# Patient Record
Sex: FEMALE | Employment: OTHER | URBAN - METROPOLITAN AREA
[De-identification: names, ages, dates, MRNs, and addresses within clinical notes are randomized per-mention and may not be internally consistent; named-entity substitution may affect disease eponyms.]

---

## 2017-05-24 ENCOUNTER — TELEPHONE (OUTPATIENT)
Dept: OBGYN | Facility: CLINIC | Age: 47
End: 2017-05-24

## 2017-05-24 NOTE — TELEPHONE ENCOUNTER
[facility] Call Center    Phone Message    Name of Caller: Sheryle    Phone Number: Cell number on file:    Telephone Information:   Mobile [phone]       Best time to return call: any    May a detailed message be left on voicemail: yes    Relation to patient: Self    Reason for Call: Other: Patient called being very insistent on being scheduled with Dr. Bosch for back pain. Informed patient that isnt not something Dr. Bosch sees for. Patient again very insistent on seeing Dr. Bosch in June for back pain. Patient is currently scheduled in [place] to see Dr. Bosch on 06/27/2017. Please advise,      Action Taken: Message routed to:  [facility] p [phone]

## 2017-05-25 NOTE — TELEPHONE ENCOUNTER
Called and left a message to call the clinic back. Per Dr. Bosch this PT needs to be seen in FP appointment needs to be canceled.  Juanita Palumbo, CMA

## 2017-05-25 NOTE — TELEPHONE ENCOUNTER
Patient returned call.  Informed patient that she would have to see family practice for her back pain.  Patient states she also has personal issues she would like to discuss with Dr. Bosch.  Patient states they are gyn related.  Informed patient that Dr. Bosch will see her for her personal issues but not back pain.  Patient would like to do research on the family practice providers and will call and schedule.  Keturah Tompkins CMA  May 25, 2017 11:58 AM

## 2017-06-12 ENCOUNTER — OFFICE VISIT (OUTPATIENT)
Dept: PEDIATRICS | Facility: CLINIC | Age: 47
End: 2017-06-12
Payer: COMMERCIAL

## 2017-06-12 VITALS
BODY MASS INDEX: 22.55 KG/M2 | HEIGHT: 64 IN | HEART RATE: 120 BPM | SYSTOLIC BLOOD PRESSURE: 111 MMHG | TEMPERATURE: 97.6 F | DIASTOLIC BLOOD PRESSURE: 70 MMHG | WEIGHT: 132.1 LBS | OXYGEN SATURATION: 95 %

## 2017-06-12 DIAGNOSIS — R10.9 RIGHT FLANK PAIN: Primary | ICD-10-CM

## 2017-06-12 DIAGNOSIS — E55.9 VITAMIN D INSUFFICIENCY: ICD-10-CM

## 2017-06-12 DIAGNOSIS — Z13.0 SCREENING, DEFICIENCY ANEMIA, IRON: ICD-10-CM

## 2017-06-12 DIAGNOSIS — Z13.6 CARDIOVASCULAR SCREENING; LDL GOAL LESS THAN 160: ICD-10-CM

## 2017-06-12 DIAGNOSIS — Z13.29 SCREENING FOR THYROID DISORDER: ICD-10-CM

## 2017-06-12 DIAGNOSIS — Z13.1 SCREENING FOR DIABETES MELLITUS: ICD-10-CM

## 2017-06-12 PROCEDURE — 99214 OFFICE O/P EST MOD 30 MIN: CPT | Performed by: NURSE PRACTITIONER

## 2017-06-12 NOTE — PATIENT INSTRUCTIONS
PLAN:   1.  Orders Placed This Encounter   Procedures     CT Abdomen Pelvis w/o Contrast     Lipid panel reflex to direct LDL     Comprehensive metabolic panel     Vitamin D Deficiency     TSH     T4 free     CBC with platelets     Vitamin B12     Ferritin       2. Patient needs to follow up in if no improvement,or sooner if worsening of symptoms or other symptoms develop.  FURTHER TESTING:       - CT abdomen and pelvis  FUTURE LABS:       - Schedule a fasting blood draw   Will follow up and/or notify patient of  results via My Chart to determine further need for followup      It was a pleasure seeing you today at the [facility]. Thank you for allowing us to care for you today. We truly hope we provided you with the excellent service you deserve. Please let us know if there is anything else we can do for you so we can be sure you are leaving completley satisfied with your care experience.       General information about your clinic   Clinic Hours Lab Hours (Appointments are required)   Mon-Thurs: 7:30 AM - 7 PM Mon-Thurs: 7:30 AM - 7 PM   Fri: 7:30 AM - 5 PM Fri: 7:30 AM - 5 PM        After Hours Nurse Advise & Appts:  [facility] Nurse Advisors: [phone]  [facility] On Call: to make appointments anytime: [phone] On Call Physician: call [phone] and answering service will page the on call physician.        For urgent appointments, please call [phone] and ask for the triage nurse or your care team clinic nurse.  How to contact my care team:  MyChart: [email or web address]   Phone: [phone]   Fax: [phone]       [facility]:   Phone: [phone]  Hours: 8:00 AM - 6:00 PM  Medication Refills:  Call your pharmacy and they will forward the refill to us. Please allow 3 business days for your refills to be completed.       Normal or non-critical lab and imaging results will be communicated to you by MyChart, letter or phone within 7 days.  If you do not hear  from us within 10 days, please call the clinic. If you have a critical or abnormal lab result, we will notify you by phone as soon as possible.       We now have PWIC (Pediatric Walk in Care)  Monday-Friday from 7:30-4. Simply walk in and be seen for your urgent needs like cough, fever, rash, diarrhea or vomiting, pink eye, UTI. No appointments needed. Ask one of the team for more information      -Your Care Team:    Dr. Stephanie Theodore - Internal Medicine/Pediatrics   Dr. Shirley Wesley - Family Medicine  Dr. Loli Nichols - Pediatrics  Katie Billy CNP - Family Practice Nurse Practitioner  Dr. Vale Santillan - Pediatrics  Dr. Aron Freedman - Internal Medicine

## 2017-06-12 NOTE — MR AVS SNAPSHOT
After Visit Summary   6/12/2017    Sheryle Cruse    MRN: [id number]           Patient Information     Date Of Birth          1970        Visit Information        Provider Department      6/12/2017 9:00 AM Katie Billy APRN CNP [facility]        Today's Diagnoses     Right flank pain    -  1    CARDIOVASCULAR SCREENING; LDL GOAL LESS THAN 160        Screening for thyroid disorder        Screening for diabetes mellitus        Vitamin D insufficiency        Screening, deficiency anemia, iron          Care Instructions    PLAN:   1.  Orders Placed This Encounter   Procedures     CT Abdomen Pelvis w/o Contrast     Lipid panel reflex to direct LDL     Comprehensive metabolic panel     Vitamin D Deficiency     TSH     T4 free     CBC with platelets     Vitamin B12     Ferritin       2. Patient needs to follow up in if no improvement,or sooner if worsening of symptoms or other symptoms develop.  FURTHER TESTING:       - CT abdomen and pelvis  FUTURE LABS:       - Schedule a fasting blood draw   Will follow up and/or notify patient of  results via My Chart to determine further need for followup      It was a pleasure seeing you today at the [facility]. Thank you for allowing us to care for you today. We truly hope we provided you with the excellent service you deserve. Please let us know if there is anything else we can do for you so we can be sure you are leaving completley satisfied with your care experience.       General information about your clinic   Clinic Hours Lab Hours (Appointments are required)   Mon-Thurs: 7:30 AM - 7 PM Mon-Thurs: 7:30 AM - 7 PM   Fri: 7:30 AM - 5 PM Fri: 7:30 AM - 5 PM        After Hours Nurse Advise & Appts:  [facility] Nurse Advisors: [phone]  [facility] On Call: to make appointments anytime: [phone] On Call Physician: call [phone] and answering service will page the on call physician.        For  urgent appointments, please call [phone] and ask for the triage nurse or your care team clinic nurse.  How to contact my care team:  Michael: [email or web address]   Phone: [phone]   Fax: [phone]       [facility]:   Phone: [phone]  Hours: 8:00 AM - 6:00 PM  Medication Refills:  Call your pharmacy and they will forward the refill to us. Please allow 3 business days for your refills to be completed.       Normal or non-critical lab and imaging results will be communicated to you by MyChart, letter or phone within 7 days.  If you do not hear from us within 10 days, please call the clinic. If you have a critical or abnormal lab result, we will notify you by phone as soon as possible.       We now have PWIC (Pediatric Walk in Care)  Monday-Friday from 7:30-4. Simply walk in and be seen for your urgent needs like cough, fever, rash, diarrhea or vomiting, pink eye, UTI. No appointments needed. Ask one of the team for more information      -Your Care Team:    Dr. Stephanie Theodore - Internal Medicine/Pediatrics   Dr. Shirley Wesley - Family Medicine  Dr. Loli Nichols - Pediatrics  Katie Billy CNP - Family Practice Nurse Practitioner  Dr. Vale Santillan - Pediatrics  Dr. Aron Freedman - Internal Medicine                      Follow-ups after your visit        Your next 10 appointments already scheduled     Jun 27, 2017  9:15 AM CDT   Office Visit with Griselda Bosch DO   [facility] ([facility])    [address] [place] [id number] [phone]           Bring a current list of meds and any records pertaining to this visit.  For Physicals, please bring immunization records and any forms needing to be filled out.  Please arrive 10 minutes early to complete paperwork.              Future tests that were ordered for you today     Open Future Orders        Priority Expected Expires Ordered    UA with Microscopic reflex to Culture Routine  6/12/2018  2017    CT Abdomen Pelvis w/o Contrast Routine 2017    Lipid panel reflex to direct LDL Routine  2017    Comprehensive metabolic panel Routine  2017    Vitamin D Deficiency Routine  2017    TSH Routine  2017    T4 free Routine  2017    CBC with platelets Routine  2017    Vitamin B12 Routine  2017    Ferritin Routine  2017            Who to contact     If you have questions or need follow up information about today's clinic visit or your schedule please contact [facility] directly at [phone].  Normal or non-critical lab and imaging results will be communicated to you by qunbhart, letter or phone within 4 business days after the clinic has received the results. If you do not hear from us within 7 days, please contact the clinic through qunbhart or phone. If you have a critical or abnormal lab result, we will notify you by phone as soon as possible.  Submit refill requests through [employer] or call your pharmacy and they will forward the refill request to us. Please allow 3 business days for your refill to be completed.          Additional Information About Your Visit        [employer] Information     [employer] is an electronic gateway that provides easy, online access to your medical records. With [employer], you can request a clinic appointment, read your test results, renew a prescription or communicate with your care team.     To sign up for [employer] visit the website at [email or web address]   You will be asked to enter the access code listed below, as well as some personal information. Please follow the directions to create your username and password.     Your access code is: [email or web address]  Expires: 9/10/2017  9:58 AM     Your access code will  in 90 days. If you need help or a new code, please contact your [facility]  [facility] or call [phone] for assistance.        Care EveryWhere ID     This is your Care EveryWhere ID. This could be used by other organizations to access your [facility] medical records  [id number]        Your Vitals Were     Pulse Temperature Height Pulse Oximetry Breastfeeding? BMI (Body Mass Index)    120 97.6  F (36.4  C) (Temporal) 5' 4\" (1.626 m) 95% No 22.67 kg/m2       Blood Pressure from Last 3 Encounters:   06/12/17 111/70   08/19/16 117/79   11/21/14 128/73    Weight from Last 3 Encounters:   06/12/17 132 lb 1.6 oz (59.9 kg)   09/27/16 130 lb (59 kg)   08/19/16 125 lb 8 oz (56.9 kg)               Primary Care Provider    [facility]       No address on file        Thank you!     Thank you for choosing [facility]  for your care. Our goal is always to provide you with excellent care. Hearing back from our patients is one way we can continue to improve our services. Please take a few minutes to complete the written survey that you may receive in the mail after your visit with us. Thank you!             Your Updated Medication List - Protect others around you: Learn how to safely use, store and throw away your medicines at www.disposemymeds.org.          This list is accurate as of: 6/12/17  9:59 AM.  Always use your most recent med list.                   Brand Name Dispense Instructions for use    CALCIUM MAGNESIUM PO      With zinc       CHROMIUM PICOLINATE PO          FOLIC ACID PO      Take 1 mg by mouth daily       MILK THISTLE PO      With dandelion root       ONE-A-DAY ESSENTIAL Tabs      Take 1 tablet by mouth daily.       UNABLE TO FIND      Take 1 tablet by mouth daily. hepacaps supplement       Vitamin B 12 100 MCG Lozg          VITAMIN D PO            "

## 2017-06-12 NOTE — PROGRESS NOTES
SUBJECTIVE:                                                    Sheryle Cruse is a 46 year old female who presents to clinic today for the following health issues:    Back Pain      Duration: worst in the 3 months        Specific cause: none    Description:   Location of pain: low back right, middle of back right and upper back right  Character of pain: tenderness   Pain radiation:radiates into the right buttocks into the pelvic regions-feels it more when eating  New numbness or weakness in legs, not attributed to pain:  no     Intensity: Currently 2-3/10    History:   Pain interferes with job: YES, makes it more difficult   History of back problems: recurrent self limited episodes of low back pain in the past  Any previous MRI or X-rays: Yes- at [facility].  Date 10/26/16  Sees a specialist for back pain:  No  Therapies tried without relief: Physical Therapy, stretching     Alleviating factors:   Improved by: feels better in the morning       Precipitating factors:  Worsened by: after eating , sitting     Functional and Psychosocial Screen (Noemi STarT Back):      Not performed today       Accompanying Signs & Symptoms:  Risk of Fracture:  None  Risk of Cauda Equina:  None  Risk of Infection:  None  Risk of Cancer:  None  Risk of Ankylosing Spondylitis:  Onset at age <35, male, AND morning back stiffness. no                  Has a history of periodical issues with her back before  Has done physical therapy in the past  Pain in high in upper back and will radiate down her right side almost down to her hip   Pain in the right flank region and then up the side   States has issues with her mid section since 2008   States has had tests in the past to rule out PCOS   Thought was cyst on ovary but that was in 2008   Now having lessoning of her menses.  Have had issues with her menses when younger   Last pap was done about 5 years ago and was done a clinic not in existence any longer     Problem list and histories reviewed  & adjusted, as indicated.  Additional history: as documented    Patient Active Problem List   Diagnosis     Vitamin D deficiency     Vegetarianism     Hyperlipidemia LDL goal <160     Advance care planning     Past Surgical History:   Procedure Laterality Date     Mammogram  2003, 2009       Social History   Substance Use Topics     Smoking status: Never Smoker     Smokeless tobacco: Never Used     Alcohol use No     Family History   Problem Relation Age of Onset     DIABETES Mother 76     Obese     HEART DISEASE Mother      Hypertension Mother      CEREBROVASCULAR DISEASE Mother      Cardiovascular Mother      CVA     CANCER Father      bladder     CEREBROVASCULAR DISEASE Father 70     Breast Cancer Maternal Grandmother      CEREBROVASCULAR DISEASE Maternal Grandmother      Lipids Maternal Grandmother      HEART DISEASE Maternal Grandfather      heart disease     DIABETES Paternal Grandmother      CEREBROVASCULAR DISEASE Paternal Grandfather      HEART DISEASE Paternal Grandfather          Current Outpatient Prescriptions   Medication Sig Dispense Refill     FOLIC ACID PO Take 1 mg by mouth daily       MILK THISTLE PO With dandelion root       CHROMIUM PICOLINATE PO        Calcium-Magnesium-Vitamin D (CALCIUM MAGNESIUM PO) With zinc       Cyanocobalamin (VITAMIN B 12) 100 MCG LOZG        Cholecalciferol (VITAMIN D PO)        Multiple Vitamin (ONE-A-DAY ESSENTIAL) TABS Take 1 tablet by mouth daily.       UNABLE TO FIND Take 1 tablet by mouth daily. hepacaps supplement       Allergies   Allergen Reactions     Pcn [Bicillin C-R,] Rash     swelling       Reviewed and updated as needed this visit by clinical staff  Tobacco  Allergies  Meds  Med Hx  Surg Hx  Fam Hx  Soc Hx      Reviewed and updated as needed this visit by Provider         ROS:  CONSTITUTIONAL:POSITIVE  for fatigue and NEGATIVE  for anorexia  INTEGUMENTARY/SKIN: NEGATIVE for rash   ENT/MOUTH: NEGATIVE for ear, mouth and throat  "problems  RESP:NEGATIVE for significant cough or SOB  CV: NEGATIVE for chest pain, palpitations or peripheral edema  GI: POSITIVE for right flank pain  and NEGATIVE for nausea, poor appetite, vomiting and weight loss  MUSCULOSKELETAL: NEGATIVE for significant arthralgias or myalgia  NEURO: NEGATIVE for weakness, dizziness or paresthesias  ENDOCRINE: POSITIVE  for hair loss and history of PCOS  and NEGATIVE for polydipsia, polyphagia, weight gain and weight loss  PSYCHIATRIC: POSITIVE foranxiety and Hx anxiety and NEGATIVE forthoughts of hurting someone else and thoughts of self harm      OBJECTIVE:                                                    /70 (BP Location: Right arm, Patient Position: Sitting, Cuff Size: Adult Regular)  Pulse 120  Temp 97.6  F (36.4  C) (Temporal)  Ht 5' 4\" (1.626 m)  Wt 132 lb 1.6 oz (59.9 kg)  SpO2 95%  Breastfeeding? No  BMI 22.67 kg/m2  Body mass index is 22.67 kg/(m^2).  GENERAL APPEARANCE: alert, active and no distress  RESP: lungs clear to auscultation - no rales, rhonchi or wheezes  CV: regular rates and rhythm and no murmur, click or rub  MS: extremities normal- no gross deformities noted and peripheral pulses normal  SKIN: no suspicious lesions or rashes  NEURO: Normal strength and tone, mentation intact and speech normal  PSYCH: alertness: alert, affect: anxious, thought content exhibits flight of ideas,mentation appears normal., patient appears normal, good hygiene, oriented X 3  MENTAL STATUS EXAM:  Appearance/Behavior: No apparent distress, Casually groomed and Dressed appropriately for weather  Speech: Rambling  Mood/Affect: anxiety  Insight: Adequate and Fair    Diagnostic Test Results:  Recent Results (from the past 504 hour(s))   UA with Microscopic reflex to Culture    Collection Time: 06/16/17  7:49 AM   Result Value Ref Range    Color Urine Straw     Appearance Urine Clear     Glucose Urine Negative NEG mg/dL    Bilirubin Urine Negative NEG    Ketones " Urine Negative NEG mg/dL    Specific Gravity Urine 1.000 (L) 1.003 - 1.035    Blood Urine Negative NEG    pH Urine 7.0 5.0 - 7.0 pH    Protein Albumin Urine Negative NEG mg/dL    Urobilinogen mg/dL Normal 0.0 - 2.0 mg/dL    Nitrite Urine Negative NEG    Leukocyte Esterase Urine Trace (A) NEG    Source Midstream Urine     WBC Urine 2-5 (A) 0 - 2 /HPF    RBC Urine O - 2 0 - 2 /HPF    Bacteria Urine Few (A) NEG /HPF    Squamous Epithelial /LPF Urine Few FEW /LPF   Lipid panel reflex to direct LDL    Collection Time: 06/16/17  7:50 AM   Result Value Ref Range    Cholesterol 178 <200 mg/dL    Triglycerides 119 <150 mg/dL    HDL Cholesterol 57 >49 mg/dL    LDL Cholesterol Calculated 97 <100 mg/dL    Non HDL Cholesterol 121 <130 mg/dL   Comprehensive metabolic panel    Collection Time: 06/16/17  7:50 AM   Result Value Ref Range    Sodium 135 133 - 144 mmol/L    Potassium 4.3 3.4 - 5.3 mmol/L    Chloride 100 94 - 109 mmol/L    Carbon Dioxide 27 20 - 32 mmol/L    Anion Gap 8 3 - 14 mmol/L    Glucose 95 70 - 99 mg/dL    Urea Nitrogen 4 (L) 7 - 30 mg/dL    Creatinine 0.56 0.52 - 1.04 mg/dL    GFR Estimate >90  Non  GFR Calc   >60 mL/min/1.7m2    GFR Estimate If Black >90   GFR Calc   >60 mL/min/1.7m2    Calcium 9.0 8.5 - 10.1 mg/dL    Bilirubin Total 0.8 0.2 - 1.3 mg/dL    Albumin 4.1 3.4 - 5.0 g/dL    Protein Total 7.9 6.8 - 8.8 g/dL    Alkaline Phosphatase 54 40 - 150 U/L    ALT 24 0 - 50 U/L    AST 21 0 - 45 U/L   Vitamin D Deficiency    Collection Time: 06/16/17  7:50 AM   Result Value Ref Range    Vitamin D Deficiency screening 31 20 - 75 ug/L   TSH    Collection Time: 06/16/17  7:50 AM   Result Value Ref Range    TSH 1.40 0.40 - 4.00 mU/L   T4 free    Collection Time: 06/16/17  7:50 AM   Result Value Ref Range    T4 Free 1.16 0.76 - 1.46 ng/dL   CBC with platelets    Collection Time: 06/16/17  7:50 AM   Result Value Ref Range    WBC 6.2 4.0 - 11.0 10e9/L    RBC Count 4.06 3.8 - 5.2 10e12/L     Hemoglobin 12.7 11.7 - 15.7 g/dL    Hematocrit 36.3 35.0 - 47.0 %    MCV 89 78 - 100 fl    MCH 31.3 26.5 - 33.0 pg    MCHC 35.0 31.5 - 36.5 g/dL    RDW 12.7 10.0 - 15.0 %    Platelet Count 364 150 - 450 10e9/L   Vitamin B12    Collection Time: 06/16/17  7:50 AM   Result Value Ref Range    Vitamin B12 947 193 - 986 pg/mL   Ferritin    Collection Time: 06/16/17  7:50 AM   Result Value Ref Range    Ferritin 161 8 - 252 ng/mL          Recent Results (from the past 744 hour(s))   CT Abdomen Pelvis w/o Contrast    Narrative    CT abdomen/pelvis without contrast, Renal Stone Protocol, 6/16/2017.    Comparison: Not available.    History: Unspecified abdominal pain. Right flank pain    Technique: Routine images from the level of the diaphragm through the  pelvis were obtained without contrast.    Total DLP: 463 mGy*cm.    Findings:  Kidneys and collecting systems:  Normal-appearing kidneys without evidence of calculi in both kidneys  and ureters. No hydronephrosis. Urinary bladder is distended and  unremarkable.    Remainder of abdomen and pelvis. Normal nonenhanced appearance of the  liver. The gallbladder is unremarkable. Pancreas, spleen, and adrenals  are within normal limits. Moderate colonic stool burden. No bowel  obstruction. Normal appendix. The uterus and the adnexa appear grossly  unremarkable. A small free fluid within the pelvis likely physiologic.      Lower chest:  Visualized lung bases are clear.    Bones:  Lumbar spine spondylosis more pronounced at level L4-5. Scattered  sclerotic foci likely represent bone islands. Sacral Tarlov cysts. No  acute or aggressive appearing bone lesion.      Impression    Impression:  1. No calculi in either kidney or collecting system.  2. Small volume free fluid in the pelvis, most likely physiologic.  3. Otherwise no CT findings to explain the patient's symptoms.    I have personally reviewed the examination and initial interpretation  and I agree with the  findings.    KANDI GILLIAM MD       ASSESSMENT/PLAN:                                                        Sheryle was seen today for back pain.    Diagnoses and all orders for this visit:    Right flank pain  -     Comprehensive metabolic panel; Future  -     CBC with platelets; Future  -     CT Abdomen Pelvis w/o Contrast; Future  -     UA with Microscopic reflex to Culture; Future    CARDIOVASCULAR SCREENING; LDL GOAL LESS THAN 160  -     Lipid panel reflex to direct LDL; Future    Screening for thyroid disorder  -     TSH; Future  -     T4 free; Future    Screening for diabetes mellitus  -     Comprehensive metabolic panel; Future    Vitamin D insufficiency  -     Vitamin D Deficiency; Future    Screening, deficiency anemia, iron  -     CBC with platelets; Future  -     Vitamin B12; Future  -     Ferritin; Future    PLAN:   Patient needs to follow up in if no improvement,or sooner if worsening of symptoms or other symptoms develop.  FURTHER TESTING:       - CT abdomen and pelvis  FUTURE LABS:       - Schedule a fasting blood draw   Will follow up and/or notify patient of  results via My Chart to determine further need for followup    See Patient Instructions    GILDARDO Michaels [facility]

## 2017-06-12 NOTE — NURSING NOTE
"Chief Complaint   Patient presents with     Back Pain     ongoing right sided back pain, worst since x 3 months       Initial /70 (BP Location: Right arm, Patient Position: Sitting, Cuff Size: Adult Regular)  Pulse 120  Temp 97.6  F (36.4  C) (Temporal)  Ht 5' 4\" (1.626 m)  Wt 132 lb 1.6 oz (59.9 kg)  SpO2 95%  Breastfeeding? No  BMI 22.67 kg/m2 Estimated body mass index is 22.67 kg/(m^2) as calculated from the following:    Height as of this encounter: 5' 4\" (1.626 m).    Weight as of this encounter: 132 lb 1.6 oz (59.9 kg).  Medication Reconciliation: complete      EDIE Ley      "

## 2017-06-16 ENCOUNTER — RADIANT APPOINTMENT (OUTPATIENT)
Dept: CT IMAGING | Facility: CLINIC | Age: 47
End: 2017-06-16
Attending: NURSE PRACTITIONER
Payer: COMMERCIAL

## 2017-06-16 DIAGNOSIS — E55.9 VITAMIN D INSUFFICIENCY: ICD-10-CM

## 2017-06-16 DIAGNOSIS — Z13.1 SCREENING FOR DIABETES MELLITUS: ICD-10-CM

## 2017-06-16 DIAGNOSIS — R10.9 RIGHT FLANK PAIN: ICD-10-CM

## 2017-06-16 DIAGNOSIS — Z13.6 CARDIOVASCULAR SCREENING; LDL GOAL LESS THAN 160: ICD-10-CM

## 2017-06-16 DIAGNOSIS — Z13.0 SCREENING, DEFICIENCY ANEMIA, IRON: ICD-10-CM

## 2017-06-16 DIAGNOSIS — Z13.29 SCREENING FOR THYROID DISORDER: ICD-10-CM

## 2017-06-16 LAB
ALBUMIN SERPL-MCNC: 4.1 G/DL (ref 3.4–5)
ALBUMIN UR-MCNC: NEGATIVE MG/DL
ALP SERPL-CCNC: 54 U/L (ref 40–150)
ALT SERPL W P-5'-P-CCNC: 24 U/L (ref 0–50)
ANION GAP SERPL CALCULATED.3IONS-SCNC: 8 MMOL/L (ref 3–14)
APPEARANCE UR: CLEAR
AST SERPL W P-5'-P-CCNC: 21 U/L (ref 0–45)
BACTERIA #/AREA URNS HPF: ABNORMAL /HPF
BILIRUB SERPL-MCNC: 0.8 MG/DL (ref 0.2–1.3)
BILIRUB UR QL STRIP: NEGATIVE
BUN SERPL-MCNC: 4 MG/DL (ref 7–30)
CALCIUM SERPL-MCNC: 9 MG/DL (ref 8.5–10.1)
CHLORIDE SERPL-SCNC: 100 MMOL/L (ref 94–109)
CHOLEST SERPL-MCNC: 178 MG/DL
CO2 SERPL-SCNC: 27 MMOL/L (ref 20–32)
COLOR UR AUTO: ABNORMAL
CREAT SERPL-MCNC: 0.56 MG/DL (ref 0.52–1.04)
DEPRECATED CALCIDIOL+CALCIFEROL SERPL-MC: 31 UG/L (ref 20–75)
ERYTHROCYTE [DISTWIDTH] IN BLOOD BY AUTOMATED COUNT: 12.7 % (ref 10–15)
FERRITIN SERPL-MCNC: 161 NG/ML (ref 8–252)
GFR SERPL CREATININE-BSD FRML MDRD: ABNORMAL ML/MIN/1.7M2
GLUCOSE SERPL-MCNC: 95 MG/DL (ref 70–99)
GLUCOSE UR STRIP-MCNC: NEGATIVE MG/DL
HCT VFR BLD AUTO: 36.3 % (ref 35–47)
HDLC SERPL-MCNC: 57 MG/DL
HGB BLD-MCNC: 12.7 G/DL (ref 11.7–15.7)
HGB UR QL STRIP: NEGATIVE
KETONES UR STRIP-MCNC: NEGATIVE MG/DL
LDLC SERPL CALC-MCNC: 97 MG/DL
LEUKOCYTE ESTERASE UR QL STRIP: ABNORMAL
MCH RBC QN AUTO: 31.3 PG (ref 26.5–33)
MCHC RBC AUTO-ENTMCNC: 35 G/DL (ref 31.5–36.5)
MCV RBC AUTO: 89 FL (ref 78–100)
NITRATE UR QL: NEGATIVE
NON-SQ EPI CELLS #/AREA URNS LPF: ABNORMAL /LPF
NONHDLC SERPL-MCNC: 121 MG/DL
PH UR STRIP: 7 PH (ref 5–7)
PLATELET # BLD AUTO: 364 10E9/L (ref 150–450)
POTASSIUM SERPL-SCNC: 4.3 MMOL/L (ref 3.4–5.3)
PROT SERPL-MCNC: 7.9 G/DL (ref 6.8–8.8)
RBC # BLD AUTO: 4.06 10E12/L (ref 3.8–5.2)
RBC #/AREA URNS AUTO: ABNORMAL /HPF (ref 0–2)
SODIUM SERPL-SCNC: 135 MMOL/L (ref 133–144)
SP GR UR STRIP: 1 (ref 1–1.03)
T4 FREE SERPL-MCNC: 1.16 NG/DL (ref 0.76–1.46)
TRIGL SERPL-MCNC: 119 MG/DL
TSH SERPL DL<=0.05 MIU/L-ACNC: 1.4 MU/L (ref 0.4–4)
URN SPEC COLLECT METH UR: ABNORMAL
UROBILINOGEN UR STRIP-MCNC: NORMAL MG/DL (ref 0–2)
VIT B12 SERPL-MCNC: 947 PG/ML (ref 193–986)
WBC # BLD AUTO: 6.2 10E9/L (ref 4–11)
WBC #/AREA URNS AUTO: ABNORMAL /HPF (ref 0–2)

## 2017-06-16 PROCEDURE — 74176 CT ABD & PELVIS W/O CONTRAST: CPT | Performed by: RADIOLOGY

## 2017-06-16 PROCEDURE — 80053 COMPREHEN METABOLIC PANEL: CPT | Performed by: NURSE PRACTITIONER

## 2017-06-16 PROCEDURE — 81001 URINALYSIS AUTO W/SCOPE: CPT | Performed by: NURSE PRACTITIONER

## 2017-06-16 PROCEDURE — 82306 VITAMIN D 25 HYDROXY: CPT | Performed by: NURSE PRACTITIONER

## 2017-06-16 PROCEDURE — 82607 VITAMIN B-12: CPT | Performed by: NURSE PRACTITIONER

## 2017-06-16 PROCEDURE — 85027 COMPLETE CBC AUTOMATED: CPT | Performed by: NURSE PRACTITIONER

## 2017-06-16 PROCEDURE — 84443 ASSAY THYROID STIM HORMONE: CPT | Performed by: NURSE PRACTITIONER

## 2017-06-16 PROCEDURE — 36415 COLL VENOUS BLD VENIPUNCTURE: CPT | Performed by: NURSE PRACTITIONER

## 2017-06-16 PROCEDURE — 80061 LIPID PANEL: CPT | Performed by: NURSE PRACTITIONER

## 2017-06-16 PROCEDURE — 82728 ASSAY OF FERRITIN: CPT | Performed by: NURSE PRACTITIONER

## 2017-06-16 PROCEDURE — 84439 ASSAY OF FREE THYROXINE: CPT | Performed by: NURSE PRACTITIONER

## 2017-06-19 ENCOUNTER — TELEPHONE (OUTPATIENT)
Dept: PEDIATRICS | Facility: CLINIC | Age: 47
End: 2017-06-19

## 2017-06-19 DIAGNOSIS — R30.0 DYSURIA: Primary | ICD-10-CM

## 2017-06-19 NOTE — TELEPHONE ENCOUNTER
Patient given lab results from 6/16/17.  Patient had questioned what the UA results are?  She states seh is still having discomfort and a crowded feeling.  She has increased her consumption of water.  She does have an appt the OB/GYN next Tuesday.   Will send to Cecilia Billy to advise.          LAB RESULTS FROM 6/16/17:    -Liver and gallbladder tests are normal. (ALT,AST, Alk phos, bilirubin), kidney function is normal (Cr, GFR), Sodium is normal, Potassium is normal, Calcium is normal, Glucose is normal (diabetes screening test).   -Cholesterol levels (LDL,HDL, Triglycerides) are normal.  ADVISE: rechecking in 1 year.  -TSH (thyroid stimulating hormone) level is normal which indicates normal thyroid function.  -Normal red blood cell (hgb) levels, normal white blood cell count and normal platelet levels.  -Ferritin (iron) level is normal.  -Vitamin D level is normal, 1000 IU daily in diet or supplements is recommended.

## 2017-06-19 NOTE — TELEPHONE ENCOUNTER
Patient notified.  She states she will keep in touch if she would like a repeat UA/UC.  She wants to get this next appt with gyn completed first.  Informed patient that if she is still having symptoms next week the GYn provider can order a UA if deemed appropriate.      Shira Pinedo RN,   [facility], [facility]

## 2017-06-19 NOTE — TELEPHONE ENCOUNTER
I sent a letter with her results   Can we read off the results for her noted on the letter.   Thanks

## 2017-06-19 NOTE — TELEPHONE ENCOUNTER
Urine did not look like a urine infection. Urine was obviously dilute as looks like she has been drinking plenty of fluids  If still having symptoms I do not mind repeating this and adding a culture just to make sure no infection

## 2017-06-19 NOTE — LETTER
July 3, 2017      Sheryle Cruse  [address]   [place] [facility] [address]              Dear Sheryle,    In order to ensure that we are providing the best quality care, we would like to remind you that you are due for a lab appointment for the following UA that was recently ordered by Cecilia Billy. Please let us know if you have any questions and we would be happy to help.     Thank you for trusting us with your care.    Sincerely,     [facility]  [phone]

## 2017-06-19 NOTE — TELEPHONE ENCOUNTER
Notes Recorded by Katie Billy APRN CNP on 6/18/2017 at 11:44 PM  Please let patient know CT scan negative for abnormalities that would explain her symptoms

## 2017-06-19 NOTE — TELEPHONE ENCOUNTER
Patient advised of information below per Cecilia Billy regarding CT results.    Patient wondering what her lab results are?  Advised patient I will ask Cecilia Billy and call pt back later today.    Whitney Wakefield CMA

## 2017-06-27 ENCOUNTER — OFFICE VISIT (OUTPATIENT)
Dept: OBGYN | Facility: CLINIC | Age: 47
End: 2017-06-27
Payer: COMMERCIAL

## 2017-06-27 VITALS
HEART RATE: 85 BPM | BODY MASS INDEX: 23 KG/M2 | DIASTOLIC BLOOD PRESSURE: 78 MMHG | SYSTOLIC BLOOD PRESSURE: 129 MMHG | WEIGHT: 134 LBS

## 2017-06-27 DIAGNOSIS — M54.5 LOW BACK PAIN, UNSPECIFIED BACK PAIN LATERALITY, UNSPECIFIED CHRONICITY, WITH SCIATICA PRESENCE UNSPECIFIED: ICD-10-CM

## 2017-06-27 DIAGNOSIS — Z12.31 VISIT FOR SCREENING MAMMOGRAM: ICD-10-CM

## 2017-06-27 DIAGNOSIS — R10.2 PELVIC PAIN IN FEMALE: Primary | ICD-10-CM

## 2017-06-27 DIAGNOSIS — N63.0 LUMP IN FEMALE BREAST: ICD-10-CM

## 2017-06-27 PROCEDURE — 99203 OFFICE O/P NEW LOW 30 MIN: CPT | Performed by: OBSTETRICS & GYNECOLOGY

## 2017-06-27 ASSESSMENT — PAIN SCALES - GENERAL: PAINLEVEL: NO PAIN (0)

## 2017-06-27 NOTE — MR AVS SNAPSHOT
After Visit Summary   6/27/2017    Sheryle Cruse    MRN: [id number]           Patient Information     Date Of Birth          1970        Visit Information        Provider Department      6/27/2017 9:15 AM Griselda Bosch DO [facility]        Today's Diagnoses     Pelvic pain in female    -  1    Visit for screening mammogram        Low back pain, unspecified back pain laterality, unspecified chronicity, with sciatica presence unspecified          Care Instructions    Please call if you any questions.    [facility]  [address] [place]   [address]  [phone]        Griselda Bosch          Follow-ups after your visit        Follow-up notes from your care team     Return in about 4 weeks (around 7/25/2017).      Future tests that were ordered for you today     Open Future Orders        Priority Expected Expires Ordered    *MA Screening Digital Bilateral Routine  9/29/2017 6/27/2017            Who to contact     If you have questions or need follow up information about today's clinic visit or your schedule please contact [facility] directly at [phone].  Normal or non-critical lab and imaging results will be communicated to you by MyChart, letter or phone within 4 business days after the clinic has received the results. If you do not hear from us within 7 days, please contact the clinic through [facility] or phone. If you have a critical or abnormal lab result, we will notify you by phone as soon as possible.  Submit refill requests through [facility] or call your pharmacy and they will forward the refill request to us. Please allow 3 business days for your refill to be completed.          Additional Information About Your Visit        MyChart Information     [facility] lets you send messages to your doctor, view your test results, renew your prescriptions, schedule appointments and more. To sign up, go to [email or web address]  ". Click on \"Log in\" on the left side of the screen, which will take you to the Welcome page. Then click on \"Sign up Now\" on the right side of the page.     You will be asked to enter the access code listed below, as well as some personal information. Please follow the directions to create your username and password.     Your access code is: [email or web address]  Expires: 9/10/2017  9:58 AM     Your access code will  in 90 days. If you need help or a new code, please call your [place] clinic or [phone].        Care EveryWhere ID     This is your Care EveryWhere ID. This could be used by other organizations to access your [place] medical records  [id number]        Your Vitals Were     Pulse Last Period BMI (Body Mass Index)             85 2017 23 kg/m2          Blood Pressure from Last 3 Encounters:   17 129/78   17 111/70   16 117/79    Weight from Last 3 Encounters:   17 60.8 kg (134 lb)   17 59.9 kg (132 lb 1.6 oz)   16 59 kg (130 lb)               Primary Care Provider    [facility]       No address on file        Equal Access to Services     JORGE HERNÁNDEZ AH: Hadii cecy guerreroo Soaquilesali, waaxda luqadaha, qaybta kaalmada adeegyada, malinda murry. So [facility] [phone].    ATENCIÓN: Si habla español, tiene a spear disposición servicios gratuitos de asistencia lingüística. Llame al [phone].    We comply with applicable federal civil rights laws and [place] laws. We do not discriminate on the basis of race, color, national origin, age, disability sex, sexual orientation or gender identity.            Thank you!     Thank you for choosing [facility]  for your care. Our goal is always to provide you with excellent care. Hearing back from our patients is one way we can continue to improve our services. Please take a few minutes to complete the written survey that you may receive in the mail after your " visit with us. Thank you!             Your Updated Medication List - Protect others around you: Learn how to safely use, store and throw away your medicines at www.disposemymeds.org.          This list is accurate as of: 6/27/17  9:43 AM.  Always use your most recent med list.                   Brand Name Dispense Instructions for use Diagnosis    CALCIUM MAGNESIUM PO      With zinc    RLQ abdominal pain       CHROMIUM PICOLINATE PO       RLQ abdominal pain       FOLIC ACID PO      Take 1 mg by mouth daily        MILK THISTLE PO      With dandelion root    RLQ abdominal pain       ONE-A-DAY ESSENTIAL Tabs      Take 1 tablet by mouth daily.        UNABLE TO FIND      Take 1 tablet by mouth daily. hepacaps supplement        Vitamin B 12 100 MCG Lozg       RLQ abdominal pain       VITAMIN D PO       RLQ abdominal pain

## 2017-06-27 NOTE — PROGRESS NOTES
"Subjective  46 year old non-pregnant female presents today with many complaints.  In 2008 patient had a workup in [place] due to pelvic discomfort.  Patient was told she had ovarian cysts.  Patient has always had irregular cycles.  Normally her cycles are around 40-60 days.  Her last menstrual period is now.  The last few cycles have been 28 days.  Patient has noticed a \"dent\" in her abdominal region and is concerned.  She has hair loss as well.  On 6/15 patient noticed a lump on her right breast.  She has fibrocystic breast disease and states she always gets called back after a mammogram for more imaging.  Patient is not wanting to be on any form of birth control.  No children-no desire.  Patient has lower back pain as well.  She will see FP for this.  Patient is due for a pap smear today but is refusing a pelvic exam.  She states she doesn't have time for this.  We discussed the importance of this and she will make a follow up appointment.  We reviewed her CT.          ROS: 10 point ROS neg other than the symptoms noted above in the HPI.  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  U/s abd/pelvist     Vitamin D deficiency      Past Surgical History:   Procedure Laterality Date     Mammogram  2003, 2009     Family History   Problem Relation Age of Onset     DIABETES Mother 76     Obese     HEART DISEASE Mother      Hypertension Mother      CEREBROVASCULAR DISEASE Mother      Cardiovascular Mother      CVA     CANCER Father      bladder     CEREBROVASCULAR DISEASE Father 70     Breast Cancer Maternal Grandmother      CEREBROVASCULAR DISEASE Maternal Grandmother      Lipids Maternal Grandmother      HEART DISEASE Maternal Grandfather      heart disease     DIABETES Paternal Grandmother      CEREBROVASCULAR DISEASE Paternal Grandfather  "     HEART DISEASE Paternal Grandfather      Social History   Substance Use Topics     Smoking status: Never Smoker     Smokeless tobacco: Never Used     Alcohol use No         Objective  Vitals: /78  Pulse 85  Wt 60.8 kg (134 lb)  LMP 06/24/2017  BMI 23 kg/m2  BMI= Body mass index is 23 kg/(m^2).    General appearance=mood is stable, no deformities noted  Breast:  Benign exam, no masses palpated.  Dense tissue noted.  No skin changes, no axillary lymphadenopathy, no nipple discharge.  Axilla feel completely normal, no lymph node enlargement and non-tender.  Neck=overall appearance is normal  Abd=soft, Nontender/nondistended, +bowel sounds x4, no masses, no signs of hernias, no evidence of hepatosplenomegaly    CT abdomen=6/16/17:  Findings:  Kidneys and collecting systems:  Normal-appearing kidneys without evidence of calculi in both kidneys  and ureters. No hydronephrosis. Urinary bladder is distended and  unremarkable.     Remainder of abdomen and pelvis. Normal nonenhanced appearance of the  liver. The gallbladder is unremarkable. Pancreas, spleen, and adrenals  are within normal limits. Moderate colonic stool burden. No bowel  obstruction. Normal appendix. The uterus and the adnexa appear grossly  unremarkable. A small free fluid within the pelvis likely physiologic.        Lower chest:  Visualized lung bases are clear.     Bones:  Lumbar spine spondylosis more pronounced at level L4-5. Scattered  sclerotic foci likely represent bone islands. Sacral Tarlov cysts. No  acute or aggressive appearing bone lesion.         Impression:  1. No calculi in either kidney or collecting system.  2. Small volume free fluid in the pelvis, most likely physiologic.  3. Otherwise no CT findings to explain the patient's symptoms.      Assessment  1.)  Abdominal pain  2.)  Fibrocystic breasts  3.)  Hair loss  4.)  Lower back pain  5.)  Due for pap smear      Plan  1.)  Schedule appointment for pap smear  2.)  Mammogram  ordered      35 minutes was spent face to face with the patient today discussing her history, diagnosis, and follow-up plan as noted above.  Over 50% of the visit was spent in counseling and coordination of care.    Total Visit Time: 40 minutes including counseling and physical exam not including time spent on the procedure.    Nursing notes read and reviewed    Griselda Bosch

## 2017-06-27 NOTE — NURSING NOTE
"Chief Complaint   Patient presents with     Consult     Consult for personal issues   (  Pap  due ?   Last Pap 9/1/13 in [facility] [place] )       Initial /78  Pulse 85  Wt 134 lb (60.8 kg)  LMP 06/24/2017  BMI 23 kg/m2 Estimated body mass index is 23 kg/(m^2) as calculated from the following:    Height as of 6/12/17: 5' 4\" (1.626 m).    Weight as of this encounter: 134 lb (60.8 kg).  Medication Reconciliation: complete       Discuss PCOS/Cystic breasts  "

## 2017-06-27 NOTE — PATIENT INSTRUCTIONS
Please call if you any questions.    [facility]  [address] [place]   [address]  [phone]        Griselda Bosch

## 2017-06-30 ENCOUNTER — RADIANT APPOINTMENT (OUTPATIENT)
Dept: MAMMOGRAPHY | Facility: CLINIC | Age: 47
End: 2017-06-30
Attending: OBSTETRICS & GYNECOLOGY
Payer: COMMERCIAL

## 2017-06-30 ENCOUNTER — RADIANT APPOINTMENT (OUTPATIENT)
Dept: ULTRASOUND IMAGING | Facility: CLINIC | Age: 47
End: 2017-06-30
Attending: OBSTETRICS & GYNECOLOGY
Payer: COMMERCIAL

## 2017-06-30 DIAGNOSIS — N63.0 LUMP IN FEMALE BREAST: ICD-10-CM

## 2017-06-30 PROCEDURE — G0204 DX MAMMO INCL CAD BI: HCPCS | Performed by: STUDENT IN AN ORGANIZED HEALTH CARE EDUCATION/TRAINING PROGRAM

## 2017-06-30 PROCEDURE — 76642 ULTRASOUND BREAST LIMITED: CPT | Mod: RT | Performed by: STUDENT IN AN ORGANIZED HEALTH CARE EDUCATION/TRAINING PROGRAM

## 2017-07-07 ENCOUNTER — RADIANT APPOINTMENT (OUTPATIENT)
Dept: ULTRASOUND IMAGING | Facility: CLINIC | Age: 47
End: 2017-07-07
Attending: OBSTETRICS & GYNECOLOGY
Payer: COMMERCIAL

## 2017-07-07 DIAGNOSIS — N63.0 LUMP IN FEMALE BREAST: ICD-10-CM

## 2017-07-07 PROCEDURE — 88342 IMHCHEM/IMCYTCHM 1ST ANTB: CPT | Mod: 59 | Performed by: FAMILY MEDICINE

## 2017-07-07 PROCEDURE — 88377 M/PHMTRC ALYS ISHQUANT/SEMIQ: CPT | Performed by: FAMILY MEDICINE

## 2017-07-07 PROCEDURE — 88360 TUMOR IMMUNOHISTOCHEM/MANUAL: CPT | Performed by: FAMILY MEDICINE

## 2017-07-07 PROCEDURE — 00000158 ZZHCL STATISTIC H-FISH PROCESS B/S: Performed by: FAMILY MEDICINE

## 2017-07-07 PROCEDURE — 00000159 ZZHCL STATISTIC H-SEND OUTS PREP: Performed by: FAMILY MEDICINE

## 2017-07-07 PROCEDURE — 19083 BX BREAST 1ST LESION US IMAG: CPT | Mod: RT

## 2017-07-07 PROCEDURE — 88305 TISSUE EXAM BY PATHOLOGIST: CPT | Performed by: FAMILY MEDICINE

## 2017-07-11 ENCOUNTER — PRE VISIT (OUTPATIENT)
Dept: SURGERY | Facility: CLINIC | Age: 47
End: 2017-07-11

## 2017-07-11 ENCOUNTER — TELEPHONE (OUTPATIENT)
Dept: GENERAL RADIOLOGY | Facility: CLINIC | Age: 47
End: 2017-07-11

## 2017-07-11 LAB — COPATH REPORT: NORMAL

## 2017-07-11 NOTE — TELEPHONE ENCOUNTER
PREVISIT INFORMATION                                                    Sheryle Cruse is scheduled for future visit with Dr. Gonzalez on 7/13/17 at 3:40pm the [facility] specialty clinics.    Reason for visit: new breast cancer  Referring provider: Radiology referral  Have images been done? Yes   Location: [facility] available in Epic   Date: 7/7/17  Previous pathology reports? No  Have previous office records been requested? No  Has the patient seen Dr. Gonzalez in the past? (for old records): No    REVIEW                                                      New patient packet mailed to patient: No, to be given at check in  Medication reconciliation complete: No    PLAN/FOLLOW-UP NEEDED                                                      Previsit review complete.  Patient will see provider at future scheduled appointment.     Patient Reminders Given:    Informed patient to bring an updated list of allergies, medications, pharmacy details and insurance information. Directed patient to come to the 2nd floor, check-in #4 for their appointment. Informed patient to call back if appointment needs to be cancelled or rescheduled at [phone].    Reminded patient to bring any outside records regarding this appointment or have them faxed to clinic at [phone].      Esther Valerio LPN

## 2017-07-11 NOTE — TELEPHONE ENCOUNTER
Spoke with patient regarding right breast biopsy results, which indicate invasive breast cancer. Notified pt that the radiologist recommendation is surgical consultation. This has been scheduled for 7/13/17 with Dr. Birdie Gonzalez. Pt verbalized understanding of these results and all questions answered to her satisfaction.

## 2017-07-13 ENCOUNTER — OFFICE VISIT (OUTPATIENT)
Dept: SURGERY | Facility: CLINIC | Age: 47
End: 2017-07-13
Payer: COMMERCIAL

## 2017-07-13 VITALS
SYSTOLIC BLOOD PRESSURE: 117 MMHG | BODY MASS INDEX: 22.18 KG/M2 | HEIGHT: 64 IN | DIASTOLIC BLOOD PRESSURE: 83 MMHG | HEART RATE: 100 BPM | WEIGHT: 129.9 LBS

## 2017-07-13 DIAGNOSIS — Z17.0 MALIGNANT NEOPLASM OF UPPER-INNER QUADRANT OF RIGHT BREAST IN FEMALE, ESTROGEN RECEPTOR POSITIVE (H): Primary | ICD-10-CM

## 2017-07-13 DIAGNOSIS — C50.211 MALIGNANT NEOPLASM OF UPPER-INNER QUADRANT OF RIGHT BREAST IN FEMALE, ESTROGEN RECEPTOR POSITIVE (H): Primary | ICD-10-CM

## 2017-07-13 PROCEDURE — 99205 OFFICE O/P NEW HI 60 MIN: CPT | Performed by: SURGERY

## 2017-07-13 NOTE — NURSING NOTE
"Sheryle Cruse's goals for this visit include: new breast cancer  She requests these members of her care team be copied on today's visit information: no    PCP: [facility]    Referring Provider:  No referring provider defined for this encounter.    Chief Complaint   Patient presents with     Consult     new breast cancer       Initial LMP 06/24/2017 Estimated body mass index is 23 kg/(m^2) as calculated from the following:    Height as of 6/12/17: 1.626 m (5' 4\").    Weight as of 6/27/17: 60.8 kg (134 lb).  Medication Reconciliation: complete    Do you need any medication refills at today's visit? No    Esther Valerio LPN      "

## 2017-07-13 NOTE — LETTER
2017    RE:  Sheryle Cruse-:  [birth year]    HISTORY OF PRESENT ILLNESS:  Sheryle Cruse is a 46 year old female who is seen in consultation at the request of  Dr. Griselda Bosch for evaluation of newly diagnosed right breast cancer.  Esther first noted a lump in her right breast on 6/15/17.  She had felt lumps in her right breast in the past for which she had mammograms in , , and .  She also had ultrasounds to further evaluate the lumps, but no imaging abnormality was found any of those times.  When Sheryle felt this lump, she thought that it felt different than the others.  She saw her gyn for her annual exam and she was advised to have screening mammograms.  Because Sheryle was feeling a lump, diagnostic imaging was performed.  On the mammogram, an asymmetry was seen in the right breast at 3 o'clock.  An ultrasound was performed and showed a 1.9 cm mass that had a suspicious appearance.  An ultrasound biopsy revealed the mass to have high grade invasive ductal carcinoma that is strongly ER/WA positive, Her 2 is pending.  Sheryle has noted no other breast changes.  She had had no prior breast biopsy or cyst aspiration.       Sheryle reached menarche at age 13.  She is .  She continues to have regular periods (recently regular, previously always irregular) and is on no BCP or hormones.  FH is significant for her maternal GM having been diagnosed with breast cancer in her 60's and a paternal aunt being diagnosed in her 50's.  There is no FH for ovarian, colon, uterine, prostate or pancreatic CA.     REVIEW OF SYSTEMS:  Constitutional:  Negative for chills, fatigue, fever and weight change.  Eyes:  Negative for blurred vision, eye pain and photophobia.  ENT:  Negative for hearing problems, ENT pain, congestion, rhinorrhea, epistaxis, hoarseness and dental problems.  Cardiovascular:  Negative for chest pain, palpitations, tachycardia, orthopnea and edema.  Respiratory:  Negative for cough,  "dyspnea and hemoptysis.  Gastrointestinal:  Negative for abdominal pain, heartburn, constipation, diarrhea and stool changes.  Musculoskeletal:  Negative for arthralgias, back pain and myalgias.  Integumentary/Breast:  See HPI.     Vitals: /83 (BP Location: Left arm)  Pulse 100  Ht 1.626 m (5' 4\")  Wt 58.9 kg (129 lb 14.4 oz)  LMP 06/24/2017  BMI 22.3 kg/m2  BMI= Body mass index is 22.3 kg/(m^2).     EXAM:  GENERAL: healthy, alert and no distress   BREAST:  The breasts are fairly large and ptotic and appear symmetric with no overlying skin changes.  The nipples are normal bilaterally.  There is no dimpling or thickening of the skin.  A mass is palpated at 3 o'clock in the right breast near the edge of the areola.  No other mass is appreciated in either breast.  The breast tissue is generally fairly soft, but is more nodular on the right, especially in the 10 and 12 o'clock areas.  There is no axillary or supraclavicular lymphadenopathy.  CARDIOVASCULAR:  No edema or JVD.  RESPIRATORY: negative findings: no chest deformities noted, no chest wall tenderness, breathing is unlabored.  NECK: Neck supple. No adenopathy.   SKIN: No suspicious lesions or rashes  LYMPH: Normal cervical lymph nodes     ASSESSMENT:  Sheryle Cruse has newly diagnosed right breast cancer.  I reviewed the imaging and pathology reports with her and her  and explained the findings.  We talked about the fact that this is invasive ductal carcinoma that is high grade and was not that clearly seen on mammogram. We also talked about the fact that the tumor has favorable features (ER/DC strongly positive) and should be quite treatable.  With the density of the right breast on mammogram and the lumpiness on exam, I recommended that she have an MRI for further evaluation prior to planning surgery.     We next discussed the surgical options for treatment.  I described the procedures for lumpectomy and mastectomy including the details of " the procedures, the risks, anesthesia and expected recovery.  I explained the need for clear margins with lumpectomy and the possibility of needing to have another surgery if a positive margin was identified.  We talked about post-lumpectomy radiation, the course and usual side effects.  We also talked about post-mastectomy reconstruction and the stages involved.  I reviewed the data regarding lumpectomy and radiation vs mastectomy that shows that the local recurrence risk is slightly higher for lumpectomy and radiation vs mastectomy (5-10% vs. 1-2%), but the survival at 20 years is the same.  I advised Sheryle that lymph node biopsy is recommended whenever we are dealing with invasive breast cancer and described the procedure for sentinel lymph node biopsy.  We talked about the risk for lymphedema which is small with removal of only a few nodes, but certainly not zero.  I mentioned the possibility of have reduction mammoplasty as part of the treatment.     Lastly, we discussed genetic testing.  Sheryle is only 46 at diagnosis which makes her a candidate for genetic testing.  She, however, has no children or siblings and isn't sure that she wants or needs to have testing.  I suggested that she at least have a consultation with genetics and then decide.     PLAN:  At the conclusion of our conversation, Sheryle said that she was leaning toward lumpectomy and sentinel lymph node biopsy.  I, therefore, recommended that she have a breast MRI before scheduling surgery to rule out additional breast cancer.  Sheryle agreed and will be scheduled as soon as possible.  She also said that if she needs to have mastectomy, she is not interested in reconstruction.  Sheryle seemed a little interested in lumpectomy and reduction mammoplasty, but when offered a plastic surgery consultation, she declined.  She did agree to have a genetics consultation which will also be scheduled as soon as possible.     Sheryle was given a copy of  her pathology report and a folder of patient education materials.  She was invited to call with further questions.  I will call her with the results of the Her 2 and MRI when they are available.       Birdie Gonzalez MD

## 2017-07-13 NOTE — PROGRESS NOTES
CHIEF COMPLAINT:  Chief Complaint   Patient presents with     Consult     new breast cancer       HISTORY OF PRESENT ILLNESS:  Sheryle Cruse is a 46 year old female who is seen in consultation at the request of  Dr. Griselda Bosch for evaluation of newly diagnosed right breast cancer.  Esther first noted a lump in her right breast on 6/15/17.  She had felt lumps in her right breast in the past for which she had mammograms in , , and .  She also had ultrasounds to further evaluate the lumps, but no imaging abnormality was found any of those times.  When Sheryle felt this lump, she thought that it felt different than the others.  She saw her gyn for her annual exam and she was advised to have screening mammograms.  Because Sheryle was feeling a lump, diagnostic imaging was performed.  On the mammogram, an asymmetry was seen in the right breast at 3 o'clock.  An ultrasound was performed and showed a 1.9 cm mass that had a suspicious appearance.  An ultrasound biopsy revealed the mass to have high grade invasive ductal carcinoma that is strongly ER/OR positive, Her 2 is pending.  Sheryle has noted no other breast changes.  She had had no prior breast biopsy or cyst aspiration.      Sheryle reached menarche at age 13.  She is .  She continues to have regular periods (recently regular, previously always irregular) and is on no BCP or hormones.  FH is significant for her maternal GM having been diagnosed with breast cancer in her 60's and a paternal aunt being diagnosed in her 50's.  There is no FH for ovarian, colon, uterine, prostate or pancreatic CA.    REVIEW OF SYSTEMS:  Constitutional:  Negative for chills, fatigue, fever and weight change.  Eyes:  Negative for blurred vision, eye pain and photophobia.  ENT:  Negative for hearing problems, ENT pain, congestion, rhinorrhea, epistaxis, hoarseness and dental problems.  Cardiovascular:  Negative for chest pain, palpitations, tachycardia, orthopnea and  edema.  Respiratory:  Negative for cough, dyspnea and hemoptysis.  Gastrointestinal:  Negative for abdominal pain, heartburn, constipation, diarrhea and stool changes.  Musculoskeletal:  Negative for arthralgias, back pain and myalgias.  Integumentary/Breast:  See HPI.    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  U/s abd/pelvist     Vitamin D deficiency        Past Surgical History:   Procedure Laterality Date     Mammogram  2003, 2009       Family History   Problem Relation Age of Onset     DIABETES Mother 76     Obese     HEART DISEASE Mother      Hypertension Mother      CEREBROVASCULAR DISEASE Mother      Cardiovascular Mother      CVA     CANCER Father      bladder     CEREBROVASCULAR DISEASE Father 70     Breast Cancer Maternal Grandmother      CEREBROVASCULAR DISEASE Maternal Grandmother      Lipids Maternal Grandmother      HEART DISEASE Maternal Grandfather      heart disease     DIABETES Paternal Grandmother      CEREBROVASCULAR DISEASE Paternal Grandfather      HEART DISEASE Paternal Grandfather        Social History   Substance Use Topics     Smoking status: Never Smoker     Smokeless tobacco: Never Used     Alcohol use No       Patient Active Problem List   Diagnosis     Vitamin D deficiency     Vegetarianism     Hyperlipidemia LDL goal <160     Advance care planning     Allergies   Allergen Reactions     Contrast Dye      Pt does not recall which type of contrast     Pcn [Bicillin C-R,] Rash     swelling     Current Outpatient Prescriptions   Medication Sig Dispense Refill     Calcium-Magnesium-Vitamin D (CALCIUM MAGNESIUM PO) With zinc       Multiple Vitamin (ONE-A-DAY ESSENTIAL) TABS Take 1 tablet by mouth daily.       FOLIC ACID PO Take 1 mg by mouth daily       MILK THISTLE PO With dandelion root       CHROMIUM  "PICOLINATE PO        Cyanocobalamin (VITAMIN B 12) 100 MCG LOZG        Cholecalciferol (VITAMIN D PO)        Vitals: /83 (BP Location: Left arm)  Pulse 100  Ht 1.626 m (5' 4\")  Wt 58.9 kg (129 lb 14.4 oz)  LMP 06/24/2017  BMI 22.3 kg/m2  BMI= Body mass index is 22.3 kg/(m^2).    EXAM:  GENERAL: healthy, alert and no distress   BREAST:  The breasts are fairly large and ptotic and appear symmetric with no overlying skin changes.  The nipples are normal bilaterally.  There is no dimpling or thickening of the skin.  A mass is palpated at 3 o'clock in the right breast near the edge of the areola.  No other mass is appreciated in either breast.  The breast tissue is generally fairly soft, but is more nodular on the right, especially in the 10 and 12 o'clock areas.  There is no axillary or supraclavicular lymphadenopathy.  CARDIOVASCULAR:  No edema or JVD.  RESPIRATORY: negative findings: no chest deformities noted, no chest wall tenderness, breathing is unlabored.  NECK: Neck supple. No adenopathy.   SKIN: No suspicious lesions or rashes  LYMPH: Normal cervical lymph nodes    ASSESSMENT:  Sheryle Cruse has newly diagnosed right breast cancer.  I reviewed the imaging and pathology reports with her and her  and explained the findings.  We talked about the fact that this is invasive ductal carcinoma that is high grade and was not that clearly seen on mammogram. We also talked about the fact that the tumor has favorable features (ER/[place] strongly positive) and should be quite treatable.  With the density of the right breast on mammogram and the lumpiness on exam, I recommended that she have an MRI for further evaluation prior to planning surgery.    We next discussed the surgical options for treatment.  I described the procedures for lumpectomy and mastectomy including the details of the procedures, the risks, anesthesia and expected recovery.  I explained the need for clear margins with lumpectomy and the " possibility of needing to have another surgery if a positive margin was identified.  We talked about post-lumpectomy radiation, the course and usual side effects.  We also talked about post-mastectomy reconstruction and the stages involved.  I reviewed the data regarding lumpectomy and radiation vs mastectomy that shows that the local recurrence risk is slightly higher for lumpectomy and radiation vs mastectomy (5-10% vs. 1-2%), but the survival at 20 years is the same.  I advised Sheryle that lymph node biopsy is recommended whenever we are dealing with invasive breast cancer and described the procedure for sentinel lymph node biopsy.  We talked about the risk for lymphedema which is small with removal of only a few nodes, but certainly not zero.  I mentioned the possibility of have reduction mammoplasty as part of the treatment.    Lastly, we discussed genetic testing.  Sheryle is only 46 at diagnosis which makes her a candidate for genetic testing.  She, however, has no children or siblings and isn't sure that she wants or needs to have testing.  I suggested that she at least have a consultation with genetics and then decide.    PLAN:  At the conclusion of our conversation, Sheryle said that she was leaning toward lumpectomy and sentinel lymph node biopsy.  I, therefore, recommended that she have a breast MRI before scheduling surgery to rule out additional breast cancer.  Sheryle agreed and will be scheduled as soon as possible.  She also said that if she needs to have mastectomy, she is not interested in reconstruction.  Sheryle seemed a little interested in lumpectomy and reduction mammoplasty, but when offered a plastic surgery consultation, she declined.  She did agree to have a genetics consultation which will also be scheduled as soon as possible.    Sheryle was given a copy of her pathology report and a folder of patient education materials.  She was invited to call with further questions.  I will call  her with the results of the Her 2 and MRI when they are available.    Total time with patient visit: 60 minutes including discussions about the plan of care and care coordination with the patient.    Birdie Gonzalez MD    Please route or send letter to:  Dr. Griselda Bosch

## 2017-07-14 DIAGNOSIS — C50.911 MALIGNANT NEOPLASM OF RIGHT FEMALE BREAST, UNSPECIFIED ESTROGEN RECEPTOR STATUS, UNSPECIFIED SITE OF BREAST (H): Primary | ICD-10-CM

## 2017-07-14 LAB — COPATH REPORT: NORMAL

## 2017-07-17 ENCOUNTER — TELEPHONE (OUTPATIENT)
Dept: FAMILY MEDICINE | Facility: CLINIC | Age: 47
End: 2017-07-17

## 2017-07-17 ENCOUNTER — TELEPHONE (OUTPATIENT)
Dept: PEDIATRICS | Facility: CLINIC | Age: 47
End: 2017-07-17

## 2017-07-17 DIAGNOSIS — Z53.9 ERRONEOUS ENCOUNTER--DISREGARD: Primary | ICD-10-CM

## 2017-07-17 DIAGNOSIS — Z91.041 CONTRAST MEDIA ALLERGY: Primary | ICD-10-CM

## 2017-07-17 RX ORDER — METHYLPREDNISOLONE 32 MG/1
TABLET ORAL
Qty: 2 TABLET | Refills: 0 | Status: SHIPPED | OUTPATIENT
Start: 2017-07-17 | End: 2017-10-17

## 2017-07-17 NOTE — LETTER
[facility]       [address]  [place] [address]      July 17, 2017      Sheryle Cruse  [address]   [place] [facility] [facility] [place] [facility] [address]          Dear Sheryle Cruse, [id number]    At [facility] we care about your health and are committed to providing quality patient care, which includes staying current on preventative cancer screenings.  You can increase your chances of finding and treating cancers through regular screenings.      Our records show that you are due for the following screening(s):Physical with pap.    Pap Smear for cervical cancer  Recommended every three years for women 21 and older  A Pap test is used to detect cervical cancer.  The test should be taken at least once every three years but women who are at a greater risk for cervical cancer may need to have the test more often.      You are at a greater risk for cervical cancer if:   - You have had a sexually transmitted disease   - You have had more than one sex partner   - You have had an abnormal pap test in the past    You may contact the [facility] at [phone] to schedule the screening test(s) at your earliest convenience.    If you have a My-Chart Account, you also can schedule this appointment through there.    If you have already had one or all of the above screening tests at another facility, please call us so that we may update your chart.      Your partners in health,      Quality Committee   [facility]

## 2017-07-17 NOTE — TELEPHONE ENCOUNTER
Panel Management Review      BP Readings from Last 1 Encounters:   07/13/17 117/83    , No results found for: A1C, Visit date not found    Fail List measure: Physical with pap.      Patient is due/failing the following:   PAP and PHYSICAL    Action needed:   Patient needs office visit for Physical with pap..    Type of outreach:    Phone, left message for patient to call back.  and Sent letter.    Questions for provider review:    None                                                                                                                                    Anusha Suarez CMA

## 2017-07-17 NOTE — TELEPHONE ENCOUNTER
----- Message from Sol Vazquez RN sent at 7/17/2017 12:26 PM CDT -----  Hi Cecilia,  I did send it to Dr. Gonzalez, but she is on vacation this week and I don't believe anyone is covering her in-box.     ----- Message -----     From: Katie Billy APRN CNP     Sent: 7/17/2017  10:35 AM       To: Sol Vazquez RN    Hello   I did not order this test   Can you send this to the doctor that ordered it?  Thanks  Katie Billy NP, GILDARDO CNP    ----- Message -----     From: Sol Vazquez RN     Sent: 7/17/2017  10:04 AM       To: GILDARDO Michaels CNP, #    Good morning,    Sheryle is scheduled for a breast MRI on 7/24. She has a contrast allergy to an unknown contrast, and although it is unlikely to be a gadolinium allergy, the radiologist has recommended that she be premedicated for the scan.   Would you please order 32mg PO methylprednisolone to be taken 12 hours prior to the scan, and an additional 32mg PO methylprednisolone to be taken 2 hours prior to the scan?   Thank you! Let me know if you have any questions or concerns.    Sol

## 2017-07-25 ENCOUNTER — TELEPHONE (OUTPATIENT)
Dept: SURGERY | Facility: CLINIC | Age: 47
End: 2017-07-25

## 2017-07-25 NOTE — TELEPHONE ENCOUNTER
Sheryle left a message for me to call her back as she had further questions.  She wanted to be sure that I knew that she was having her bilateral breast MRI and genetics consultation on August 4.  Sheryle wanted to know if this would be too much for one day and had questions about the procedure for the MRI and what would happen in the genetics consultation.  She also asked whether taking the premeds for the MRI (she has a contrast allergy) would affect the genetic testing if she had her blood drawn the same day.  I told Sheryle that the steroid premed should not interfere in the genetic testing at all.  I described the MRI procedure and the time and reassured her that I thought that doing both in one day would be ok.  We also talked about the genetics consultation and what that generally involves.  She thinks that she will call the genetics counselor ahead of time and see if she can work on getting the information that will be needed to Claudia ahead of time to decrease her stress that day.  I told her that that was a good idea and she should go ahead and do that.  Sheryle also had been reading the patient education materials I had given her and had further questions about lymphedema risk and the number of lymph nodes removed for sentinel lymph node biopsy.  We reviewed that information and I stressed that there is always risk for lymphedema, but that overall, it is very low (about 6%) for sentinel lymph node biopsy removing 1-4 lymph nodes.  I also told Sheryle that were she to develop lymphedema post-op, she would be referred for lymphedema treatment early.  Sheryle seemed to be satisfied that her questions were answered.  She was encouraged to call if she has further questions.  I will call her as soon as I receive the MRI result.    Birdie Gonzalez MD

## 2017-07-31 ENCOUNTER — TELEPHONE (OUTPATIENT)
Dept: ONCOLOGY | Facility: CLINIC | Age: 47
End: 2017-07-31

## 2017-07-31 NOTE — TELEPHONE ENCOUNTER
Social Work Contact - Initial - 7-31-17    SW received referral from Sol Vazquez, Breast Navigator at [facility] to follow up with pt regarding cancer support/resources.  SW contacted pt via phone today.  Pt is 46 year old  female that has been diagnosed with right breast cancer.  Pt is anticipating lumpectomy surgery and radiation therapy treatments though unsure yet of when these things will happen.  Pt communicates that her 2 priority needs currently are transportation (pt doesn't drive and spouse works) to/from clinic for appts, especially radiation therapy appts.  SW discussed/provided pt with information regarding the [employer] [facility] volunteer transportation program.  Pt aware that it is volunteer program so all of pt's transport needs will not be able to be provided by this program.  SW also provided pt with [employer], [phone], transport resource.  Pt's other current primary need is for financial assistance.  Currently pt and spouse have [place] [employer] insurance policies for which they pay a smaller premium as premium is subsidized.  Pt's spouse has been out of work for three months and just started back to work in a contract job where he has no benefits.  MONA discussed with pt the option of applying for the [employer] myra once she begins her cancer treatment.  MONA also discussed with pt the [employer], which sometimes is able to assist patients in paying a bill.    MONA also gave pt the resource of [employer] to see if she might be eligible for financial assistance through them for transportation.  MONA provided active listening and support to pt during contact.  Pt will contact SW when she has her surgery date or date of first radiation therapy appt scheduled.  MONA will continue to follow pt for cancer support/resources.         Skip Traore, LAZARA     Social Work  [facility]  Office:  [phone]  E-Mail:   [email or web address]  7/31/2017 2:47 PM

## 2017-08-04 ENCOUNTER — OFFICE VISIT (OUTPATIENT)
Dept: ONCOLOGY | Facility: CLINIC | Age: 47
End: 2017-08-04
Attending: GENETIC COUNSELOR, MS
Payer: COMMERCIAL

## 2017-08-04 DIAGNOSIS — C50.911 MALIGNANT NEOPLASM OF RIGHT BREAST IN FEMALE, ESTROGEN RECEPTOR POSITIVE, UNSPECIFIED SITE OF BREAST (H): Primary | ICD-10-CM

## 2017-08-04 DIAGNOSIS — Z80.3 FAMILY HISTORY OF MALIGNANT NEOPLASM OF BREAST: ICD-10-CM

## 2017-08-04 DIAGNOSIS — Z17.0 MALIGNANT NEOPLASM OF RIGHT BREAST IN FEMALE, ESTROGEN RECEPTOR POSITIVE, UNSPECIFIED SITE OF BREAST (H): Primary | ICD-10-CM

## 2017-08-04 DIAGNOSIS — Z80.41 FAMILY HISTORY OF MALIGNANT NEOPLASM OF OVARY: ICD-10-CM

## 2017-08-04 PROCEDURE — 96040 ZZH GENETIC COUNSELING, EACH 30 MINUTES: CPT | Mod: ZF | Performed by: GENETIC COUNSELOR, MS

## 2017-08-04 NOTE — PROGRESS NOTES
8/4/2017    Referring Provider: Birdie Gonzalez MD    Presenting Information:   I met with Sheryle Cruse today for genetic counseling at the Cancer Risk Management Program at the [facility] to discuss her personal history of breast cancer and family history of breast and ovarian cancer. She is here today to review this history, cancer screening recommendations, and available genetic testing options.    Personal History:  Sheryle is a 46 year old female. She was diagnosed with invasive mammary carcinoma with DCIS of the right breast at age 46; the tumor is ER/MA+ and Her2-. Sheryle reports that she is currently planning to pursue a lumpectomy, though her genetic testing results may help inform surgery decisions (lumpectomy versus bilateral mastectomy).      She had her first menstrual period at age 13, does not have biological children, and is premenopausal. Sheryle has her ovaries, fallopian tubes and uterus in place, and she has had no ovarian cancer screening to date. She reports that she has never used hormone replacement therapy.      Her most recent mammogram in June 2017 after self-palpating a mass identified this cancer. She does not regularly do any other cancer screening at this time. Sheryle reported no tobacco use and no alcohol use.    Family History: (Please see scanned pedigree for detailed family history information)    One maternal aunt was diagnosed with leukemia in her 50's and passed away at age 59.    One maternal aunt was diagnosed with lung cancer and passed away at age 74; she had a history of smoking.    Sheryle's maternal grandmother was diagnosed with breast cancer in her 70's and passed away at age 86; treatment included surgery.    Sheryle's father was diagnosed with and passed away from bladder cancer at age 80; he did not have a history of smoking or alcohol use.    One paternal aunt was diagnosed with leukemia and passed away at age 26.    One paternal aunt passed away  from lung cancer at an unknown age; she had a history of smoking.    One paternal aunt was diagnosed with possibly breast and ovarian cancer in her 50's and passed away at an unknown age.    Her maternal ethnicity is , Occitan, English, and Congolese. Her paternal ethnicity is Luxembourger, Polish, French, and possibly Ashkenazi Holiness.    Discussion:    Sheryle's personal history of breast cancer and family history of breast and ovarian cancer is suggestive of a hereditary cancer syndrome.    We reviewed the features of sporadic, familial, and hereditary cancers.  In looking at Sheryle's family history, it is possible that a cancer susceptibility gene is present as she was diagnosed with breast cancer under age 51 and she has two relatives diagnosed with breast and possibly related cancers (i.e. ovarian). That being said, it is not clear if her paternal aunt was diagnosed with ovarian cancer and she has multiple relatives that have never been diagnosed with a related cancer.    We discussed the natural history and genetics of several hereditary cancer syndromes, including Hereditary Breast and Ovarian Cancer (HBOC) syndrome and Cool syndrome. A detailed handout regarding these syndromes and the information we discussed was provided to Sheryle at the end of our appointment today and can be found in the after visit summary. Topics included: inheritance pattern, cancer risks, cancer screening recommendations, and also risks, benefits and limitations of testing.    We reviewed that the most common cause of hereditary breast cancer is HBOC syndrome, which is caused by mutations in the BRCA1 and BRCA2 genes. Individuals with HBOC syndrome are at increased risk for several different cancers, including breast, ovarian, male breast, prostate, melanoma, and pancreatic cancer.    Based on her personal and family history, Sheryle meets current National Comprehensive Cancer Network (NCCN) criteria for genetic testing  of BRCA1 and BRCA2.      We discussed that there are also other additional genes that could cause increased risk of breast and/or ovarian cancer. For example, Cool syndrome (caused by mutations in the MLH1, MSH2, MSH6, PMS2, and EPCAM genes) is associated with increased risk for ovarian, uterine, and gastrointestinal cancers. As many of these genes present with overlapping features in a family, it would be reasonable for Sheryle to consider panel genetic testing to analyze multiple genes at once.    We reviewed genetic testing options for hereditary breast and gynecologic cancers: actionable high/moderate risk breast and gynecologic cancer risk custom panel (CustomNext-Cancer, 16 genes) and expanded high and moderate risk panel testing (OvaNext, 25 genes).     Sheryle explained that she will likely pursue the OvaNext gene panel, but stated that she would prefer to take information home to review over the weekend. She then explained she will contact me on Monday, 8/7 with her final decision so we may coordinate the necessary paperwork and blood draw. She denied having any other questions at this time.    Plan:  1) Today Sheryle elected to take the weekend to review her genetic testing options before having her blood drawn.  2) She was provided information regarding the 16 gene CustomNext-Cancer Panel and OvaNext Panel, as well as my contact information.  3) Sheryle will contact me on Monday, 8/7 with her final decision regarding testing in order to coordinate the necessary paperwork and blood draw.    Face to face time: 45 minutes    Claudia Wynn MS, [facility]  Certified Genetic Counselor  Office: [phone]  Pager: [phone]    Addendum: Sheryle contacted me on Monday, 8/7 and stated that she wishes to proceed with testing using the OvaNext gene panel offered by [facility]. Sheryle also stated that she would prefer having her blood drawn at the [facility] on either Thursday or Friday in the  late afternoon. We reviewed that before having her blood drawn for testing, Sheryle will need to meet with either myself or my colleague at [place] to complete the final paperwork. She explained that given her transportation and financial concerns, she would not be able to meet with either myself or my colleague this week and likely not next week. As information gathered from the OvaNext test will be used to inform surgery decisions, I offered to provide Sheryle with the necessary paperwork to review and that I can call her to obtain consent for testing. She can then present to the [place] laboratory for the blood draw. Sheryle was appreciative of the offer and requested that the forms be emailed to her, using the email address on file. I will then contact Sheryle on Tuesday, 8/8 at 4:30pm to review the paperwork and obtain consent for testing. She explained that she will then email me the signed forms and present to the laboratory on Thursday, 8/10 at 5:00pm to have her blood drawn for testing. Sheryle denied having any other questions at this time.

## 2017-08-04 NOTE — MR AVS SNAPSHOT
After Visit Summary   8/4/2017    Sheryle Cruse    MRN: [id number]           Patient Information     Date Of Birth          1970        Visit Information        Provider Department      8/4/2017 2:00 PM Claudia Wynn GC;  [address] CONSULT [facility]        Care Instructions      Assessing Cancer Risk  Only about 5-10% of cancers are thought to be due to an inherited cancer susceptibility gene.    These families often have:    Several people with the same or related types of cancer    Cancers diagnosed at a young age (before age 50)    Individuals with more than one primary cancer    Multiple generations of the family affected with cancer    Some people may be candidates for genetic testing of more than one gene.  For these families, genetic testing using a multi-gene cancer panel may be offered.  These panels may test genes known to increase the risk for breast (and other) cancers: OLLIE, BRCA1, BRCA2, CDH1, CHEK2, PALB2, PTEN, and TP53.  The purpose of this handout is to serve as a brief summary of the high/moderate-risk breast cancer genes which have published clinical management guidelines for individuals who are found to carry a mutation.    Hereditary Breast and Ovarian Cancer Syndrome (HBOC)  A single mutation in one of the copies of BRCA1 or BRCA2 increases the risk for breast and ovarian cancer, among others.  The risk for pancreatic cancer and melanoma may also be slightly increased in some families.  The tables below list the chance that someone with a BRCA mutation would develop cancer in his or her lifetime1,2,3,4.          Women   Men     General Population BRCA+    General Population BRCA+   Breast  12% 40-80%  Breast <1% ~7%   Ovarian  1-2% 10-40%  Prostate 16% 20%     A person s ethnic background is also important to consider, as individuals of Ashkenazi Shinto ancestry have a higher chance of having a BRCA gene mutation.  There are three BRCA mutations that  occur more frequently in this population.    Li-Fraumeni Syndrome (LFS)  LFS is a cancer predisposition syndrome. Individuals with LFS are at an increased risk for developing cancer at a young age. The general lifetime risk for development of cancer is 50% by age 30 and 90% by age 60.  LFS is caused by a mutation in the TP53 gene.  A single mutation in one of the copies of TP53 increases the risk for multiple cancers.     Core Cancers: Sarcomas, Breast, Brain, Lung, Leukemias/Lymphomas, Adrenocortical carcinomas  Other Cancers: Gastrointestinal, Thyroid, Skin, Genitourinary        Cowden Syndrome  Cowden syndrome is a hereditary condition that increases the risk for breast, thyroid, endometrial, and kidney cancer.  Cowden syndrome is caused by a mutation in the PTEN gene.  A single mutation in one of the copies of PTEN causes Cowden syndrome and increases cancer risk.  The table below shows the chance that someone with a PTEN mutation would develop cancer in their lifetime5,6.  Other benign features seen in some individuals with Cowden syndrome include benign skin lesions (facial papules, keratoses, lipomas), learning disability, autism, thyroid nodules, colon polyps, and larger head size.      Lifetime Cancer Risk   Cancer Type General Population Cowden Syndrome   Breast  12% 25-50%*   Thyroid  1% 35%   Renal 1-2% 35%   Endometrial  2-3% 28%   Colon 5% 9%   Melanoma 2-3% >5%     *One recent study found breast cancer risk to be increased to 85%    Hereditary Diffuse Gastric Cancer (HDGC)  Currently, one gene is known to cause hereditary diffuse gastric cancer: CDH1.  Individuals with HDGC are at increased risk for diffuse gastric cancer and lobular breast cancer. Of people diagnosed with HDGC, 30-50% have a mutation in the CDH1 gene.  This suggests there are likely other genes that may cause HDGC that have not been identified yet.      Lifetime Cancer Risks    General Pop HDGC    Diffuse Gastric  <1% ~80%   Breast  12% 39-52%     Additional Genes Associated with Increased Breast Cancer Risk  PALB2  Mutations in PALB2 have been shown to increase the risk of breast cancer up to 33-58% in some families; where individuals fall within this risk range is dependent upon family history.9 PALB2 mutations have also been associated with increased risk for pancreatic cancer, although this risk has not been quantified yet.  Individuals who inherit two PALB2 mutations--one from their mother and one from their father--have a condition called Fanconi Anemia.  This rare autosomal recessive condition is associated with short stature, developmental delay, bone marrow failure, and increased risk for childhood cancers.    OLLIE  OLLIE is a moderate-risk breast cancer gene. Women who have a mutation in OLLIE can have between a 2-4 fold increased risk for breast cancer compared to the general population.10  OLLIE mutations have also been associated with increased risk for pancreatic cancer, however an estimate of this cancer risk is not well understood.11  Individuals who inherit two OLLIE mutations have a condition called ataxia-telangiectasia (AT).  This rare autosomal recessive condition affects the nervous system and immune system, and is associated with progressive cerebellar ataxia beginning in childhood.  Individuals with ataxia-telangiectasia often have a weakened immune system and have an increased risk for childhood cancers.           CHEK2   CHEK2 is a moderate-risk breast cancer gene.  Women who have a mutation in CHEK2 have around a 2-fold increased risk for breast cancer compared to the general population, and this risk may be higher depending upon family history.12,13,14  Mutations in CHEK2 have also been shown to increase the risk of a number of other cancers, including colon and prostate, however these cancer risks are currently not well understood.         Inheritance   All of the genes reviewed above are inherited in an autosomal dominant  pattern.  This means that if a parent has a mutation, each of his or her children will have a 50% chance of inheriting that same mutation.  Therefore, each child--male or female--would have a 50% chance of being at increased risk for developing cancer.      Image obtained from Genetics Home Reference, 2013     Mutations in some genes can occur de ector, which means that a person s mutation occurred for the first time in them and was not inherited from a parent.  Now that they have the mutation, however, it can be passed on to future generations.    Genetic Testing  Genetic testing involves a blood test and will look for any harmful mutations within genes that are associated with increased cancer risk.  If possible, it is recommended that the person(s) who has had cancer be tested before other family members.  That person will give us the most useful information about whether or not a specific gene is associated with the cancer in the family.    Results  There are three possible results of genetic testing:    Positive--a harmful mutation was identified in one or more of the genes    Negative--no mutation was identified in any of the genes on this panel    Variant of unknown significance--a variation in one of the genes was identified, but it is unclear how this impacts cancer risk in the family    Advantages and Disadvantages  There are advantages and disadvantages to genetic testing.  Advantages    May clarify your cancer risk    Can help you make medical decisions    May explain the cancers in your family    May give useful information to your family members (if you share your results)    Disadvantages    Possible negative emotional impact of learning about inherited cancer risk    Uncertainty in interpreting a negative test result in some situations    Possible genetic discrimination concerns (see below)    Genetic Information Nondiscrimination Act (EMIL)  EMIL is a federal law that protects individuals from health  insurance or employment discrimination based on a genetic test result alone.  Although rare, there are currently no legal discrimination protections in terms of life insurance, long term care, or disability insurances.  Visit the National Human Genome Research [place] genome.gov/32458034 to learn more.    Reducing Cancer Risk  Each of the genes listed within this handout have nationally recognized cancer screening guidelines that would be recommended for individuals who test positive.  In addition to increased cancer screening, surgeries may be offered or recommended to reduce cancer risk.  Recommendations are based upon an individual s genetic test result as well as their personal and family history of cancer.    Questions to Think About Regarding Genetic Testing    What effect will the test result have on me and my relationship with my family members if I have an inherited gene mutation?  If I don t have a gene mutation?    Should I share my test results, and how will my family react to this news, which may also affect them?    Are my children ready to learn new information that may one day affect their own health?      Resources  FORCE: Facing Our Risk of Cancer Empowered facingourrisk.org   Bright Pink bebrightpink.org   Li-Fraumeni Syndrome Association lfsassociation.org   PTEN World PTENworld.Canlife   No stomach for cancer, Inc. nostomachforcancer.org   Stomach cancer relief network scrnet.org   Collaborative Group of the Americas on Inherited Colorectal Cancer (CGA) cgaicc.com    Cancer Care cancercare.org   American Cancer Society (ACS) cancer.org   National Cancer [place] (NCI) cancer.gov     Cancer Risk Management Program [phone] ([phone])  ? Liseth Chavez, MS, [facility]  [phone]  ? Blanca Moralez, MS, [facility]  [phone]  ? Claudia Wynn, MS, [facility]  [phone]  ? Jenna Rivero, MS, [facility]  [phone]   ? Jalen Yung, MS, [facility]  [phone]    References  1. Darlene Do PDP, Micheal S, Deepti VELAZQUEZ,  Yulissa JE, Loren JL, Fahad N, Waylon H, Jose O, Chanda A, Maria G B, Sarthak P, Nam S, Jericho DM, Perez N, Hyacinth E, Michelle H, Zach E, Robert J, Ambika J, Dai B, Tulinius H, Thorlacius S, Eerola H, Nevanlinna H, Gordon K, Aaron OP. Average risks of breast and ovarian cancer associated with BRCA1 or BRCA2 mutations detected in case series unselected for family history: a combined analysis of 222 studies. Am J Hum Monica. 2003;72:1117-30.  2. Anurag N, Reny M, Ramos G.  BRCA1 and BRCA2 Hereditary Breast and Ovarian Cancer. Gene Reviews online. 2013.  3. Dennis YC, Serge S, Valentin G, Anthony S. Breast cancer risk among male BRCA1 and BRCA2 mutation carriers. J Natl Cancer Inst. 2007;99:1811-4.  4. Antonio KELLY, Sea I, Alon J, Kimmy E, Scarlett ER, Margie F. Risk of breast cancer in male BRCA2 carriers. J Med Monica. 2010;47:710-1.  5. Balderrama MH, Meg J, Frank J, Itzel LA, Yamile MS, Bobo C. Lifetime cancer risks in individuals with germline PTEN mutations. Clin Cancer Res. 2012;18:400-7.  6. Pilarski R. Cowden Syndrome: A Critical Review of the Clinical Literature. J Monica . 2009:18:13-27.  7. National Comprehensive Cancer Network. Clinical practice guidelines in oncology, colorectal cancer screening. Available online (registration required). 2013.  8. National Cancer Independence. SEER Cancer Stat Fact Sheets.  December 2013.  9. Dennis LENZ, et al. Breast-Cancer Risk in Families with Mutations in PALB2. NEJM. 2014; 371(6):497-506.  10. Camille ELDER, Ayaz VARGAS, Cachorro S, Brandie P, Aylin T, Malini M, Jeremi B, Rosaura H, Enmanuel R, Hector K, Aster L, Antonio KELLY, Jericho VARGAS, Lamont DF, Dony MR, The Breast Cancer Susceptibility Collaboration ([place]) & Cindy DELATORRE. OLLIE mutations that cause ataxia-telangiectasia are breast cancer susceptibility alleles. Nature Genetics. 2006;38:873-875  11. Mark N , Saira Y, Corinna J, Tejas L, Cassy AYALA , Fany ML, Kirby S, Domo VILLASENOR,  Vianney S, Krystina ML, Etta J , Crystal R, Kylah SZ, Missy JR, Lisa VE, Isela M, Vogelstein B, Toy N, Sandie RH, Renuka KW, and Bradley AP. OLLIE mutations in patients with hereditary pancreatic cancer. Cancer Discover. 2012;2:41-46  12. CHEK2 Breast Cancer Case-Control Consortium. CHEK2*1100delC and susceptibility to breast cancer: A collaborative analysis involving 10,860 breast cancer cases and 9,065 controls from 10 studies. Am J Hum Monica, 74 (2004), pp. 3186-5656  13. Jose T, Arnaldo S, Calvin K, et al. Spectrum of Mutations in BRCA1, BRCA2, CHEK2, and TP53 in Families at High Risk of Breast Cancer. VICENTE. 2006;295(12):0636-0201.   14. Yfn C, Brian D, Imtiaz ELDER, et al. Risk of breast cancer in women with a CHEK2 mutation with and without a family history of breast cancer. J Clin Oncol. 2011;29:5066-1799\        Assessing Cancer Risk  Only about 5-10% of cancers are thought to be due to an inherited cancer susceptibility gene.    These families often have:    Several people with the same or related types of cancer    Cancers diagnosed at a young age (before age 50)    Individuals with more than one primary cancer    Multiple generations of the family affected with cancer    Some people may be candidates for genetic testing of more than one gene.  For these families, genetic testing using a cancer panel may be offered.  These panels can test many genes at once known to increase the risk for gynecologic (and other) cancers:  BRCA1, BRCA2, BRIP1 MLH1, MSH2, MSH6, PMS2, EPCAM, PTEN, PALB2, RAD51C, RAD51D, and TP53. The purpose of this handout is to serve as a brief summary of the gynecologic cancer risk genes that have published clinical management guidelines for individuals who are found to carry a mutation.    ______________________________________________________________________________  Hereditary Breast and Ovarian Cancer Syndrome   (BRCA1 and BRCA2)  A single mutation in one of  the copies of BRCA1 or BRCA2 increases the risk for breast and ovarian cancer, among others.  The risk for pancreatic cancer and melanoma may also be slightly increased in some families.  The chart below shows the chance that someone with a BRCA mutation would develop cancer in his or her lifetime1,2,3,4.        A person s ethnic background is also important to consider, as individuals of Ashkenazi Sikhism ancestry have a higher chance of having a BRCA gene mutation.  There are three BRCA mutations that occur more frequently in this population.    Cool Syndrome   (MLH1, MSH2, MSH6, PMS2, and EPCAM)  Currently five genes are known to cause Cool Syndrome: MLH1, MSH2, MSH6, PMS2, and EPCAM.  A single mutation in one of the Cool Syndrome genes increases the risk for colon, endometrial, ovarian, and stomach cancers.  Other cancers that occur less commonly in Cool Syndrome include urinary tract, skin, and brain cancers.  The chart below shows the chance that a person with Cool syndrome would develop cancer in his or her lifetime7.      *Cancer risk varies depending on Cool syndrome gene found    Cowden Syndrome   (PTEN)  Cowden syndrome is a hereditary condition that increases the risk for breast, thyroid, endometrial, and kidney cancer.  Cowden syndrome is caused by a mutation in the PTEN gene.  A single mutation in one of the copies of PTEN causes Cowden syndrome and increases cancer risk.  The chart below shows the chance that someone with a PTEN mutation would develop cancer in their lifetime5,6.  Other benign features seen in some individuals with Cowden syndrome include benign skin lesions (facial papules, keratoses, lipomas), learning disability, autism, thyroid nodules, colon polyps, and larger head size.      *One recent study found breast cancer risk to be increased to 85%  Li-Fraumeni Syndrome   (TP53)  Li-Fraumeni Syndrome (LFS) is a cancer predisposition syndrome caused by a mutation in the TP53 gene. A  single mutation in one of the copies of TP53 increases the risk for multiple cancers. Individuals with LFS are at an increased risk for developing cancer at a young age. The general lifetime risk for development of cancer is 50% by age 30 and 90% by age 60.     Core Cancers: Sarcomas, Breast, Brain, Lung, Leukemias/Lymphomas, Adrenocortical carcinomas  Other Cancers: Gastrointestinal, Thyroid, Skin, Genitourinary    Additional Genes  PALB2  Mutations in PALB2 have been shown to increase the risk of breast cancer up to 33-58% in some families; where individuals fall within this risk range is dependent upon family history. PALB2 mutations have also been associated with increased risk for pancreatic cancer, although this risk has not been quantified yet.  Individuals who inherit two PALB2 mutations--one from their mother and one from their father--have a condition called Fanconi Anemia.  This rare autosomal recessive condition is associated with short stature, developmental delay, bone marrow failure, and increased risk for childhood cancers.    BRIP1, RAD51C and RAD51D  Mutations in BRIP1, RAD51C, and RAD51D have been shown to increase the risk of ovarian cancer as well as female breast cancer.    ______________________________________________________________________________    Inheritance  All of the cancer syndromes reviewed above are inherited in an autosomal dominant pattern.  This means that if a parent has a mutation, each of his or her children will have a 50% chance of inheriting that same mutation.  Therefore, each child--male or female--would have a 50% chance of being at increased risk for developing cancer.      Image obtained from Genetics Home Reference, 2013     Mutations in some genes can occur de ector, which means that a person s mutation occurred for the first time in them and was not inherited from a parent.  Now that they have the mutation, however, it can be passed on to future  generations.    Genetic Testing  Genetic testing involves a blood test and will look for any harmful mutations that are associated with increased cancer risk.  If possible, it is recommended that the person(s) who has had cancer be tested before other family members.  That person will give us the most useful information about whether or not a specific gene is associated with the cancer in the family.    Results  There are three possible results of genetic testing:    Positive--a harmful mutation was identified in one or more of the genes    Negative--no mutation was identified in any of the 9 genes on this panel    Variant of unknown significance--a variation in one of the genes was identified, but it is unclear how this impacts cancer risk in the family    Advantages and Disadvantages   There are advantages and disadvantages to genetic testing.  Advantages    May clarify your cancer risk    Can help you make medical decisions    May explain the cancers in your family    May give useful information to your family members (if you share your results)    Disadvantages    Possible negative emotional impact of learning about inherited cancer risk    Uncertainty in interpreting a negative test result in some situations    Possible genetic discrimination concerns (see below)    Genetic Information Nondiscrimination Act (EMIL)  EMIL is a federal law that protects individuals from health insurance or employment discrimination based on a genetic test result alone.  Although rare, there are currently no legal discrimination protections in terms of life insurance, long term care, or disability insurances.  Visit the National Human Genome Research [place] website to learn more.    Reducing Cancer Risk  Each of the genes listed within this handout have nationally recognized cancer screening guidelines that would be recommended for individuals who test positive.  In addition to increased cancer screening, surgeries may be  offered or recommended to reduce cancer risk.  Recommendations are based upon an individual s genetic test result as well as their personal and family history of cancer.    Questions to Think About Regarding Genetic Testing:    What effect will the test result have on me and my relationship with my family members if I have an inherited gene mutation?  If I don t have a gene mutation?    Should I share my test results, and how will my family react to this news, which may also affect them?    Are my children ready to learn new information that may one day affect their own health?        Hereditary Cancer Resources    FORCE: Facing Our Risk of Cancer Empowered facingourrisk.org   Bright Pink bebrightpink.org   Li-Fraumeni Syndrome Association lfsassociation.org   PTEN World PTENworld.com   Collaborative Group of the Americas on Inherited Colorectal Cancer (CGA) cgaicc.com http://www.facingourrisk.org/   Cancer Care cancercare.org   American Cancer Society (ACS) cancer.org   National Cancer [place] (NCI) cancer.gov       Cancer Risk Management Program [phone] ([phone])  ? Liseth Scott, MS, [facility]  [phone]  ? Blanca Kitr, MS, [facility]  [phone]  ? Claudia Wynn, MS, [facility]  [phone]  ? Jenna Rivero, MS, [facility]  [phone]   ? Jordynon Dilshad, MS, [facility]  [phone]    References  15. Dennis ELDER, Darlene PDP, Micheal S, Deepti VELAZQUEZ, Yulissa JE, Loren JL, Fahad N, Waylon H, Jose O, Chanda A, Maria G B, Sarthak P, Manajay S, Jreicho DM, Chris N, Hyacinth E, Michelle H, Serrano E, Lubinski J, Gronwald J, Dai B, Cindy H, Hebertlaci S, Emekarola H, Shakira H, Gordon K, Aaron OP. Average risks of breast and ovarian cancer associated with BRCA1 or BRCA2 mutations detected in case series unselected for family history: a combined analysis of 222 studies. Am J Hum Monica. 2003;72:1117-30.  16. Anurag DELATORRE, Reny GARCIA, Ramos GANNON.  BRCA1 and BRCA2 Hereditary Breast and Ovarian Cancer. Gene Reviews online.  2013.  17. Dennis YC, Serge S, Valentin G, Gabrielle S. Breast cancer risk among male BRCA1 and BRCA2 mutation carriers. J Natl Cancer Inst. 2007;99:1811-4.  18. Antonio DG, Sea I, Alon J, Kimmy E, Scarlett ER, Margie F. Risk of breast cancer in male BRCA2 carriers. J Med Monica. 2010;47:710-1.  19. Balderrama MH, Meg J, Frank J, Itzel LA, Yamile MS, Eng C. Lifetime cancer risks in individuals with germline PTEN mutations. Clin Cancer Res. 2012;18:400-7.  20. Pilcorinki R. Cowden Syndrome: A Critical Review of the Clinical Literature. J Monica . 2009:18:13-27.  21. National Comprehensive Cancer Network. Clinical practice guidelines in oncology, colorectal cancer screening. Available online (registration required). 2015.  22. Dennis LENZ et al. Breast-Cancer Risk in Families with Mutations in PALB2. NEM. 2014; 371(6):497-506.          Follow-ups after your visit        Your next 10 appointments already scheduled     Aug 04, 2017  3:30 PM CDT   [facility] with  [facility]   [facility] ([facility])    [address] [place] [id number] [phone]            Aug 04, 2017  4:00 PM CDT   (Arrive by 3:45 PM)   MR BREAST BILATERAL W/O & W CONTRAST with [address] MRI ([facility])    [address] [place] [address] [id number] [phone]           Take your medicines as usual, unless your doctor tells you not to. Bring a list of your current medicines to your exam (including vitamins, minerals and over-the-counter drugs).  The timing of your exam may depend on the start of your last period. If you re in menopause, you may have the exam anytime.  Please bring any previous mammograms or breast MRIs from other facilities to the MRI dept. Do not mail these items to us.  You will have contrast for this exam. To prepare:   The day before your exam, drink extra fluids at least  six 8-ounce glasses (unless your doctor tells you to restrict your fluids).   Have a blood test (creatinine test) within 30 days of your exam. Go to your clinic or Diagnostic Imaging Department for this test.  The MRI machine uses a strong magnet. Please wear clothes without metal (snaps, zippers). A sweatsuit works well, or we may give you a hospital gown.  Please remove any body piercings and hair extensions before you arrive. You will also remove watches, jewelry, hairpins, wallets, dentures, partial dental plates and hearing aids. You may wear contact lenses, and you may be able to wear your rings. We have a safe place to keep your personal items, but it is safer to leave them at home.   **IMPORTANT** THE INSTRUCTIONS BELOW ARE ONLY FOR THOSE PATIENTS WHO HAVE BEEN TOLD THEY WILL RECEIVE SEDATION OR GENERAL ANESTHESIA DURING THEIR MRI PROCEDURE:  IF YOU WILL RECEIVE SEDATION (take medicine to help you relax during your exam)   You must get the medicine from your doctor before you arrive. Bring the medicine to the exam. Do not take it at home.   Arrive one hour early. Bring someone who can take you home after the test. Your medicine will make you sleepy. After the exam, you may not drive, take a bus or take a taxi by yourself.   No eating 8 hours before your exam. You may have clear liquids up until 4 hours before your exam. (Clear liquids include water, clear tea, black coffee and fruit juice without pulp.)  IF YOU WILL RECEIVE ANESTHESIA (be asleep for your exam)   Arrive 1 1/2 hours early. Bring someone who can take you home after the test. You may not drive, take a bus or take a taxi by yourself.   No eating 8 hours before your exam. You may have clear liquids up until 4 hours before your exam. (Clear liquids include water, clear tea, black coffee and fruit juice without pulp.)  If you have any questions, please contact your Imaging Department exam site.              Who to contact     If you have questions  "or need follow up information about today's clinic visit or your schedule please contact [facility] directly at [phone].  Normal or non-critical lab and imaging results will be communicated to you by MyChart, letter or phone within 4 business days after the clinic has received the results. If you do not hear from us within 7 days, please contact the clinic through [employer] or phone. If you have a critical or abnormal lab result, we will notify you by phone as soon as possible.  Submit refill requests through [employer] or call your pharmacy and they will forward the refill request to us. Please allow 3 business days for your refill to be completed.          Additional Information About Your Visit        [employer] Information     [employer] lets you send messages to your doctor, view your test results, renew your prescriptions, schedule appointments and more. To sign up, go to [email or web address] . Click on \"Log in\" on the left side of the screen, which will take you to the Welcome page. Then click on \"Sign up Now\" on the right side of the page.     You will be asked to enter the access code listed below, as well as some personal information. Please follow the directions to create your username and password.     Your access code is: [email or web address]  Expires: 9/10/2017  9:58 AM     Your access code will  in 90 days. If you need help or a new code, please call your [facility] clinic or [phone].        Care EveryWhere ID     This is your Care EveryWhere ID. This could be used by other organizations to access your [facility] medical records  [id number]         Blood Pressure from Last 3 Encounters:   17 117/83   17 129/78   17 111/70    Weight from Last 3 Encounters:   17 58.9 kg (129 lb 14.4 oz)   17 60.8 kg (134 lb)   17 59.9 kg (132 lb 1.6 oz)              Today, you had the following     No orders found for display       Primary Care Provider    [place]       No address on file        Equal Access to Services     JORGE HERNÁNDEZ : Hadii cecy fowler yolandayolanda Maevebee, walacyda leenabethanyha, diorlorena dayroxanabree haley, malinda mcdermottyisselbrodie murry. So [facility] [phone].    ATENCIÓN: Si habla español, tiene a spear disposición servicios gratuitos de asistencia lingüística. [facility] [phone].    We comply with applicable federal civil rights laws and [place] laws. We do not discriminate on the basis of race, color, national origin, age, disability sex, sexual orientation or gender identity.            Thank you!     Thank you for choosing [facility]  for your care. Our goal is always to provide you with excellent care. Hearing back from our patients is one way we can continue to improve our services. Please take a few minutes to complete the written survey that you may receive in the mail after your visit with us. Thank you!             Your Updated Medication List - Protect others around you: Learn how to safely use, store and throw away your medicines at www.disposemymeds.org.          This list is accurate as of: 8/4/17  3:22 PM.  Always use your most recent med list.                   Brand Name Dispense Instructions for use Diagnosis    CALCIUM MAGNESIUM PO      With zinc    RLQ abdominal pain       CHROMIUM PICOLINATE PO       RLQ abdominal pain       FOLIC ACID PO      Take 1 mg by mouth daily        methylPREDNISolone 32 MG tablet    MEDROL    2 tablet    one tablet to be taken 12 hours prior to the scan, and an additional 32mg PO methylprednisolone to be taken 2 hours prior to the scan    Contrast media allergy       MILK THISTLE PO      With dandelion root    RLQ abdominal pain       ONE-A-DAY ESSENTIAL Tabs      Take 1 tablet by mouth daily.        Vitamin B 12 100 MCG Lozg       RLQ abdominal pain       VITAMIN D PO       RLQ abdominal pain

## 2017-08-04 NOTE — LETTER
8/4/2017       RE: Sheryle Cruse  [address]  [place] [address]     Dear Colleague,    Thank you for referring your patient, Sheryle Cruse, to the [facility]. Please see a copy of my visit note below.    8/4/2017    Referring Provider: Birdie Gonzalez MD    Presenting Information:   I met with Sheryle Cruse today for genetic counseling at the Cancer Risk Management Program at the [facility] to discuss her personal history of breast cancer and family history of breast and ovarian cancer. She is here today to review this history, cancer screening recommendations, and available genetic testing options.    Personal History:  Sheryle is a 46 year old female. 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 Sheryle reports that she is currently planning to pursue a lumpectomy, though her genetic testing results may help inform surgery decisions (lumpectomy versus bilateral mastectomy).      She had her first menstrual period at age 13, does not have biological children, and is premenopausal. Sheryle has her ovaries, fallopian tubes and uterus in place, and she has had no ovarian cancer screening to date. She reports that she has never used hormone replacement therapy.      Her most recent mammogram in June 2017 after self-palpating a mass identified this cancer. She does not regularly do any other cancer screening at this time. Sheryle reported no tobacco use and no alcohol use.    Family History: (Please see scanned pedigree for detailed family history information)    One maternal aunt was diagnosed with leukemia in her 50's and passed away at age 59.    One maternal aunt was diagnosed with lung cancer and passed away at age 74; she had a history of smoking.    Sheryle's maternal grandmother was diagnosed with breast cancer in her 70's and passed away at age 86; treatment included  surgery.    Sheryle's father was diagnosed with and passed away from bladder cancer at age 80; he did not have a history of smoking or alcohol use.    One paternal aunt was diagnosed with leukemia and passed away at age 26.    One paternal aunt passed away from lung cancer at an unknown age; she had a history of smoking.    One paternal aunt was diagnosed with possibly breast and ovarian cancer in her 50's and passed away at an unknown age.    Her maternal ethnicity is , Turkmen, English, and Luxembourger. Her paternal ethnicity is Spanish, Polish, Yi, and possibly Ashkenazi Confucianist.    Discussion:    Sheryle's personal history of breast cancer and family history of breast and ovarian cancer is suggestive of a hereditary cancer syndrome.    We reviewed the features of sporadic, familial, and hereditary cancers.  In looking at Sheryle's family history, it is possible that a cancer susceptibility gene is present as she was diagnosed with breast cancer under age 51 and she has two relatives diagnosed with breast and possibly related cancers (i.e. ovarian). That being said, it is not clear if her paternal aunt was diagnosed with ovarian cancer and she has multiple relatives that have never been diagnosed with a related cancer.    We discussed the natural history and genetics of several hereditary cancer syndromes, including Hereditary Breast and Ovarian Cancer (HBOC) syndrome and Cool syndrome. A detailed handout regarding these syndromes and the information we discussed was provided to Sheryle at the end of our appointment today and can be found in the after visit summary. Topics included: inheritance pattern, cancer risks, cancer screening recommendations, and also risks, benefits and limitations of testing.    We reviewed that the most common cause of hereditary breast cancer is HBOC syndrome, which is caused by mutations in the BRCA1 and BRCA2 genes. Individuals with HBOC syndrome are at increased  risk for several different cancers, including breast, ovarian, male breast, prostate, melanoma, and pancreatic cancer.    Based on her personal and family history, Sheryle meets current National Comprehensive Cancer Network (NCCN) criteria for genetic testing of BRCA1 and BRCA2.      We discussed that there are also other additional genes that could cause increased risk of breast and/or ovarian cancer. For example, Cool syndrome (caused by mutations in the MLH1, MSH2, MSH6, PMS2, and EPCAM genes) is associated with increased risk for ovarian, uterine, and gastrointestinal cancers. As many of these genes present with overlapping features in a family, it would be reasonable for Sheryle to consider panel genetic testing to analyze multiple genes at once.    We reviewed genetic testing options for hereditary breast and gynecologic cancers: actionable high/moderate risk breast and gynecologic cancer risk custom panel (CustomNext-Cancer, 16 genes) and expanded high and moderate risk panel testing (OvaNext, 25 genes).     Sheryle explained that she will likely pursue the OvaNext gene panel, but stated that she would prefer to take information home to review over the weekend. She then explained she will contact me on Monday, 8/7 with her final decision so we may coordinate the necessary paperwork and blood draw. She denied having any other questions at this time.    Plan:  1) Today Sheryle elected to take the weekend to review her genetic testing options before having her blood drawn.  2) She was provided information regarding the 16 gene CustomNext-Cancer Panel and OvaNext Panel, as well as my contact information.  3) Sheryle will contact me on Monday, 8/7 with her final decision regarding testing in order to coordinate the necessary paperwork and blood draw.    Face to face time: 45 minutes    Claudia Wynn MS, [facility]  Certified Genetic Counselor  Office: [phone]  Pager: [phone]    Addendum: Sheryle contacted me  on Monday, 8/7 and stated that she wishes to proceed with testing using the OvaNext gene panel offered by [facility]. Sheryle also stated that she would prefer having her blood drawn at the [facility] on either Thursday or Friday in the late afternoon. We reviewed that before having her blood drawn for testing, Sheryle will need to meet with either myself or my colleague at [facility] to complete the final paperwork. She explained that given her transportation and financial concerns, she would not be able to meet with either myself or my colleague this week and likely not next week. As information gathered from the OvaNext test will be used to inform surgery decisions, I offered to provide Sheryle with the necessary paperwork to review and that I can call her to obtain consent for testing. She can then present to the [facility] laboratory for the blood draw. Sheryle was appreciative of the offer and requested that the forms be emailed to her, using the email address on file. I will then contact Sheryle on Tuesday, 8/8 at 4:30pm to review the paperwork and obtain consent for testing. She explained that she will then email me the signed forms and present to the laboratory on Thursday, 8/10 at 5:00pm to have her blood drawn for testing. Sheryle denied having any other questions at this time.      Again, thank you for allowing me to participate in the care of your patient.      Sincerely,    Claudia Wynn GC

## 2017-08-04 NOTE — PATIENT INSTRUCTIONS
Assessing Cancer Risk  Only about 5-10% of cancers are thought to be due to an inherited cancer susceptibility gene.    These families often have:    Several people with the same or related types of cancer    Cancers diagnosed at a young age (before age 50)    Individuals with more than one primary cancer    Multiple generations of the family affected with cancer    Some people may be candidates for genetic testing of more than one gene.  For these families, genetic testing using a multi-gene cancer panel may be offered.  These panels may test genes known to increase the risk for breast (and other) cancers: OLLIE, BRCA1, BRCA2, CDH1, CHEK2, PALB2, PTEN, and TP53.  The purpose of this handout is to serve as a brief summary of the high/moderate-risk breast cancer genes which have published clinical management guidelines for individuals who are found to carry a mutation.    Hereditary Breast and Ovarian Cancer Syndrome (HBOC)  A single mutation in one of the copies of BRCA1 or BRCA2 increases the risk for breast and ovarian cancer, among others.  The risk for pancreatic cancer and melanoma may also be slightly increased in some families.  The tables below list the chance that someone with a BRCA mutation would develop cancer in his or her lifetime1,2,3,4.          Women   Men     General Population BRCA+    General Population BRCA+   Breast  12% 40-80%  Breast <1% ~7%   Ovarian  1-2% 10-40%  Prostate 16% 20%     A person s ethnic background is also important to consider, as individuals of Ashkenazi [place] ancestry have a higher chance of having a BRCA gene mutation.  There are three BRCA mutations that occur more frequently in this population.    Li-Fraumeni Syndrome (LFS)  LFS is a cancer predisposition syndrome. Individuals with LFS are at an increased risk for developing cancer at a young age. The general lifetime risk for development of cancer is 50% by age 30 and 90% by age 60.  LFS is caused by a mutation in the  TP53 gene.  A single mutation in one of the copies of TP53 increases the risk for multiple cancers.     Core Cancers: Sarcomas, Breast, Brain, Lung, Leukemias/Lymphomas, Adrenocortical carcinomas  Other Cancers: Gastrointestinal, Thyroid, Skin, Genitourinary        Cowden Syndrome  Cowden syndrome is a hereditary condition that increases the risk for breast, thyroid, endometrial, and kidney cancer.  Cowden syndrome is caused by a mutation in the PTEN gene.  A single mutation in one of the copies of PTEN causes Cowden syndrome and increases cancer risk.  The table below shows the chance that someone with a PTEN mutation would develop cancer in their lifetime5,6.  Other benign features seen in some individuals with Cowden syndrome include benign skin lesions (facial papules, keratoses, lipomas), learning disability, autism, thyroid nodules, colon polyps, and larger head size.      Lifetime Cancer Risk   Cancer Type General Population Cowden Syndrome   Breast  12% 25-50%*   Thyroid  1% 35%   Renal 1-2% 35%   Endometrial  2-3% 28%   Colon 5% 9%   Melanoma 2-3% >5%     *One recent study found breast cancer risk to be increased to 85%    Hereditary Diffuse Gastric Cancer (HDGC)  Currently, one gene is known to cause hereditary diffuse gastric cancer: CDH1.  Individuals with HDGC are at increased risk for diffuse gastric cancer and lobular breast cancer. Of people diagnosed with HDGC, 30-50% have a mutation in the CDH1 gene.  This suggests there are likely other genes that may cause HDGC that have not been identified yet.      Lifetime Cancer Risks    General Pop HDGC    Diffuse Gastric  <1% ~80%   Breast 12% 39-52%     Additional Genes Associated with Increased Breast Cancer Risk  PALB2  Mutations in PALB2 have been shown to increase the risk of breast cancer up to 33-58% in some families; where individuals fall within this risk range is dependent upon family history.9 PALB2 mutations have also been associated with  increased risk for pancreatic cancer, although this risk has not been quantified yet.  Individuals who inherit two PALB2 mutations--one from their mother and one from their father--have a condition called Fanconi Anemia.  This rare autosomal recessive condition is associated with short stature, developmental delay, bone marrow failure, and increased risk for childhood cancers.    OLLIE  OLLIE is a moderate-risk breast cancer gene. Women who have a mutation in OLLIE can have between a 2-4 fold increased risk for breast cancer compared to the general population.10  OLLIE mutations have also been associated with increased risk for pancreatic cancer, however an estimate of this cancer risk is not well understood.11  Individuals who inherit two OLLIE mutations have a condition called ataxia-telangiectasia (AT).  This rare autosomal recessive condition affects the nervous system and immune system, and is associated with progressive cerebellar ataxia beginning in childhood.  Individuals with ataxia-telangiectasia often have a weakened immune system and have an increased risk for childhood cancers.           CHEK2   CHEK2 is a moderate-risk breast cancer gene.  Women who have a mutation in CHEK2 have around a 2-fold increased risk for breast cancer compared to the general population, and this risk may be higher depending upon family history.12,13,14  Mutations in CHEK2 have also been shown to increase the risk of a number of other cancers, including colon and prostate, however these cancer risks are currently not well understood.         Inheritance   All of the genes reviewed above are inherited in an autosomal dominant pattern.  This means that if a parent has a mutation, each of his or her children will have a 50% chance of inheriting that same mutation.  Therefore, each child--male or female--would have a 50% chance of being at increased risk for developing cancer.      Image obtained from [employer] Home Reference, 2013      Mutations in some genes can occur de ector, which means that a person s mutation occurred for the first time in them and was not inherited from a parent.  Now that they have the mutation, however, it can be passed on to future generations.    Genetic Testing  Genetic testing involves a blood test and will look for any harmful mutations within genes that are associated with increased cancer risk.  If possible, it is recommended that the person(s) who has had cancer be tested before other family members.  That person will give us the most useful information about whether or not a specific gene is associated with the cancer in the family.    Results  There are three possible results of genetic testing:    Positive--a harmful mutation was identified in one or more of the genes    Negative--no mutation was identified in any of the genes on this panel    Variant of unknown significance--a variation in one of the genes was identified, but it is unclear how this impacts cancer risk in the family    Advantages and Disadvantages  There are advantages and disadvantages to genetic testing.  Advantages    May clarify your cancer risk    Can help you make medical decisions    May explain the cancers in your family    May give useful information to your family members (if you share your results)    Disadvantages    Possible negative emotional impact of learning about inherited cancer risk    Uncertainty in interpreting a negative test result in some situations    Possible genetic discrimination concerns (see below)    Genetic Information Nondiscrimination Act (EMIL)  EMIL is a federal law that protects individuals from health insurance or employment discrimination based on a genetic test result alone.  Although rare, there are currently no legal discrimination protections in terms of life insurance, long term care, or disability insurances.  Visit the National Human Genome Research [place] genome.gov/56060149 to learn  more.    Reducing Cancer Risk  Each of the genes listed within this handout have nationally recognized cancer screening guidelines that would be recommended for individuals who test positive.  In addition to increased cancer screening, surgeries may be offered or recommended to reduce cancer risk.  Recommendations are based upon an individual s genetic test result as well as their personal and family history of cancer.    Questions to Think About Regarding Genetic Testing    What effect will the test result have on me and my relationship with my family members if I have an inherited gene mutation?  If I don t have a gene mutation?    Should I share my test results, and how will my family react to this news, which may also affect them?    Are my children ready to learn new information that may one day affect their own health?      Resources  FORCE: Facing Our Risk of Cancer Empowered [email or web address]   Bright Pink bebrightpink.org   Li-Fraumeni Syndrome Association lfsassociation.org   PTEN World PTENworld.com   No stomach for cancer, Inc. nostomachforcancer.org   Stomach cancer relief network scrnet.org   Collaborative Group of the Americas on Inherited Colorectal Cancer (CGA) cgaicc.com    Cancer Care cancercare.org   American Cancer Society (ACS) cancer.org   National Cancer [place] (NCI) cancer.gov     Cancer Risk Management Program [phone] ([phone])  ? Liseth Scott, MS, [facility] [place]  [phone]  ? Blanca Kirt, MS, [facility] [place] [facility] [place]  [phone]  ? Claudia Wynn, MS, [facility] [place] [facility] [place]  [phone]  ? Jenna Rivero, MS, [facility] [place] [facility] [place]  [phone]   ? Jalen Yung, MS, [facility] [place] [facility] [place]  [phone]    References  1. Darlene Do PDP, Michela S, Deepti VELAZQUEZ, Yulissa JE, Loren JL, Michaelaman N, Waylon H, Jose O, Chanda A, Maria G B, Sarthak P, Nam S, Jericho DM, Chris N, Hyacinth E, Michelle H, Zach E, Robert J, Ambika J, Dai B, Tulinius H, Thorlacius S, Eerola H, Shakira H, Gordon K, Aaron OP. Average risks of breast and ovarian  cancer associated with BRCA1 or BRCA2 mutations detected in case series unselected for family history: a combined analysis of 222 studies. Am J Hum [place]. 2003;72:1117-30.  2. Anurag N, Reny GARCIA, Ramos G.  BRCA1 and BRCA2 Hereditary Breast and Ovarian Cancer. Gene Reviews online. 2013.  3. Dennis YC, Serge S, Valentin G, Gabrielle S. Breast cancer risk among male BRCA1 and BRCA2 mutation carriers. J Natl Cancer Inst. 2007;99:1811-4.  4. Antonio KELLY, Sea I, Alon J, Kimmy E, Scarlett ER, Margie F. Risk of breast cancer in male BRCA2 carriers. J Med Monica. 2010;47:710-1.  5. Tacos MH, Meg J, Frank J, Itzel BERNAL, Yamile MS, Bobo C. Lifetime cancer risks in individuals with germline PTEN mutations. Clin Cancer Res. 2012;18:400-7.  6. Mauraki R. Cowden Syndrome: A Critical Review of the Clinical Literature. J Monica . 2009:18:13-27.  7. National Comprehensive Cancer Network. Clinical practice guidelines in oncology, colorectal cancer screening. Available online (registration required). 2013.  8. National Cancer [place]. SEER Cancer Stat Fact Sheets.  December 2013.  9. Dennis LENZ, et al. Breast-Cancer Risk in Families with Mutations in PALB2. NEJM. 2014; 371(6):497-506.  10. Camille ELDER, Ayaz D, Cachorro S, Brandie P, Doi T, Malini M, Jeremi B, Rosaura H, Enmanuel R, Hector K, Aster L, Antonio KELLY, Jericho D, Lamont DF, Dony MR, The Breast Cancer Susceptibility Collaboration ([place]) & Cindy N. OLLIE mutations that cause ataxia-telangiectasia are breast cancer susceptibility alleles. Nature Genetics. 2006;38:873-875  11. Mark N , Saira Y, Corinna SCHRADER, Tejas L, Cassy GM , Fany ML, Kirby S, Oliveros AG, Syngal S, Krystina ML, Etta J , Crystal R, Kylah SZ, Missy JR, Lisa VE, Siela M, Vogelstein B, Toy N, Sandie RH, Renuka KW, and Bradley AP. OLLIE mutations in patients with hereditary pancreatic cancer. Cancer Discover. 2012;2:41-46  12. CHEK2 Breast Cancer Case-Control Consortium.  CHEK2*1100delC and susceptibility to breast cancer: A collaborative analysis involving 10,860 breast cancer cases and 9,065 controls from 10 studies. Am J Hum [place], 74 (2004), pp. 0547-6840  13. Jose T, Arnaldo S, Calvin K, et al. Spectrum of Mutations in BRCA1, BRCA2, CHEK2, and TP53 in Families at High Risk of Breast Cancer. VICENTE. 2006;295(12):9918-2455.   14. Yfn GONZALEZ, Brian VARGAS, Imtiaz ELDER, et al. Risk of breast cancer in women with a CHEK2 mutation with and without a family history of breast cancer. J Clin Oncol. 2011;29:2002-2233\        Assessing Cancer Risk  Only about 5-10% of cancers are thought to be due to an inherited cancer susceptibility gene.    These families often have:    Several people with the same or related types of cancer    Cancers diagnosed at a young age (before age 50)    Individuals with more than one primary cancer    Multiple generations of the family affected with cancer    Some people may be candidates for genetic testing of more than one gene.  For these families, genetic testing using a cancer panel may be offered.  These panels can test many genes at once known to increase the risk for gynecologic (and other) cancers:  BRCA1, BRCA2, BRIP1 MLH1, MSH2, MSH6, PMS2, EPCAM, PTEN, PALB2, RAD51C, RAD51D, and TP53. The purpose of this handout is to serve as a brief summary of the gynecologic cancer risk genes that have published clinical management guidelines for individuals who are found to carry a mutation.    ______________________________________________________________________________  Hereditary Breast and Ovarian Cancer Syndrome   (BRCA1 and BRCA2)  A single mutation in one of the copies of BRCA1 or BRCA2 increases the risk for breast and ovarian cancer, among others.  The risk for pancreatic cancer and melanoma may also be slightly increased in some families.  The chart below shows the chance that someone with a BRCA mutation would develop cancer in his or her  lifetime1,2,3,4.        A person s ethnic background is also important to consider, as individuals of Ashkenazi [place] ancestry have a higher chance of having a BRCA gene mutation.  There are three BRCA mutations that occur more frequently in this population.    Cool Syndrome   (MLH1, MSH2, MSH6, PMS2, and EPCAM)  Currently five genes are known to cause Cool Syndrome: MLH1, MSH2, MSH6, PMS2, and EPCAM.  A single mutation in one of the Cool Syndrome genes increases the risk for colon, endometrial, ovarian, and stomach cancers.  Other cancers that occur less commonly in Cool Syndrome include urinary tract, skin, and brain cancers.  The chart below shows the chance that a person with Cool syndrome would develop cancer in his or her lifetime7.      *Cancer risk varies depending on Cool syndrome gene found    Cowden Syndrome   (PTEN)  Cowden syndrome is a hereditary condition that increases the risk for breast, thyroid, endometrial, and kidney cancer.  Cowden syndrome is caused by a mutation in the PTEN gene.  A single mutation in one of the copies of PTEN causes Cowden syndrome and increases cancer risk.  The chart below shows the chance that someone with a PTEN mutation would develop cancer in their lifetime5,6.  Other benign features seen in some individuals with Cowden syndrome include benign skin lesions (facial papules, keratoses, lipomas), learning disability, autism, thyroid nodules, colon polyps, and larger head size.      *One recent study found breast cancer risk to be increased to 85%  Li-Fraumeni Syndrome   (TP53)  Li-Fraumeni Syndrome (LFS) is a cancer predisposition syndrome caused by a mutation in the TP53 gene. A single mutation in one of the copies of TP53 increases the risk for multiple cancers. Individuals with LFS are at an increased risk for developing cancer at a young age. The general lifetime risk for development of cancer is 50% by age 30 and 90% by age 60.     Core Cancers: Sarcomas,  Breast, Brain, Lung, Leukemias/Lymphomas, Adrenocortical carcinomas  Other Cancers: Gastrointestinal, Thyroid, Skin, Genitourinary    Additional Genes  PALB2  Mutations in PALB2 have been shown to increase the risk of breast cancer up to 33-58% in some families; where individuals fall within this risk range is dependent upon family history. PALB2 mutations have also been associated with increased risk for pancreatic cancer, although this risk has not been quantified yet.  Individuals who inherit two PALB2 mutations--one from their mother and one from their father--have a condition called Fanconi Anemia.  This rare autosomal recessive condition is associated with short stature, developmental delay, bone marrow failure, and increased risk for childhood cancers.    BRIP1, RAD51C and RAD51D  Mutations in BRIP1, RAD51C, and RAD51D have been shown to increase the risk of ovarian cancer as well as female breast cancer.    ______________________________________________________________________________    Inheritance  All of the cancer syndromes reviewed above are inherited in an autosomal dominant pattern.  This means that if a parent has a mutation, each of his or her children will have a 50% chance of inheriting that same mutation.  Therefore, each child--male or female--would have a 50% chance of being at increased risk for developing cancer.      Image obtained from Genetics Home Reference, 2013     Mutations in some genes can occur de ector, which means that a person s mutation occurred for the first time in them and was not inherited from a parent.  Now that they have the mutation, however, it can be passed on to future generations.    Genetic Testing  Genetic testing involves a blood test and will look for any harmful mutations that are associated with increased cancer risk.  If possible, it is recommended that the person(s) who has had cancer be tested before other family members.  That person will give us the most  useful information about whether or not a specific gene is associated with the cancer in the family.    Results  There are three possible results of genetic testing:    Positive--a harmful mutation was identified in one or more of the genes    Negative--no mutation was identified in any of the 9 genes on this panel    Variant of unknown significance--a variation in one of the genes was identified, but it is unclear how this impacts cancer risk in the family    Advantages and Disadvantages   There are advantages and disadvantages to genetic testing.  Advantages    May clarify your cancer risk    Can help you make medical decisions    May explain the cancers in your family    May give useful information to your family members (if you share your results)    Disadvantages    Possible negative emotional impact of learning about inherited cancer risk    Uncertainty in interpreting a negative test result in some situations    Possible genetic discrimination concerns (see below)    Genetic Information Nondiscrimination Act (EMIL)  EMIL is a federal law that protects individuals from health insurance or employment discrimination based on a genetic test result alone.  Although rare, there are currently no legal discrimination protections in terms of life insurance, long term care, or disability insurances.  Visit the National Human Dotour.com Research [place] website to learn more.    Reducing Cancer Risk  Each of the genes listed within this handout have nationally recognized cancer screening guidelines that would be recommended for individuals who test positive.  In addition to increased cancer screening, surgeries may be offered or recommended to reduce cancer risk.  Recommendations are based upon an individual s genetic test result as well as their personal and family history of cancer.    Questions to Think About Regarding Genetic Testing:    What effect will the test result have on me and my relationship with my family  members if I have an inherited gene mutation?  If I don t have a gene mutation?    Should I share my test results, and how will my family react to this news, which may also affect them?    Are my children ready to learn new information that may one day affect their own health?        Hereditary Cancer Resources    FORCE: Facing Our Risk of Cancer Empowered facingourrisk.org   Bright Pink bebrightpink.org   Li-Fraumeni Syndrome Association lfsassociation.org   PTEN World PTENworld.com   Collaborative Group of the Americas on Inherited Colorectal Cancer (CGA) cgaicc.com http://www.facingourrisk.org/   Cancer Care cancercare.org   American Cancer Society (ACS) cancer.org   National Cancer [place] (NCI) cancer.gov       Cancer Risk Management Program [phone] ([phone])  ? Liseth Chavez, MS, [facility] [place]  [phone]  ? Blanca Moralez, MS, [facility] [place]  [phone]  ? Claudia Wynn, MS, [facility] [place]  [phone]  ? Jenna Rivero, MS, [facility] [place]  [phone]   ? Jalen Yung, MS, [facility] [place]  [phone]    References  15. Dennis A, Darlene PDP, Micheal S, Deepti VELAZQUEZ, Yulissa JE, Loren JL, Fahad N, Waylon H, Jose O, Chanda A, Maria G B, Sarthak P, Manajay S, Jericho DM, Perez N, Hyacinth E, Michelle H, Zach E, Sylvieinski J, Gronwald J, Dai B, Obduliaus H, Thorlacius S, Eerola H, Shakira H, Gordon K, Aaron OP. Average risks of breast and ovarian cancer associated with BRCA1 or BRCA2 mutations detected in case series unselected for family history: a combined analysis of 222 studies. Am J Hum Monica. 2003;72:1117-30.  16. Anurag DELATORRE, Reny GARCIA, Ramos GANNON.  BRCA1 and BRCA2 Hereditary Breast and Ovarian Cancer. Gene Reviews online. 2013.  17. Dennis YC, Serge S, Valentin G, Anthony S. Breast cancer risk among male BRCA1 and BRCA2 mutation carriers. J Natl Cancer Inst. 2007;99:1811-4.  18. Antonio DG, Sea I, Alon J, Kimmy E, Scarlett ER, Margie F. Risk of breast cancer in male BRCA2 carriers. J Med Monica. 2010;47:710-1.  19. Tacos NAZARIO,  Meg J, Frank J, Itzel LA, Yamile MS, Bobo C. Lifetime cancer risks in individuals with germline PTEN mutations. Clin Cancer Res. 2012;18:400-7.  20. Guillermina FLOWERS. Cowden Syndrome: A Critical Review of the Clinical Literature. J Monica . 2009:18:13-27.  21. National Comprehensive Cancer Network. Clinical practice guidelines in oncology, colorectal cancer screening. Available online (registration required). 2015.  22. Dennis LENZ et al. Breast-Cancer Risk in Families with Mutations in PALB2. NEJM. 2014; 371(6):497-506.

## 2017-08-04 NOTE — LETTER
Cancer Risk Management  Program Locations    [facility]  [facility]  Mailing Address  Cancer Risk Management Program  [facility]  [address]  [place] [address]    New patient appointments  [phone]  August 8, 2017    Sheryle Cruse  [address]   [place] [address]      Dear Sheryle,    It was a pleasure meeting with you at the [facility] on August 4, 2017. Here is a copy of the progress note from your recent genetic counseling visit to the Cancer Risk Management Program. If you have any additional questions, please feel free to call.    8/4/2017    Referring Provider: Birdie Gonzalez MD    Presenting Information:   I met with Sheryle Cruse today for genetic counseling at the Cancer Risk Management Program at the [facility] to discuss her personal history of breast cancer and family history of breast and ovarian cancer. She is here today to review this history, cancer screening recommendations, and available genetic testing options.    Personal History:  Sheryle is a 46 year old female. She was diagnosed with invasive mammary carcinoma with DCIS of the right breast at age 46; the tumor is ER/AK+ and Her2-. Sheryle reports that she is currently planning to pursue a lumpectomy, though her genetic testing results may help inform surgery decisions (lumpectomy versus bilateral mastectomy).      She had her first menstrual period at age 13, does not have biological children, and is premenopausal. Sheryle has her ovaries, fallopian tubes and uterus in place, and she has had no ovarian cancer screening to date. She reports that she has never used hormone replacement therapy.      Her most recent mammogram in June 2017 after self-palpating a mass identified this cancer. She does  not regularly do any other cancer screening at this time. Sheryle reported no tobacco use and no alcohol use.    Family History: (Please see scanned pedigree for detailed family history information)    One maternal aunt was diagnosed with leukemia in her 50's and passed away at age 59.    One maternal aunt was diagnosed with lung cancer and passed away at age 74; she had a history of smoking.    Sheryle's maternal grandmother was diagnosed with breast cancer in her 70's and passed away at age 86; treatment included surgery.    Sheryle's father was diagnosed with and passed away from bladder cancer at age 80; he did not have a history of smoking or alcohol use.    One paternal aunt was diagnosed with leukemia and passed away at age 26.    One paternal aunt passed away from lung cancer at an unknown age; she had a history of smoking.    One paternal aunt was diagnosed with possibly breast and ovarian cancer in her 50's and passed away at an unknown age.    Her maternal ethnicity is , [place]er, English, and Albanian. Her paternal ethnicity is Swazi, Polish, Austrian, and possibly Ashkenazi Moravian.    Discussion:    Sheryle's personal history of breast cancer and family history of breast and ovarian cancer is suggestive of a hereditary cancer syndrome.    We reviewed the features of sporadic, familial, and hereditary cancers.  In looking at Sheryle's family history, it is possible that a cancer susceptibility gene is present as she was diagnosed with breast cancer under age 51 and she has two relatives diagnosed with breast and possibly related cancers (i.e. ovarian). That being said, it is not clear if her paternal aunt was diagnosed with ovarian cancer and she has multiple relatives that have never been diagnosed with a related cancer.    We discussed the natural history and genetics of several hereditary cancer syndromes, including Hereditary Breast and Ovarian Cancer (HBOC) syndrome and Cool syndrome.  A detailed handout regarding these syndromes and the information we discussed was provided to Sheryle at the end of our appointment today and can be found in the after visit summary. Topics included: inheritance pattern, cancer risks, cancer screening recommendations, and also risks, benefits and limitations of testing.    We reviewed that the most common cause of hereditary breast cancer is HBOC syndrome, which is caused by mutations in the BRCA1 and BRCA2 genes. Individuals with HBOC syndrome are at increased risk for several different cancers, including breast, ovarian, male breast, prostate, melanoma, and pancreatic cancer.    Based on her personal and family history, Sheryle meets current National Comprehensive Cancer Network (NCCN) criteria for genetic testing of BRCA1 and BRCA2.      We discussed that there are also other additional genes that could cause increased risk of breast and/or ovarian cancer. For example, Cool syndrome (caused by mutations in the MLH1, MSH2, MSH6, PMS2, and EPCAM genes) is associated with increased risk for ovarian, uterine, and gastrointestinal cancers. As many of these genes present with overlapping features in a family, it would be reasonable for Sheryle to consider panel genetic testing to analyze multiple genes at once.    We reviewed genetic testing options for hereditary breast and gynecologic cancers: actionable high/moderate risk breast and gynecologic cancer risk custom panel (CustomNext-Cancer, 16 genes) and expanded high and moderate risk panel testing (OvaNext, 25 genes).     Sheryle explained that she will likely pursue the OvaNext gene panel, but stated that she would prefer to take information home to review over the weekend. She then explained she will contact me on Monday, 8/7 with her final decision so we may coordinate the necessary paperwork and blood draw. She denied having any other questions at this time.      Plan:  1) Today Sheryle elected to take the  weekend to review her genetic testing options before having her blood drawn.  2) She was provided information regarding the 16 gene CustomNext-Cancer Panel and OvaNext Panel, as well as my contact information.  3) Sheryle will contact me on Monday, 8/7 with her final decision regarding testing in order to coordinate the necessary paperwork and blood draw.    Face to face time: 45 minutes    Claudia Wynn MS, [facility]  Certified Genetic Counselor  Office: [phone]  Pager: [phone]    Addendum: Sheryle contacted me on Monday, 8/7 and stated that she wishes to proceed with testing using the OvaNext gene panel offered by [employer]. Sheryle also stated that she would prefer having her blood drawn at the [facility] on either Thursday or Friday in the late afternoon. We reviewed that before having her blood drawn for testing, Sheryle will need to meet with either myself or my colleague at [facility] to complete the final paperwork. She explained that given her transportation and financial concerns, she would not be able to meet with either myself or my colleague this week and likely not next week. As information gathered from the OvaNext test will be used to inform surgery decisions, I offered to provide Sheryle with the necessary paperwork to review and that I can call her to obtain consent for testing. She can then present to the [facility] laboratory for the blood draw. Sheryle was appreciative of the offer and requested that the forms be emailed to her, using the email address on file. I will then contact Sheryle on Tuesday, 8/8 at 4:30pm to review the paperwork and obtain consent for testing. She explained that she will then email me the signed forms and present to the laboratory on Thursday, 8/10 at 5:00pm to have her blood drawn for testing. Sheryle denied having any other questions at this time.

## 2017-08-08 ENCOUNTER — TELEPHONE (OUTPATIENT)
Dept: SURGERY | Facility: CLINIC | Age: 47
End: 2017-08-08

## 2017-08-08 ENCOUNTER — TELEPHONE (OUTPATIENT)
Dept: ONCOLOGY | Facility: CLINIC | Age: 47
End: 2017-08-08

## 2017-08-08 DIAGNOSIS — Z80.3 FAMILY HISTORY OF MALIGNANT NEOPLASM OF BREAST: ICD-10-CM

## 2017-08-08 DIAGNOSIS — C50.911 MALIGNANT NEOPLASM OF RIGHT BREAST IN FEMALE, ESTROGEN RECEPTOR POSITIVE, UNSPECIFIED SITE OF BREAST (H): Primary | ICD-10-CM

## 2017-08-08 DIAGNOSIS — Z80.41 FAMILY HISTORY OF MALIGNANT NEOPLASM OF OVARY: ICD-10-CM

## 2017-08-08 DIAGNOSIS — Z17.0 MALIGNANT NEOPLASM OF RIGHT BREAST IN FEMALE, ESTROGEN RECEPTOR POSITIVE, UNSPECIFIED SITE OF BREAST (H): Primary | ICD-10-CM

## 2017-08-08 NOTE — TELEPHONE ENCOUNTER
I contacted Sheryle to let her know about the results of her MRI.  It showed two additional lesions in the right breast and one in the left.  Recommendation was made for ultrasound and possible biopsy.  I explained this to Sheryle and she agrees to proceed with additional work up.  We will call her to schedule.    Birdie Gonzalez MD

## 2017-08-08 NOTE — TELEPHONE ENCOUNTER
8/8/2017    Per her request, I called Sheryle to review the consent forms for genetic testing that were previously emailed to her. She again confirmed that she is interested in moving forward with the OvaNext gene panel, given her personal history of breast cancer and family history of breast and ovarian cancer.    Discussion:    Genetic testing is available for 25 genes associated with hereditary gynecologic cancers: OvaNext (OLLIE, BARD1, BRCA1, BRCA2, BRIP1, CDH1, CHEK2, DICER1, EPCAM, MLH1, MRE11A, MSH2, MSH6, MUTYH, NBN, NF1, PALB2, PMS2, PTEN, RAD50, RAD51C, RAD51D, SMARCA4, STK11, and TP53).    We discussed that some of the genes in the OvaNext panel are associated with specific hereditary cancer syndromes and have published management guidelines: Hereditary Breast and Ovarian Cancer syndrome (BRCA1, BRCA2), Cool syndrome (MLH1, MSH2, MSH6, PMS2, EPCAM), Hereditary Diffuse Gastric Cancer (CDH1), Cowden syndrome (PTEN), Li Fraumeni syndrome (TP53), Peutz-Jeghers syndrome (STK11), MUTYH Associated Polyposis (MUTYH), and Neurofibromatosis type 1 (NF1).      Risk-reducing salpingo-oophorectomy can be considered in women with mutations in BRIP1, RAD51C, or RAD51D.  Breast cancer risk management guidelines are available for OLLIE, CHEK2, PALB2, NF1, and NBN.    The remaining genes (BARD1, DICER1, MRE11A, RAD50, and SMARCA4) are associated with increased cancer risk and may allow us to make medical recommendations when mutations are identified.      Sheryle denied having any other questions regarding the consent forms or genetic test. As such, consent was obtained and genetic testing for OvaNext will be sent to [facility]. Sheryle confirmed that she will email me the signed consent forms so that we may move forward with testing.    Sheryle is currently scheduled for a lab only appointment at the [facility] on Thursday, 8/10. The laboratory has been contacted regarding this patient and were  provided an Ambry sample collection kit by my colleague. Once her blood sample is collected, the expected turn around time for the BRCA1 and BRCA2 test results will be 7-10 days, in order to make surgery decisions. The complete test results for the OvaNext gene panel will be available in approximately 3-4 weeks.    Medical Management: The information from genetic testing may determine:     additional cancer screening recommendations (i.e. mammogram and breast MRI, more frequent colonoscopies, annual dermatologic exams, etc.)     options for risk reducing surgeries (i.e. bilateral mastectomy, surgery to remove the ovaries and/or uterus, etc.)    Targeted chemotherapies for patients who have certain cancers now, or who develop certain cancers in the future.     These recommendations and possible targeted chemotherapies will be discussed in detail once genetic testing is completed.     Plan:  1. All of Sheryle's questions were answered to her satisfaction.   2. Sheryle signed the consent forms at the conclusion of our conversation and will email me the completed forms.  3. Sheryle is scheduled for a blood draw appointment on 8/10 at the [facility].   4. Sheryle will first be called with her test results, in order to make surgery decisions. Turn around time: approximately 7-10 days for the BRCA1 and BRCA2 results, 3-4 weeks for the complete OvaNext gene panel.     Claudia Wynn MS, [facility]  Certified Genetic Counselor  Office: [phone]  Pager: [phone]

## 2017-08-18 ENCOUNTER — RADIANT APPOINTMENT (OUTPATIENT)
Dept: ULTRASOUND IMAGING | Facility: CLINIC | Age: 47
End: 2017-08-18
Attending: SURGERY
Payer: COMMERCIAL

## 2017-08-18 DIAGNOSIS — R92.8 ABNORMAL MRI, BREAST: ICD-10-CM

## 2017-08-18 PROCEDURE — 76642 ULTRASOUND BREAST LIMITED: CPT | Mod: 50

## 2017-08-18 PROCEDURE — 19084 BX BREAST ADD LESION US IMAG: CPT | Mod: RT

## 2017-08-18 PROCEDURE — 19083 BX BREAST 1ST LESION US IMAG: CPT | Mod: LT

## 2017-08-18 PROCEDURE — 88305 TISSUE EXAM BY PATHOLOGIST: CPT | Performed by: SURGERY

## 2017-08-21 ENCOUNTER — TELEPHONE (OUTPATIENT)
Dept: ONCOLOGY | Facility: CLINIC | Age: 47
End: 2017-08-21

## 2017-08-21 NOTE — TELEPHONE ENCOUNTER
Social Work Contact - Follow-Up - 8-21-17    SW contacted pt via phone today to follow up regarding cancer support/resources SW had provided to pt in previous contact.  Pt is moving forward with the evaluation of her cancer - pt had two new breast biopsies done yesterday and is awaiting the results of those.  When diagnosis in completed, pt anticipates learning more about her treatment plan.  Pt has financial concerns and SW discussed completing the [employer] myra application when pt begins treatment.  Pt will let SW know when she has a treatment plan in place.  SW provided active listening, affirmations, and support to pt during contact.  SW will follow and await to hear back from pt.      LAZARA Godinez     Social Work  [facility]  Office:  [phone]  E-Mail:  [email or web address]  8/21/2017 10:32 AM

## 2017-08-22 ENCOUNTER — TELEPHONE (OUTPATIENT)
Dept: SURGERY | Facility: CLINIC | Age: 47
End: 2017-08-22

## 2017-08-22 LAB — COPATH REPORT: NORMAL

## 2017-08-22 NOTE — TELEPHONE ENCOUNTER
I called to let Sheryle know that the two additional biopsies performed last week for MRI findings were both benign.  The area in the right breast showed fibrocystic change and the one in the left was a benign fibroadenoma.  She is very happy to hear that they were benign, but the experience of having the additional biopsies pushed her more toward having bilateral mastectomies.  The genetics results are still pending.  Sheryle does not think that she is interested in reconstruction.  I suggested that we talk again in the office and review all of the findings and that she then make a decision so that we can schedule surgery.  It is now over 1 month since I first met with her.  Sheryle is scheduled to follow up with me on 8/31 at 3:40p in [place].  By then we will hopefully have some of the genetic testing results.    Birdie Gonzalez MD

## 2017-08-29 ENCOUNTER — TELEPHONE (OUTPATIENT)
Dept: ONCOLOGY | Facility: CLINIC | Age: 47
End: 2017-08-29

## 2017-08-29 NOTE — TELEPHONE ENCOUNTER
8/29/207    I called Laiirina today in order to discuss the results of the OvaNext genetic testing. As she was not available, I left a voicemail message with my contact information and encouraged her to return my call.    Claudia Wynn MS, [facility]  Certified Genetic Counselor  Office: [phone]  Pager: [phone]

## 2017-08-30 ENCOUNTER — TELEPHONE (OUTPATIENT)
Dept: ONCOLOGY | Facility: CLINIC | Age: 47
End: 2017-08-30

## 2017-08-30 DIAGNOSIS — Z15.89 MONOALLELIC MUTATION OF MUTYH GENE: ICD-10-CM

## 2017-08-30 NOTE — LETTER
Cancer Risk Management  Program Locations    [facility]  [facility]  Mailing Address  Cancer Risk Management Program  [facility]  [address]  [place] [address]    New patient appointments  [phone]  August 31, 2017    Sheryle Cruse  [address]   [place] [address]      Dear Sheryle,    It was a pleasure speaking with you over the phone recently regarding your genetic testing results. Here is a copy of the progress note summarizing our conversation. If you have any additional questions, please feel free to call.    8/30/2017    Referring Provider: Birdie Gonzalez MD    Presenting Information:   I spoke to Sheryle by phone today to discuss her genetic testing results. Her blood was drawn on 8/10/2017. BRCA1/2 Analyses with the OvaNext gene panel was ordered  from [email or web address]. This testing was done because of Sheryle's personal history of breast cancer and family history of breast and ovarian cancer.    Genetic Testing Results: POSITIVE  Sheryle is POSITIVE for one MUTYH gene mutation. Specifically her mutation is called [id number] (also known as c.[id number]). 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 We discussed the impact of this testing on Sheryle in detail.     Of note, Sheryle tested negative for mutations in the following genes by sequencing and deletion/duplication analysis: OLLIE, BARD1, BRCA1, BRCA2, BRIP1, CDH1, CHEK2, DICER1, EPCAM (deletions/duplications only), MLH1, MRE11A, MSH2, MSH6, MUTYH, NBN, NF1, PALB2, PMS2, PTEN, RAD50, RAD51C, RAD51D, SMARCA4, STK11, and TP53.    Testing did not detect an identifiable mutation associated with  "Hereditary Breast and Ovarian Cancer syndrome (BRCA1, BRCA2), Cool syndrome (MLH1, MSH2, MSH6, PMS2, EPCAM), Hereditary Diffuse Gastric Cancer (CDH1), Cowden syndrome (PTEN), Li Fraumeni syndrome (TP53), Peutz-Jeghers syndrome (STK11), or Neurofibromatosis type 1 (NF1).     A copy of the test report can be found in the Laboratory tab, dated 8/10/2017, and named \"[facility]\". The report is scanned in as a linked document.    Cancer Risks:   We reviewed that having only one MUTYH mutation means that an individual is a carrier for MAP, but does not have the condition MAP. We discussed that approximately 1 in 100 (1%) individuals in the general population are carriers of MAP (carry a single mutation in the MUTYH gene).      Carriers of MAP have a very slightly increased risk of colon cancer, which could be as high as twice the population risk of 5-6% (~10-12%).    The data is currently inconclusive as to whether women who carry one MUTYH gene mutation are at increased risk for breast cancer. If there is a risk for breast cancer, this risk is likely only slightly increased above the general population risk of 12%.     As current data suggests that the breast cancer risk associated with one MUTYH gene mutation may only be slightly increased, it is very unlikely that this MUTYH gene mutation is the entire explanation for Sheryle's breast cancer. It is possible that this MUTYH gene mutation, in combination with other genetic and/or environmental risk factors, caused her breast cancer.     If this is the case, additional genetic testing may be available in the future that can further identify a genetic/hereditary explanation for Sheryle's breast cancer. As such, she is encouraged to contact me annually to review any new genetic testing options that may be appropriate for her.    We reviewed that individuals with MAP carry two MUTYH mutations and can have hundreds to thousands of polyps, but most have fewer than " 100. Individuals with MAP have up to an 80% risk of developing colon cancer by age 70 if not treated. MAP is also associated with an increased risk of duodenal cancer. Other benign growths have been reported in MAP, as well.      Cancer Screening and Prevention:  The following screening is recommended for individuals who have one mutation in the MUTYH gene, per current National Comprehensive Cancer Network (NCCN) guidelines:    Individuals who have a first degree relative (parent, child, sibling) with colon cancer should consider having a colonoscopy every five years starting at 40 (or 10 years before their relative's age of diagnosis, whichever is first).    It is currently uncertain if a specialized screening plan is necessary for those individuals who have do not have a family history of colon cancer.     As Sheryle does not have a first degree relative with colon cancer, she could consider beginning colonoscopies at age 50 and repeated every 10 years, or per colonoscopy findings.    We discussed the importance of contacting me annually, though, to determine if guidelines have changed. If these guidelines have change, it may change how we approach colon screening for her in the future.     No other cancer screening recommendations specific to carriers of one MUTYH gene mutation are available at this time. As such, all other screening recommendations should be based on Sheryle's personal and family history:    Sheryle should continue to follow all breast cancer treatment and future cancer screening recommendations as made by her medical providers.    Sheryle s close female relatives remain at increased risk for breast cancer given their family history, regardless of whether or not they carry this MUTYH gene mutation. Breast cancer screening is generally recommended to begin approximately 10 years younger than the earliest age of breast cancer diagnosis in the family, or at age 40, whichever comes first. In this  family, screening may begin as early as age 35. Breast screening options should be discussed with an individual's primary care provider and a genetic counselor, to determine at what age to begin screening, what screening is appropriate, or if additional screening (such as breast MRI) is necessary based on personal/family history factors.    Other population cancer screening options, such as those recommended by the American Cancer Society and NCCN, are also appropriate for Sheryle and her family. These screening recommendations may change if there are changes to Sheryle's personal and/or family history. Final screening recommendations should be made by each individual's managing physician.    Implications for Family Members:  We reviewed the autosomal recessive inheritance of MAP. Individuals with MAP have a mutation within both copies of the MUTYH gene (two mutations, one that was inherited from each parent). When both parents are carriers for MAP, they have a 25% chance of having a child who inherits two mutations (affected with MAP), a 50% chance of having a child who inherits one mutation (carriers of MAP, with small increased risk for colon cancer), and a 25% chance of having a child who inherited neither mutation (unaffected; not carriers). These chances apply to each pregnancy.     We discussed that testing for MUTYH mutations in Sheryle s extended relatives is available and would be helpful in determining if certain relatives and/or their children could be carriers of or have the condition MAP. If Sheryle s relatives do choose to pursue testing of the MUTYH gene, they should have comprehensive full gene sequencing and deletion/duplication analysis of the MUTYH gene. This would best clarify their risk, as it is possible that they could have MAP, carry the same mutation identified in Sheryle, or carry a mutation in the MUTYH gene that is different than the one identified in Sheryle. I am happy to help her  "relatives connect with a genetic counselor in their area if they would like to discuss testing.       Plan:  1.  Sheryle will be mailed a copy of her test results.  2.  I will provide a \"Dear Relative\" letter for Sheryle to share with her family members. This will be mailed to her.  3.  She plans to follow up with he medical providers, including Dr. Gonzalez.  4.  Sheryle is encouraged to contact me annually to review new genetic testing options and if additional information is available regarding cancer risks/screening for carriers of MAP.    If Sheryle has additional questions, I encouraged her to contact me directly at [phone].     Claudia Wynn MS, [facility]  Certified Genetic Counselor  Office: [phone]  Pager: [phone]  "

## 2017-08-30 NOTE — TELEPHONE ENCOUNTER
"8/30/2017    Referring Provider: Birdie Gonzalez MD    Presenting Information:   I spoke to Sheryle by phone today to discuss her genetic testing results. Her blood was drawn on 8/10/2017. BRCA1/2 Analyses with the [facility] gene panel was ordered  from [facility]. This testing was done because of Sheryle's personal history of breast cancer and family history of breast and ovarian cancer.    Genetic Testing Results: POSITIVE  Sheryle is POSITIVE for one MUTYH gene mutation. Specifically her mutation is called p.G396D (also known as c.1187G>A). 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 We discussed the impact of this testing on Sheryle in detail.     Of note, Sheryle tested negative for mutations in the following genes by sequencing and deletion/duplication analysis: OLLIE, BARD1, BRCA1, BRCA2, BRIP1, CDH1, CHEK2, DICER1, EPCAM (deletions/duplications only), MLH1, MRE11A, MSH2, MSH6, MUTYH, NBN, NF1, PALB2, PMS2, PTEN, RAD50, RAD51C, RAD51D, SMARCA4, STK11, and TP53.    Testing did not detect an identifiable mutation associated with Hereditary Breast and Ovarian Cancer syndrome (BRCA1, BRCA2), Cool syndrome (MLH1, MSH2, MSH6, PMS2, EPCAM), Hereditary Diffuse Gastric Cancer (CDH1), Cowden syndrome (PTEN), Li Fraumeni syndrome (TP53), Peutz-Jeghers syndrome (STK11), or Neurofibromatosis type 1 (NF1).     A copy of the test report can be found in the Laboratory tab, dated 8/10/2017, and named \"[facility]\". The report is scanned in as a linked document.    Cancer Risks:   We reviewed that having only one MUTYH mutation means that an individual is a carrier for MAP, but does not have the condition MAP. We discussed that approximately 1 in 100 (1%) individuals in the general population are carriers of MAP (carry a single mutation in the MUTYH gene).      Carriers " of MAP have a very slightly increased risk of colon cancer, which could be as high as twice the population risk of 5-6% (~10-12%).    The data is currently inconclusive as to whether women who carry one MUTYH gene mutation are at increased risk for breast cancer. If there is a risk for breast cancer, this risk is likely only slightly increased above the general population risk of 12%.     As current data suggests that the breast cancer risk associated with one MUTYH gene mutation may only be slightly increased, it is very unlikely that this MUTYH gene mutation is the entire explanation for Sheryle's breast cancer. It is possible that this MUTYH gene mutation, in combination with other genetic and/or environmental risk factors, caused her breast cancer.     If this is the case, additional genetic testing may be available in the future that can further identify a genetic/hereditary explanation for Sheryle's breast cancer. As such, she is encouraged to contact me annually to review any new genetic testing options that may be appropriate for her.    We reviewed that individuals with MAP carry two MUTYH mutations and can have hundreds to thousands of polyps, but most have fewer than 100. Individuals with MAP have up to an 80% risk of developing colon cancer by age 70 if not treated. MAP is also associated with an increased risk of duodenal cancer. Other benign growths have been reported in MAP, as well.      Cancer Screening and Prevention:  The following screening is recommended for individuals who have one mutation in the MUTYH gene, per current National Comprehensive Cancer Network (NCCN) guidelines:    Individuals who have a first degree relative (parent, child, sibling) with colon cancer should consider having a colonoscopy every five years starting at 40 (or 10 years before their relative's age of diagnosis, whichever is first).    It is currently uncertain if a specialized screening plan is necessary for those  individuals who have do not have a family history of colon cancer.     As Sheryle does not have a first degree relative with colon cancer, she could consider beginning colonoscopies at age 50 and repeated every 10 years, or per colonoscopy findings.    We discussed the importance of contacting me annually, though, to determine if guidelines have changed. If these guidelines have change, it may change how we approach colon screening for her in the future.     No other cancer screening recommendations specific to carriers of one MUTYH gene mutation are available at this time. As such, all other screening recommendations should be based on Sheryle's personal and family history:    Sheryle should continue to follow all breast cancer treatment and future cancer screening recommendations as made by her medical providers.    Sheryle s close female relatives remain at increased risk for breast cancer given their family history, regardless of whether or not they carry this MUTYH gene mutation. Breast cancer screening is generally recommended to begin approximately 10 years younger than the earliest age of breast cancer diagnosis in the family, or at age 40, whichever comes first. In this family, screening may begin as early as age 35. Breast screening options should be discussed with an individual's primary care provider and a genetic counselor, to determine at what age to begin screening, what screening is appropriate, or if additional screening (such as breast MRI) is necessary based on personal/family history factors.    Other population cancer screening options, such as those recommended by the American Cancer Society and NCCN, are also appropriate for Sheryle and her family. These screening recommendations may change if there are changes to Sheryle's personal and/or family history. Final screening recommendations should be made by each individual's managing physician.    Implications for Family Members:  We reviewed the  "autosomal recessive inheritance of MAP. Individuals with MAP have a mutation within both copies of the MUTYH gene (two mutations, one that was inherited from each parent). When both parents are carriers for MAP, they have a 25% chance of having a child who inherits two mutations (affected with MAP), a 50% chance of having a child who inherits one mutation (carriers of MAP, with small increased risk for colon cancer), and a 25% chance of having a child who inherited neither mutation (unaffected; not carriers). These chances apply to each pregnancy.     We discussed that testing for MUTYH mutations in Sheryle s extended relatives is available and would be helpful in determining if certain relatives and/or their children could be carriers of or have the condition MAP. If Sheryle s relatives do choose to pursue testing of the MUTYH gene, they should have comprehensive full gene sequencing and deletion/duplication analysis of the MUTYH gene. This would best clarify their risk, as it is possible that they could have MAP, carry the same mutation identified in Sheryle, or carry a mutation in the MUTYH gene that is different than the one identified in Sheryle. I am happy to help her relatives connect with a genetic counselor in their area if they would like to discuss testing.       Plan:  1.  Sheryle will be mailed a copy of her test results.  2.  I will provide a \"Dear Relative\" letter for Sheryle to share with her family members. This will be mailed to her.  3.  She plans to follow up with he medical providers, including Dr. Gonzalez.  4.  Sheryle is encouraged to contact me annually to review new genetic testing options and if additional information is available regarding cancer risks/screening for carriers of MAP.    If Sheryle has additional questions, I encouraged her to contact me directly at [phone].     Claudia Wynn MS, [facility] [place]  Certified Genetic Counselor  Office: [phone]  Pager: [phone]  "

## 2017-08-31 ENCOUNTER — OFFICE VISIT (OUTPATIENT)
Dept: SURGERY | Facility: CLINIC | Age: 47
End: 2017-08-31
Payer: COMMERCIAL

## 2017-08-31 ENCOUNTER — RADIANT APPOINTMENT (OUTPATIENT)
Dept: GENERAL RADIOLOGY | Facility: CLINIC | Age: 47
End: 2017-08-31
Payer: COMMERCIAL

## 2017-08-31 ENCOUNTER — OFFICE VISIT (OUTPATIENT)
Dept: FAMILY MEDICINE | Facility: CLINIC | Age: 47
End: 2017-08-31
Payer: COMMERCIAL

## 2017-08-31 VITALS
TEMPERATURE: 97.2 F | HEART RATE: 93 BPM | OXYGEN SATURATION: 98 % | WEIGHT: 128 LBS | SYSTOLIC BLOOD PRESSURE: 123 MMHG | DIASTOLIC BLOOD PRESSURE: 81 MMHG | HEIGHT: 64 IN | BODY MASS INDEX: 21.85 KG/M2

## 2017-08-31 DIAGNOSIS — Z17.0 CARCINOMA OF LOWER-INNER QUADRANT OF RIGHT BREAST IN FEMALE, ESTROGEN RECEPTOR POSITIVE (H): Primary | ICD-10-CM

## 2017-08-31 DIAGNOSIS — C50.311 CARCINOMA OF LOWER-INNER QUADRANT OF RIGHT BREAST IN FEMALE, ESTROGEN RECEPTOR POSITIVE (H): Primary | ICD-10-CM

## 2017-08-31 DIAGNOSIS — Z17.0 MALIGNANT NEOPLASM OF LOWER-INNER QUADRANT OF LEFT BREAST IN FEMALE, ESTROGEN RECEPTOR POSITIVE (H): ICD-10-CM

## 2017-08-31 DIAGNOSIS — K59.00 CONSTIPATION, UNSPECIFIED CONSTIPATION TYPE: Primary | ICD-10-CM

## 2017-08-31 DIAGNOSIS — Z15.89 MONOALLELIC MUTATION OF MUTYH GENE: ICD-10-CM

## 2017-08-31 DIAGNOSIS — K59.00 CONSTIPATION, UNSPECIFIED CONSTIPATION TYPE: ICD-10-CM

## 2017-08-31 DIAGNOSIS — C50.312 MALIGNANT NEOPLASM OF LOWER-INNER QUADRANT OF LEFT BREAST IN FEMALE, ESTROGEN RECEPTOR POSITIVE (H): ICD-10-CM

## 2017-08-31 LAB
ALBUMIN SERPL-MCNC: 4.2 G/DL (ref 3.4–5)
ALP SERPL-CCNC: 50 U/L (ref 40–150)
ALT SERPL W P-5'-P-CCNC: 23 U/L (ref 0–50)
ANION GAP SERPL CALCULATED.3IONS-SCNC: 6 MMOL/L (ref 3–14)
AST SERPL W P-5'-P-CCNC: 18 U/L (ref 0–45)
BASOPHILS # BLD AUTO: 0 10E9/L (ref 0–0.2)
BASOPHILS NFR BLD AUTO: 0.9 %
BILIRUB SERPL-MCNC: 0.8 MG/DL (ref 0.2–1.3)
BUN SERPL-MCNC: 3 MG/DL (ref 7–30)
CALCIUM SERPL-MCNC: 9 MG/DL (ref 8.5–10.1)
CHLORIDE SERPL-SCNC: 104 MMOL/L (ref 94–109)
CO2 SERPL-SCNC: 27 MMOL/L (ref 20–32)
CREAT SERPL-MCNC: 0.53 MG/DL (ref 0.52–1.04)
DIFFERENTIAL METHOD BLD: NORMAL
EOSINOPHIL # BLD AUTO: 0.1 10E9/L (ref 0–0.7)
EOSINOPHIL NFR BLD AUTO: 1.6 %
ERYTHROCYTE [DISTWIDTH] IN BLOOD BY AUTOMATED COUNT: 12.5 % (ref 10–15)
GFR SERPL CREATININE-BSD FRML MDRD: >90 ML/MIN/1.7M2
GLUCOSE SERPL-MCNC: 95 MG/DL (ref 70–99)
HCT VFR BLD AUTO: 37.6 % (ref 35–47)
HGB BLD-MCNC: 12.8 G/DL (ref 11.7–15.7)
LYMPHOCYTES # BLD AUTO: 1.8 10E9/L (ref 0.8–5.3)
LYMPHOCYTES NFR BLD AUTO: 39.8 %
MCH RBC QN AUTO: 31.6 PG (ref 26.5–33)
MCHC RBC AUTO-ENTMCNC: 34 G/DL (ref 31.5–36.5)
MCV RBC AUTO: 93 FL (ref 78–100)
MONOCYTES # BLD AUTO: 0.6 10E9/L (ref 0–1.3)
MONOCYTES NFR BLD AUTO: 13.3 %
NEUTROPHILS # BLD AUTO: 2 10E9/L (ref 1.6–8.3)
NEUTROPHILS NFR BLD AUTO: 44.4 %
PLATELET # BLD AUTO: 409 10E9/L (ref 150–450)
POTASSIUM SERPL-SCNC: 4.4 MMOL/L (ref 3.4–5.3)
PROT SERPL-MCNC: 8.1 G/DL (ref 6.8–8.8)
RBC # BLD AUTO: 4.05 10E12/L (ref 3.8–5.2)
SODIUM SERPL-SCNC: 137 MMOL/L (ref 133–144)
WBC # BLD AUTO: 4.4 10E9/L (ref 4–11)

## 2017-08-31 PROCEDURE — 99214 OFFICE O/P EST MOD 30 MIN: CPT | Performed by: PREVENTIVE MEDICINE

## 2017-08-31 PROCEDURE — 99215 OFFICE O/P EST HI 40 MIN: CPT | Performed by: SURGERY

## 2017-08-31 PROCEDURE — 80053 COMPREHEN METABOLIC PANEL: CPT | Performed by: PREVENTIVE MEDICINE

## 2017-08-31 PROCEDURE — 74020 XR ABDOMEN 2 VW: CPT

## 2017-08-31 PROCEDURE — 85025 COMPLETE CBC W/AUTO DIFF WBC: CPT | Performed by: PREVENTIVE MEDICINE

## 2017-08-31 PROCEDURE — 36415 COLL VENOUS BLD VENIPUNCTURE: CPT | Performed by: PREVENTIVE MEDICINE

## 2017-08-31 ASSESSMENT — PAIN SCALES - GENERAL: PAINLEVEL: NO PAIN (0)

## 2017-08-31 NOTE — NURSING NOTE
"Sheryle Cruse's goals for this visit include: new breast cancer  She requests these members of her care team be copied on today's visit information: no    PCP: [facility]    Referring Provider:  No referring provider defined for this encounter.    Chief Complaint   Patient presents with     RECHECK     new breast cancer       Initial There were no vitals taken for this visit. Estimated body mass index is 21.97 kg/(m^2) as calculated from the following:    Height as of an earlier encounter on 8/31/17: 1.626 m (5' 4\").    Weight as of an earlier encounter on 8/31/17: 58.1 kg (128 lb).  Medication Reconciliation: complete    Do you need any medication refills at today's visit? No    Esther Valerio LPN      "

## 2017-08-31 NOTE — PATIENT INSTRUCTIONS
Based on your medical history and these are the current health maintenance or preventive care services that you are due for (some may have been done at this visit)  Health Maintenance Due   Topic Date Due     PAP SCREENING Q3 YR (SYSTEM ASSIGNED)  09/01/2016         At [facility], we strive to deliver an exceptional experience to you, every time we see you.    If you receive a survey in the mail, please send us back your thoughts. We really do value your feedback.    Your care team's suggested websites for health information:  Www.[employer].org : Up to date and easily searchable information on multiple topics.  Www.medlineplus.gov : medication info, interactive tutorials, watch real surgeries online  Www.familydoctor.org : good info from the Academy of Family Physicians  Www.cdc.gov : public health info, travel advisories, epidemics (H1N1)  Www.aap.org : children's health info, normal development, vaccinations  Www.health.[facility].[place].us : [place] dept of health, public health issues in [place], N1N1    How to contact your care team:   Team Jyoti/Spirit [phone]         Pharmacy [phone]    Dr. Armenta, Jesusita De PA-C, Dr. Levy, Negin EWING CNP, Sol Cook PA-C, Dr. Greenwood, and GILDARDO Montero CNP    Team RNs: Laurel Medina      Clinic hours  M-Th 7 am-7 pm   Fri 7 am-5 pm.   Urgent care M-F 11 am-9 pm,   Sat/Sun 9 am-5 pm.  Pharmacy M-Th 8 am-8 pm Fri 8 am-6 pm  Sat/Sun 9 am-5 pm.     All password changes, disabled accounts, or ID changes in Memonic/MyHealth will be done by our Access Services Department.    If you need help with your account or password, call: [phone]. Clinic staff no longer has the ability to change passwords.

## 2017-08-31 NOTE — PROGRESS NOTES
CHIEF COMPLAINT:  Chief Complaint   Patient presents with     RECHECK     new breast cancer       HISTORY OF PRESENT ILLNESS:  Sheryle Cruse is a 46 year old female who is seen in follow up regarding newly diagnosed right breast cancer.  She had two further biopsies (one left and one right) for abnormalities found on MRI.  Both biopsies were negative though the radiologist wasn't sure that the right biopsy site correlated with the MRI abnormality.  After having these biopsies, Sheryle has said that the biopsies were very hard on her and have made her lean more toward bilateral mastectomies.  She is here to further discuss surgical options and for me to answer more questions.    REVIEW OF SYSTEMS:  Constitutional:  Negative for chills, fatigue, fever and weight change.  Eyes:  Negative for blurred vision, eye pain and photophobia.  ENT:  Negative for hearing problems, ENT pain, congestion, rhinorrhea, epistaxis, hoarseness and dental problems.  Cardiovascular:  Negative for chest pain, palpitations, tachycardia, orthopnea and edema.  Respiratory:  Negative for cough, dyspnea and hemoptysis.  Gastrointestinal:  Negative for abdominal pain, heartburn, constipation, diarrhea and stool changes.  Musculoskeletal:  Negative for arthralgias, back pain and myalgias.  Integumentary/Breast:  See HPI.    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 (c.1187G>A) [employer] [facility] 8/10/17     Vitamin D deficiency        Past Surgical History:   Procedure Laterality Date     Mammogram  2003, 2009       Family History   Problem Relation Age of Onset     DIABETES Mother 76     Obese     HEART DISEASE Mother       Hypertension Mother      CEREBROVASCULAR DISEASE Mother      Cardiovascular Mother      CVA     CANCER Father      bladder     CEREBROVASCULAR DISEASE Father 70     Breast Cancer Maternal Grandmother      CEREBROVASCULAR DISEASE Maternal Grandmother      Lipids Maternal Grandmother      HEART DISEASE Maternal Grandfather      heart disease     DIABETES Paternal Grandmother      CEREBROVASCULAR DISEASE Paternal Grandfather      HEART DISEASE Paternal Grandfather        Social History   Substance Use Topics     Smoking status: Never Smoker     Smokeless tobacco: Never Used     Alcohol use No       Patient Active Problem List   Diagnosis     Vitamin D deficiency     Vegetarianism     Hyperlipidemia LDL goal <160     Advance care planning     ERRONEOUS ENCOUNTER--DISREGARD     Monoallelic mutation of MUTYH gene     Allergies   Allergen Reactions     Contrast Dye      Pt does not recall which type of contrast     Pcn [Bicillin C-R,] Rash     swelling     Current Outpatient Prescriptions   Medication Sig Dispense Refill     methylPREDNISolone (MEDROL) 32 MG tablet one tablet to be taken 12 hours prior to the scan, and an additional 32mg PO methylprednisolone to be taken 2 hours prior to the scan 2 tablet 0     FOLIC ACID PO Take 1 mg by mouth daily       MILK THISTLE PO With dandelion root       CHROMIUM PICOLINATE PO        Calcium-Magnesium-Vitamin D (CALCIUM MAGNESIUM PO) With zinc       Cyanocobalamin (VITAMIN B 12) 100 MCG LOZG        Cholecalciferol (VITAMIN D PO)        Multiple Vitamin (ONE-A-DAY ESSENTIAL) TABS Take 1 tablet by mouth daily.       Vitals: There were no vitals taken for this visit.  BMI= There is no height or weight on file to calculate BMI.    EXAM:  GENERAL: healthy, alert and no distress   BREAST:  The breasts are large and appear symmetric with no overlying skin changes.  The nipples are normal bilaterally.  There is no dimpling or thickening of the skin. A little ecchymosis is seen at the left  and right biopsy sites.  The right breast mass at 3 o'clock in the central breast is palpated and feels about the same as it had last exam. The breast tissue is generally very soft and no other abnormality is identified.  There is no axillary or supraclavicular lymphadenopathy.  CARDIOVASCULAR:  No edema or JVD.  RESPIRATORY: negative findings: no chest deformities noted, no chest wall tenderness, breathing is unlabored.  NECK: Neck supple. No adenopathy.   SKIN: No suspicious lesions or rashes  LYMPH: Normal cervical lymph nodes    ASSESSMENT:  Sheryle Cruse has high grade right breast cancer that is strongly ER/NY positive.  Though no further cancer was identified in either breast on MRI and biopsy, she is thinking that bilateral mastectomy without reconstruction is how she wishes to proceed because she does not want to have to go through more imaging and biopsies in the future.  Additionally, she was found to carry a MUTYH mutation which places her at higher risk for colon cancer.  It is not clear whether this mutation also increases risk for breast cancer.    Sheryle has been talking to people who have had mastectomies and lumpectomies.  She has also been doing a lot of reading.  I answered the 4 page list of questions that she had compiled that touched on the procedures, hospital stay, diet, exercise, post-mastectomy garments/bras/prostheses, risks, anesthesia, post-op pain meds and metastatic work up.  We talked quite a bit about sentinel lymph node biopsy which Sheryle is not sure that she will allow me to do.  I explained that the risk of lymphedema is small if only a few nodes are removed.  I also explained why the lymph node biopsy is important in staging and making additional treatment recommendations.  We also talked about the indications for post-mastectomy radiation which I don't think will be indicated.  Sheryle is fairly adamant about not wanting to have chemotherapy, but thinks that she would be  willing to take estrogen blockade and is willing to talk to an oncologist about additional treatment.    PLAN:  Sheryle will think further over the weekend and call next week to schedule surgery.  She will call if she has further questions.    Total time with patient visit: 60 minutes including discussions about the plan of care and care coordination with the patient.    Birdie Gonzalez MD    Please route or send letter to:  Primary Care Provider (PCP)

## 2017-08-31 NOTE — MR AVS SNAPSHOT
After Visit Summary   8/31/2017    Sheryle Cruse    MRN: [id number]           Patient Information     Date Of Birth          1970        Visit Information        Provider Department      8/31/2017 10:20 AM Monse Levy MD [facility]        Today's Diagnoses     Constipation, unspecified constipation type    -  1    Monoallelic mutation of MUTYH gene          Care Instructions    Based on your medical history and these are the current health maintenance or preventive care services that you are due for (some may have been done at this visit)  Health Maintenance Due   Topic Date Due     PAP SCREENING Q3 YR (SYSTEM ASSIGNED)  09/01/2016         At [facility], we strive to deliver an exceptional experience to you, every time we see you.    If you receive a survey in the mail, please send us back your thoughts. We really do value your feedback.    Your care team's suggested websites for health information:  Www.[employer] : Up to date and easily searchable information on multiple topics.  Www.medlineplus.gov : medication info, interactive tutorials, watch real surgeries online  Www.familydoctor.org : good info from the Academy of Family Physicians  Www.cdc.gov : public health info, travel advisories, epidemics (H1N1)  Www.aap.org : children's health info, normal development, vaccinations  Www.health.[facility].[place].us : [place] dept of health, public health issues in [place], N1N1    How to contact your care team:   Team Jyoti/Jamal [phone]         Pharmacy [phone]    Dr. Armenta, Jesusita De PA-C, Negin Thompson CNP, Sol Cook PA-C, Dr. Greenwood, and GIDLARDO Montero CNP    Team RNs: Laurel & Carol      Clinic hours  M-Th 7 am-7 pm   Fri 7 am-5 pm.   Urgent care M-F 11 am-9 pm,   Sat/Sun 9 am-5 pm.  Pharmacy M-Th 8 am-8 pm Fri 8 am-6 pm  Sat/Sun 9 am-5 pm.     All password changes, disabled accounts, or ID changes in  "MyChart/MyHealth will be done by our Access Services Department.    If you need help with your account or password, call: [phone]. Clinic staff no longer has the ability to change passwords.             Follow-ups after your visit        Your next 10 appointments already scheduled     Aug 31, 2017  3:40 PM CDT   Return Visit with Birdie Gonzalez MD   [facility])    [address] [place] [id number] [phone]            Sep 07, 2017  4:00 PM CDT   Return Visit with GILDARDO Michaels CNP   [facility])    [address] [place] [id number] [phone]              Future tests that were ordered for you today     Open Future Orders        Priority Expected Expires Ordered    XR Abdomen 2 Views Routine 8/31/2017 8/31/2018 8/31/2017            Who to contact     If you have questions or need follow up information about today's clinic visit or your schedule please contact [facility] directly at [phone].  Normal or non-critical lab and imaging results will be communicated to you by MyChart, letter or phone within 4 business days after the clinic has received the results. If you do not hear from us within 7 days, please contact the clinic through MyChart or phone. If you have a critical or abnormal lab result, we will notify you by phone as soon as possible.  Submit refill requests through [employer] or call your pharmacy and they will forward the refill request to us. Please allow 3 business days for your refill to be completed.          Additional Information About Your Visit        [employer] Information     [employer] lets you send messages to your doctor, view your test results, renew your prescriptions, schedule appointments and more. To sign up, go to [email or web address] . Click on \"Log in\" on the left side of the screen, which will take you to the " "Welcome page. Then click on \"Sign up Now\" on the right side of the page.     You will be asked to enter the access code listed below, as well as some personal information. Please follow the directions to create your username and password.     Your access code is: [email or web address]  Expires: 9/10/2017  9:58 AM     Your access code will  in 90 days. If you need help or a new code, please call your [place] clinic or [phone].        Care EveryWhere ID     This is your Care EveryWhere ID. This could be used by other organizations to access your [place] medical records  [id number]        Your Vitals Were     Pulse Temperature Height Pulse Oximetry Breastfeeding? BMI (Body Mass Index)    93 97.2  F (36.2  C) (Oral) 5' 4\" (1.626 m) 98% No 21.97 kg/m2       Blood Pressure from Last 3 Encounters:   17 123/81   17 117/83   17 129/78    Weight from Last 3 Encounters:   17 128 lb (58.1 kg)   17 129 lb 14.4 oz (58.9 kg)   17 134 lb (60.8 kg)              We Performed the Following     CBC with platelets differential     Comprehensive metabolic panel        Primary Care Provider    [facility]       No address on file        Equal Access to Services     JORGE HERNÁNDEZ : Hadii aad ku hadasho Soomaali, waaxda luqadaha, qaybta kaalmada adeegyada, malinda copeland . So [facility] [phone].    ATENCIÓN: Si habla español, tiene a spear disposición servicios gratuitos de asistencia lingüística. Llame al [phone].    We comply with applicable federal civil rights laws and [place] laws. We do not discriminate on the basis of race, color, national origin, age, disability sex, sexual orientation or gender identity.            Thank you!     Thank you for choosing [facility]  for your care. Our goal is always to provide you with excellent care. Hearing back from our patients is one way we can continue to improve our services. Please " take a few minutes to complete the written survey that you may receive in the mail after your visit with us. Thank you!             Your Updated Medication List - Protect others around you: Learn how to safely use, store and throw away your medicines at www.disposemymeds.org.          This list is accurate as of: 8/31/17 10:59 AM.  Always use your most recent med list.                   Brand Name Dispense Instructions for use Diagnosis    CALCIUM MAGNESIUM PO      With zinc    RLQ abdominal pain       CHROMIUM PICOLINATE PO       RLQ abdominal pain       FOLIC ACID PO      Take 1 mg by mouth daily        methylPREDNISolone 32 MG tablet    MEDROL    2 tablet    one tablet to be taken 12 hours prior to the scan, and an additional 32mg PO methylprednisolone to be taken 2 hours prior to the scan    Contrast media allergy       MILK THISTLE PO      With dandelion root    RLQ abdominal pain       ONE-A-DAY ESSENTIAL Tabs      Take 1 tablet by mouth daily.        Vitamin B 12 100 MCG Lozg       RLQ abdominal pain       VITAMIN D PO       RLQ abdominal pain

## 2017-08-31 NOTE — NURSING NOTE
"Chief Complaint   Patient presents with     Constipation     Diarrhea       Initial /81  Pulse 93  Temp 97.2  F (36.2  C) (Oral)  Ht 5' 4\" (1.626 m)  Wt 128 lb (58.1 kg)  SpO2 98%  Breastfeeding? No  BMI 21.97 kg/m2 Estimated body mass index is 21.97 kg/(m^2) as calculated from the following:    Height as of this encounter: 5' 4\" (1.626 m).    Weight as of this encounter: 128 lb (58.1 kg).  Medication Reconciliation: complete   Edith NOWAK        "

## 2017-08-31 NOTE — PATIENT INSTRUCTIONS
Assessing Cancer Risk  Only about 5-10% of cancers are thought to be due to an inherited cancer susceptibility gene.    These families often have:    Several people with the same or related types of cancer    Cancers diagnosed at a young age (before age 50)    Individuals with more than one primary cancer    Multiple generations of the family affected with cancer    MUTYH Associated Polyposis (MAP)  MAP is a hereditary condition that causes the formation of adenoma type polyps (typically 10 to more than 100) in the colon, the first part of the small intestine, and the stomach.  On average, individuals affected with MAP usually develop these precancerous polyps around age 50.      MUTYH  Gene  We each inherit two copies of every gene in our bodies: one from our mother, and one from our father.  Each gene has a specific job to do.  When a gene has a mistake or  mutation  in it, it does not work like it should.  Everyone has two copies of the MUTYH gene.  In order to be affected with MAP, a person must inherit a mutation in both copies of the gene.  This causes the formation of polyps and increases cancer risk.  The table below shows the chance that someone with MAP would develop cancer in their lifetime1,2.      Lifetime Cancer Risks   Colon %   Duodenal (small intestine) 5%     Studies suggest that there may also be increased risk for other later-onset cancers including ovarian and bladder cancer in individuals with MAP.  Currently, it is thought that carriers of MAP do have a colon cancer risk 2-3 times greater than the general population, but this risk does not seem to be for early-onset colon cancer.      Other Findings  Individuals with MAP may also show these features:    CHRPE: freckle like spots on the inside of the eye    Dental abnormalities/cysts on the jaw bone    Benign and malignant tumors of the skin s oil glands (sebaceous tumors)     Inheritance   MUTYH mutations are inherited in an autosomal  recessive pattern.  This means that if a both parents have a mutation, each of that couple s children will have a 25% chance of being affected with MAP.  Likewise, each child would have a 25% chance of having no MUTYH mutations and a 50% chance of having only a single mutation.  People with only one mutation are called  carriers  and are NOT affected with MAP.              Image obtained from Genetics Home Reference, 2013    Genetic Testing  Genetic testing involves a simple blood test and will look at the genetic information in the MUTYH gene for any harmful mutations that are associated with increased risk for polyps and cancer.  If possible, it is recommended that the person(s) who has had polyps or cancer be tested before other family members.  That person will give us the most useful information about whether or not a specific gene is associated with the cancer in the family.  MUTYH testing can be ordered in several ways including looking for only the two common  mutations or looking at the entire length of the gene.  Your genetic counselor can help determine which test is most appropriate for you and your family.     Results  There are three possible results of MUTYH genetic testing:    Positive--two harmful MUTYH mutations were identified    Carrier--only one harmful MUTYH mutation was identified    Negative--no MUTYH mutations were identified    Variant of unknown significance--a variation in the gene was identified, but it is unclear how this impacts cancer risk in the family    Advantages and Disadvantages  There are advantages and disadvantages to genetic testing of this gene.    Advantages    May clarify your cancer risk    Can help you make medical decisions    May explain the polyps and cancers in your family    May give useful information to your family members (if you share your results)    Disadvantages    Possible negative emotional impact of learning about inherited cancer  risk    Uncertainty in interpreting a negative test result in some situations    Possible genetic discrimination concerns (see below)        Reducing Cancer Risk  Current screening recommendations for people with MAP include2:    Colon:   o Colonoscopy starting at age 25-30; repeated every 2-3 years if no polyps are found; repeated every 1-2 years if fewer than 20 polyps are found and they are small    o Consider removal of the colon if more than 20 polyps are found at any one colonoscopy or all of the polyps cannot be removed during the colonoscopy    Duodenum and Stomach:   o Baseline upper endoscopy with side-viewing beginning at age 30-35      Even after the colon is removed, the rectum should be evaluated every 6-12 months for polyps.    Some medications are available that may reduce the number of polyps.    Guidelines do not yet encourage screening for other cancers, but skin and thyroid exams could be done as part of the annual physical exam.    Carriers of MAP (one MUTYH mutation) should have average moderate-risk colon cancer screening.     Genetic Information Nondiscrimination Act (EMIL)  EMIL is a federal law that protects individuals from health insurance or employment discrimination based on a genetic test result alone.  Although rare, there are currently no legal protections in terms of life insurance, long term care, or disability insurances.  Visit the National Human Genome Research [place] at Genome.gov/82286401 to learn more.    Questions to Think About Regarding Genetic Testing    What effect will the test result have on me and my relationship with my family members if I have an inherited gene mutation?  If I don t have a gene mutation?    Should I share my test results, and how will my family react to this news, which may also affect them?    Are my children ready to learn new information that may one day affect their own health?    Resources  Colorectal Cancer Coalition  fightcolorectalcancer.org   Colon Cancer [place] (CCA) ccalliance.org   American Cancer Society (ACS) cancer.org   National Cancer [place] (NCI) cancer.gov     Please call us if you have any questions or concerns.     Cancer Risk Management Program [phone] ([phone])    ? Liseth Scott, MS, [facility]  [phone]  ? Blanca Kirt, MS, [facility]  [phone]  ? Claudia Wynn, MS, [facility]  [phone]   ? Jenna Rivero, MS, [facility]  [phone]   ? Jalen Yung, MS, [facility]  [phone]    References  1. Martin SJ, Carissa Patel MC, Félix IP, Domo RS. Clinical implications of the colorectal cancer risk associated with MUTYH mutation. J Clin Oncol. 2009;27:3975-80.  2. National Comprehensive Cancer Network. Clinical practice guidelines in oncology, colorectal cancer screening. Available online (registration required). 2014.

## 2017-08-31 NOTE — PROGRESS NOTES
SUBJECTIVE:   Sheryle Cruse is a 46 year old female who presents to clinic today for the following health issues:      Constipation      Duration: x 1 week    Description:       Frequency of bowel movements: last BM yesterday small amount       Consistency of stool: hard and small    Intensity:  severe    Accompanying signs and symptoms: back pain       Abdominal pain: no        Rectal pain: YES       Blood in stool: no        Nausea/vomitting: no     History:        Similar problems in past: YES    Precipitating or alleviating factors: being treated for breast cancer       Medications worsening symptoms: no     Therapies tried and outcome: MOM and water       Chronic laxative use: no     Breast cancer diagnosis in July 2017.   Within last week has had increased stress around diagnosis  Has had alternating diarrhea and constipation in the past in [place] where a work up was done  Vegan diet+  Took black beans last week, felt bloated and uncomfortable, has had problems with beans in the past, felt a lump in the rectum, spot of blood after straining for BM.   Small hard bowel movements  Large BM today, after Milk of Magnesium, different shape from normal   No dysuria, no hematuria  On day 4 of period   No dysuria or hematuria, no emesis, no fever.  Able to pass gas     Problem list and histories reviewed & adjusted, as indicated.  Additional history: as documented    Patient Active Problem List   Diagnosis     Vitamin D deficiency     Vegetarianism     Hyperlipidemia LDL goal <160     Advance care planning     ERRONEOUS ENCOUNTER--DISREGARD     Monoallelic mutation of MUTYH gene     Malignant neoplasm of lower-inner quadrant of left breast in female, estrogen receptor positive (H)     Past Surgical History:   Procedure Laterality Date     Mammogram  2003, 2009       Social History   Substance Use Topics     Smoking status: Never Smoker     Smokeless tobacco: Never Used     Alcohol use No     Family History   Problem  Relation Age of Onset     DIABETES Mother 76     Obese     HEART DISEASE Mother      Hypertension Mother      CEREBROVASCULAR DISEASE Mother      Cardiovascular Mother      CVA     CANCER Father      bladder     CEREBROVASCULAR DISEASE Father 70     Breast Cancer Maternal Grandmother      CEREBROVASCULAR DISEASE Maternal Grandmother      Lipids Maternal Grandmother      HEART DISEASE Maternal Grandfather      heart disease     DIABETES Paternal Grandmother      CEREBROVASCULAR DISEASE Paternal Grandfather      HEART DISEASE Paternal Grandfather          Current Outpatient Prescriptions   Medication Sig Dispense Refill     Calcium-Magnesium-Vitamin D (CALCIUM MAGNESIUM PO) With zinc       Cyanocobalamin (VITAMIN B 12) 100 MCG LOZG        Multiple Vitamin (ONE-A-DAY ESSENTIAL) TABS Take 1 tablet by mouth daily.       methylPREDNISolone (MEDROL) 32 MG tablet one tablet to be taken 12 hours prior to the scan, and an additional 32mg PO methylprednisolone to be taken 2 hours prior to the scan 2 tablet 0     FOLIC ACID PO Take 1 mg by mouth daily       MILK THISTLE PO With dandelion root       CHROMIUM PICOLINATE PO        Cholecalciferol (VITAMIN D PO)        Allergies   Allergen Reactions     Contrast Dye      Pt does not recall which type of contrast     Diphenhydramine      Pcn [Bicillin C-R,] Rash     swelling     BP Readings from Last 3 Encounters:   09/01/17 115/77   08/31/17 123/81   07/13/17 117/83    Wt Readings from Last 3 Encounters:   09/01/17 130 lb 12.8 oz (59.3 kg)   08/31/17 128 lb (58.1 kg)   07/13/17 129 lb 14.4 oz (58.9 kg)                        Reviewed and updated as needed this visit by clinical staffTobacco  Allergies  Meds       Reviewed and updated as needed this visit by Provider         ROS:  Constitutional, neuro, ENT, endocrine, pulmonary, cardiac, gastrointestinal, genitourinary, musculoskeletal, integument and psychiatric systems are negative, except as otherwise noted.      OBJECTIVE:    "                                                 /81  Pulse 93  Temp 97.2  F (36.2  C) (Oral)  Ht 5' 4\" (1.626 m)  Wt 128 lb (58.1 kg)  SpO2 98%  Breastfeeding? No  BMI 21.97 kg/m2  Body mass index is 21.97 kg/(m^2).  GENERAL APPEARANCE: healthy and alert, looks pale  EYES: Eyes grossly normal to inspection and conjunctivae and sclerae normal  NECK: no adenopathy, no asymmetry, masses, or scars and thyroid normal to palpation  RESP: lungs clear to auscultation - no rales, rhonchi or wheezes  CV: regular rates and rhythm, normal S1 S2, no S3 or S4 and no murmur, click or rub  ABDOMEN: non distended, no rebound or guarding, bowel sounds+  Rectal: no masses, no thrombosed hemorrhoids, no bleeding, small internal hemorrhoid palpated at 6 o' clock position.   MS: extremities normal- no gross deformities noted  SKIN: no suspicious lesions or rashes  NEURO: Normal strength and tone, mentation intact and speech normal  PSYCH: mentation appears normal and anxious    Diagnostic test results:  Diagnostic Test Results:  Results for orders placed or performed in visit on 08/31/17   CBC with platelets differential   Result Value Ref Range    WBC 4.4 4.0 - 11.0 10e9/L    RBC Count 4.05 3.8 - 5.2 10e12/L    Hemoglobin 12.8 11.7 - 15.7 g/dL    Hematocrit 37.6 35.0 - 47.0 %    MCV 93 78 - 100 fl    MCH 31.6 26.5 - 33.0 pg    MCHC 34.0 31.5 - 36.5 g/dL    RDW 12.5 10.0 - 15.0 %    Platelet Count 409 150 - 450 10e9/L    Diff Method Automated Method     % Neutrophils 44.4 %    % Lymphocytes 39.8 %    % Monocytes 13.3 %    % Eosinophils 1.6 %    % Basophils 0.9 %    Absolute Neutrophil 2.0 1.6 - 8.3 10e9/L    Absolute Lymphocytes 1.8 0.8 - 5.3 10e9/L    Absolute Monocytes 0.6 0.0 - 1.3 10e9/L    Absolute Eosinophils 0.1 0.0 - 0.7 10e9/L    Absolute Basophils 0.0 0.0 - 0.2 10e9/L   Comprehensive metabolic panel   Result Value Ref Range    Sodium 137 133 - 144 mmol/L    Potassium 4.4 3.4 - 5.3 mmol/L    Chloride 104 94 - 109 " mmol/L    Carbon Dioxide 27 20 - 32 mmol/L    Anion Gap 6 3 - 14 mmol/L    Glucose 95 70 - 99 mg/dL    Urea Nitrogen 3 (L) 7 - 30 mg/dL    Creatinine 0.53 0.52 - 1.04 mg/dL    GFR Estimate >90 >60 mL/min/1.7m2    GFR Estimate If Black >90 >60 mL/min/1.7m2    Calcium 9.0 8.5 - 10.1 mg/dL    Bilirubin Total 0.8 0.2 - 1.3 mg/dL    Albumin 4.2 3.4 - 5.0 g/dL    Protein Total 8.1 6.8 - 8.8 g/dL    Alkaline Phosphatase 50 40 - 150 U/L    ALT 23 0 - 50 U/L    AST 18 0 - 45 U/L            XR ABDOMEN 2 VW  8/31/2017 11:20 AM     HISTORY:  Constipation, unspecified     COMPARISON:  None.         IMPRESSION:  Normal bowel gas pattern. Moderate to large amount of  fecal material in the ascending colon. Small amount of fecal material  in the remainder of the colon.      SHANT PIMENTEL MD    ASSESSMENT/PLAN:                                                    1. Constipation, unspecified constipation type  -TSH was normal 6/2017  - CBC with platelets differential  - Comprehensive metabolic panel  - XR Abdomen 2 Views; Future  -X rays of the abdomen does not show any air fluid levels  -Stool + on X rays  -Hydration, high fiber diet  -Start Miralax  -ER precautions reviewed in detail. If abdominal pain, fever over 101 F, emesis, rectal bleeding, melena, then needs to be seen in ER, patient expressed comprehension of this.       2. Monoallelic mutation of MUTYH gene  -Had genetic testing done  -Increased risk of colon cancer  -Will need colonoscopy     3. Malignant neoplasm of lower-inner quadrant of left breast in female, estrogen receptor positive (H)  -Meeting with breast surgeon this afternoon to decide on treatment options.  -New diagnosis       Follow up with Provider - As scheduled with PCP    Will discuss pap smear with PCP     Monse Levy MD MPH    [facility]

## 2017-08-31 NOTE — LETTER
[facility]       [address]  [place] [address]      September 5, 2017      Sheryle Cruse  [address]   [place] [place] [address]              Dear Sheryle,    X rays of the abdomen showed a moderate or large amount of stool in the colon. High fiber diet, Miralax and hydration should help with constipation. Please let me know if you have any questions and thank you for choosing [facility].    Regards,    Monse Levy MD MPH/ag    Results for orders placed or performed in visit on 08/31/17   XR Abdomen 2 Views    Narrative    XR ABDOMEN 2 VW  8/31/2017 11:20 AM    HISTORY:  Constipation, unspecified    COMPARISON:  None.      Impression    IMPRESSION:  Normal bowel gas pattern. Moderate to large amount of  fecal material in the ascending colon. Small amount of fecal material  in the remainder of the colon.     SHANT PIMENTEL MD     MRN: [id number]

## 2017-08-31 NOTE — LETTER
[facility]       [address]  [place] [address]      September 5, 2017      Sheryle Cruse  [address]   [place] [facility] [place] [address]              Dear Sheryle Cruse,    Basic blood count did not show anemia or infection in the blood. Electrolytes, glucose, kidney and liver function tests were normal. Plan of care and follow up as discussed in clinic. Please let me know if you have any questions and thank you for choosing [facility].    Regards,    Monse Levy MD MPH/ag  MRN:[id number]    Results for orders placed or performed in visit on 08/31/17   CBC with platelets differential   Result Value Ref Range    WBC 4.4 4.0 - 11.0 10e9/L    RBC Count 4.05 3.8 - 5.2 10e12/L    Hemoglobin 12.8 11.7 - 15.7 g/dL    Hematocrit 37.6 35.0 - 47.0 %    MCV 93 78 - 100 fl    MCH 31.6 26.5 - 33.0 pg    MCHC 34.0 31.5 - 36.5 g/dL    RDW 12.5 10.0 - 15.0 %    Platelet Count 409 150 - 450 10e9/L    Diff Method Automated Method     % Neutrophils 44.4 %    % Lymphocytes 39.8 %    % Monocytes 13.3 %    % Eosinophils 1.6 %    % Basophils 0.9 %    Absolute Neutrophil 2.0 1.6 - 8.3 10e9/L    Absolute Lymphocytes 1.8 0.8 - 5.3 10e9/L    Absolute Monocytes 0.6 0.0 - 1.3 10e9/L    Absolute Eosinophils 0.1 0.0 - 0.7 10e9/L    Absolute Basophils 0.0 0.0 - 0.2 10e9/L   Comprehensive metabolic panel   Result Value Ref Range    Sodium 137 133 - 144 mmol/L    Potassium 4.4 3.4 - 5.3 mmol/L    Chloride 104 94 - 109 mmol/L    Carbon Dioxide 27 20 - 32 mmol/L    Anion Gap 6 3 - 14 mmol/L    Glucose 95 70 - 99 mg/dL    Urea Nitrogen 3 (L) 7 - 30 mg/dL    Creatinine 0.53 0.52 - 1.04 mg/dL    GFR Estimate >90 >60 mL/min/1.7m2    GFR Estimate If Black >90 >60 mL/min/1.7m2    Calcium 9.0 8.5 - 10.1 mg/dL    Bilirubin Total 0.8 0.2 - 1.3 mg/dL    Albumin 4.2 3.4 - 5.0 g/dL    Protein Total 8.1 6.8 - 8.8 g/dL    Alkaline Phosphatase 50 40 - 150 U/L    ALT 23 0 - 50 U/L    AST 18 0 - 45 U/L     MRN:[id number]

## 2017-09-01 ENCOUNTER — NURSE TRIAGE (OUTPATIENT)
Dept: NURSING | Facility: CLINIC | Age: 47
End: 2017-09-01

## 2017-09-01 ENCOUNTER — OFFICE VISIT (OUTPATIENT)
Dept: URGENT CARE | Facility: URGENT CARE | Age: 47
End: 2017-09-01
Payer: COMMERCIAL

## 2017-09-01 ENCOUNTER — TELEPHONE (OUTPATIENT)
Dept: FAMILY MEDICINE | Facility: CLINIC | Age: 47
End: 2017-09-01

## 2017-09-01 VITALS
WEIGHT: 130.8 LBS | OXYGEN SATURATION: 100 % | HEART RATE: 66 BPM | BODY MASS INDEX: 22.45 KG/M2 | SYSTOLIC BLOOD PRESSURE: 115 MMHG | TEMPERATURE: 97.5 F | DIASTOLIC BLOOD PRESSURE: 77 MMHG

## 2017-09-01 DIAGNOSIS — K59.00 CONSTIPATION, UNSPECIFIED CONSTIPATION TYPE: Primary | ICD-10-CM

## 2017-09-01 PROCEDURE — 99213 OFFICE O/P EST LOW 20 MIN: CPT | Performed by: PHYSICIAN ASSISTANT

## 2017-09-01 NOTE — TELEPHONE ENCOUNTER
Reason for Disposition    [1] Constant abdominal pain AND [2] present > 2 hours    Additional Information    Negative: [1] Abdominal pain is main symptom AND [2] adult male    Negative: [1] Abdominal pain is main symptom AND [2] adult female    Negative: Rectal bleeding or blood in stool is main symptom    Negative: Rectal pain or itching is main symptom    Negative: Patient sounds very sick or weak to the triager    Negative: [1] Vomiting AND [2] abdomen looks much more swollen than usual    Negative: [1] Vomiting AND [2] contains bile (green color)    Protocols used: CONSTIPATION-ADULT-LUPE Hoytirina calls and says that she has breast cancer and has had digestive issues. Pt. Says that she is constipated. Pt. Says that an x-ray shows that she still has stool in her colon. Pt. Says that she feels full and cannot eat. Pt. Says that her abdomen is distended, too. Pt. Feels abdominal pressure. Pt. Says that she takes Miralax. Pt. Says that when she took MOM, this helped her.

## 2017-09-01 NOTE — PROGRESS NOTES
SUBJECTIVE:   Sheryle Cruse is a 46 year old female who presents to clinic today for the following health issues:      constipation      Duration: over a week, was seen yesterday and abdominal x-ray showed stool but no abnormal gas patterns    Description (location/character/radiation): abdominal    Intensity:  moderate    Accompanying signs and symptoms: constipation    History (similar episodes/previous evaluation): Patient has a history of IBS diagnosed years ago, but states it doesn't typically bother her regularly    Precipitating or alleviating factors: Patient was diagnosed with breast cancer about a month ago, and is very anxious that this constipation is due to colon cancer    Therapies tried and outcome: Milk of magnesia yesterday produced one large bowel movement, miralax since then but no additional bowel movements.  She has been increasing fiber and water, but feels like it's just making it worse because she doesn't really have an appetite       Problem list and histories reviewed & adjusted, as indicated.  Additional history: as documented    Patient Active Problem List   Diagnosis     Vitamin D deficiency     Vegetarianism     Hyperlipidemia LDL goal <160     Advance care planning     ERRONEOUS ENCOUNTER--DISREGARD     Monoallelic mutation of MUTYH gene     Past Surgical History:   Procedure Laterality Date     Mammogram  2003, 2009       Social History   Substance Use Topics     Smoking status: Never Smoker     Smokeless tobacco: Never Used     Alcohol use No     Family History   Problem Relation Age of Onset     DIABETES Mother 76     Obese     HEART DISEASE Mother      Hypertension Mother      CEREBROVASCULAR DISEASE Mother      Cardiovascular Mother      CVA     CANCER Father      bladder     CEREBROVASCULAR DISEASE Father 70     Breast Cancer Maternal Grandmother      CEREBROVASCULAR DISEASE Maternal Grandmother      Lipids Maternal Grandmother      HEART DISEASE Maternal Grandfather      heart  disease     DIABETES Paternal Grandmother      CEREBROVASCULAR DISEASE Paternal Grandfather      HEART DISEASE Paternal Grandfather          Current Outpatient Prescriptions   Medication Sig Dispense Refill     methylPREDNISolone (MEDROL) 32 MG tablet one tablet to be taken 12 hours prior to the scan, and an additional 32mg PO methylprednisolone to be taken 2 hours prior to the scan 2 tablet 0     FOLIC ACID PO Take 1 mg by mouth daily       MILK THISTLE PO With dandelion root       CHROMIUM PICOLINATE PO        Calcium-Magnesium-Vitamin D (CALCIUM MAGNESIUM PO) With zinc       Cyanocobalamin (VITAMIN B 12) 100 MCG LOZG        Cholecalciferol (VITAMIN D PO)        Multiple Vitamin (ONE-A-DAY ESSENTIAL) TABS Take 1 tablet by mouth daily.       Allergies   Allergen Reactions     Contrast Dye      Pt does not recall which type of contrast     Diphenhydramine      Pcn [Bicillin C-R,] Rash     swelling     BP Readings from Last 3 Encounters:   09/01/17 115/77   08/31/17 123/81   07/13/17 117/83    Wt Readings from Last 3 Encounters:   09/01/17 130 lb 12.8 oz (59.3 kg)   08/31/17 128 lb (58.1 kg)   07/13/17 129 lb 14.4 oz (58.9 kg)                        Reviewed and updated as needed this visit by clinical staff     Reviewed and updated as needed this visit by Provider         ROS:  Constitutional, HEENT, cardiovascular, pulmonary, gi and gu systems are negative, except as otherwise noted.      OBJECTIVE:   /77 (BP Location: Left arm, Patient Position: Chair, Cuff Size: Adult Regular)  Pulse 66  Temp 97.5  F (36.4  C) (Oral)  Wt 130 lb 12.8 oz (59.3 kg)  SpO2 100%  BMI 22.45 kg/m2  Body mass index is 22.45 kg/(m^2).  GENERAL: healthy, alert and no distress  HENT: ear canals and TM's normal, nose and mouth without ulcers or lesions  NECK: no adenopathy, no asymmetry, masses, or scars and thyroid normal to palpation  RESP: lungs clear to auscultation - no rales, rhonchi or wheezes  CV: regular rate and rhythm,  normal S1 S2, no S3 or S4, no murmur, click or rub, no peripheral edema and peripheral pulses strong  ABDOMEN: soft, mild discomfort/bloated feeling with general palpation, no masses and bowel sounds normal  MS: no gross musculoskeletal defects noted, no edema    Diagnostic Test Results:  none     ASSESSMENT/PLAN:       ICD-10-CM    1. Constipation, unspecified constipation type K59.00        Discussed options with patient, including continuing to watch and wait, addition of stimulant medication, and going to ER to discuss further imaging not available here in [facility].  Patient would like to try stimulant laxative, as Milk of Magnesia worked somewhat yesterday.  Counseled on the importance of only using short-term, and she should f/u with primary next week, sooner if symptoms are worsening.  The patient indicates understanding of these issues and agrees with the plan.      Chelsie Stephens PA-C  [facility]

## 2017-09-01 NOTE — MR AVS SNAPSHOT
"              After Visit Summary   9/1/2017    Sheryle Cruse    MRN: [id number]           Patient Information     Date Of Birth          1970        Visit Information        Provider Department      9/1/2017 5:45 PM Chelsie Stephens PA-C [facility]        Care Instructions    Try taking Senokot once daily, it may cause some abdominal cramping, and you should stop taking if you have significant abdominal pain.  Follow-up with your primary care or in the ER for worsening symptoms.            Follow-ups after your visit        Your next 10 appointments already scheduled     Sep 07, 2017  4:00 PM CDT   Return Visit with GILDARDO Michaels CNP   [facility] ([facility])    [id number] [place] [id number] [phone]              Who to contact     If you have questions or need follow up information about today's clinic visit or your schedule please contact [facility] directly at [phone].  Normal or non-critical lab and imaging results will be communicated to you by [employer], letter or phone within 4 business days after the clinic has received the results. If you do not hear from us within 7 days, please contact the clinic through [employer] or phone. If you have a critical or abnormal lab result, we will notify you by phone as soon as possible.  Submit refill requests through [employer] or call your pharmacy and they will forward the refill request to us. Please allow 3 business days for your refill to be completed.          Additional Information About Your Visit        [employer] Information     [employer] lets you send messages to your doctor, view your test results, renew your prescriptions, schedule appointments and more. To sign up, go to [email or web address] . Click on \"Log in\" on the left side of the screen, which will take you to the Welcome page. Then click on \"Sign up Now\" on the right side of the " page.     You will be asked to enter the access code listed below, as well as some personal information. Please follow the directions to create your username and password.     Your access code is: [email or web address]  Expires: 9/10/2017  9:58 AM     Your access code will  in 90 days. If you need help or a new code, please call your [place] clinic or [phone].        Care EveryWhere ID     This is your Care EveryWhere ID. This could be used by other organizations to access your [place] medical records  [id number]        Your Vitals Were     Pulse Temperature Pulse Oximetry BMI (Body Mass Index)          66 97.5  F (36.4  C) (Oral) 100% 22.45 kg/m2         Blood Pressure from Last 3 Encounters:   17 115/77   17 123/81   17 117/83    Weight from Last 3 Encounters:   17 130 lb 12.8 oz (59.3 kg)   17 128 lb (58.1 kg)   17 129 lb 14.4 oz (58.9 kg)              Today, you had the following     No orders found for display       Primary Care Provider    [facility]       No address on file        Equal Access to Services     JORGE HERNÁNDEZ : Hadii cecy guerreroo Sobee, waaxda luqadaha, qaybta kaalmada adeegyada, malinda murry. So [facility] [phone].    ATENCIÓN: Si habla español, tiene a spear disposición servicios gratuitos de asistencia lingüística. Llame al [phone].    We comply with applicable federal civil rights laws and [place] laws. We do not discriminate on the basis of race, color, national origin, age, disability sex, sexual orientation or gender identity.            Thank you!     Thank you for choosing [facility]  for your care. Our goal is always to provide you with excellent care. Hearing back from our patients is one way we can continue to improve our services. Please take a few minutes to complete the written survey that you may receive in the mail after your visit with us. Thank you!              Your Updated Medication List - Protect others around you: Learn how to safely use, store and throw away your medicines at www.disposemymeds.org.          This list is accurate as of: 9/1/17  6:12 PM.  Always use your most recent med list.                   Brand Name Dispense Instructions for use Diagnosis    CALCIUM MAGNESIUM PO      With zinc    RLQ abdominal pain       CHROMIUM PICOLINATE PO       RLQ abdominal pain       FOLIC ACID PO      Take 1 mg by mouth daily        methylPREDNISolone 32 MG tablet    MEDROL    2 tablet    one tablet to be taken 12 hours prior to the scan, and an additional 32mg PO methylprednisolone to be taken 2 hours prior to the scan    Contrast media allergy       MILK THISTLE PO      With dandelion root    RLQ abdominal pain       ONE-A-DAY ESSENTIAL Tabs      Take 1 tablet by mouth daily.        Vitamin B 12 100 MCG Lozg       RLQ abdominal pain       VITAMIN D PO       RLQ abdominal pain

## 2017-09-01 NOTE — PATIENT INSTRUCTIONS
Try taking Senokot once daily, it may cause some abdominal cramping, and you should stop taking if you have significant abdominal pain.  Follow-up with your primary care or in the ER for worsening symptoms.

## 2017-09-01 NOTE — NURSING NOTE
"Chief Complaint   Patient presents with     Constipation       Initial /77 (BP Location: Left arm, Patient Position: Chair, Cuff Size: Adult Regular)  Pulse 66  Temp 97.5  F (36.4  C) (Oral)  Wt 130 lb 12.8 oz (59.3 kg)  SpO2 100%  BMI 22.45 kg/m2 Estimated body mass index is 22.45 kg/(m^2) as calculated from the following:    Height as of 8/31/17: 5' 4\" (1.626 m).    Weight as of this encounter: 130 lb 12.8 oz (59.3 kg).  Medication Reconciliation: edwardo Salamanca    "

## 2017-09-01 NOTE — TELEPHONE ENCOUNTER
Reason for call:  Patient reporting a symptom    Symptom or request: GI issues constipation    Duration (how long have symptoms been present): 1 wk     Have you been treated for this before? No    Additional comments: also being treated for breast cancer wants call back today    Phone Number patient can be reached at:  Cell number on file:    Telephone Information:   Mobile [phone]       Best Time:  Any      Sending high priority message    Can we leave a detailed message on this number:  YES    Call taken on 9/1/2017 at 4:34 PM by Loan Jacome

## 2017-09-05 ENCOUNTER — TELEPHONE (OUTPATIENT)
Dept: PEDIATRICS | Facility: CLINIC | Age: 47
End: 2017-09-05

## 2017-09-05 ENCOUNTER — MEDICAL CORRESPONDENCE (OUTPATIENT)
Dept: HEALTH INFORMATION MANAGEMENT | Facility: CLINIC | Age: 47
End: 2017-09-05

## 2017-09-05 NOTE — PROGRESS NOTES
Please send a letter:    Dear Sheryle Cruse,    Basic blood count did not show anemia or infection in the blood. Electrolytes, glucose, kidney and liver function tests were normal. Plan of care and follow up as discussed in clinic. Please let me know if you have any questions and thank you for choosing [facility].    Regards,    Monse Levy MD MPH

## 2017-09-05 NOTE — TELEPHONE ENCOUNTER
Patient subsequently spoke with triage Rn and was seen. Will close this enc  Víctor Jennings PA-C

## 2017-09-05 NOTE — TELEPHONE ENCOUNTER
FYI  Surgical scheduler called regarding appointment with WILY Billy on 9/7/17.  Looks like GI issues and pre op.  Should be 40 minutes. Patient will call back to reschedule.

## 2017-09-05 NOTE — PROGRESS NOTES
Please send a letter:    Dear Sheryle Cruse,    X rays of the abdomen showed a moderate or large amount of stool in the colon. High fiber diet, Miralax and hydration should help with constipation. Please let me know if you have any questions and thank you for choosing [facility].    Regards,    Monse Levy MD MPH

## 2017-09-07 ENCOUNTER — OFFICE VISIT (OUTPATIENT)
Dept: PEDIATRICS | Facility: CLINIC | Age: 47
End: 2017-09-07
Payer: COMMERCIAL

## 2017-09-07 ENCOUNTER — OFFICE VISIT (OUTPATIENT)
Dept: PSYCHOLOGY | Facility: CLINIC | Age: 47
End: 2017-09-07
Payer: COMMERCIAL

## 2017-09-07 VITALS
DIASTOLIC BLOOD PRESSURE: 70 MMHG | HEIGHT: 64 IN | TEMPERATURE: 97.2 F | OXYGEN SATURATION: 100 % | HEART RATE: 84 BPM | BODY MASS INDEX: 21.17 KG/M2 | SYSTOLIC BLOOD PRESSURE: 110 MMHG | WEIGHT: 124 LBS

## 2017-09-07 DIAGNOSIS — C50.312 MALIGNANT NEOPLASM OF LOWER-INNER QUADRANT OF LEFT BREAST IN FEMALE, ESTROGEN RECEPTOR POSITIVE (H): ICD-10-CM

## 2017-09-07 DIAGNOSIS — Z17.0 MALIGNANT NEOPLASM OF LOWER-INNER QUADRANT OF LEFT BREAST IN FEMALE, ESTROGEN RECEPTOR POSITIVE (H): ICD-10-CM

## 2017-09-07 DIAGNOSIS — Z01.818 PREOP GENERAL PHYSICAL EXAM: Primary | ICD-10-CM

## 2017-09-07 DIAGNOSIS — F43.23 ADJUSTMENT DISORDER WITH MIXED ANXIETY AND DEPRESSED MOOD: Primary | ICD-10-CM

## 2017-09-07 DIAGNOSIS — K59.00 CONSTIPATION, UNSPECIFIED CONSTIPATION TYPE: ICD-10-CM

## 2017-09-07 LAB
ANION GAP SERPL CALCULATED.3IONS-SCNC: 12 MMOL/L (ref 3–14)
BUN SERPL-MCNC: 7 MG/DL (ref 7–30)
CALCIUM SERPL-MCNC: 9.5 MG/DL (ref 8.5–10.1)
CHLORIDE SERPL-SCNC: 101 MMOL/L (ref 94–109)
CO2 SERPL-SCNC: 25 MMOL/L (ref 20–32)
CREAT SERPL-MCNC: 0.53 MG/DL (ref 0.52–1.04)
ERYTHROCYTE [DISTWIDTH] IN BLOOD BY AUTOMATED COUNT: 12.6 % (ref 10–15)
GFR SERPL CREATININE-BSD FRML MDRD: >90 ML/MIN/1.7M2
GLUCOSE SERPL-MCNC: 92 MG/DL (ref 70–99)
HCT VFR BLD AUTO: 37.1 % (ref 35–47)
HGB BLD-MCNC: 12.9 G/DL (ref 11.7–15.7)
MCH RBC QN AUTO: 31.5 PG (ref 26.5–33)
MCHC RBC AUTO-ENTMCNC: 34.8 G/DL (ref 31.5–36.5)
MCV RBC AUTO: 91 FL (ref 78–100)
PLATELET # BLD AUTO: 449 10E9/L (ref 150–450)
POTASSIUM SERPL-SCNC: 4.5 MMOL/L (ref 3.4–5.3)
RBC # BLD AUTO: 4.1 10E12/L (ref 3.8–5.2)
SODIUM SERPL-SCNC: 138 MMOL/L (ref 133–144)
WBC # BLD AUTO: 6.7 10E9/L (ref 4–11)

## 2017-09-07 PROCEDURE — 99207 ZZC NO CHARGE BEHAVIORAL WARM HANDOFF: CPT | Performed by: SOCIAL WORKER

## 2017-09-07 PROCEDURE — 36415 COLL VENOUS BLD VENIPUNCTURE: CPT | Performed by: NURSE PRACTITIONER

## 2017-09-07 PROCEDURE — 80048 BASIC METABOLIC PNL TOTAL CA: CPT | Performed by: NURSE PRACTITIONER

## 2017-09-07 PROCEDURE — 99214 OFFICE O/P EST MOD 30 MIN: CPT | Performed by: NURSE PRACTITIONER

## 2017-09-07 PROCEDURE — 85027 COMPLETE CBC AUTOMATED: CPT | Performed by: NURSE PRACTITIONER

## 2017-09-07 NOTE — Clinical Note
Skip can you touch base with her has issues with  losing his job and concerns of care at home at surgery.

## 2017-09-07 NOTE — PROGRESS NOTES
[address] [address]  [phone]  Dept: [phone]    PRE-OP EVALUATION:  Today's date: 2017    Sheryle Cruse (: 1970) presents for pre-operative evaluation assessment as requested by Dr. Gonzalez.  She requires evaluation and anesthesia risk assessment prior to undergoing surgery/procedure for treatment of breast cancer .  Proposed procedure: bilateral mastectomy     Date of Surgery/ Procedure: 10/4/17  Time of Surgery/ Procedure: 11:30AM  Hospital/Surgical Facility: [facility]  Fax number for surgical facility: [phone]  Primary Physician: Camille Coulter [facility]  Type of Anesthesia Anticipated: General    Patient has a Health Care Directive or Living Will:  YES     1. NO - Do you have a history of heart attack, stroke, stent, bypass or surgery on an artery in the head, neck, heart or legs?  2. NO - Do you ever have any pain or discomfort in your chest?  3. NO - Do you have a history of  Heart Failure?  4. NO - Are you troubled by shortness of breath when: walking on the level, up a slight hill or at night?  5. NO - Do you currently have a cold, bronchitis or other respiratory infection?  6. NO - Do you have a cough, shortness of breath or wheezing?  7. NO - Do you sometimes get pains in the calves of your legs when you walk?  8. YES - DO YOU OR ANYONE IN YOUR FAMILY HAVE PREVIOUS HISTORY OF BLOOD CLOTS? Mother had two strokes, personal-pain the legs  9. NO - Do you or does anyone in your family have a serious bleeding problem such as prolonged bleeding following surgeries or cuts?  10. YES - HAVE YOU EVER HAD PROBLEMS WITH ANEMIA OR BEEN TOLD TO TAKE IRON PILLS? 19 years old-bleeding for awhile and taking iron pills  11. YES - HAVE YOU HAD ANY ABNORMAL BLOOD LOSS SUCH AS BLACK, TARRY OR BLOODY STOOLS, OR ABNORMAL VAGINAL BLEEDING? Vaginal bleeding while she was 19 years ol  12. NO - Have you ever had a blood  transfusion?  13. YES - HAVE YOU OR ANY OF YOUR RELATIVES EVER HAD PROBLEMS WITH ANESTHESIA? Mother has nausea   14. NO - Do you have sleep apnea, excessive snoring or daytime drowsiness?  15. NO - Do you have any prosthetic heart valves?  16. NO - Do you have prosthetic joints?  17. NO - Is there any chance that you may be pregnant?      HPI:                                                      Brief HPI related to upcoming procedure: treatment of breast cancer .  Proposed procedure: bilateral mastectomy       See problem list for active medical problems.  Problems all longstanding and stable, except as noted/documented.  See ROS for pertinent symptoms related to these conditions.                                                                                                  .    MEDICAL HISTORY:                                                    Patient Active Problem List    Diagnosis Date Noted     Malignant neoplasm of lower-inner quadrant of left breast in female, estrogen receptor positive (H) 09/04/2017     Priority: Medium     Monoallelic mutation of MUTYH gene 08/30/2017     Priority: Medium     Carrier of MUTYH-Associated Polyposis (MAP)  MUTYH mutation p.G396D (c.1187G>A)  [employer] 8/10/17       ERRONEOUS ENCOUNTER--DISREGARD 07/17/2017     Priority: Medium     Advance care planning 11/11/2016     Priority: Medium     Advance Care Planning 11/11/2016: Receipt of ACP document:  Received: Health Care Directive which was witnessed or notarized on 7/12/16.  Document previously scanned on 9/6/16.  Validation form completed and sent to be scanned.  Code Status needs to be updated to reflect choices in most recent ACP document which declares preference for DNR.  Recommend goals of care discussion with patient or legal decision maker(s) to confirm current choices. Update documents and Code Status order as applicable.   Confirmed/documented designated decision maker(s).  Added by María Jansen     Advance Care Planning Liaison               Vitamin D deficiency 01/07/2011     Priority: Medium     Vegetarianism 01/07/2011     Priority: Medium     Hyperlipidemia LDL goal <160 01/07/2011     Priority: Medium      Past Medical History:   Diagnosis Date     Anemia, iron deficiency      Depression past     Eating disorder age 20    [facility], In recovery for 15 yr     Fibrocystic breast      IBS (irritable bowel syndrome)     possible.  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 (c.1187G>A) [employer] 8/10/17     Vitamin D deficiency      Past Surgical History:   Procedure Laterality Date     Mammogram  2003, 2009     Current Outpatient Prescriptions   Medication Sig Dispense Refill     Calcium-Magnesium-Vitamin D (CALCIUM MAGNESIUM PO) With zinc       Multiple Vitamin (ONE-A-DAY ESSENTIAL) TABS Take 1 tablet by mouth daily.       methylPREDNISolone (MEDROL) 32 MG tablet one tablet to be taken 12 hours prior to the scan, and an additional 32mg PO methylprednisolone to be taken 2 hours prior to the scan 2 tablet 0     FOLIC ACID PO Take 1 mg by mouth daily       MILK THISTLE PO With dandelion root       CHROMIUM PICOLINATE PO        Cyanocobalamin (VITAMIN B 12) 100 MCG LOZG        Cholecalciferol (VITAMIN D PO)        OTC products: no recent use of OTC ASA, NSAIDS or Steroids and no use of herbal medications or other supplements    Allergies   Allergen Reactions     Contrast Dye      Pt does not recall which type of contrast     Diphenhydramine      Pcn [Bicillin C-R,] Rash     swelling      Latex Allergy: NO    Social History   Substance Use Topics     Smoking status: Never Smoker     Smokeless tobacco: Never Used     Alcohol use No     History   Drug Use No       REVIEW OF SYSTEMS:                                                    C: NEGATIVE for fever, chills, change in weight  INTEGUMENTARY/SKIN: NEGATIVE for  "rash   E/M: NEGATIVE for ear, mouth and throat problems  R: NEGATIVE for significant cough or SOB  BREAST: POSITIVE for diagnosis of breast cancer   CV: NEGATIVE for chest pain, palpitations or peripheral edema  GI: NEGATIVE for nausea, vomiting and weight loss  : NEGATIVE for frequency, dysuria, or hematuria  M: NEGATIVE for significant arthralgias or myalgia  N: NEGATIVE for weakness, dizziness or paresthesias  E: NEGATIVE for temperature intolerance, skin/hair changes  H: NEGATIVE for bleeding problems  PSYCHIATRIC: POSITIVE for stress with new diagnosis and NEGATIVE forthoughts of hurting someone else and thoughts of self harm    EXAM:                                                    /70 (BP Location: Right arm, Patient Position: Sitting, Cuff Size: Adult Regular)  Pulse 84  Temp 97.2  F (36.2  C) (Temporal)  Ht 5' 4\" (1.626 m)  Wt 124 lb (56.2 kg)  LMP 08/28/2017  SpO2 100%  Breastfeeding? No  BMI 21.28 kg/m2    GENERAL APPEARANCE: healthy, alert and no distress     HENT: ear canals and TM's normal and nose and mouth without ulcers or lesions     NECK: no adenopathy     RESP: lungs clear to auscultation - no rales, rhonchi or wheezes     CV: regular rates and rhythm, no murmur, click or rub and no irregular beats     ABDOMEN: soft, nontender     MS: extremities normal- no gross deformities noted, no evidence of inflammation in joints, FROM in all extremities.     SKIN: no suspicious lesions or rashes     NEURO: Normal strength and tone, sensory exam grossly normal, mentation intact and speech normal     PSYCH: mentation appears normal. and affect normal/bright    DIAGNOSTICS:                                                    EKG: Not indicated due to non-vascular surgery and low risk of event (age <65 and without cardiac risk factors)  Results for orders placed or performed in visit on 09/07/17   Basic metabolic panel   Result Value Ref Range    Sodium 138 133 - 144 mmol/L    Potassium 4.5 " 3.4 - 5.3 mmol/L    Chloride 101 94 - 109 mmol/L    Carbon Dioxide 25 20 - 32 mmol/L    Anion Gap 12 3 - 14 mmol/L    Glucose 92 70 - 99 mg/dL    Urea Nitrogen 7 7 - 30 mg/dL    Creatinine 0.53 0.52 - 1.04 mg/dL    GFR Estimate >90 >60 mL/min/1.7m2    GFR Estimate If Black >90 >60 mL/min/1.7m2    Calcium 9.5 8.5 - 10.1 mg/dL   CBC with platelets   Result Value Ref Range    WBC 6.7 4.0 - 11.0 10e9/L    RBC Count 4.10 3.8 - 5.2 10e12/L    Hemoglobin 12.9 11.7 - 15.7 g/dL    Hematocrit 37.1 35.0 - 47.0 %    MCV 91 78 - 100 fl    MCH 31.5 26.5 - 33.0 pg    MCHC 34.8 31.5 - 36.5 g/dL    RDW 12.6 10.0 - 15.0 %    Platelet Count 449 150 - 450 10e9/L         IMPRESSION:                                                    Reason for surgery/procedure: treatment of breast cancer .  Proposed procedure: bilateral mastectomy     Diagnosis/reason for consult: preop evaluation     The proposed surgical procedure is considered INTERMEDIATE risk.    REVISED CARDIAC RISK INDEX  The patient has the following serious cardiovascular risks for perioperative complications such as (MI, PE, VFib and 3  AV Block):  No serious cardiac risks  INTERPRETATION: 1 risks: Class II (low risk - 0.9% complication rate)    The patient has the following additional risks for perioperative complications:  The 10-year ASCVD risk score (Nayeli ELISE Jr, et al., 2013) is: 0.5%    Values used to calculate the score:      Age: 46 years      Sex: Female      Is Non- : No      Diabetic: No      Tobacco smoker: No      Systolic Blood Pressure: 110 mmHg      Is BP treated: No      HDL Cholesterol: 57 mg/dL      Total Cholesterol: 178 mg/dL      ICD-10-CM    1. Preop general physical exam Z01.818 Basic metabolic panel     CBC with platelets   2. Malignant neoplasm of lower-inner quadrant of left breast in female, estrogen receptor positive (H) C50.312 Basic metabolic panel    Z17.0 CBC with platelets   3. Constipation, unspecified constipation  type K59.00        RECOMMENDATIONS:                                                      APPROVAL GIVEN to proceed with proposed procedure, without further diagnostic evaluation       Signed Electronically by: GILDARDO Michaels CNP    Copy of this evaluation report is provided to requesting physician.    [place] Preop Guidelines

## 2017-09-07 NOTE — NURSING NOTE
"Chief Complaint   Patient presents with     Pre-Op Exam     DOS:10/4/17 [facility]       Initial /70 (BP Location: Right arm, Patient Position: Sitting, Cuff Size: Adult Regular)  Pulse 84  Temp 97.2  F (36.2  C) (Temporal)  Ht 5' 4\" (1.626 m)  Wt 124 lb (56.2 kg)  LMP 08/28/2017  SpO2 100%  Breastfeeding? No  BMI 21.28 kg/m2 Estimated body mass index is 21.28 kg/(m^2) as calculated from the following:    Height as of this encounter: 5' 4\" (1.626 m).    Weight as of this encounter: 124 lb (56.2 kg).  Medication Reconciliation: complete      EDIE Ley      "

## 2017-09-07 NOTE — MR AVS SNAPSHOT
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   Symptomatic therapy suggested:   A high fiber diet with plenty of fluids (up to 8 glasses of water daily) is suggested to relieve these symptoms.  Metamucil, 1 tablespoon once or twice daily can be used to keep bowels regular if needed.  2.  Orders Placed This Encounter   Procedures     Basic metabolic panel     CBC with platelets       3. Patient needs to follow up in if no improvement,or sooner if worsening of symptoms or other symptoms develop.  Will follow up and/or notify patient on results via phone or mail to determine further need for followup            Before Your Surgery      Call your surgeon if there is any change in your health. This includes signs of a cold or flu (such as a sore throat, runny nose, cough, rash or fever).    Do not smoke, drink alcohol or take over the counter medicine (unless your surgeon or primary care doctor tells you to) for the 24 hours before and after surgery.    If you take prescribed drugs: Follow your doctor s orders about which medicines to take and which to stop until after surgery.    Eating and drinking prior to surgery: follow the instructions from your surgeon    Take a shower or bath the night before surgery. Use the soap your surgeon gave you to gently clean your skin. If you do not have soap from your surgeon, use your regular soap. Do not shave or scrub the surgery site.  Wear clean pajamas and have clean sheets on your bed.    It was a pleasure seeing you today at the [facility]. Thank you for allowing us to care for you today. We truly hope we provided you with the excellent service you deserve. Please let us know if there is anything else we can do for you so we can be sure you are leaving completley satisfied with your care experience.       General information about your clinic   Clinic Hours Lab Hours (Appointments are required)   Mon-Thurs: 7:30 AM - 7 PM Mon-Thurs: 7:30 AM - 7 PM   Fri: 7:30 AM - 5 PM Fri: 7:30 AM - 5 PM        After Hours Nurse Advise & Appts:  [facility] Nurse Advisors: [phone]  [place] On Call: to make appointments anytime: [phone] On Call Physician: call [phone] and answering service will page the on call physician.        For urgent appointments, please call [phone] and ask for the triage nurse or your care team clinic nurse.  How to contact my care team:  [place]: [email or web address]   Phone: [phone]   Fax: [phone]       [place] [facility]:   Phone: [phone]  Hours: 8:00 AM - 6:00 PM  Medication Refills:  Call your pharmacy and they will forward the refill to us. Please allow 3 business days for your refills to be completed.       Normal or non-critical lab and imaging results will be communicated to you by MyChart, letter or phone within 7 days.  If you do not hear from us within 10 days, please call the clinic. If you have a critical or abnormal lab result, we will notify you by phone as soon as possible.       We now have PWIC (Pediatric Walk in Care)  Monday-Friday from 7:30-4. Simply walk in and be seen for your urgent needs like cough, fever, rash, diarrhea or vomiting, pink eye, UTI. No appointments needed. Ask one of the team for more information      -Your Care Team:    Dr. Stephanie Theodore - Internal Medicine/Pediatrics   Dr. Shirley Wesley - Family Medicine  Dr. Loli Nichols - Pediatrics  Dr. Vale Santillan - Pediatrics  Katie Billy  CNP - Family Practice Nurse Practitioner            Follow-ups after your visit        Your next 10 appointments already scheduled     Oct 04, 2017 11:30 AM [facility] with Birdie Gonzalez MD   [facility])    [facility] ([facility])   [phone]              Who to contact     If you have questions or need follow up information about today's clinic visit or your schedule please contact [facility] directly at [phone].  Normal or non-critical lab and imaging results will be communicated to you by Dood[employer], letter or phone within 4 business days after the clinic has received the results. If you do not hear from us within 7 days, please contact the clinic through [employer] or phone. If you have a critical or abnormal lab result, we will notify you by phone as soon as possible.  Submit refill requests through [employer] or call your pharmacy and they will forward the refill request to us. Please allow 3 business days for your refill to be completed.          Additional Information About Your Visit        [employer] Information     [employer] is an electronic gateway that provides easy, online access to your medical records. With [employer] you can request a clinic appointment, read your test results, renew a prescription or communicate with your care team.     To sign up for [employer] visit the website at [email or web address]   You will be asked to enter the access code listed below, as well as some personal information. Please follow the directions to create your username and password.     Your access code is: [email or web address]  Expires: 9/10/2017  9:58 AM     Your access code will  in 90 days. If you need help or a new code, please contact your [facility] or call [phone] for assistance.        Care EveryWhere ID     This is your Care EveryWhere ID. This could be  "used by other organizations to access your [place] medical records  [id number]        Your Vitals Were     Pulse Temperature Height Last Period Pulse Oximetry Breastfeeding?    84 97.2  F (36.2  C) (Temporal) 5' 4\" (1.626 m) 08/28/2017 100% No    BMI (Body Mass Index)                   21.28 kg/m2            Blood Pressure from Last 3 Encounters:   09/07/17 110/70   09/01/17 115/77   08/31/17 123/81    Weight from Last 3 Encounters:   09/07/17 124 lb (56.2 kg)   09/01/17 130 lb 12.8 oz (59.3 kg)   08/31/17 128 lb (58.1 kg)              We Performed the Following     Basic metabolic panel     CBC with platelets        Primary Care Provider    [facility]       No address on file        Equal Access to Services     JORGE HERNÁNDEZ : Vargas Quan, wajohnny luqadaha, qaybta kaalmada sudha, malinda copeland . So [facility] [phone].    ATENCIÓN: Si habla español, tiene a spear disposición servicios gratuitos de asistencia lingüística. Llame al [phone].    We comply with applicable federal civil rights laws and [place] laws. We do not discriminate on the basis of race, color, national origin, age, disability sex, sexual orientation or gender identity.            Thank you!     Thank you for choosing [facility]  for your care. Our goal is always to provide you with excellent care. Hearing back from our patients is one way we can continue to improve our services. Please take a few minutes to complete the written survey that you may receive in the mail after your visit with us. Thank you!             Your Updated Medication List - Protect others around you: Learn how to safely use, store and throw away your medicines at www.disposemymeds.org.          This list is accurate as of: 9/7/17  4:11 PM.  Always use your most recent med list.                   Brand Name Dispense Instructions for use Diagnosis    CALCIUM MAGNESIUM PO      With " zinc    RLQ abdominal pain       CHROMIUM PICOLINATE PO       RLQ abdominal pain       FOLIC ACID PO      Take 1 mg by mouth daily        methylPREDNISolone 32 MG tablet    MEDROL    2 tablet    one tablet to be taken 12 hours prior to the scan, and an additional 32mg PO methylprednisolone to be taken 2 hours prior to the scan    Contrast media allergy       MILK THISTLE PO      With dandelion root    RLQ abdominal pain       ONE-A-DAY ESSENTIAL Tabs      Take 1 tablet by mouth daily.        Vitamin B 12 100 MCG Lozg       RLQ abdominal pain       VITAMIN D PO       RLQ abdominal pain

## 2017-09-07 NOTE — PROGRESS NOTES
Patient had appointment with her primary care physician to complete pre-op for upcoming mastectomy. [facility] services were offered due to patient presenting with stress associated with ongoing adjustment to cancer diagnosis and upcoming anxiety about surgery and adjustment to new normal. No immediate safety/risk issues were reported or identified.  Patient discussed numerous strategies that have assisted her to cope with and adjust to cancer diagnosis. She shared with [facility] that she has migue, has a supportive , and is grateful for the support groups that she is involved in.  Patient discussed intention to take it one day at a time, and shared that she has hope that her surgery will provide her with a peace of mind. Explained the role of the [facility] and provided contact information.  Patient expressed appreciation of knowing of C availability, and stated that she will schedule with [facility] if needs arise.    Blanca Burns, [facility]   Behavioral Health Clinician

## 2017-09-07 NOTE — PATIENT INSTRUCTIONS
PLAN:   1.   Symptomatic therapy suggested:   A high fiber diet with plenty of fluids (up to 8 glasses of water daily) is suggested to relieve these symptoms.  Metamucil, 1 tablespoon once or twice daily can be used to keep bowels regular if needed.  2.  Orders Placed This Encounter   Procedures     Basic metabolic panel     CBC with platelets       3. Patient needs to follow up in if no improvement,or sooner if worsening of symptoms or other symptoms develop.  Will follow up and/or notify patient on results via phone or mail to determine further need for followup            Before Your Surgery      Call your surgeon if there is any change in your health. This includes signs of a cold or flu (such as a sore throat, runny nose, cough, rash or fever).    Do not smoke, drink alcohol or take over the counter medicine (unless your surgeon or primary care doctor tells you to) for the 24 hours before and after surgery.    If you take prescribed drugs: Follow your doctor s orders about which medicines to take and which to stop until after surgery.    Eating and drinking prior to surgery: follow the instructions from your surgeon    Take a shower or bath the night before surgery. Use the soap your surgeon gave you to gently clean your skin. If you do not have soap from your surgeon, use your regular soap. Do not shave or scrub the surgery site.  Wear clean pajamas and have clean sheets on your bed.   It was a pleasure seeing you today at the [facility]. Thank you for allowing us to care for you today. We truly hope we provided you with the excellent service you deserve. Please let us know if there is anything else we can do for you so we can be sure you are leaving completley satisfied with your care experience.       General information about your clinic   Clinic Hours Lab Hours (Appointments are required)   Mon-Thurs: 7:30 AM - 7 PM Mon-Thurs: 7:30 AM - 7 PM   Fri: 7:30 AM - 5 PM Fri: 7:30  AM - 5 PM        After Hours Nurse Advise & Appts:  [facility]: [phone]  [place] On Call: to make appointments anytime: [phone] On Call Physician: call [phone] and answering service will page the on call physician.        For urgent appointments, please call [phone] and ask for the triage nurse or your care team clinic nurse.  How to contact my care team:  MyChart: [email or web address]   Phone: [phone]   Fax: [phone]       [place] [facility]:   Phone: [phone]  Hours: 8:00 AM - 6:00 PM  Medication Refills:  Call your pharmacy and they will forward the refill to us. Please allow 3 business days for your refills to be completed.       Normal or non-critical lab and imaging results will be communicated to you by MyChart, letter or phone within 7 days.  If you do not hear from us within 10 days, please call the clinic. If you have a critical or abnormal lab result, we will notify you by phone as soon as possible.       We now have PWIC (Pediatric Walk in Care)  Monday-Friday from 7:30-4. Simply walk in and be seen for your urgent needs like cough, fever, rash, diarrhea or vomiting, pink eye, UTI. No appointments needed. Ask one of the team for more information      -Your Care Team:    Dr. Stephanie Theodore - Internal Medicine/Pediatrics   Dr. Shirley Wesley - Family Medicine  Dr. Loli Nichols - Pediatrics  Dr. Vale Santillan - Pediatrics  Katie Billy CNP - Family Practice Nurse Practitioner

## 2017-09-08 ENCOUNTER — ALLIED HEALTH/NURSE VISIT (OUTPATIENT)
Dept: CARE COORDINATION | Facility: CLINIC | Age: 47
End: 2017-09-08

## 2017-09-08 ENCOUNTER — TELEPHONE (OUTPATIENT)
Dept: PEDIATRICS | Facility: CLINIC | Age: 47
End: 2017-09-08

## 2017-09-08 ENCOUNTER — TELEPHONE (OUTPATIENT)
Dept: PSYCHOLOGY | Facility: CLINIC | Age: 47
End: 2017-09-08

## 2017-09-08 DIAGNOSIS — Z71.9 VISIT FOR COUNSELING: Primary | ICD-10-CM

## 2017-09-08 NOTE — MR AVS SNAPSHOT
"              After Visit Summary   9/8/2017    Sheryle Cruse    MRN: [id number]           Patient Information     Date Of Birth          1970        Visit Information        Provider Department      9/8/2017 10:15 AM Jeana Traore BSW  CASE MANAGEMENT        Today's Diagnoses     Visit for counseling    -  1       Follow-ups after your visit        Your next 10 appointments already scheduled     Oct 04, 2017 11:30 AM CDT   [place] [facility] with Birdie Gonzalez MD   [facility]uling ([facility])    [facility] ([facility])   [phone]              Who to contact     If you have questions or need follow up information about today's clinic visit or your schedule please contact  CASE MANAGEMENT directly at [phone].  Normal or non-critical lab and imaging results will be communicated to you by MyChart, letter or phone within 4 business days after the clinic has received the results. If you do not hear from us within 7 days, please contact the clinic through MyChart or phone. If you have a critical or abnormal lab result, we will notify you by phone as soon as possible.  Submit refill requests through [employer] or call your pharmacy and they will forward the refill request to us. Please allow 3 business days for your refill to be completed.          Additional Information About Your Visit        [employer] Information     [employer] lets you send messages to your doctor, view your test results, renew your prescriptions, schedule appointments and more. To sign up, go to [email or web address] . Click on \"Log in\" on the left side of the screen, which will take you to the Welcome page. Then click on \"Sign up Now\" on the right side of the page.     You will be asked to enter the access code listed below, as well as some personal information. Please follow the directions to create your username and password.     Your access code is: " [email or web address]  Expires: 9/10/2017  9:58 AM     Your access code will  in 90 days. If you need help or a new code, please call your [place] clinic or [phone].        Care EveryWhere ID     This is your Care EveryWhere ID. This could be used by other organizations to access your [place] medical records  [id number]        Your Vitals Were     Last Period                   2017            Blood Pressure from Last 3 Encounters:   17 110/70   17 115/77   17 123/81    Weight from Last 3 Encounters:   17 56.2 kg (124 lb)   17 59.3 kg (130 lb 12.8 oz)   17 58.1 kg (128 lb)              Today, you had the following     No orders found for display       Primary Care Provider    [facility]       No address on file        Equal Access to Services     JORGE HERNÁNDEZ : Hadii cecy Quan, waaxda lulynette, qaybta kaalmada sudha, malinda copeland . So [facility] [phone].    ATENCIÓN: Si habla español, tiene a spear disposición servicios gratuitos de asistencia lingüística. Llame al [phone].    We comply with applicable federal civil rights laws and [place] laws. We do not discriminate on the basis of race, color, national origin, age, disability sex, sexual orientation or gender identity.            Thank you!     Thank you for choosing [facility]  for your care. Our goal is always to provide you with excellent care. Hearing back from our patients is one way we can continue to improve our services. Please take a few minutes to complete the written survey that you may receive in the mail after your visit with us. Thank you!             Your Updated Medication List - Protect others around you: Learn how to safely use, store and throw away your medicines at www.disposemymeds.org.          This list is accurate as of: 17 10:36 AM.  Always use your most recent med list.                   Brand Name Dispense  Instructions for use Diagnosis    CALCIUM MAGNESIUM PO      With zinc    RLQ abdominal pain       CHROMIUM PICOLINATE PO       RLQ abdominal pain       FOLIC ACID PO      Take 1 mg by mouth daily        methylPREDNISolone 32 MG tablet    MEDROL    2 tablet    one tablet to be taken 12 hours prior to the scan, and an additional 32mg PO methylprednisolone to be taken 2 hours prior to the scan    Contrast media allergy       MILK THISTLE PO      With dandelion root    RLQ abdominal pain       ONE-A-DAY ESSENTIAL Tabs      Take 1 tablet by mouth daily.        Vitamin B 12 100 MCG Lozg       RLQ abdominal pain       VITAMIN D PO       RLQ abdominal pain

## 2017-09-08 NOTE — TELEPHONE ENCOUNTER
[facility] returned phone call from patient. Patient had questions about warm-hand off from 9/7. [facility] explained that the warm hand-off is a non-billable encounter.  Patient expressed appreciation for the information.  Patient asked additional questions about what to anticipate and expect with [facility] services.  Patient reported that she will likely schedule one visit with [facility] prior to her surgery, and return to [facility] visits as she continues to recover.  Patient to schedule appointment with [facility] as desired.    Blanca Burns, [facility]  Behavioral Health Clinician

## 2017-09-08 NOTE — PROGRESS NOTES
Social Work Contact - Follow-Up - 9-8-17    MONA met with pt in [facility] yesterday when pt had her pre-op appointment.  Pt has decided on a bilateral mastectomy and that surgery has been scheduled for 10-4-17 at [facility].  SW has previously discussed/provided pt with cancer support/resources.  Pt has completed her portion of the [employer] application and will mail it to SW to complete SW part and then submit it, since pt now has surgery date scheduled.  Pt indicates that her  has been unemployed for most of 2017, though started a new job this week.  Pt inquired regarding having a home care care nurse visit pt post-discharge from the hospital to assist her in drain cares as pt's spouse will only be able to be off work from his new job for a few days.  MONA discussed with pt C RN and the fact that most insurances require that pt be homebound to cover home RN.  However, MONA informed pt that SW will connect with [facility] discharge planner when pt is hospitalized to request planner assess pt for home RN for drain cares.  Pt has been talking with the [employer] [facility] for her medical appts post-surgery.  SW also discussed with pt [employer] transportation resource and sent information to pt regarding this.  Pt did meet with [facility], Blanca Burns, yesterday, so knows she is available to meet with pt if needed.  Pt does have good support from family and friends.  SW will continue to follow for cancer support/resources.          LAZARA Godinez     Social Work  [facility]  Office:  [phone]  E-Mail:  [email or web address]  9/8/2017 10:34 AM

## 2017-09-08 NOTE — TELEPHONE ENCOUNTER
Notes Recorded by Katie Billy, APRN CNP on 9/7/2017 at 9:28 PM  Please let patient know lab OK for surgery      Patient given lab results.    Shira Pinedo RN,   [facility]

## 2017-09-11 ENCOUNTER — TELEPHONE (OUTPATIENT)
Dept: PSYCHOLOGY | Facility: CLINIC | Age: 47
End: 2017-09-11

## 2017-09-11 NOTE — TELEPHONE ENCOUNTER
[facility] missed call from patient. [facility] returned message. Patient inquired about scheduling appointment with [facility]. New appointment scheduled for 9/21 at 4:00pm.    Blanca Burns [facility]  Behavioral Health Clinician

## 2017-09-12 ENCOUNTER — TELEPHONE (OUTPATIENT)
Dept: CARE COORDINATION | Facility: CLINIC | Age: 47
End: 2017-09-12

## 2017-09-12 NOTE — TELEPHONE ENCOUNTER
Social Work Contact - Follow-Up - 9-12-17    SW received pt's [facility] [employer] application this AM, completed it, and faxed it to [employer] today.  SW updated pt regarding this and she is appreciative of assistance.  Per MONA chart review, SW also noted that pt has set up appt to see [facility], Blanca Grant, on 9-21-17.  SW will continue to follow pt for cancer support/resources.    LAZARA Godinez     Social Work  [facility]  Office:  [phone]  E-Mail:  [email or web address]  9/12/2017 9:13 AM

## 2017-09-14 ENCOUNTER — MEDICAL CORRESPONDENCE (OUTPATIENT)
Dept: HEALTH INFORMATION MANAGEMENT | Facility: CLINIC | Age: 47
End: 2017-09-14

## 2017-09-14 ENCOUNTER — DOCUMENTATION ONLY (OUTPATIENT)
Dept: SURGERY | Facility: CLINIC | Age: 47
End: 2017-09-14

## 2017-09-14 NOTE — PROGRESS NOTES
Pt was scheduled for bilateral mastectomies and right sentinel lymph node biopsy at [facility] 10/4/17 with Dr Gonzalez, when [facility] representative called to register patient and review procedure pt stated that she does not want to have the sentinel lymph node biopsy done, she is aware of the recommendations for this procedure and has had a discussion with Dr Gonzalez about it and the need for it and still prefers not to have that part of the procedure done.  Dr Gonzalez informed of patients request and the right sentinel lymph node biopsy was canceled, she will still have bilateral mastectomies on 10/4/17.

## 2017-09-22 ENCOUNTER — TELEPHONE (OUTPATIENT)
Dept: CARE COORDINATION | Facility: CLINIC | Age: 47
End: 2017-09-22

## 2017-09-22 ENCOUNTER — OFFICE VISIT (OUTPATIENT)
Dept: PSYCHOLOGY | Facility: CLINIC | Age: 47
End: 2017-09-22
Payer: COMMERCIAL

## 2017-09-22 ENCOUNTER — OFFICE VISIT (OUTPATIENT)
Dept: PEDIATRICS | Facility: CLINIC | Age: 47
End: 2017-09-22
Payer: COMMERCIAL

## 2017-09-22 ENCOUNTER — RADIANT APPOINTMENT (OUTPATIENT)
Dept: GENERAL RADIOLOGY | Facility: CLINIC | Age: 47
End: 2017-09-22
Attending: NURSE PRACTITIONER
Payer: COMMERCIAL

## 2017-09-22 VITALS
DIASTOLIC BLOOD PRESSURE: 76 MMHG | OXYGEN SATURATION: 96 % | WEIGHT: 124 LBS | BODY MASS INDEX: 21.17 KG/M2 | HEIGHT: 64 IN | HEART RATE: 95 BPM | SYSTOLIC BLOOD PRESSURE: 116 MMHG | TEMPERATURE: 97.6 F

## 2017-09-22 DIAGNOSIS — F43.23 ADJUSTMENT DISORDER WITH MIXED ANXIETY AND DEPRESSED MOOD: Primary | ICD-10-CM

## 2017-09-22 DIAGNOSIS — K59.00 CONSTIPATION, UNSPECIFIED CONSTIPATION TYPE: ICD-10-CM

## 2017-09-22 DIAGNOSIS — M54.50 LOW BACK PAIN WITHOUT SCIATICA, UNSPECIFIED BACK PAIN LATERALITY, UNSPECIFIED CHRONICITY: ICD-10-CM

## 2017-09-22 DIAGNOSIS — M54.50 LOW BACK PAIN WITHOUT SCIATICA, UNSPECIFIED BACK PAIN LATERALITY, UNSPECIFIED CHRONICITY: Primary | ICD-10-CM

## 2017-09-22 PROCEDURE — 99214 OFFICE O/P EST MOD 30 MIN: CPT | Performed by: NURSE PRACTITIONER

## 2017-09-22 PROCEDURE — 72100 X-RAY EXAM L-S SPINE 2/3 VWS: CPT | Performed by: RADIOLOGY

## 2017-09-22 PROCEDURE — 90832 PSYTX W PT 30 MINUTES: CPT | Performed by: SOCIAL WORKER

## 2017-09-22 RX ORDER — POLYETHYLENE GLYCOL 3350 17 G/17G
1 POWDER, FOR SOLUTION ORAL DAILY
Qty: 510 G | Refills: 3 | Status: SHIPPED | OUTPATIENT
Start: 2017-09-22 | End: 2017-10-17

## 2017-09-22 ASSESSMENT — ANXIETY QUESTIONNAIRES
3. WORRYING TOO MUCH ABOUT DIFFERENT THINGS: NEARLY EVERY DAY
GAD7 TOTAL SCORE: 14
2. NOT BEING ABLE TO STOP OR CONTROL WORRYING: SEVERAL DAYS
7. FEELING AFRAID AS IF SOMETHING AWFUL MIGHT HAPPEN: NEARLY EVERY DAY
6. BECOMING EASILY ANNOYED OR IRRITABLE: SEVERAL DAYS
1. FEELING NERVOUS, ANXIOUS, OR ON EDGE: NEARLY EVERY DAY
IF YOU CHECKED OFF ANY PROBLEMS ON THIS QUESTIONNAIRE, HOW DIFFICULT HAVE THESE PROBLEMS MADE IT FOR YOU TO DO YOUR WORK, TAKE CARE OF THINGS AT HOME, OR GET ALONG WITH OTHER PEOPLE: VERY DIFFICULT
5. BEING SO RESTLESS THAT IT IS HARD TO SIT STILL: NOT AT ALL

## 2017-09-22 ASSESSMENT — PATIENT HEALTH QUESTIONNAIRE - PHQ9
SUM OF ALL RESPONSES TO PHQ QUESTIONS 1-9: 11
5. POOR APPETITE OR OVEREATING: NEARLY EVERY DAY

## 2017-09-22 NOTE — PATIENT INSTRUCTIONS
PLAN:   1.   Symptomatic therapy suggested: Miralax once a day.  2.  Orders Placed This Encounter   Medications     polyethylene glycol (MIRALAX) powder     Sig: Take 17 g (1 capful) by mouth daily     Dispense:  510 g     Refill:  3     Orders Placed This Encounter   Procedures     XR Lumbar Spine 2/3 Views     3. Patient needs to follow up in if no improvement,or sooner if worsening of symptoms or other symptoms develop.  FURTHER TESTING:       - lumbar spine xray  Will follow up and/or notify patient of  results via My Chart to determine further need for followup    It was a pleasure seeing you today at the [facility]. Thank you for allowing us to care for you today. We truly hope we provided you with the excellent service you deserve. Please let us know if there is anything else we can do for you so we can be sure you are leaving completley satisfied with your care experience.       General information about your clinic   Clinic Hours Lab Hours (Appointments are required)   Mon-Thurs: 7:30 AM - 7 PM Mon-Thurs: 7:30 AM - 7 PM   Fri: 7:30 AM - 5 PM Fri: 7:30 AM - 5 PM        After Hours Nurse Advise & Appts:  [facility] Nurse Advisors: [phone]  [facility] On Call: to make appointments anytime: [phone] On Call Physician: call [phone] and answering service will page the on call physician.        For urgent appointments, please call [phone] and ask for the triage nurse or your care team clinic nurse.  How to contact my care team:  MyChart: [email or web address]   Phone: [phone]   Fax: [phone]       [facility]:   Phone: [phone]  Hours: 8:00 AM - 6:00 PM  Medication Refills:  Call your pharmacy and they will forward the refill to us. Please allow 3 business days for your refills to be completed.       Normal or non-critical lab and imaging results will be communicated to you by MyChart, letter or phone within 7 days.  If you do not hear from us  within 10 days, please call the clinic. If you have a critical or abnormal lab result, we will notify you by phone as soon as possible.       We now have PWIC (Pediatric Walk in Care)  Monday-Friday from 7:30-4. Simply walk in and be seen for your urgent needs like cough, fever, rash, diarrhea or vomiting, pink eye, UTI. No appointments needed. Ask one of the team for more information      -Your Care Team:    Dr. Stephanie Theodore - Internal Medicine/Pediatrics   Dr. Shirley Wesley - Family Medicine  Dr. Loli Nichols - Pediatrics  Dr. Vale Santillan - Pediatrics  Katie Billy CNP - Family Practice Nurse Practitioner

## 2017-09-22 NOTE — NURSING NOTE
"Chief Complaint   Patient presents with     Back Pain       Initial /76  Pulse 95  Temp 97.6  F (36.4  C) (Temporal)  Ht 5' 4\" (1.626 m)  Wt 124 lb (56.2 kg)  LMP 08/28/2017  SpO2 96%  BMI 21.28 kg/m2 Estimated body mass index is 21.28 kg/(m^2) as calculated from the following:    Height as of this encounter: 5' 4\" (1.626 m).    Weight as of this encounter: 124 lb (56.2 kg).  Medication Reconciliation: complete     Blanca Cr MA    "

## 2017-09-22 NOTE — PROGRESS NOTES
[facility]  September 22, 2017      Behavioral Health Clinician Progress Note    Patient Name: Sheryle Cruse           Service Type:  Individual      Service Location:   Face to Face in Clinic     Session Start Time: 9: 33 am  Session End Time: 10 : 08 am       Session Length: 16 - 37      Attendees: Patient    Visit Activities (Refresh list every visit): NEW and [facility] Only    Diagnostic Assessment Date: Unable to complete due to short appointment prior to visit with PCP and patient's anxiety and presenting needs  Treatment Plan Review Date: To be completed once diagnostic assessment completed  See Flowsheets for today's PHQ-9 and MAGGY-7 results  Previous PHQ-9:   PHQ-9 SCORE 9/22/2017   Total Score 11     Previous MAGGY-7:   MAGGY-7 SCORE 9/22/2017   Total Score 14       FRANCISCO LEVEL:  FRANCISCO Score (Last Two) 8/22/2016 9/22/2017   FRANCISCO Raw Score 36 29   Activation Score 47.4 52.9   FRANCISCO Level 2 2       DATA  Extended Session (60+ minutes): No  Interactive Complexity: No  Crisis: No  [facility] Patient: No    Treatment Objective(s) Addressed in This Session:  Target Behavior(s): Preparation for upcoming masetectomy    Anxiety: will develop more effective coping skills to manage anxiety symptoms    Current Stressors / Issues:  Patient presented with heightened anxiety as she prepares for her upcoming mastectomy on October 4.  Patient shared that she feels need to be prepared and organized as possible since she does not know what to anticipate and expect for after her surgery.  She discussed tension between caring for herself, and feeling responsibilities to care for her mother and support her .   Patient reported decrease in sleep due to racing thoughts, ruminations, and worrying about worst case scenarios.  [facility] engaged patient in CBT techniques to assist with a reduction in anxiety. [facility] reviewed with patient previously learned coping skills that she can apply between now and her upcoming surgery in order to  attempt to reduce anxiety.     Progress on Treatment Objective(s) / Homework:  New Objective established this session - CONTEMPLATION (Considering change and yet undecided); Intervened by assessing the negative and positive thinking (ambivalence) about behavior change    Motivational Interviewing    MI Intervention: Co-Developed Goal: increase self-care, Expressed Empathy/Understanding, Supported Autonomy, Collaboration, Evocation, Permission to raise concern or advise, Open-ended questions, Change talk (evoked) and Reframe     Change Talk Expressed by the Patient: Desire to change Reasons to change    Provider Response to Change Talk: E - Evoked more info from patient about behavior change, A - Affirmed patient's thoughts, decisions, or attempts at behavior change, R - Reflected patient's change talk and S - Summarized patient's change talk statements        Care Plan review completed: No    Medication Review:  No current psychiatric medications prescribed    Medication Compliance:  NA    Changes in Health Issues:   None reported    Chemical Use Review:   Substance Use: Chemical use reviewed, no active concerns identified      Tobacco Use: No current tobacco use.      Assessment: Current Emotional / Mental Status (status of significant symptoms):  Risk status (Self / Other harm or suicidal ideation)  Patient denies a history of suicidal ideation, suicide attempts, self-injurious behavior, homicidal ideation, homicidal behavior and and other safety concerns  Patient denies current fears or concerns for personal safety.  Patient denies current or recent suicidal ideation or behaviors.  Patient denies current or recent homicidal ideation or behaviors.  Patient denies current or recent self injurious behavior or ideation.  Patient denies other safety concerns.  A safety and risk management plan has not been developed at this time, however patient was encouraged to call [facility] should there be a change in  any of these risk factors.    Appearance:   Appropriate   Eye Contact:   Good   Psychomotor Behavior: Normal   Attitude:   Cooperative   Orientation:   All  Speech   Rate / Production: Normal    Volume:  Normal   Mood:    Anxious  Sad   Affect:    Appropriate   Thought Content:  Clear   Thought Form:  Coherent  Logical   Insight:    Fair     Diagnoses:  1. Adjustment disorder with mixed anxiety and depressed mood        Collateral Reports Completed:  Not Applicable    Plan: (Homework, other):  Patient was given information about behavioral services and encouraged to schedule a follow up appointment with the clinic [facility] after surgery and is able to return for follow up visit.  She was also given strategies to assist her to increase self-care.  CD Recommendations: No indications of CD issues.  Blanca Burns, [facility]      ______________________________________________________________________

## 2017-09-22 NOTE — Clinical Note
Darrin Valdivia  Patient is concerned about discharge post op instructions and need for help at home after discharge Can you help us sort out who needs to be ordering this and getting the  involved in this before she is discharged after surgery. Her biggest concern is this will not be done at discharge.  Thanks  Cecilia

## 2017-09-22 NOTE — PROGRESS NOTES
SUBJECTIVE:   Sheryle Cruse is a 46 year old female who presents to clinic today for the following health issues:    Back Pain   Has a recurring issue with her lower back which happens maybe on and off for several years  Has been told has been musculoskeletal issue. Has been to physical therapy   States know she is a hypochondriac but is worried about her low back   Have had xrays on her back but thinks a childhood.   Also having issues with constipation   Does take Miralax but periodically       Duration: a few weeks ago        Specific cause: The constipation that started to occur    Description:   Location of pain: low back section of spine (middle) and mid lower back  Character of pain: dull ache, achy knot and constant but sometimes better than others   Pain radiation: shoulders and up and down spine   New numbness or weakness in legs, not attributed to pain:  no     Intensity: Currently 4/10, At its worst 7/10    History:   Pain interferes with job: Not applicable  History of back problems: Pt. States she did see a chiropractor when she was 13 or 14 since there is a lack of alignment in spine  Any previous MRI or X-rays: None, No MRI due to contrast dye allergy   Sees a specialist for back pain:  [facility]  Therapies tried without relief: OTC pain relievers, ice packs and stretch    Alleviating factors:   Improved by: None    Precipitating factors:  Worsened by: Sitting for long periods of time, lying down in one spot for a long time    Functional and Psychosocial Screen (Noemi STarT Back):      Not performed today    Pt. C/o constipation that occurred a couple weeks ago asking having black beans.    Pt. Wanted to make sure nothing is going on since surgery is coming up.  Pt was scheduled for bilateral mastectomies and right sentinel lymph node biopsy at [facility] 10/4/17 with Dr Gonzalez, when [facility] representative called to register patient and review procedure pt stated that she does not  want to have the sentinel lymph node biopsy done, she is aware of the recommendations for this procedure and has had a discussion with Dr Gonzalez about it and the need for it and still prefers not to have that part of the procedure done.  Dr Gonzalez informed of patients request and the right sentinel lymph node biopsy was canceled, she will still have bilateral mastectomies on 10/4/17.    Pt. States she has been sleeping more on her back which she is not use to being a stomach sleeper and will wake up with tingling in arms.    Pt. States when she is having her surgery she will be having her menstrual period.    Pt. Would like to discuss on doing after surgery with supplements and vitamins since she heard it is not good to take after a surgery.    Problem list and histories reviewed & adjusted, as indicated.  Additional history: as documented    Patient Active Problem List   Diagnosis     Vitamin D deficiency     Vegetarianism     Hyperlipidemia LDL goal <160     Advance care planning     ERRONEOUS ENCOUNTER--DISREGARD     Monoallelic mutation of MUTYH gene     Malignant neoplasm of lower-inner quadrant of left breast in female, estrogen receptor positive (H)     Adjustment disorder with mixed anxiety and depressed mood     Past Surgical History:   Procedure Laterality Date     Mammogram  2003, 2009       Social History   Substance Use Topics     Smoking status: Never Smoker     Smokeless tobacco: Never Used     Alcohol use No     Family History   Problem Relation Age of Onset     DIABETES Mother 76     Obese     HEART DISEASE Mother      Hypertension Mother      CEREBROVASCULAR DISEASE Mother      Cardiovascular Mother      CVA     CANCER Father      bladder     CEREBROVASCULAR DISEASE Father 70     Breast Cancer Maternal Grandmother      CEREBROVASCULAR DISEASE Maternal Grandmother      Lipids Maternal Grandmother      HEART DISEASE Maternal Grandfather      heart disease     DIABETES Paternal Grandmother       "CEREBROVASCULAR DISEASE Paternal Grandfather      HEART DISEASE Paternal Grandfather          Current Outpatient Prescriptions   Medication Sig Dispense Refill     methylPREDNISolone (MEDROL) 32 MG tablet one tablet to be taken 12 hours prior to the scan, and an additional 32mg PO methylprednisolone to be taken 2 hours prior to the scan 2 tablet 0     FOLIC ACID PO Take 1 mg by mouth daily       MILK THISTLE PO With dandelion root       CHROMIUM PICOLINATE PO        Calcium-Magnesium-Vitamin D (CALCIUM MAGNESIUM PO) With zinc       Cyanocobalamin (VITAMIN B 12) 100 MCG LOZG        Cholecalciferol (VITAMIN D PO)        Multiple Vitamin (ONE-A-DAY ESSENTIAL) TABS Take 1 tablet by mouth daily.       Allergies   Allergen Reactions     Contrast Dye      Pt does not recall which type of contrast     Diphenhydramine      Pcn [Bicillin C-R,] Rash     swelling     BP Readings from Last 3 Encounters:   09/22/17 116/76   09/07/17 110/70   09/01/17 115/77    Wt Readings from Last 3 Encounters:   09/22/17 124 lb (56.2 kg)   09/07/17 124 lb (56.2 kg)   09/01/17 130 lb 12.8 oz (59.3 kg)         Labs reviewed in EPIC        Reviewed and updated as needed this visit by clinical staff       Reviewed and updated as needed this visit by Provider         ROS:  CONSTITUTIONAL:NEGATIVE for fever, chills, change in weight  INTEGUMENTARY/SKIN: NEGATIVE for rash   RESP:NEGATIVE for significant cough or SOB  CV: NEGATIVE for dyspnea on exertion and syncope or near-syncope  GI: POSITIVE for constipation and NEGATIVE for nausea, vomiting and weight loss  MUSCULOSKELETAL: POSITIVE  for back pain  and NEGATIVE for joint swelling  and joint warmth   NEURO: NEGATIVE for weakness, dizziness or paresthesias    OBJECTIVE:     /76  Pulse 95  Temp 97.6  F (36.4  C) (Temporal)  Ht 5' 4\" (1.626 m)  Wt 124 lb (56.2 kg)  LMP 08/28/2017  SpO2 96%  BMI 21.28 kg/m2  Body mass index is 21.28 kg/(m^2).  GENERAL APPEARANCE: alert, active and no " distress  RESP: lungs clear to auscultation - no rales, rhonchi or wheezes  CV: regular rates and rhythm and no murmur, click or rub  ABDOMEN: soft, non-tender  MS: extremities normal- no gross deformities noted  ORTHO: Lumber/Thoracic Spine Exam: Tender:  left para lumbar muscles, right para lumbar muscles  Non-tender:  thoracic facet joints, lumbar spinous processes, lumbar facet joints  Range of Motion:  lumbar flexion  full, lumbar extension  full  Strength:  Normal   Special tests:  negative straight leg raises  SKIN: no suspicious lesions or rashes  NEURO: Normal strength and tone, mentation intact and speech normal  PSYCH: mentation appears normal, patient appearance--, anxious, fatigued and worried    Diagnostic Test Results:  Recent Results (from the past 744 hour(s))   XR Abdomen 2 Views    Narrative    XR ABDOMEN 2 VW  8/31/2017 11:20 AM    HISTORY:  Constipation, unspecified    COMPARISON:  None.      Impression    IMPRESSION:  Normal bowel gas pattern. Moderate to large amount of  fecal material in the ascending colon. Small amount of fecal material  in the remainder of the colon.     SHANT PIMENTEL MD     Recent Results (from the past 744 hour(s))   XR Lumbar Spine 2/3 Views    Narrative    3 views lumbar spine radiographs 9/22/2017 2:18 PM    History: Low back pain     Comparison: CT 6/16/2017    Findings:    Standing  AP, lateral including lumbosacral spot film lateral view  views of the lumbar spine were obtained.    5  lumbar type vertebral bodies are assumed for the purpose of this  dictation.    There is no acute osseous abnormality.      There is multilevel degenerative changes of the lumbar spine, most  pronounced at L5-S1 with vacuum phenomenon. There is also lower lumbar  predominant facet arthropathy.    Vascular calcifications. The visualized bowel gas pattern is  non-obstructive.      Impression    Impression:  1.  No acute osseous abnormality.  2.  Multilevel degenerative changes with  significant disc height loss  L5-S1.    MARIOLA MANNING MD (Joe)         ASSESSMENT/PLAN:     Sheryle was seen today for back pain.    Diagnoses and all orders for this visit:    Low back pain without sciatica, unspecified back pain laterality, unspecified chronicity  -     XR Lumbar Spine 2/3 Views; Future    Constipation, unspecified constipation type  -     polyethylene glycol (MIRALAX) powder; Take 17 g (1 capful) by mouth daily  A high fiber diet with plenty of fluids (up to 8 glasses of water daily) is suggested to relieve these symptoms.  Metamucil, 1 tablespoon once or twice daily can be used to keep bowels regular if needed.    PLAN:    Patient needs to follow up in if no improvement,or sooner if worsening of symptoms or other symptoms develop.  FURTHER TESTING:       - lumbar spine xray  Will follow up and/or notify patient of  results via My Chart to determine further need for followup      See Patient Instructions    GILDARDO Michaels [facility]

## 2017-09-22 NOTE — TELEPHONE ENCOUNTER
Social Work Contact - Follow-Up - 9-22-17    SW received referral from Cecilia Billy indicating that pt wants to make sure that home health RN is set up for pt post-discharge from the hospital to assist pt with her drain management as pt will be home alone during the day.  MONA contacted Liseth Campos, RN CC, [facility], [phone], to discuss above with her.  Liseth noted pt's surgery date and will follow up with pt the morning after surgery to assess pt for home care RN post-discharge.  MONA/Liseth Ross discussed that most insurances would require pt to be homebound to cover home RN and that can be assessed while pt is in hospital following her surgery.  Liseth Ross/[facility] would be setting up HHC RN post-discharge from the hospital if pt meets insurance criteria for it.  MONA contacted pt via phone today and updated her regarding above.  Pt given [facility] RN CC information.  SW will continue to follow pt.  Pt will let SW know if she has any additional questions prior to her surgery on 10-4-17.  Pt's anxiety level remains high and pt has seen Blanca Burns [facility], for that.       Skip Traore, LAZARA     Social Work  [facility]  Office:  [phone]  E-Mail:  [email or web address]  9/22/2017 2:47 PM

## 2017-09-22 NOTE — MR AVS SNAPSHOT
After Visit Summary   9/22/2017    Sheryle Cruse    MRN: [id number]           Patient Information     Date Of Birth          1970        Visit Information        Provider Department      9/22/2017 10:10 AM Katie Billy APRN CNP [facility]        Today's Diagnoses     Low back pain without sciatica, unspecified back pain laterality, unspecified chronicity    -  1    Constipation, unspecified constipation type          Care Instructions    PLAN:   1.   Symptomatic therapy suggested: Miralax once a day.  2.  Orders Placed This Encounter   Medications     polyethylene glycol (MIRALAX) powder     Sig: Take 17 g (1 capful) by mouth daily     Dispense:  510 g     Refill:  3     Orders Placed This Encounter   Procedures     XR Lumbar Spine 2/3 Views     3. Patient needs to follow up in if no improvement,or sooner if worsening of symptoms or other symptoms develop.  FURTHER TESTING:       - lumbar spine xray  Will follow up and/or notify patient of  results via My Chart to determine further need for followup    It was a pleasure seeing you today at the [facility]. Thank you for allowing us to care for you today. We truly hope we provided you with the excellent service you deserve. Please let us know if there is anything else we can do for you so we can be sure you are leaving completley satisfied with your care experience.       General information about your clinic   Clinic Hours Lab Hours (Appointments are required)   Mon-Thurs: 7:30 AM - 7 PM Mon-Thurs: 7:30 AM - 7 PM   Fri: 7:30 AM - 5 PM Fri: 7:30 AM - 5 PM        After Hours Nurse Advise & Appts:  [facility] Nurse Advisors: [phone]  [facility] On Call: to make appointments anytime: [phone] On Call Physician: call [phone] and answering service will page the on call physician.        For urgent appointments, please call [phone] and ask for the triage nurse or your care team  clinic nurse.  How to contact my care team:  Michael: [email or web address]   Phone: [phone]   Fax: [phone]       [facility]:   Phone: [phone]  Hours: 8:00 AM - 6:00 PM  Medication Refills:  Call your pharmacy and they will forward the refill to us. Please allow 3 business days for your refills to be completed.       Normal or non-critical lab and imaging results will be communicated to you by MyChart, letter or phone within 7 days.  If you do not hear from us within 10 days, please call the clinic. If you have a critical or abnormal lab result, we will notify you by phone as soon as possible.       We now have PWIC (Pediatric Walk in Care)  Monday-Friday from 7:30-4. Simply walk in and be seen for your urgent needs like cough, fever, rash, diarrhea or vomiting, pink eye, UTI. No appointments needed. Ask one of the team for more information      -Your Care Team:    Dr. Stephanie Theodore - Internal Medicine/Pediatrics   Dr. Shirley Wesley - Family Medicine  Dr. Loli Nichols - Pediatrics  Dr. Vale Santillan - Pediatrics  Katie Billy CNP - Family Practice Nurse Practitioner                         Follow-ups after your visit        Your next 10 appointments already scheduled     Oct 04, 2017 11:30 AM [facility] with Birdie Gonzalez MD   [facility] Sched[facility] ([facility])    [facility] ([facility])   [phone]              Future tests that were ordered for you today     Open Future Orders        Priority Expected Expires Ordered    XR Lumbar Spine 2/3 Views Routine 9/22/2017 9/22/2018 9/22/2017            Who to contact     If you have questions or need follow up information about today's clinic visit or your schedule please contact [facility] directly at [phone].  Normal or non-critical lab and imaging results will be communicated to you by Vernahart, letter or phone within 4 business days  "after the clinic has received the results. If you do not hear from us within 7 days, please contact the clinic through [employer] or phone. If you have a critical or abnormal lab result, we will notify you by phone as soon as possible.  Submit refill requests through [employer] or call your pharmacy and they will forward the refill request to us. Please allow 3 business days for your refill to be completed.          Additional Information About Your Visit        [employer] Information     [employer] is an electronic gateway that provides easy, online access to your medical records. With [employer], you can request a clinic appointment, read your test results, renew a prescription or communicate with your care team.     To sign up for [employer] visit the website at [email or web address]   You will be asked to enter the access code listed below, as well as some personal information. Please follow the directions to create your username and password.     Your access code is: [email or web address]  Expires: 2017 11:09 AM     Your access code will  in 90 days. If you need help or a new code, please contact your [facility] or call [phone] for assistance.        Care EveryWhere ID     This is your Care EveryWhere ID. This could be used by other organizations to access your [place] medical records  [id number]        Your Vitals Were     Pulse Temperature Height Last Period Pulse Oximetry BMI (Body Mass Index)    95 97.6  F (36.4  C) (Temporal) 5' 4\" (1.626 m) 2017 96% 21.28 kg/m2       Blood Pressure from Last 3 Encounters:   17 116/76   17 110/70   17 115/77    Weight from Last 3 Encounters:   17 124 lb (56.2 kg)   17 124 lb (56.2 kg)   17 130 lb 12.8 oz (59.3 kg)                 Today's Medication Changes          These changes are accurate as of: 17 11:09 AM.  If you have any questions, ask your nurse or doctor.               Start taking these " medicines.        Dose/Directions    polyethylene glycol powder   Commonly known as:  MIRALAX   Used for:  Constipation, unspecified constipation type   Started by:  Katie Billy APRN CNP        Dose:  1 capful   Take 17 g (1 capful) by mouth daily   Quantity:  510 g   Refills:  3            Where to get your medicines      These medications were sent to [facility] - [place], [place] - [address] AT [facility]  [address] [address]    Hours:  24-hours Phone:  [phone]     polyethylene glycol powder                Primary Care Provider    [facility]       No address on file        Equal Access to Services     JORGE HERNÁNDEZ : Vargas Quan, wajohnny reis, edy kaalmada sudha, malinda murry. So [facility] [phone].    ATENCIÓN: Si habla español, tiene a spear disposición servicios gratuitos de asistencia lingüística. Llame al [phone].    We comply with applicable federal civil rights laws and [place] laws. We do not discriminate on the basis of race, color, national origin, age, disability sex, sexual orientation or gender identity.            Thank you!     Thank you for choosing [facility]  for your care. Our goal is always to provide you with excellent care. Hearing back from our patients is one way we can continue to improve our services. Please take a few minutes to complete the written survey that you may receive in the mail after your visit with us. Thank you!             Your Updated Medication List - Protect others around you: Learn how to safely use, store and throw away your medicines at www.disposemymeds.org.          This list is accurate as of: 9/22/17 11:09 AM.  Always use your most recent med list.                   Brand Name Dispense Instructions for use Diagnosis    CALCIUM MAGNESIUM PO      With zinc    RLQ abdominal pain        CHROMIUM PICOLINATE PO       RLQ abdominal pain       FOLIC ACID PO      Take 1 mg by mouth daily        methylPREDNISolone 32 MG tablet    MEDROL    2 tablet    one tablet to be taken 12 hours prior to the scan, and an additional 32mg PO methylprednisolone to be taken 2 hours prior to the scan    Contrast media allergy       MILK THISTLE PO      With dandelion root    RLQ abdominal pain       ONE-A-DAY ESSENTIAL Tabs      Take 1 tablet by mouth daily.        polyethylene glycol powder    MIRALAX    510 g    Take 17 g (1 capful) by mouth daily    Constipation, unspecified constipation type       Vitamin B 12 100 MCG Lozg       RLQ abdominal pain       VITAMIN D PO       RLQ abdominal pain

## 2017-09-23 ASSESSMENT — ANXIETY QUESTIONNAIRES: GAD7 TOTAL SCORE: 14

## 2017-09-25 ENCOUNTER — TELEPHONE (OUTPATIENT)
Dept: PEDIATRICS | Facility: CLINIC | Age: 47
End: 2017-09-25

## 2017-09-25 NOTE — TELEPHONE ENCOUNTER
Reasonable to wait after surgery to do physical therapy  They will be managing her pain meds after surgery.   Symptomatic therapy suggested: otherwise can try a muscle relaxant or   apply heat to affected area, apply ice to affected area

## 2017-09-25 NOTE — TELEPHONE ENCOUNTER
Called patient, gave message per Cecilia Billy NP.  She is having a bilateral mastectomy next week, she will not be able to manage PT yet.    She will re-visit that when she is recovered from her mastectomy surgery.      Until then, she is wondering what she can do to manage her pain after the surgery while she is at home recovering.    Please advise.    Tresa Bailey RN, [facility]

## 2017-09-25 NOTE — TELEPHONE ENCOUNTER
XR Lumbar Spine 2/3 Views   Status:  Final result   Visible to patient:  No (Not Released) Dx:  Low back pain without sciatica, unspe... Order: [id number]       Notes Recorded by Katie Billy APRN CNP on 9/22/2017 at 4:56 PM  Please let patient know has some degenerative changes in her lumbar spine which are common   Could consider physical therapy if she would like          Details        Reading Physician Reading Date Result Priority       Sridhar Salazar,  9/22/2017            Narrative             3 views lumbar spine radiographs 9/22/2017 2:18 PM    History: Low back pain     Comparison: CT 6/16/2017    Findings:    Standing  AP, lateral including lumbosacral spot film lateral view  views of the lumbar spine were obtained.    5  lumbar type vertebral bodies are assumed for the purpose of this  dictation.    There is no acute osseous abnormality.      There is multilevel degenerative changes of the lumbar spine, most  pronounced at L5-S1 with vacuum phenomenon. There is also lower lumbar  predominant facet arthropathy.    Vascular calcifications. The visualized bowel gas pattern is  non-obstructive.             Impression             Impression:  1.  No acute osseous abnormality.  2.  Multilevel degenerative changes with significant disc height loss  L5-S1.    MD Mayte LEDBETTER RN (Joe)

## 2017-09-25 NOTE — TELEPHONE ENCOUNTER
Patient notified.  Verbalized understanding.    Shira Pinedo RN,   [facility]

## 2017-09-28 ENCOUNTER — TELEPHONE (OUTPATIENT)
Dept: CARE COORDINATION | Facility: CLINIC | Age: 47
End: 2017-09-28

## 2017-09-28 NOTE — TELEPHONE ENCOUNTER
Social Work Contact - Follow-Up - 9-28-17    SW received message to call pt regarding [employer] myra application results.  SW contacted [facility] regarding pt's myra application and they processed it today.  Pt was awarded $600.00.  SW attempted to reach pt to provide her with above information though was unable to connect with pt.  SW LM for pt indicating above.  SW will continue to follow pt and reach out to her again in 2 weeks.    LAZARA Godinez     Social Work  [facility]  Office:  [phone]  E-Mail:  [email or web address]  9/28/2017 11:15 AM

## 2017-10-04 ENCOUNTER — TRANSFERRED RECORDS (OUTPATIENT)
Dept: HEALTH INFORMATION MANAGEMENT | Facility: CLINIC | Age: 47
End: 2017-10-04

## 2017-10-04 ENCOUNTER — OFFICE VISIT (OUTPATIENT)
Dept: SURGERY | Facility: PHYSICIAN GROUP | Age: 47
End: 2017-10-04
Payer: COMMERCIAL

## 2017-10-04 PROCEDURE — 19303 MAST SIMPLE COMPLETE: CPT | Mod: 50 | Performed by: SURGERY

## 2017-10-04 PROCEDURE — 38525 BIOPSY/REMOVAL LYMPH NODES: CPT | Mod: RT | Performed by: SURGERY

## 2017-10-06 ENCOUNTER — TELEPHONE (OUTPATIENT)
Dept: MAMMOGRAPHY | Facility: CLINIC | Age: 47
End: 2017-10-06

## 2017-10-06 NOTE — TELEPHONE ENCOUNTER
I called Sheryle to give her the pathology results from her surgery on Weds.  In the left breast was a previously biopsied fibroadenoma and no other abnormality.  In the right breast were 2 high grade invasive ductal carcinoma tumors, 3 cm and 7 mm, and both were removed in the mastectomy specimen with clear margins.  No other malignancy was identified.  The one lymph node I removed from the right axilla, did contain metastatic disease measuring 1.2 cm and there was no extranodal extension.    Sheryle states that she is doing pretty well.  They have been managing the drains without difficulty.  Her pain is mild and controlled with Tylenol.  Her only real complaint is the tightness that she is now feeling.  Sheryle will follow up with me on 10/10 for drain removal.    Birdie Gonzalez MD

## 2017-10-10 ENCOUNTER — OFFICE VISIT (OUTPATIENT)
Dept: SURGERY | Facility: CLINIC | Age: 47
End: 2017-10-10
Payer: COMMERCIAL

## 2017-10-10 DIAGNOSIS — C50.511 MALIGNANT NEOPLASM OF LOWER-OUTER QUADRANT OF RIGHT BREAST OF FEMALE, ESTROGEN RECEPTOR POSITIVE (H): Primary | ICD-10-CM

## 2017-10-10 DIAGNOSIS — Z17.0 MALIGNANT NEOPLASM OF LOWER-OUTER QUADRANT OF RIGHT BREAST OF FEMALE, ESTROGEN RECEPTOR POSITIVE (H): Primary | ICD-10-CM

## 2017-10-10 PROCEDURE — 99024 POSTOP FOLLOW-UP VISIT: CPT | Performed by: SURGERY

## 2017-10-10 NOTE — PATIENT INSTRUCTIONS
Medical Oncology: [phone]. Please call to schedule.    Radiation Oncology will be contacting you to schedule an appointment. If you do not hear from them in a timely manner, please call [phone].

## 2017-10-10 NOTE — PROGRESS NOTES
CHIEF COMPLAINT:  Chief Complaint   Patient presents with     Surgical Followup     drain removal       HISTORY OF PRESENT ILLNESS:  Sheryle Cruse is a 46 year old female who is seen for a post-op visit s/p bilateral mastectomies and right lymph node biopsy on 10/4/17.  She is here to have her drains removed.  Sheryle reports that she has been doing surprisingly well.  She has been taking only Tylenol for pain control.  The drains have been putting out less than 30 cc/drain/day.    The pathology report states that there were two grade 3 invasive ductal carcinoma foci in the right breast, on 3 cm and the other 7 mm.  DCIS was seen as well.  The margins were negative for both the invasive and the in situ disease.  The left breast was negative for malignancy.  The one right axillary lymph node (Sheryle had forbidden me from doing a SLN biopsy or axillary dissection.  This one node was removed because it appeared to be abnormal and was visible once the breast was removed.) had a 1.2 cm focus of metastatic disease with no extracapsular extension.    REVIEW OF SYSTEMS:  Constitutional:  Negative for chills, fatigue, fever and weight change.  Eyes:  Negative for blurred vision, eye pain and photophobia.  ENT:  Negative for hearing problems, ENT pain, congestion, rhinorrhea, epistaxis, hoarseness and dental problems.  Cardiovascular:  Negative for chest pain, palpitations, tachycardia, orthopnea and edema.  Respiratory:  Negative for cough, dyspnea and hemoptysis.  Gastrointestinal:  Negative for abdominal pain, heartburn, constipation, diarrhea and stool changes.  Musculoskeletal:  Negative for arthralgias, back pain and myalgias.  Integumentary/Breast:  See HPI.    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>A) Vudu 8/10/17     Vitamin D deficiency        Past Surgical History:   Procedure Laterality Date     Mammogram  2003, 2009       Family History   Problem Relation Age of Onset     DIABETES Mother 76     Obese     HEART DISEASE Mother      Hypertension Mother      CEREBROVASCULAR DISEASE Mother      Cardiovascular Mother      CVA     CANCER Father      bladder     CEREBROVASCULAR DISEASE Father 70     Breast Cancer Maternal Grandmother      CEREBROVASCULAR DISEASE Maternal Grandmother      Lipids Maternal Grandmother      HEART DISEASE Maternal Grandfather      heart disease     DIABETES Paternal Grandmother      CEREBROVASCULAR DISEASE Paternal Grandfather      HEART DISEASE Paternal Grandfather        Social History   Substance Use Topics     Smoking status: Never Smoker     Smokeless tobacco: Never Used     Alcohol use No       Patient Active Problem List   Diagnosis     Vitamin D deficiency     Vegetarianism     Hyperlipidemia LDL goal <160     Advance care planning     ERRONEOUS ENCOUNTER--DISREGARD     Monoallelic mutation of MUTYH gene     Malignant neoplasm of lower-inner quadrant of left breast in female, estrogen receptor positive (H)     Adjustment disorder with mixed anxiety and depressed mood     Allergies   Allergen Reactions     Contrast Dye      Pt does not recall which type of contrast     Diphenhydramine      Pcn [Bicillin C-R,] Rash     swelling     Current Outpatient Prescriptions   Medication Sig Dispense Refill     polyethylene glycol (MIRALAX) powder Take 17 g (1 capful) by mouth daily 510 g 3     methylPREDNISolone (MEDROL) 32 MG tablet one tablet to be taken 12 hours prior to the scan, and an additional 32mg PO methylprednisolone to be taken 2 hours prior to the scan 2 tablet 0     FOLIC ACID PO Take 1 mg by mouth daily       MILK THISTLE PO With dandelion root        CHROMIUM PICOLINATE PO        Calcium-Magnesium-Vitamin D (CALCIUM MAGNESIUM PO) With zinc       Cyanocobalamin (VITAMIN B 12) 100 MCG LOZG        Cholecalciferol (VITAMIN D PO)        Multiple Vitamin (ONE-A-DAY ESSENTIAL) TABS Take 1 tablet by mouth daily.       Vitals: There were no vitals taken for this visit.  BMI= There is no height or weight on file to calculate BMI.    EXAM:  GENERAL: healthy, alert and no distress   BREAST:  The breasts are absent and the mastectomy incisions are intact.  The MURPHY drains have very thin serosanguinous drainage.  There is no ecchymosis and the flaps appear very healthy.  The drains were removed without difficulty and dry dressings were applied.    ASSESSMENT:  Sheryle Cruse is recovering very well from bilateral mastectomies.  There is no complication at this time and the drains have now been removed.  I have not yet given her exercises to work on, but will do that next week.    I reviewed the pathology report with Sheryle and her , Erik.  I again explained why I removed the one lymph node and they both seem to understand and accept the rationale.  We talked about the fact that this is high grade, stage 2 disease for which it is likely that chemotherapy, post-mastectomy radiation and estrogen blockade (97% ER positive) will be recommended.  I mentioned the Oncotype DX test, but with the positive lymph node, I would like her to see oncology before ordering the test.    PLAN:  Sheryle will follow up in 1 week.  She may start showering tomorrow.  She has been referred to oncology and radiation for consultations.    Birdie Gonzalez MD    Please route or send letter to:  GILDARDO Segura, CNP

## 2017-10-10 NOTE — NURSING NOTE
"Sheryle Cruse's goals for this visit include: MURPHY drain removal  She requests these members of her care team be copied on today's visit information: no    PCP: [facility]    Referring Provider:  No referring provider defined for this encounter.    Chief Complaint   Patient presents with     Surgical Followup     drain removal       Initial There were no vitals taken for this visit. Estimated body mass index is 21.28 kg/(m^2) as calculated from the following:    Height as of 9/22/17: 1.626 m (5' 4\").    Weight as of 9/22/17: 56.2 kg (124 lb).  Medication Reconciliation: complete    Do you need any medication refills at today's visit? No    Esther Valerio LPN      "

## 2017-10-10 NOTE — MR AVS SNAPSHOT
After Visit Summary   10/10/2017    Sheryle Cruse    MRN: [id number]           Patient Information     Date Of Birth          1970        Visit Information        Provider Department      10/10/2017 2:00 PM Birdie Gonzalez MD [facility]        Today's Diagnoses     Malignant neoplasm of lower-outer quadrant of right breast of female, estrogen receptor positive (H)    -  1      Care Instructions    Medical Oncology: [phone]. Please call to schedule.    Radiation Oncology will be contacting you to schedule an appointment. If you do not hear from them in a timely manner, please call [phone].          Follow-ups after your visit        Additional Services     ONC/HEME ADULT REFERRAL       Your provider has referred you to: [facility]: Cancer Care/Hematology (All Cancer Related Services) [facility] [phone]   [email or web address]    Please be aware that coverage of these services is subject to the terms and limitations of your health insurance plan.  Call member services at your health plan with any benefit or coverage questions.      Please bring the following with you to your appointment:    (1) Any X-Rays, CTs or MRIs which have been performed.  Contact the facility where they were done to arrange for  prior to your scheduled appointment.   (2) List of current medications  (3) This referral request   (4) Any documents/labs given to you for this referral            RADIATION THERAPY REFERRAL       Your provider has referred you to: [facility]: [facility] [place] [phone]   [email or web address]    Please be aware that coverage of these services is subject to the terms and limitations of your health insurance plan.  Call member services at your health plan with any benefit or coverage questions.      Please bring the following to your  appointment:    >>   Any x-rays, CTs or MRIs which have been performed.  Contact the facility where they were done to arrange for  prior to your scheduled appointment.   >>   List of current medications   >>   This referral request   >>   Any documents/labs given to you for this referral                  Your next 10 appointments already scheduled     Oct 19, 2017  3:20 PM CDT   [facility]    [address] [place] [address] [phone]              Who to contact     If you have questions or need follow up information about today's clinic visit or your schedule please contact [facility] directly at [phone].  Normal or non-critical lab and imaging results will be communicated to you by MyChart, letter or phone within 4 business days after the clinic has received the results. If you do not hear from us within 7 days, please contact the clinic through MyChart or phone. If you have a critical or abnormal lab result, we will notify you by phone as soon as possible.  Submit refill requests through [employer] or call your pharmacy and they will forward the refill request to us. Please allow 3 business days for your refill to be completed.          Additional Information About Your Visit        [employer] Information     [employer] is an electronic gateway that provides easy, online access to your medical records. With [employer], you can request a clinic appointment, read your test results, renew a prescription or communicate with your care team.     To sign up for [email or web address] visit the website at [email or web address]   You will be asked to enter the access code listed below, as well as some personal information. Please follow the directions to create your username and password.     Your access code is: [email or web address]  Expires: 2017 11:09 AM     Your access code will  in 90 days. If you  need help or a new code, please contact your [facility] or call [phone] for assistance.        Care EveryWhere ID     This is your Care EveryWhere ID. This could be used by other organizations to access your [facility] medical records  [id number]         Blood Pressure from Last 3 Encounters:   09/22/17 116/76   09/07/17 110/70   09/01/17 115/77    Weight from Last 3 Encounters:   09/22/17 56.2 kg (124 lb)   09/07/17 56.2 kg (124 lb)   09/01/17 59.3 kg (130 lb 12.8 oz)              We Performed the Following     ONC/HEME ADULT REFERRAL     RADIATION THERAPY REFERRAL        Primary Care Provider Office Phone # Fax #    [facility] [phone] [address]  [facility] [address]        Equal Access to Services     JORGE HERNÁNDEZ : Hadii aad ku hadasho Soomaali, waaxda luqadaha, qaybta kaalmada adeegyada, waxay idiin hayaan lauren copeland . So [facility] [phone].    ATENCIÓN: Si habla español, tiene a spear disposición servicios gratuitos de asistencia lingüística. Llame al [phone].    We comply with applicable federal civil rights laws and [place] laws. We do not discriminate on the basis of race, color, national origin, age, disability, sex, sexual orientation, or gender identity.            Thank you!     Thank you for choosing [facility]  for your care. Our goal is always to provide you with excellent care. Hearing back from our patients is one way we can continue to improve our services. Please take a few minutes to complete the written survey that you may receive in the mail after your visit with us. Thank you!             Your Updated Medication List - Protect others around you: Learn how to safely use, store and throw away your medicines at www.disposemymeds.org.          This list is accurate as of: 10/10/17  2:42 PM.  Always use your most recent med list.                   Brand Name Dispense  Instructions for use Diagnosis    CALCIUM MAGNESIUM PO      With zinc    RLQ abdominal pain       CHROMIUM PICOLINATE PO       RLQ abdominal pain       FOLIC ACID PO      Take 1 mg by mouth daily        methylPREDNISolone 32 MG tablet    MEDROL    2 tablet    one tablet to be taken 12 hours prior to the scan, and an additional 32mg PO methylprednisolone to be taken 2 hours prior to the scan    Contrast media allergy       MILK THISTLE PO      With dandelion root    RLQ abdominal pain       ONE-A-DAY ESSENTIAL Tabs      Take 1 tablet by mouth daily.        polyethylene glycol powder    MIRALAX    510 g    Take 17 g (1 capful) by mouth daily    Constipation, unspecified constipation type       Vitamin B 12 100 MCG Lozg       RLQ abdominal pain       VITAMIN D PO       RLQ abdominal pain

## 2017-10-10 NOTE — LETTER
October 10, 2017    Re: Sheryle Cruse : 1970    HISTORY OF PRESENT ILLNESS:  Sheryle Cruse is a 46 year old female who is seen for a post-op visit s/p bilateral mastectomies and right lymph node biopsy on 10/4/17.  She is here to have her drains removed.  Sheryle reports that she has been doing surprisingly well.  She has been taking only Tylenol for pain control.  The drains have been putting out less than 30 cc/drain/day.     The pathology report states that there were two grade 3 invasive ductal carcinoma foci in the right breast, on 3 cm and the other 7 mm.  DCIS was seen as well.  The margins were negative for both the invasive and the in situ disease.  The left breast was negative for malignancy.  The one right axillary lymph node (Sheryle had forbidden me from doing a SLN biopsy or axillary dissection.  This one node was removed because it appeared to be abnormal and was visible once the breast was removed.) had a 1.2 cm focus of metastatic disease with no extracapsular extension.     REVIEW OF SYSTEMS:  Constitutional:  Negative for chills, fatigue, fever and weight change.  Eyes:  Negative for blurred vision, eye pain and photophobia.  ENT:  Negative for hearing problems, ENT pain, congestion, rhinorrhea, epistaxis, hoarseness and dental problems.  Cardiovascular:  Negative for chest pain, palpitations, tachycardia, orthopnea and edema.  Respiratory:  Negative for cough, dyspnea and hemoptysis.  Gastrointestinal:  Negative for abdominal pain, heartburn, constipation, diarrhea and stool changes.  Musculoskeletal:  Negative for arthralgias, back pain and myalgias.  Integumentary/Breast:  See HPI.     Vitals: There were no vitals taken for this visit.  BMI= There is no height or weight on file to calculate BMI.     EXAM:  GENERAL: healthy, alert and no distress   BREAST:  The breasts are absent and the mastectomy incisions are intact.  The MURPHY drains have very thin serosanguinous drainage.  There is no  ecchymosis and the flaps appear very healthy.  The drains were removed without difficulty and dry dressings were applied.     ASSESSMENT:  Sheryle Cruse is recovering very well from bilateral mastectomies.  There is no complication at this time and the drains have now been removed.  I have not yet given her exercises to work on, but will do that next week.     I reviewed the pathology report with Sheryle and her , Erik.  I again explained why I removed the one lymph node and they both seem to understand and accept the rationale.  We talked about the fact that this is high grade, stage 2 disease for which it is likely that chemotherapy, post-mastectomy radiation and estrogen blockade (97% ER positive) will be recommended.  I mentioned the Oncotype DX test, but with the positive lymph node, I would like her to see oncology before ordering the test.     PLAN:  Sheryle will follow up in 1 week.  She may start showering tomorrow.  She has been referred to oncology and radiation for consultations.     Birdie Gonzalez MD

## 2017-10-13 ENCOUNTER — TELEPHONE (OUTPATIENT)
Dept: CARE COORDINATION | Facility: CLINIC | Age: 47
End: 2017-10-13

## 2017-10-13 NOTE — TELEPHONE ENCOUNTER
Social Work Contact - Follow-Up - 10-13-17    SW attempted to contact pt via phone today to follow-up regarding cancer support/resources.  Pt had bilateral mastectomy surgery on 10-4-17.  MONA unable to connect with pt and LM for pt to call SW back.    LAZARA Godinez     Social Work  [facility]  Office:  [phone]  E-Mail:  [email or web address]  10/13/2017 8:47 AM

## 2017-10-17 ENCOUNTER — CARE COORDINATION (OUTPATIENT)
Dept: ONCOLOGY | Facility: CLINIC | Age: 47
End: 2017-10-17

## 2017-10-17 ENCOUNTER — ONCOLOGY VISIT (OUTPATIENT)
Dept: ONCOLOGY | Facility: CLINIC | Age: 47
End: 2017-10-17
Payer: COMMERCIAL

## 2017-10-17 ENCOUNTER — ALLIED HEALTH/NURSE VISIT (OUTPATIENT)
Dept: RESEARCH | Facility: CLINIC | Age: 47
End: 2017-10-17

## 2017-10-17 DIAGNOSIS — Z17.0 MALIGNANT NEOPLASM OF LOWER-INNER QUADRANT OF LEFT BREAST IN FEMALE, ESTROGEN RECEPTOR POSITIVE (H): Primary | ICD-10-CM

## 2017-10-17 DIAGNOSIS — C50.312 MALIGNANT NEOPLASM OF LOWER-INNER QUADRANT OF LEFT BREAST IN FEMALE, ESTROGEN RECEPTOR POSITIVE (H): Primary | ICD-10-CM

## 2017-10-17 PROCEDURE — 97802 MEDICAL NUTRITION INDIV IN: CPT | Performed by: DIETITIAN, REGISTERED

## 2017-10-17 PROCEDURE — 99205 OFFICE O/P NEW HI 60 MIN: CPT | Performed by: INTERNAL MEDICINE

## 2017-10-17 ASSESSMENT — PAIN SCALES - GENERAL: PAINLEVEL: MILD PAIN (2)

## 2017-10-17 NOTE — NURSING NOTE
"Oncology Rooming Note    October 17, 2017 12:55 PM   Sheryle Cruse is a 46 year old female who presents for:    Chief Complaint   Patient presents with     Oncology Clinic Visit     NEW-Breast Cancer     Initial Vitals: There were no vitals taken for this visit. Estimated body mass index is 21.28 kg/(m^2) as calculated from the following:    Height as of 9/22/17: 1.626 m (5' 4\").    Weight as of 9/22/17: 56.2 kg (124 lb). There is no height or weight on file to calculate BSA.  Data Unavailable Comment: Data Unavailable   No LMP recorded. Patient is not currently having periods (Reason: Premenopausal).  Allergies reviewed: Yes  Medications reviewed: Yes    Medications: Medication refills not needed today.  Pharmacy name entered into [facility]: [id number] - [facility] [address] [place] [address] AT [facility]    Clinical concerns:     8 minutes for nursing intake (face to face time)     SUDHIR HOLLAND LPN              "

## 2017-10-17 NOTE — MR AVS SNAPSHOT
After Visit Summary   10/17/2017    Sheryle Cruse    MRN: [id number]           Patient Information     Date Of Birth          1970        Visit Information        Provider Department      10/17/2017 1:30 PM Brandie Bauman, [facility]        Today's Diagnoses     Malignant neoplasm of lower-inner quadrant of left breast in female, estrogen receptor positive (H)    -  1       Follow-ups after your visit        Your next 10 appointments already scheduled     Oct 25, 2017  9:00 AM CDT   New Visit with Johana Ledesma MD   [facility])    [address] [place] [id number]   [phone]            Oct 25, 2017 10:30 AM CDT   Ct Sim with Johana Ledesma MD, MG RT SIMULATION   [facility])    [address] [place] [address]   [phone]            Oct 27, 2017  9:15 AM CDT   PE NPET ONCOLOGY (EYES TO THIGHS) with MGPET1   [facility])    [address] [place] [address]   [phone]           Tell your doctor:   If there is any chance you may be pregnant or if you are breastfeeding.   If you have problems lying in small spaces (claustrophobia). If you do, your doctor may give you medicine to help you relax. If you have diabetes:   Have your exam early in the morning. Your blood glucose will go up as the day goes by.   Your glucose level must be 180 or less at the start of the exam. Please take any medicines you need to ensure this blood glucose level. 24 hours before your scan: Don t do any heavy exercise. (No jogging, aerobics or other workouts.) Exercise will make your pictures less accurate. 6 hours before your scan:   Stop all food and liquids (except water).   Do not chew gum or suck on mints.   If you need to take medicine with food, you may take it with a few crackers.  Please  call your Imaging Department at your exam site with any questions.            Dec 28, 2017  8:40 AM CST   Return Visit with Birdie Gonzalez MD   [facility] ([facility])    [address] [address] [place] [address] [phone]              Who to contact     If you have questions or need follow up information about today's clinic visit or your schedule please contact [facility] directly at [phone].  Normal or non-critical lab and imaging results will be communicated to you by MyChart, letter or phone within 4 business days after the clinic has received the results. If you do not hear from us within 7 days, please contact the clinic through MyChart or phone. If you have a critical or abnormal lab result, we will notify you by phone as soon as possible.  Submit refill requests through [employer] or call your pharmacy and they will forward the refill request to us. Please allow 3 business days for your refill to be completed.          Additional Information About Your Visit        [employer] Information     [employer] is an electronic gateway that provides easy, online access to your medical records. With [employer], you can request a clinic appointment, read your test results, renew a prescription or communicate with your care team.     To sign up for [employer] visit the website at [email or web address]   You will be asked to enter the access code listed below, as well as some personal information. Please follow the directions to create your username and password.     Your access code is: [email or web address]  Expires: 2017 11:09 AM     Your access code will  in 90 days. If you need help or a new code, please contact your [facility] or call [phone] for assistance.        Care EveryWhere ID     This is your Care EveryWhere ID. This could be used by other organizations to access your [facility]  records  [id number]        Your Vitals Were     Last Period                   09/25/2017            Blood Pressure from Last 3 Encounters:   10/19/17 119/83   10/17/17 (P) 120/80   09/22/17 116/76    Weight from Last 3 Encounters:   10/17/17 (P) 59.9 kg (132 lb)   09/22/17 56.2 kg (124 lb)   09/07/17 56.2 kg (124 lb)              We Performed the Following     MNT INDIVIDUAL INITIAL EA 15 MIN        Primary Care Provider Office Phone # Fax #    [facility] [phone]       [address]  [facility] [address]        Equal Access to Services     JORGE HERNÁNDEZ : Hadii cecy guerreroo Sobee, waaxda luqadaha, qaybta kaalmada adeegyada, malinda copeland . So [facility] [phone].    ATENCIÓN: Si habla español, tiene a spear disposición servicios gratuitos de asistencia lingüística. Llame al [phone].    We comply with applicable federal civil rights laws and [place] laws. We do not discriminate on the basis of race, color, national origin, age, disability, sex, sexual orientation, or gender identity.            Thank you!     Thank you for choosing [facility]  for your care. Our goal is always to provide you with excellent care. Hearing back from our patients is one way we can continue to improve our services. Please take a few minutes to complete the written survey that you may receive in the mail after your visit with us. Thank you!             Your Updated Medication List - Protect others around you: Learn how to safely use, store and throw away your medicines at www.disposemymeds.org.          This list is accurate as of: 10/17/17 11:59 PM.  Always use your most recent med list.                   Brand Name Dispense Instructions for use Diagnosis    CALCIUM MAGNESIUM PO      With zinc    RLQ abdominal pain       ONE-A-DAY ESSENTIAL Tabs      Take 1 tablet by mouth daily.

## 2017-10-17 NOTE — MR AVS SNAPSHOT
After Visit Summary   10/17/2017    Sheryle Cruse    MRN: [id number]           Patient Information     Date Of Birth          1970        Visit Information        Provider Department      10/17/2017 2:09 PM Davina Martinez, RN [facility]        Today's Diagnoses     Malignant neoplasm of lower-inner quadrant of left breast in female, estrogen receptor positive (H)    -  1       Follow-ups after your visit        Your next 10 appointments already scheduled     Oct 19, 2017  3:20 PM CDT   Post Op with Birdie Gonzalez MD   [facility])    [address] [place] [address]   [phone]            Oct 25, 2017  9:00 AM CDT   New Visit with Johana Ledesma MD   [facility])    [address] [place] [address]   [phone]            Oct 25, 2017 10:30 AM CDT   Ct Sim with Johana Ledesma MD, MG RT SIMULATION   [facility])    [address] [place] [id number] [phone]              Future tests that were ordered for you today     Open Future Orders        Priority Expected Expires Ordered    NPET Oncology (Eyes to Thighs) Routine  1/15/2018 10/17/2017            Who to contact     If you have questions or need follow up information about today's clinic visit or your schedule please contact [facility] directly at [phone].  Normal or non-critical lab and imaging results will be communicated to you by MyChart, letter or phone within 4 business days after the clinic has received the results. If you do not hear from us within 7 days, please contact the clinic through MyChart or phone. If you have a critical or abnormal lab result, we will notify you by phone as soon as possible.  Submit refill requests through [employer] or call your pharmacy and they will  "forward the refill request to us. Please allow 3 business days for your refill to be completed.          Additional Information About Your Visit        MyChart Information     [employer] lets you send messages to your doctor, view your test results, renew your prescriptions, schedule appointments and more. To sign up, go to [email or web address] . Click on \"Log in\" on the left side of the screen, which will take you to the Welcome page. Then click on \"Sign up Now\" on the right side of the page.     You will be asked to enter the access code listed below, as well as some personal information. Please follow the directions to create your username and password.     Your access code is: [email or web address]  Expires: 2017 11:09 AM     Your access code will  in 90 days. If you need help or a new code, please call your [place] clinic or [phone].        Care EveryWhere ID     This is your Care EveryWhere ID. This could be used by other organizations to access your [place] medical records  [id number]        Your Vitals Were     Last Period                   2017            Blood Pressure from Last 3 Encounters:   10/17/17 (P) 120/80   17 116/76   17 110/70    Weight from Last 3 Encounters:   10/17/17 (P) 59.9 kg (132 lb)   17 56.2 kg (124 lb)   17 56.2 kg (124 lb)              Today, you had the following     No orders found for display       Primary Care Provider Office Phone # Fax #    [facility] [phone]       [address]  [facility] [address]        Equal Access to Services     CAROLYN HERNÁNDEZ : Hadii cecy Quan, walacyda luqadaha, qaybta kaalmada sudha, malinda murry. So [facility] [phone].    ATENCIÓN: Si habla español, tiene a spear disposición servicios gratuitos de asistencia lingüística. Llame al [phone].    We comply with applicable federal civil rights laws and [place] laws. We do " not discriminate on the basis of race, color, national origin, age, disability, sex, sexual orientation, or gender identity.            Thank you!     Thank you for choosing [facility] [place],  CL[facility]  for your care. Our goal is always to provide you with excellent care. Hearing back from our patients is one way we can continue to improve our services. Please take a few minutes to complete the written survey that you may receive in the mail after your visit with us. Thank you!             Your Updated Medication List - Protect others around you: Learn how to safely use, store and throw away your medicines at www.disposemymeds.org.          This list is accurate as of: 10/17/17  3:31 PM.  Always use your most recent med list.                   Brand Name Dispense Instructions for use Diagnosis    CALCIUM MAGNESIUM PO      With zinc    RLQ abdominal pain       ONE-A-DAY ESSENTIAL Tabs      Take 1 tablet by mouth daily.

## 2017-10-17 NOTE — NURSING NOTE
I met with Mrs. Sparrow and her  to provide them with initial information about the [facility] clinical trial.  I provided her with a copy of the informed consent to read and will meet with her on Wednesday, October 25 to discuss in greater detail.  I did let her know that patients are eligible after completion of radiation therapy and hormone therapy is also to be started.  I will continue to follow up with her.

## 2017-10-17 NOTE — PROGRESS NOTES
Met with patient and spouse in clinic for chemotherapy teaching on Taxol, Adriamycin, and Cytoxan as well as education on Tamoxifen and Femara.  Patient declined formal chemotherapy teaching at this time, and wishes to take the information home to review herself.  Patient has stated that at this time, she is leaning against chemotherapy treatment, but will let us know if she changes her mind.  Patient was provided with [facility] handouts (dated 10/17/17) on the Taxol, Adriamycin, Cytoxan, Tamoxifen, and Femara.  Explained to patient that these handouts contain information on common side effects, less common/rare side effects, and ways to manage side effects.  Also provided patient with handout on port-a-cath placement.  If patient decides to proceed with chemotherapy, she will need to return to clinic for formal chemotherapy education.  Provided patient with handout with important clinic information/phone numbers, as well as business card for this RN with direct phone number listed.  Encouraged patient to call with any questions.      Owen Chatterjee, RN, BSN, OCN  Oncology Care Coordinator  [facility]

## 2017-10-17 NOTE — MR AVS SNAPSHOT
After Visit Summary   10/17/2017    Sheryle Cruse    MRN: [id number]           Patient Information     Date Of Birth          1970        Visit Information        Provider Department      10/17/2017 1:00 PM Tianna Santillan MD [facility]        Today's Diagnoses     Malignant neoplasm of lower-inner quadrant of left breast in female, estrogen receptor positive (H)    -  1       Follow-ups after your visit        Additional Services     NUTRITION REFERRAL                 Your next 10 appointments already scheduled     Oct 19, 2017  3:20 PM CDT   [facility] Op with Birdie Gonzalez MD   [facility]    [address] [place] [id number] [phone]            Oct 25, 2017  9:00 AM CDT   New Visit with Johana Ledesma MD   [facility] [place]    [address] [place] [id number] [phone]            Oct 25, 2017 10:30 AM CDT   Ct Sim with Johana Ledesma MD, MG RT SIMULATION   [facility]    [address] [place] [id number] [phone]              Future tests that were ordered for you today     Open Future Orders        Priority Expected Expires Ordered    NPET Oncology (Eyes to Thighs) Routine  1/15/2018 10/17/2017            Who to contact     If you have questions or need follow up information about today's clinic visit or your schedule please contact [facility] directly at [phone].  Normal or non-critical lab and imaging results will be communicated to you by MyChart, letter or phone within 4 business days after the clinic has received the results. If you do not hear from us within 7 days, please contact the clinic through MyChart or phone. If you have a critical or abnormal lab result, we will notify you by phone as soon as possible.  Submit refill requests through  [employer] or call your pharmacy and they will forward the refill request to us. Please allow 3 business days for your refill to be completed.          Additional Information About Your Visit        [employer] Information     [employer] is an electronic gateway that provides easy, online access to your medical records. With [employer], you can request a clinic appointment, read your test results, renew a prescription or communicate with your care team.     To sign up for [employer] visit the website at [email or web address]   You will be asked to enter the access code listed below, as well as some personal information. Please follow the directions to create your username and password.     Your access code is: [email or web address]  Expires: 2017 11:09 AM     Your access code will  in 90 days. If you need help or a new code, please contact your [facility] or call [phone] for assistance.        Care EveryWhere ID     This is your Care EveryWhere ID. This could be used by other organizations to access your [facility] medical records  [id number]        Your Vitals Were     Last Period                   2017            Blood Pressure from Last 3 Encounters:   10/17/17 (P) 120/80   17 116/76   17 110/70    Weight from Last 3 Encounters:   10/17/17 (P) 59.9 kg (132 lb)   17 56.2 kg (124 lb)   17 56.2 kg (124 lb)              We Performed the Following     NUTRITION REFERRAL        Primary Care Provider Office Phone # Fax #    [facility] [phone]       [address]  [facility] [address]        Equal Access to Services     JORGE HERNÁNDEZ : Hadii aad ku hadasho [place]ilesali, waaxda luqadaha, qaybta kaalmada sudha, malinda murry. So [facility] [phone].    ATENCIÓN: Si habla español, tiene a spear disposición servicios gratuitos de asistencia lingüística. Llame al [phone].    We comply with  applicable federal civil rights laws and [place] laws. We do not discriminate on the basis of race, color, national origin, age, disability, sex, sexual orientation, or gender identity.            Thank you!     Thank you for choosing [facility]  for your care. Our goal is always to provide you with excellent care. Hearing back from our patients is one way we can continue to improve our services. Please take a few minutes to complete the written survey that you may receive in the mail after your visit with us. Thank you!             Your Updated Medication List - Protect others around you: Learn how to safely use, store and throw away your medicines at www.disposemymeds.org.          This list is accurate as of: 10/17/17  2:15 PM.  Always use your most recent med list.                   Brand Name Dispense Instructions for use Diagnosis    CALCIUM MAGNESIUM PO      With zinc    RLQ abdominal pain       ONE-A-DAY ESSENTIAL Tabs      Take 1 tablet by mouth daily.

## 2017-10-18 NOTE — PROGRESS NOTES
2017            Birdie Gonzalez MD   [facility]   [address] [place] [address], [address]   [place]  [address]      RE:  CRUSE, SHERYLE   :  1970   MR#:  [id number]      Dear Dr. Gonzalez:      Thank you for asking us to see your patient, Sheryle Cruse in consultation for further evaluation and management of her recently diagnosed right breast cancer.  Please see the enclosed note for details of our visit on 10/17/2017 as well as our recommendations.      HISTORY OF PRESENT ILLNESS:  Sheryle Cruse is a 46-year-old female who presents to the [facility] to discuss her new diagnosis of right breast cancer.  The patient's history dates back to 2017 when she self-palpated a right breast mass, described it as feeling like a marble.  She proceeded to have a diagnostic mammogram and ultrasound on 2017, which showed, in the right breast 3 o'clock position, anterior depth, a focal asymmetry.  Focal ultrasound confirmed a 1.4 x 0.7 x 1.9 cm, irregular, hypoechoic, solid mass with microlobulations.  This correlated to the area of palpable concern.  The patient proceeded, on 2017, to have an ultrasound-guided biopsy which confirmed invasive mammary carcinoma, [place] grade 3, at least 1.1 cm greatest dimension, DCIS present, intermediate nuclear grade, solid type.  The invasive carcinoma was estrogen and progesterone receptor positive, HER-2 not amplified.  The patient proceeded to have a breast MRI on 2017, and this did show in the right breast at 3 o'clock a 2.5 cm area of concern which is the index cancer.  There was also a 6 mm focus anterior to the cancer that was suspicious in the right central breast towards 12 o'clock at the posterior depth. Tissue diagnosis recommended.  In the left breast, at the 6 o'clock position, anterior depth,  there was a 1.1 cm, suspicious mass.  Again, tissue diagnosis recommended.  The  patient did undergo biopsies of these areas on 08/18/2017.  The right breast specimen is in the 11:30 position, showed small fragments of breast tissue with fibrocystic changes including papillary apocrine metaplasia/hyperplasia, and the left breast 6 o'clock position showed fibroadenoma, simple, with mixed david- and intracanalicular pattern and unusual ductal hyperplasia, negative for atypia or malignancy.  It should be noted that the radiologist, however, was unsure whether the right breast MRI biopsy site correlated with the MRI abnormality.  The patient went on to meet with Dr. Gonzalez who discussed surgical options.  Because she found the biopsies were very difficult, she elected to have bilateral mastectomies.  She was taken to the operating theatre on 10/04/2017, underwent bilateral simple mastectomies and right axillary lymph node biopsy, only 1 node.  The patient was very clear with Dr. Gonzalez prior to her surgery she did not want lymph nodes sampled; however, this was clearly abnormal, which is why Dr. Gonzalez removed it.  Final pathology:  The left breast showed fibroadenoma and mild fibrocystic changes.  However, in the right breast, the primary tumor of 3 cm as well as a separate focus of 0.7 cm, invasive ductal carcinoma, [place] grade 3, poorly differentiated, DCIS present.  The 1 lymph node removed did have a 1.2 cm metastatic deposit staged as PT2N1.  The patient states that her surgery went very well.  She does keep a bandage over the mastectomy site, says she feels better with this, but will be following up with Dr. Gonzalez.  Otherwise, denies any fevers, chills, nausea, vomiting, chest pain, shortness of breath, cough.  No abdominal discomfort.  No new palpable masses, and the remainder of her  comprehensive review of systems is negative.      PAST MEDICAL HISTORY:   1.  Newly diagnosed breast cancer.  See above.   2.  Iron-deficiency anemia.   3.  Eating disorder, anorexia, bulimia, was part  of the [employer] [facility], has been recovery for the last 15-20 years, currently on a vegan diet, does have fibrocystic breasts.   4.  Irritable bowel syndrome.   5.  Mutation of MUTYH gene,   6.  Vitamin D deficiency.      FAMILY HISTORY:  Father supposedly had bladder cancer, but the patient is not 100% sure.  This may have been in his 90s.      SOCIAL HISTORY:   for 22 years and comes in with her .  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      MEDICATIONS:  Calcium, magnesium, vitamin D, multivitamin.      GYNECOLOGIC HISTORY:   0.  Last menstrual cycle 2017.  Uses condoms for contraception.  No prior history of fertility drugs or hormone replacement therapy, but was on oral contraceptives in the past.      ALLERGIES:  Contrast dye.  She had a rash.  Penicillin causes rash, swelling.  Benadryl - this was given after reaction.  She developed joint aches.      PHYSICAL EXAMINATION:   VITAL SIGNS:  Blood pressure 120/80, pulse 88, respirations 18, temp 98.3, pulse ox 99% on room air, weight 132 pounds, BMI is 22.65.   GENERAL:  Comfortable, in no acute distress.   HEENT:  Atraumatic, normocephalic.  Pupils equal, round, reactive.  Sclerae anicteric.  Oropharynx:  Moist mucous membranes.  No lesions, ulcers or exudate.   NECK:  Supple, full range of motion, trachea midline.   HEART:  Regular rate and rhythm, normal S1, S2, no murmurs or gallops.  No edema.   LUNGS:  Clear to auscultation bilaterally.  No crackles or wheezes.  Normal respiratory effort.   GASTROINTESTINAL:  Positive bowel sounds.  Soft, nontender, nondistended.  No hepatomegaly.   EXTREMITIES:  No cyanosis, warm.   MUSCULOSKELETAL:  No point tenderness.   LYMPHATICS:  No cervical, supraclavicular or axillary nodes palpable.   SKIN:  No petechiae or rashes.   NEUROLOGIC:  Alert and oriented.   BREASTS:  Bilaterally surgically absent.  A bandage is covering.  The patient is still  quite tender.  Therefore, declined an exam.      LABORATORY DATA:  From 09/07/2017:  Creatinine 0.53, potassium 4.5, WBC 6.7, hemoglobin 12.9, hematocrit 37.1, platelets 449.      IMAGING:  As stated in the HPI.      PATHOLOGY:  As stated in the HPI.      ASSESSMENT AND PLAN:  Sheryle Cruse is a 46-year-old female with newly diagnosed stage IIB, T2N1 multifocal invasive ductal carcinoma, 3 cm and 0.7 cm, 1 lymph node positive, with a 1.2 cm metastatic deposit.  I discussed at length with the patient and her  regarding her diagnosis, prognosis and treatment options.  I explained to the patient and her  that she is considered to have stage IIB disease based on tumor size and lymph node involvement.  We reviewed her prognosis using the PREDICT tool, and 81 out of 100 women are alive at 5 years with no adjuvant therapy after surgery, 5 out of 100 women treated are alive because of hormonal therapy, and 10 out of 100 women treated are alive because of hormonal therapy and chemotherapy.  We reviewed her treatment options in regards to chemotherapy.  One is standard with Adriamycin, Cytoxan and Taxol.  Went on to review the potential risks, side effects and toxicities including but not limited to alopecia, nausea, vomiting, bone marrow suppression, increased risk for infection, bleeding, allergic reactions, organ dysfunction, cardiotoxicity, neuropathy which can be permanent and debilitating, and death.  The second is to consider a clinical trial.  We do not have any available at the [facility] for adjuvant chemotherapy.  However, if the patient is interested, I can look into this further.  The third option would be to do nothing, which I would not recommend.  We do strongly recommend chemotherapy since I do feel that patient is at high risk of recurrence.  The patient states that she is not interested in chemotherapy, is willing to review the educational material provided to her, but at this  point is leaning towards not chemotherapy.  She will, however, consider radiation and hormonal therapy.  I reviewed with the patient that, if she does not proceed with chemotherapy, the next step is radiation.  She is already scheduled to meet with my colleague, Dr. Ledesma, next week.  Following radiation, we would recommend hormonal therapy.  Again, reviewed the standard is tamoxifen in premenopausal women.  If the patient would consider ovarian suppression, we would recommend an aromatase inhibitor.  There is another option of a clinical trial.  We do have the PALLAS study available.  This would be standard endocrine therapy versus endocrine therapy and palbociclib.  The patient is interested in hearing more about this study.      I did explain to the patient that, since she only had 1 lymph node removed, and Dr. Gonzalez was not able to do a sentinel node biopsy or axillary node dissection, I would like to do a PET scan for full staging.      The patient is concerned about her weight and was interested in meeting with a nutritionist, especially since she is vegan, and we would be happy to set this up.      I reviewed that since the patient does have the MUTYH gene mutation, she is at potential higher risk for colon cancer.  I would recommend meeting with our GI physicians to consider colonoscopy.      PLAN:   1.  The patient will be given education material regarding Adriamycin, Cytoxan, Taxol, Femara, tamoxifen and the PALLIS study.  She does understand that, if she were to go on aromatase inhibitor, we would recommend ovarian suppression since she is still premenopausal.   2.  PET scan.   3.  GI referral for colonoscopy.   4.  Nutrition referral.      The patient understands that she is at high risk of recurrence, and we strongly recommend systemic chemotherapy.  However, since she declined, she will proceed with radiation.  Return to see us once radiation is completed for next steps.  If, however, the patient  changes her mind and will consider systemic chemotherapy prior to radiation, she should contact the clinic and return to see me sooner.      At the end of our discussion, all questions addressed to the best of my ability.  The patient and her  have verbalized understanding and are agreeable with the plan.      Dr. Gonzalez, thank you for giving me the opportunity to participate in this delightful patient's care.  If you have any questions, please feel free to contact me.         Sincerely,      UMAIR KEVIN MD      Case discussed with Dr. Gonzalez and Dr. Ledesma.           D: 10/17/2017 15:27   T: 10/18/2017 11:07   MT:       Name:     CRUSE, SHERYLE   MRN:      [id number]        Account:      [id number]   :      1970      Document: [id number]       cc: Katie Gonzalez MD

## 2017-10-19 ENCOUNTER — TELEPHONE (OUTPATIENT)
Dept: RADIATION ONCOLOGY | Facility: CLINIC | Age: 47
End: 2017-10-19

## 2017-10-19 ENCOUNTER — TELEPHONE (OUTPATIENT)
Dept: ONCOLOGY | Facility: CLINIC | Age: 47
End: 2017-10-19

## 2017-10-19 ENCOUNTER — OFFICE VISIT (OUTPATIENT)
Dept: SURGERY | Facility: CLINIC | Age: 47
End: 2017-10-19
Payer: COMMERCIAL

## 2017-10-19 VITALS — DIASTOLIC BLOOD PRESSURE: 83 MMHG | SYSTOLIC BLOOD PRESSURE: 119 MMHG | HEART RATE: 80 BPM | TEMPERATURE: 98.8 F

## 2017-10-19 DIAGNOSIS — C50.411 MALIGNANT NEOPLASM OF UPPER-OUTER QUADRANT OF RIGHT BREAST IN FEMALE, ESTROGEN RECEPTOR POSITIVE (H): Primary | ICD-10-CM

## 2017-10-19 DIAGNOSIS — Z17.0 MALIGNANT NEOPLASM OF UPPER-OUTER QUADRANT OF RIGHT BREAST IN FEMALE, ESTROGEN RECEPTOR POSITIVE (H): Primary | ICD-10-CM

## 2017-10-19 PROCEDURE — 99024 POSTOP FOLLOW-UP VISIT: CPT | Performed by: SURGERY

## 2017-10-19 NOTE — NURSING NOTE
"Sheryle Cruse's goals for this visit include: post op 2 week. Pain 2/10. Irritation from tape residue.  She requests these members of her care team be copied on today's visit information: yes      PCP: [facility]    Referring Provider:  No referring provider defined for this encounter.    Chief Complaint   Patient presents with     RECHECK       Initial /83 (BP Location: Left arm, Patient Position: Chair, Cuff Size: Adult Small)  Pulse 80  Temp 98.8  F (37.1  C) (Oral)  LMP 09/25/2017 Estimated body mass index is 22.65 kg/(m^2) (pended) as calculated from the following:    Height as of 10/17/17: (P) 1.626 m (5' 4.02\").    Weight as of 10/17/17: (P) 59.9 kg (132 lb).  Medication Reconciliation: complete    Do you need any medication refills at today's visit? N/a.Tammie Carr RN      "

## 2017-10-19 NOTE — PROGRESS NOTES
CHIEF COMPLAINT:  Chief Complaint   Patient presents with     RECHECK       HISTORY OF PRESENT ILLNESS:  Sheryle Cruse is a 46 year old female who is seen in follow up s/p bilateral mastectomies and right lymph node biopsy on 10/4/17.  The drains were removed last week and she is here now to check for seroma and evaluate healing.  Sheryle reports that she is feeling pretty well.  She has some discomfort, but no pain.  She has not noticed any fluid collection.  Sheryle has met with Dr. Santillan and is scheduled for a radiation consultation next week.    REVIEW OF SYSTEMS:  Constitutional:  Negative for chills, fatigue, fever and weight change.  Eyes:  Negative for blurred vision, eye pain and photophobia.  ENT:  Negative for hearing problems, ENT pain, congestion, rhinorrhea, epistaxis, hoarseness and dental problems.  Cardiovascular:  Negative for chest pain, palpitations, tachycardia, orthopnea and edema.  Respiratory:  Negative for cough, dyspnea and hemoptysis.  Gastrointestinal:  Negative for abdominal pain, heartburn, constipation, diarrhea and stool changes.  Musculoskeletal:  Negative for arthralgias, back pain and myalgias.  Integumentary/Breast:  See HPI.    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 (c.1187G>A) SIMTEK 8/10/17     Vitamin D deficiency        Past Surgical History:   Procedure Laterality Date     Mammogram  2003, 2009       Family History   Problem Relation Age of Onset     DIABETES Mother 76     Obese     HEART DISEASE Mother      Hypertension Mother      CEREBROVASCULAR DISEASE Mother      Cardiovascular Mother      CVA     CANCER Father      bladder      CEREBROVASCULAR DISEASE Father 70     Breast Cancer Maternal Grandmother      CEREBROVASCULAR DISEASE Maternal Grandmother      Lipids Maternal Grandmother      HEART DISEASE Maternal Grandfather      heart disease     DIABETES Paternal Grandmother      CEREBROVASCULAR DISEASE Paternal Grandfather      HEART DISEASE Paternal Grandfather        Social History   Substance Use Topics     Smoking status: Never Smoker     Smokeless tobacco: Never Used     Alcohol use No       Patient Active Problem List   Diagnosis     Vitamin D deficiency     Vegetarianism     Hyperlipidemia LDL goal <160     Advance care planning     ERRONEOUS ENCOUNTER--DISREGARD     Monoallelic mutation of MUTYH gene     Malignant neoplasm of lower-inner quadrant of left breast in female, estrogen receptor positive (H)     Adjustment disorder with mixed anxiety and depressed mood     Allergies   Allergen Reactions     Contrast Dye      Pt does not recall which type of contrast     Diphenhydramine      Pcn [Bicillin C-R,] Rash     swelling     Current Outpatient Prescriptions   Medication Sig Dispense Refill     Calcium-Magnesium-Vitamin D (CALCIUM MAGNESIUM PO) With zinc       Multiple Vitamin (ONE-A-DAY ESSENTIAL) TABS Take 1 tablet by mouth daily.       Vitals: /83 (BP Location: Left arm, Patient Position: Chair, Cuff Size: Adult Small)  Pulse 80  Temp 98.8  F (37.1  C) (Oral)  LMP 09/25/2017  BMI= There is no height or weight on file to calculate BMI.    EXAM:  GENERAL: healthy, alert and no distress   BREAST:  The breasts are absent and the mastectomy incisions are intact.  There is a small area in the center of the right incision that is open superficially.  A small seroma is palpated at the lateral end of the right incision.  The ROM is limited bilaterally by muscle tightness and pain.    INFORMED CONSENT:  Verbal Consent Received    LOCATION:  Right  seroma    PREP:  Alcohol    Procedure localization by palpation     FLUID  DESCRIPTION:  20 cc of clear serous fluid.    COMPLICATIONS:  None    PATHOLOGY:  Discarded    FINDINGS:  The seroma completely resolved with aspiration.    ASSESSMENT:  Sheryle Cruse is healing well.  The small seroma on the right was easily aspirated and resolved.  There are no other concerning findings.  The limited ROM will likely improve with exercise and time.  I gave Sheryle a sheet of stretching exercises and instructed her on their use.  If she does not have improvement after a week, she will call and we will refer her to PT.  We also discussed activities in detail.  I told Sheryle that she can do whatever she wants with her legs, but to avoid anything strenuous, heavy or repetitive with her arms.    PLAN:  Sheryle will let me know if she needs to be referred to PT or if she is having any issues with healing or recurrence of seroma.  She will meet with radiation next week and decide whether she will proceed with post-mastectomy radiation.  She has already met with oncology and decided that she will not have chemotherapy, but will likely agree to take estrogen blockade.    Sheryle will follow up with me at the end of December.    Birdie Gonzalez MD    Please route or send letter to:  GILDARDO Segura, CNP

## 2017-10-19 NOTE — MR AVS SNAPSHOT
After Visit Summary   10/19/2017    Sheryle Cruse    MRN: [id number]           Patient Information     Date Of Birth          1970        Visit Information        Provider Department      10/19/2017 3:20 PM Birdie Gonzalez MD [facility]        Today's Diagnoses     Malignant neoplasm of upper-outer quadrant of right breast in female, estrogen receptor positive (H)    -  1       Follow-ups after your visit        Follow-up notes from your care team     Return in about 2 months (around 12/19/2017).      Your next 10 appointments already scheduled     Oct 25, 2017  9:00 AM CDT   New Visit with Johana Ledesma MD   [facility]    [address] [place] [address] [phone]            Oct 25, 2017 10:30 AM CDT   Ct Sim with Johana Ledesma MD, MG RT SIMULATION   [facility]    [address] [place] [address] [phone]            Dec 28, 2017  8:40 AM CST   Return Visit with Birdie Gonzalez MD   [facility]    [address] [place] [address] [phone]              Who to contact     If you have questions or need follow up information about today's clinic visit or your schedule please contact [facility] directly at [phone].  Normal or non-critical lab and imaging results will be communicated to you by MyChart, letter or phone within 4 business days after the clinic has received the results. If you do not hear from us within 7 days, please contact the clinic through MyChart or phone. If you have a critical or abnormal lab result, we will notify you by phone as soon as possible.  Submit refill requests through [employer] or call your pharmacy and they will forward the refill request to us. Please allow 3 business days for your refill to be completed.           Additional Information About Your Visit        [employer] Information     [employer] is an electronic gateway that provides easy, online access to your medical records. With [employer], you can request a clinic appointment, read your test results, renew a prescription or communicate with your care team.     To sign up for [employer] visit the website at [email or web address]   You will be asked to enter the access code listed below, as well as some personal information. Please follow the directions to create your username and password.     Your access code is: [email or web address]  Expires: 2017 11:09 AM     Your access code will  in 90 days. If you need help or a new code, please contact your [facility] or call [phone] for assistance.        Care EveryWhere ID     This is your Care EveryWhere ID. This could be used by other organizations to access your [place] medical records  [id number]        Your Vitals Were     Pulse Temperature Last Period             80 98.8  F (37.1  C) (Oral) 2017          Blood Pressure from Last 3 Encounters:   10/19/17 119/83   10/17/17 (P) 120/80   17 116/76    Weight from Last 3 Encounters:   10/17/17 (P) 59.9 kg (132 lb)   17 56.2 kg (124 lb)   17 56.2 kg (124 lb)              Today, you had the following     No orders found for display       Primary Care Provider Office Phone # Fax #    [facility] [phone]       [address]  [facility] [address]        Equal Access to Services     CAROLYN HERNÁNDEZ : Hadii aad ku hadasho Soomaali, waaxda luqadaha, qaybta kaalmada adeegyada, malinda murry. So [facility] [phone].    ATENCIÓN: Si habla español, tiene a spear disposición servicios gratuitos de asistencia lingüística. Llame al [phone].    We comply with applicable federal civil rights laws and [place] laws. We do not discriminate on the basis of race,  color, national origin, age, disability, sex, sexual orientation, or gender identity.            Thank you!     Thank you for choosing [facility]  for your care. Our goal is always to provide you with excellent care. Hearing back from our patients is one way we can continue to improve our services. Please take a few minutes to complete the written survey that you may receive in the mail after your visit with us. Thank you!             Your Updated Medication List - Protect others around you: Learn how to safely use, store and throw away your medicines at www.disposemymeds.org.          This list is accurate as of: 10/19/17  4:17 PM.  Always use your most recent med list.                   Brand Name Dispense Instructions for use Diagnosis    CALCIUM MAGNESIUM PO      With zinc    RLQ abdominal pain       ONE-A-DAY ESSENTIAL Tabs      Take 1 tablet by mouth daily.

## 2017-10-19 NOTE — LETTER
2017    Re: Sheryle Cruse - 1970    HISTORY OF PRESENT ILLNESS:  Sheryle Cruse is a 46 year old female who is seen in follow up s/p bilateral mastectomies and right lymph node biopsy on 10/4/17.  The drains were removed last week and she is here now to check for seroma and evaluate healing.  Sheryle reports that she is feeling pretty well.  She has some discomfort, but no pain.  She has not noticed any fluid collection.  Sheryle has met with Dr. Santillan and is scheduled for a radiation consultation next week.     REVIEW OF SYSTEMS:  Constitutional:  Negative for chills, fatigue, fever and weight change.  Eyes:  Negative for blurred vision, eye pain and photophobia.  ENT:  Negative for hearing problems, ENT pain, congestion, rhinorrhea, epistaxis, hoarseness and dental problems.  Cardiovascular:  Negative for chest pain, palpitations, tachycardia, orthopnea and edema.  Respiratory:  Negative for cough, dyspnea and hemoptysis.  Gastrointestinal:  Negative for abdominal pain, heartburn, constipation, diarrhea and stool changes.  Musculoskeletal:  Negative for arthralgias, back pain and myalgias.  Integumentary/Breast:  See HPI.   Vitals: /83 (BP Location: Left arm, Patient Position: Chair, Cuff Size: Adult Small)  Pulse 80  Temp 98.8  F (37.1  C) (Oral)  LMP 2017  BMI= There is no height or weight on file to calculate BMI.     EXAM:  GENERAL: healthy, alert and no distress   BREAST:  The breasts are absent and the mastectomy incisions are intact.  There is a small area in the center of the right incision that is open superficially.  A small seroma is palpated at the lateral end of the right incision.  The ROM is limited bilaterally by muscle tightness and pain.     INFORMED CONSENT:  Verbal Consent Received     LOCATION:  Right  seroma     PREP:  Alcohol     Procedure localization by palpation      FLUID DESCRIPTION:  20 cc of clear serous fluid.     COMPLICATIONS:   None     PATHOLOGY:  Discarded     FINDINGS:  The seroma completely resolved with aspiration.     ASSESSMENT:  Sheryle Cruse is healing well.  The small seroma on the right was easily aspirated and resolved.  There are no other concerning findings.  The limited ROM will likely improve with exercise and time.  I gave Sheryle a sheet of stretching exercises and instructed her on their use.  If she does not have improvement after a week, she will call and we will refer her to PT.  We also discussed activities in detail.  I told Sheryle that she can do whatever she wants with her legs, but to avoid anything strenuous, heavy or repetitive with her arms.     PLAN:  Sheryle will let me know if she needs to be referred to PT or if she is having any issues with healing or recurrence of seroma.  She will meet with radiation next week and decide whether she will proceed with post-mastectomy radiation.  She has already met with oncology and decided that she will not have chemotherapy, but will likely agree to take estrogen blockade.     Sheryle will follow up with me at the end of December.     Birdie Gonzalez MD

## 2017-10-19 NOTE — TELEPHONE ENCOUNTER
Spoke with patient about moving consult date with Dr Ledesma on 10/26/17 from 10/25/17 and patient stated she is unable to move appointment. Spoke with patient about time, location, and CT SIm and patient verbalized understanding. Patient stated that if she needed time to think about radiation after consult with Dr Ledesma would it be alright to do CT Sim another day and this writer stated that yes it would

## 2017-10-19 NOTE — TELEPHONE ENCOUNTER
I was given the ok to schedule the PET scan. When I called patient about it, she requested to schedule it herself. She also said she did not want the colonoscopy right now. She wanted to get other things taken care of before scheduling that.       Karen

## 2017-10-22 ENCOUNTER — OFFICE VISIT (OUTPATIENT)
Dept: URGENT CARE | Facility: URGENT CARE | Age: 47
End: 2017-10-22
Payer: COMMERCIAL

## 2017-10-22 VITALS
HEART RATE: 80 BPM | TEMPERATURE: 97.1 F | WEIGHT: 126 LBS | RESPIRATION RATE: 14 BRPM | BODY MASS INDEX: 21.63 KG/M2 | DIASTOLIC BLOOD PRESSURE: 81 MMHG | OXYGEN SATURATION: 98 % | SYSTOLIC BLOOD PRESSURE: 120 MMHG

## 2017-10-22 DIAGNOSIS — Z17.0 MALIGNANT NEOPLASM OF LOWER-INNER QUADRANT OF LEFT BREAST IN FEMALE, ESTROGEN RECEPTOR POSITIVE (H): ICD-10-CM

## 2017-10-22 DIAGNOSIS — N64.89 SEROMA OF BREAST: Primary | ICD-10-CM

## 2017-10-22 DIAGNOSIS — Z90.13 STATUS POST BILATERAL MASTECTOMY: ICD-10-CM

## 2017-10-22 DIAGNOSIS — C50.312 MALIGNANT NEOPLASM OF LOWER-INNER QUADRANT OF LEFT BREAST IN FEMALE, ESTROGEN RECEPTOR POSITIVE (H): ICD-10-CM

## 2017-10-22 PROCEDURE — 99213 OFFICE O/P EST LOW 20 MIN: CPT | Mod: 24 | Performed by: FAMILY MEDICINE

## 2017-10-22 NOTE — NURSING NOTE
"Chief Complaint   Patient presents with     Surgical Followup     had a both breast removal       Initial /81  Pulse 80  Temp 97.1  F (36.2  C)  Resp 14  Wt 126 lb (57.2 kg)  LMP 09/25/2017  SpO2 98%  BMI (P) 21.62 kg/m2 Estimated body mass index is 21.62 kg/(m^2) (pended) as calculated from the following:    Height as of 10/17/17: (P) 5' 4.02\" (1.626 m).    Weight as of this encounter: 126 lb (57.2 kg).  Medication Reconciliation: complete     Jamie Maxwell. MA      "

## 2017-10-22 NOTE — PROGRESS NOTES
SUBJECTIVE:                                                    Sheryle Cruse is a 46 year old female who presents to clinic today for the following health issues:      Chief Complaint   Patient presents with     Surgical Followup     had a both breast removal     Underwent bilateral mastectomy earlier this month for breast cancer, drains were removed 10/10/2017, saw her surgeon last Thursday, c/o discomfort/swelling on the right side. She denies any fever, chills, sob or other relevant systemic symptoms.       Problem list and histories reviewed & adjusted, as indicated.  Additional history: as documented    Patient Active Problem List   Diagnosis     Vitamin D deficiency     Vegetarianism     Hyperlipidemia LDL goal <160     Advance care planning     ERRONEOUS ENCOUNTER--DISREGARD     Monoallelic mutation of MUTYH gene     Malignant neoplasm of lower-inner quadrant of left breast in female, estrogen receptor positive (H)     Adjustment disorder with mixed anxiety and depressed mood     Past Surgical History:   Procedure Laterality Date     Mammogram  2003, 2009       Social History   Substance Use Topics     Smoking status: Never Smoker     Smokeless tobacco: Never Used     Alcohol use No     Family History   Problem Relation Age of Onset     DIABETES Mother 76     Obese     HEART DISEASE Mother      Hypertension Mother      CEREBROVASCULAR DISEASE Mother      Cardiovascular Mother      CVA     CANCER Father      bladder     CEREBROVASCULAR DISEASE Father 70     Breast Cancer Maternal Grandmother      CEREBROVASCULAR DISEASE Maternal Grandmother      Lipids Maternal Grandmother      HEART DISEASE Maternal Grandfather      heart disease     DIABETES Paternal Grandmother      CEREBROVASCULAR DISEASE Paternal Grandfather      HEART DISEASE Paternal Grandfather          Current Outpatient Prescriptions   Medication Sig Dispense Refill     Calcium-Magnesium-Vitamin D (CALCIUM MAGNESIUM PO) With zinc       Multiple  Vitamin (ONE-A-DAY ESSENTIAL) TABS Take 1 tablet by mouth daily.       Allergies   Allergen Reactions     Contrast Dye      Pt does not recall which type of contrast     Diphenhydramine      Pcn [Bicillin C-R,] Rash     swelling     Recent Labs   Lab Test  09/07/17   1652  08/31/17   1104  06/16/17   0750  08/24/16   0701  09/09/10   LDL   --    --   97  99   --   137.0   HDL   --    --   57  52   --   71   TRIG   --    --   119  75   --   55   ALT   --   23  24  26   < >   --    CR  0.53  0.53  0.56  0.66   < >   --    GFRESTIMATED  >90  >90  >90  Non African American GFR Calc    >90  Non  GFR Calc     < >   --    GFRESTBLACK  >90  >90  >90  African American GFR Calc    >90   GFR Calc     < >   --    POTASSIUM  4.5  4.4  4.3  3.9   < >   --    TSH   --    --   1.40  2.00   --   0.65    < > = values in this interval not displayed.      BP Readings from Last 3 Encounters:   10/22/17 120/81   10/19/17 119/83   10/17/17 (P) 120/80    Wt Readings from Last 3 Encounters:   10/22/17 126 lb (57.2 kg)   10/17/17 (P) 132 lb (59.9 kg)   09/22/17 124 lb (56.2 kg)                  Labs reviewed in EPIC          ROS:  Constitutional, HEENT, cardiovascular, pulmonary, gi and gu systems are negative, except as otherwise noted.      OBJECTIVE:   /81  Pulse 80  Temp 97.1  F (36.2  C)  Resp 14  Wt 126 lb (57.2 kg)  LMP 09/25/2017  SpO2 98%  BMI (P) 21.62 kg/m2  Body mass index is 21.62 kg/(m^2) (pended).  GENERAL: healthy, alert and no distress  RESP: lungs clear to auscultation - no rales, rhonchi or wheezes  BREAST: small seroma at the end of right incision, small area on incision superficially open centrally, no skin discoloration, warmth or discharge noted  CV: regular rate and rhythm, normal S1 S2, no S3 or S4, no murmur, click or rub, no peripheral edema and peripheral pulses strong  NEURO: Normal strength and tone, mentation intact and speech normal      ASSESSMENT/PLAN:          ICD-10-CM    1. Seroma of breast N64.89    2. Malignant neoplasm of lower-inner quadrant of left breast in female, estrogen receptor positive (H) C50.312     Z17.0    3. Status post bilateral mastectomy Z90.13        Reassurance provided and explained that there are no signs of infection currently. Natural course of healing process explained. Suggested to apply bacitracin superficially along with non-adhesive dressing.  Scheduled to have radiation consultation next week. Return criteria discussed and suggested to follow up with PCP in 1 week. All questions answered.        Jeremy Kelly MD  [facility]

## 2017-10-22 NOTE — MR AVS SNAPSHOT
After Visit Summary   10/22/2017    Sheryle Cruse    MRN: [id number]           Patient Information     Date Of Birth          1970        Visit Information        Provider Department      10/22/2017 9:10 AM Jeremy Kelly MD [facility]        Today's Diagnoses     Seroma of breast    -  1    Malignant neoplasm of lower-inner quadrant of left breast in female, estrogen receptor positive (H)        Status post bilateral mastectomy           Follow-ups after your visit        Your next 10 appointments already scheduled     Oct 25, 2017  9:00 AM CDT   New Visit with Johana Ledesma MD   [facility] [place]    [address] [place] [address]   [phone]            Oct 25, 2017 10:30 AM CDT   Ct Sim with Johana Ledesma MD, MG RT SIMULATION   [facility] [place]    [address] [place] [address]   [phone]            Oct 27, 2017  9:15 AM CDT   PE NPET ONCOLOGY (EYES TO THIGHS) with [email or web address]   [facility] [place]    [address] [place] [address]   [phone]           Tell your doctor:   If there is any chance you may be pregnant or if you are breastfeeding.   If you have problems lying in small spaces (claustrophobia). If you do, your doctor may give you medicine to help you relax. If you have diabetes:   Have your exam early in the morning. Your blood glucose will go up as the day goes by.   Your glucose level must be 180 or less at the start of the exam. Please take any medicines you need to ensure this blood glucose level. 24 hours before your scan: Don t do any heavy exercise. (No jogging, aerobics or other workouts.) Exercise will make your pictures less accurate. 6 hours before your scan:   Stop all food and liquids (except water).   Do not chew gum or suck on mints.   If you need to take medicine  "with food, you may take it with a few crackers.  Please call your Imaging Department at your exam site with any questions.            Dec 28, 2017  8:40 AM CST   Return Visit with Birdie Gonzalez MD   [facility] ([facility])    [address] [address] [place] [address] [phone]              Who to contact     If you have questions or need follow up information about today's clinic visit or your schedule please contact [facility] directly at [phone].  Normal or non-critical lab and imaging results will be communicated to you by [employer], letter or phone within 4 business days after the clinic has received the results. If you do not hear from us within 7 days, please contact the clinic through [employer] or phone. If you have a critical or abnormal lab result, we will notify you by phone as soon as possible.  Submit refill requests through [employer] or call your pharmacy and they will forward the refill request to us. Please allow 3 business days for your refill to be completed.          Additional Information About Your Visit        MyChart Information     [employer] lets you send messages to your doctor, view your test results, renew your prescriptions, schedule appointments and more. To sign up, go to [email or web address] . Click on \"Log in\" on the left side of the screen, which will take you to the Welcome page. Then click on \"Sign up Now\" on the right side of the page.     You will be asked to enter the access code listed below, as well as some personal information. Please follow the directions to create your username and password.     Your access code is: [email or web address]  Expires: 2017 11:09 AM     Your access code will  in 90 days. If you need help or a new code, please call your [facility] or [phone].        Care EveryWhere ID     This is your Care EveryWhere ID. This could be used by other organizations to access your " [place] medical records  [id number]        Your Vitals Were     Pulse Temperature Respirations Last Period Pulse Oximetry BMI (Body Mass Index)    80 97.1  F (36.2  C) 14 09/25/2017 98% 21.63 kg/m2       Blood Pressure from Last 3 Encounters:   10/22/17 120/81   10/19/17 119/83   10/17/17 (P) 120/80    Weight from Last 3 Encounters:   10/22/17 126 lb (57.2 kg)   10/17/17 (P) 132 lb (59.9 kg)   09/22/17 124 lb (56.2 kg)              Today, you had the following     No orders found for display       Primary Care Provider Office Phone # Fax #    [facility] [phone] [address]  [facility] [address]        Equal Access to Services     JORGE HERNÁNDEZ : Hadii aad ku hadasho Soomaali, waaxda luqadaha, qaybta kaalmada adesanyayada, malinda copeland . So [facility] [phone].    ATENCIÓN: Si habla español, tiene a spear disposición servicios gratuitos de asistencia lingüística. Nasir al [phone].    We comply with applicable federal civil rights laws and [place] laws. We do not discriminate on the basis of race, color, national origin, age, disability, sex, sexual orientation, or gender identity.            Thank you!     Thank you for choosing [facility]  for your care. Our goal is always to provide you with excellent care. Hearing back from our patients is one way we can continue to improve our services. Please take a few minutes to complete the written survey that you may receive in the mail after your visit with us. Thank you!             Your Updated Medication List - Protect others around you: Learn how to safely use, store and throw away your medicines at www.disposemymeds.org.          This list is accurate as of: 10/22/17  9:57 AM.  Always use your most recent med list.                   Brand Name Dispense Instructions for use Diagnosis    CALCIUM MAGNESIUM PO      With zinc    RLQ abdominal pain       ONE-A-DAY ESSENTIAL  Tabs      Take 1 tablet by mouth daily.

## 2017-10-23 ENCOUNTER — TELEPHONE (OUTPATIENT)
Dept: CARE COORDINATION | Facility: CLINIC | Age: 47
End: 2017-10-23

## 2017-10-23 NOTE — TELEPHONE ENCOUNTER
Social Work Contact - Follow-Up - 10-23-17    SW contacted pt via phone today to follow up regarding cancer support/resources.  Pt reports that she is doing well post-surgery except for some swelling.  Pt expresses concern regarding transportation to/from radiation therapy.  SW had provided pt with the following resources:  American [employer]'s Road To Recovery, [employer], and [employer].  Pt is exploring these resources as well as others.  SW brainstormed with pt regarding asking for family/friend assistance with transport or checking with a migue community in her area to see if the may be able to assist pt with transport or funds for transport.  Pt has radiation oncology appts this week to determine treatment plan and will see Katie Billy in [facility] on 11-2-17.  SW encouraged pt to ask for SW if she has questions/concerns when she is in clinic on 11-2-17.  SW will follow.     LAZARA Godinez     Social Work  [facility]  Office:  [phone]  E-Mail:  [email or web address]  10/23/2017 5:52 PM

## 2017-10-25 ENCOUNTER — RADIANT APPOINTMENT (OUTPATIENT)
Dept: ULTRASOUND IMAGING | Facility: CLINIC | Age: 47
End: 2017-10-25
Attending: SURGERY
Payer: COMMERCIAL

## 2017-10-25 ENCOUNTER — OFFICE VISIT (OUTPATIENT)
Dept: RADIATION ONCOLOGY | Facility: CLINIC | Age: 47
End: 2017-10-25
Payer: COMMERCIAL

## 2017-10-25 VITALS
RESPIRATION RATE: 14 BRPM | DIASTOLIC BLOOD PRESSURE: 75 MMHG | HEART RATE: 84 BPM | WEIGHT: 130 LBS | TEMPERATURE: 98.1 F | SYSTOLIC BLOOD PRESSURE: 140 MMHG | OXYGEN SATURATION: 100 % | BODY MASS INDEX: 22.2 KG/M2 | HEIGHT: 64 IN

## 2017-10-25 DIAGNOSIS — N64.89 SEROMA OF BREAST: ICD-10-CM

## 2017-10-25 DIAGNOSIS — Z17.0 MALIGNANT NEOPLASM OF OVERLAPPING SITES OF RIGHT BREAST IN FEMALE, ESTROGEN RECEPTOR POSITIVE (H): Primary | ICD-10-CM

## 2017-10-25 DIAGNOSIS — C50.811 MALIGNANT NEOPLASM OF OVERLAPPING SITES OF RIGHT BREAST IN FEMALE, ESTROGEN RECEPTOR POSITIVE (H): Primary | ICD-10-CM

## 2017-10-25 PROCEDURE — 76642 ULTRASOUND BREAST LIMITED: CPT | Mod: RT

## 2017-10-25 PROCEDURE — 99205 OFFICE O/P NEW HI 60 MIN: CPT | Performed by: RADIOLOGY

## 2017-10-25 RX ORDER — POLYETHYLENE GLYCOL 3350 17 G/17G
1 POWDER, FOR SOLUTION ORAL DAILY
COMMUNITY

## 2017-10-25 ASSESSMENT — ENCOUNTER SYMPTOMS
DEPRESSION: 1
NERVOUS/ANXIOUS: 1
WEIGHT LOSS: 1
CONSTIPATION: 1
INSOMNIA: 1
FLANK PAIN: 1

## 2017-10-25 ASSESSMENT — PAIN SCALES - GENERAL: PAINLEVEL: NO PAIN (0)
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
## 2017-10-25 NOTE — PROGRESS NOTES
Dear Colleagues,  Today I had the pleasure of seeing this patient in consultation for her recent diagnosis of multifocal right breast cancer.     IDENTIFICATION: Ms. Sparrow is a 46 year-old premenopausal woman with a recent diagnosis of right breast cancer s/p bilateral mastectomy with right axillary single lymph node removal/biopsy  revealing [id number], ER+/ND+ referred for discussion regarding adjuvant radiation therapy.  She is declining chemotherapy.     HISTORY OF PRESENT ILLNESS: Ms. Sparrow is a 46 year woman who self palpated a breast mass in June of this year.  She specifically denies having any other new breast masses or other skin changes during this time.  She consulted her ObGyn.       On 6/30/17, she had a digital diagnostic bilateral mammogram that showed within the right breast 3:00 position, anterior depth a focal asymmetry. No significant change in the left breast.  Same day focused ultrasound demonstrates an irregular hypoechoic solid mass with microlobulations 3:00 position 4 cm from the nipple 1.4 cm in greatest dimension that correlated with mammography and palpable lump. Immediately above this mass is echogenic breast tissue. Right axillary survey demonstrated multiple benign-appearing lymph nodes.    On 7/7/17, ultrasound guided right breast biopsy of the mass at the 3:00 position, 4 cm FN showed invasive mammary carcinoma (NST), G3 with focal IG DCIS.      She saw Dr. Gonzalez in consultation on 7/13/17.  With the density of the right breast on mammogram and the mass on exam, she recommended that she have an MRI for further evaluation prior to planning surgery.    On 8/4/17, bilateral breast MRI showed the index cancer in the right breast at 3:00 measuring 2.5 cm in greatest extent. A 6 mm focus anterior to the cancer that was suspicious. In the right central breast toward 12:00 at posterior depth, there was also a suspicious 6 mm round mass. In the left breast at 6:00 anterior depth, there was a  1.1 cm suspicious mass.     She was then seen by genetics and had her blood drawn on 8/10/17.  She is positive for one MUTYH gene mutation which has moderately increased risk of colon and possibly breast cancer.    As to follow up on her MRI, on 8/18/17,  ultrasound guided needle biopsy was completed on the right breast 11:30 retroareolar position and the left breast 6 o'clock position, 1 cm FN and both areas were negative for atypia or malignancy. However the radiologist was not sure if the right biopsy site correlated with the MRI abnormality.    Afterwards Ms. Sparrow said that the biopsies were very hard on her.  This coupled with her recently being found to be a carrier for MUTYH made her lean more toward bilateral mastectomies. However she was adamant about not having a SLNBx given the increased risk of lymphedema.     On 10/4/17 Dr. Gonzalez took her to the OR for bilateral masectomies.  During the operation Dr. Gonzalez palpated a suspicious right axillary lymph node and removed it.  Pathology revealed on the right two separate foci of G3 IDCA (3cm and 0.7cm) +LVSI/IG DCIS. Negative invasive and DCIS margins.  One 1.2cm lymph node with no TREMAYNE.  This gave her a pathologic stage of [id number], ER+/OR+. Her H2N results are not available to me at this time.Her postoperative course was complicated by developing a small seroma which was drained.    She has met with Tianna Santillan and is declining chemotherapy.    Currently she has some fatigue.  She also subjectively feels like she is losing weight after the surgery although her numbers have not changed significantly.   She has tightness in her chest at prior mastectomy site.  Takes laxatives for constipation. + Insomnia.  She specifically denies any other new masses, skin changes, shortness of breath, chest or bony pain, or new neurologic symptoms.  She is being seen here today for consideration of postoperative radiotherapy.     REVIEW OF SYSTEMS: As per HPI, a  "14-point review of systems is otherwise negative.     PAST RADIATION THERAPY:  Denies.    PAST CTD/PACEMAKER: Denies     BREAST RISK FACTORS:  Premenopausal. Menarche age 13, last period started yesterday. G0.  No h/o HRT use. Used OCP for a few years as a teenager.  Uses condoms for contraception.  Maternal grandmother with breast cancer  in her 80 s.  Father with history of bladder cancer.  No family history of ovarian cancer.    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  U/s abd/pelvist     Monoallelic mutation of MUTYH gene 2017    Carrier of MUTYH-Associated Polyposis (MAP) MUTYH mutation p.G396D (c.1187G>A) FAGUO 8/10/17     Vitamin D deficiency        Past Surgical History:   Procedure Laterality Date     Mammogram  ,        Family History   Problem Relation Age of Onset     DIABETES Mother 76     Obese     HEART DISEASE Mother      Hypertension Mother      CEREBROVASCULAR DISEASE Mother      Cardiovascular Mother      CVA     CANCER Father      bladder     CEREBROVASCULAR DISEASE Father 70     Breast Cancer Maternal Grandmother      CEREBROVASCULAR DISEASE Maternal Grandmother      Lipids Maternal Grandmother      HEART DISEASE Maternal Grandfather      heart disease     DIABETES Paternal Grandmother      CEREBROVASCULAR DISEASE Paternal Grandfather      HEART DISEASE Paternal Grandfather        Social History   Substance Use Topics     Smoking status: Never Smoker     Smokeless tobacco: Never Used     Alcohol use No       PHYSICAL EXAMINATION:  /75 (BP Location: Left arm, Patient Position: Sitting, Cuff Size: Adult Regular)  Pulse 84  Temp 98.1  F (36.7  C) (Oral)  Resp 14  Ht 5' 4\"  Wt 130 lb  LMP 10/24/2017  SpO2 100%  BMI 22.31 kg/m2  GENERAL                         Well-appearing " middle-aged woman in no acute distress.  HEENT                              Normocephalic, atraumatic.  Sclerae anicteric. She wears contacts  CARDIOVASCULAR      Regular rate and rhythm.  No murmurs, rubs, or gallops.  LUNGS                              Clear to auscultation bilaterally.  BREASTS                          Bilateral breasts are surgically absent.  CHEST             Right chest wall has approximately 2cm central area that is open/dehisced approximately 0.25cm. This is covered with gauze and at present shows good granulation tissue no signs of infection.  The rest of her right chest wall scar is well healed.  There is no other erythema, ulceration or suspicious nodularity within the right or left chest wall. Left chest wall scar is well healed.    LYMPH NODES                 No cervical, supraclavicular, infraclavicular, or axillary lymphadenopathy appreciated bilaterally.  ABDOMEN                        Soft.  No hepatosplenomegaly.  Positive bowel sounds.  EXTREMITIES                    No clubbing or cyanosis.    NEUROLOGICAL              Cranial nerves II-XII intact.  5/5 strength in bilateral upper and lower proximal and distal extremities. Sensation intact in all areas tested.  MUSCULOSKELETAL        Limited ROM in right arm. No spinal tenderness.  SKIN                                 Warm and well perfused.    PSYCHIATRIC                    Alert and oriented x 3     IMPRESSION/PLAN:  Ms. Sparrow is a 46 year-old premenopausal woman with a recent diagnosis of right breast cancer s/p bilateral mastectomy with right axillary single lymph node removal/biopsy  revealing [id number], ER+/IN+ referred for discussion regarding adjuvant radiation therapy. On physical exam I did not appreciate any LAD in her right axilla, but her scar has a small open area and she has some ROM limitations. She is currently declining chemotherapy and any further surgery of her right axilla.  I recommend adjuvant XRT to improve  local control and overall survival.       The risks, benefits, treatment rationale and regimen of radiation therapy to the right chest wall and regional nodes were discussed in great detail today with the patient.  Risks include but are not limited to skin erythema, desquamation, hyperpigmentation, fibrosis, rib fracture, pneumonitis, cardiac toxicity, wound breakdown, delay in wound healing and secondary malignancy. I also emphasized today that seeing that she was found to have sachi disease and opted for no further evaluation of the axilla, I would treat her axilla to a higher dose, thus putting her at increased risk for lymphedema. While the data for lymphedema risk from radiation is controversial I estimate her risk to be at least 20% ([place] SAMANIEGO et al IJROBP 2014).  She verbalized understanding and stated that she'd like to proceed with XRT.  She will have a CT simulation completed in our department 11/3.  XRT will start within 1-2 weeks after that.  Additional problem list to be addressed in the following manner:  1) Systemic Treatment: Premenopausal Woman, [id number], ER+/MT+.  Dr. Santillan (Med Onc) has given Ms. Sparrow her recommendations and she is declining chemotherapy.  She will have endocrine therapy to begin after XRT is completed.   2) ROM exercises discussed today.  3) Wound management discussed.  She will also follow up with Surgery RN.  There was ample time for questions and all were answered to the patient s satisfaction.  Thank you for allowing me to participate in the care of this patient.  If you have any questions please do not hesitate to contact me at my office.     Sincerely,  Guillermo Ledesma MD

## 2017-10-25 NOTE — MR AVS SNAPSHOT
After Visit Summary   10/25/2017    Sheryle Cruse    MRN: [id number]           Patient Information     Date Of Birth          1970        Visit Information        Provider Department      10/25/2017 9:00 AM Johana Ledesma MD [facility]        Care Instructions    Follow up with Dr Ledesma on 11/3/17 ar 9 am for CT Simulation     Please contact [facility] RN with questions or concerns following today's appointment: [phone].    Thank you!            Follow-ups after your visit        Your next 10 appointments already scheduled     Oct 27, 2017  9:15 AM CDT   PE NPET ONCOLOGY (EYES TO THIGHS) with [facility]ET[facility]   [facility]    [address] [place] [address] [phone]           Tell your doctor:   If there is any chance you may be pregnant or if you are breastfeeding.   If you have problems lying in small spaces (claustrophobia). If you do, your doctor may give you medicine to help you relax. If you have diabetes:   Have your exam early in the morning. Your blood glucose will go up as the day goes by.   Your glucose level must be 180 or less at the start of the exam. Please take any medicines you need to ensure this blood glucose level. 24 hours before your scan: Don t do any heavy exercise. (No jogging, aerobics or other workouts.) Exercise will make your pictures less accurate. 6 hours before your scan:   Stop all food and liquids (except water).   Do not chew gum or suck on mints.   If you need to take medicine with food, you may take it with a few crackers.  Please call your Imaging Department at your exam site with any questions.            Nov 02, 2017  7:30 AM CDT   Return Visit with GILDARDO Michaels CNP   [facility]    [address] [facility] [address] [phone]            Nov 03, 2017  9:00 AM CDT   Ct Sim with  Johana Ledesma MD, MG RT SIMULATION   [facility]    [address] [place] [address] [phone]            Dec 28, 2017  8:40 AM CST   Return Visit with Birdie Gonzaelz MD   [facility] ([facility])    [address] [facility] [address] [phone]              Who to contact     If you have questions or need follow up information about today's clinic visit or your schedule please contact [facility] directly at [phone].  Normal or non-critical lab and imaging results will be communicated to you by MyChart, letter or phone within 4 business days after the clinic has received the results. If you do not hear from us within 7 days, please contact the clinic through MyChart or phone. If you have a critical or abnormal lab result, we will notify you by phone as soon as possible.  Submit refill requests through [employer] or call your pharmacy and they will forward the refill request to us. Please allow 3 business days for your refill to be completed.          Additional Information About Your Visit        [place] Self Representation Document PreparationharKidbox Information     [employer] is an electronic gateway that provides easy, online access to your medical records. With [employer], you can request a clinic appointment, read your test results, renew a prescription or communicate with your care team.     To sign up for [employer] visit the website at [email or web address]   You will be asked to enter the access code listed below, as well as some personal information. Please follow the directions to create your username and password.     Your access code is: [email or web address]  Expires: 2017 11:09 AM     Your access code will  in 90 days. If you need help or a new code, please contact your [facility] or call [phone] for assistance.        Care EveryWhere ID     This is your Care  "EveryWhere ID. This could be used by other organizations to access your [facility] medical records  [id number]        Your Vitals Were     Pulse Temperature Respirations Height Last Period Pulse Oximetry    84 98.1  F (36.7  C) (Oral) 14 5' 4\" 10/24/2017 100%    BMI (Body Mass Index)                   22.31 kg/m2            Blood Pressure from Last 3 Encounters:   10/25/17 140/75   10/22/17 120/81   10/19/17 119/83    Weight from Last 3 Encounters:   10/25/17 130 lb   10/22/17 126 lb   10/17/17 (P) 132 lb              Today, you had the following     No orders found for display       Primary Care Provider Office Phone # Fax #    [facility] [phone] [address]  [facility] [address]        Equal Access to Services     JORGE HERNÁNDEZ : Hadii cecy guerreroo Sobee, waaxda luqadaha, qaybta kaalmada adesanyayada, malinda uriarte hayelvin copeland . So [facility] [phone].    ATENCIÓN: Si habla español, tiene a spaer disposición servicios gratuitos de asistencia lingüística. Llame al [phone].    We comply with applicable federal civil rights laws and [place] laws. We do not discriminate on the basis of race, color, national origin, age, disability, sex, sexual orientation, or gender identity.            Thank you!     Thank you for choosing [facility]  for your care. Our goal is always to provide you with excellent care. Hearing back from our patients is one way we can continue to improve our services. Please take a few minutes to complete the written survey that you may receive in the mail after your visit with us. Thank you!             Your Updated Medication List - Protect others around you: Learn how to safely use, store and throw away your medicines at www.disposemymeds.org.          This list is accurate as of: 10/25/17 11:11 AM.  Always use your most recent med list.                   Brand Name Dispense Instructions for use Diagnosis    " CALCIUM MAGNESIUM PO      With zinc    RLQ abdominal pain       ONE-A-DAY ESSENTIAL Tabs      Take 1 tablet by mouth daily.        polyethylene glycol Packet    MIRALAX/GLYCOLAX     Take 1 packet by mouth daily

## 2017-10-25 NOTE — LETTER
10/25/2017        RE: Sheryle Cruse  [address]  [facility] [address]        Dear Colleagues,  Today I had the pleasure of seeing this patient in consultation for her recent diagnosis of multifocal right breast cancer.     IDENTIFICATION: Ms. Sparrow is a 46 year-old premenopausal woman with a recent diagnosis of right breast cancer s/p bilateral mastectomy with right axillary single lymph node removal/biopsy  revealing [id number], ER+/CT+ referred for discussion regarding adjuvant radiation therapy.  She is declining chemotherapy.     HISTORY OF PRESENT ILLNESS: Ms. Sparrow is a 46 year woman who self palpated a breast mass in June of this year.  She specifically denies having any other new breast masses or other skin changes during this time.  She consulted her ObGyn.       On 6/30/17, she had a digital diagnostic bilateral mammogram that showed within the right breast 3:00 position, anterior depth a focal asymmetry. No significant change in the left breast.  Same day focused ultrasound demonstrates an irregular hypoechoic solid mass with microlobulations 3:00 position 4 cm from the nipple 1.4 cm in greatest dimension that correlated with mammography and palpable lump. Immediately above this mass is echogenic breast tissue. Right axillary survey demonstrated multiple benign-appearing lymph nodes.    On 7/7/17, ultrasound guided right breast biopsy of the mass at the 3:00 position, 4 cm FN showed invasive mammary carcinoma (NST), G3 with focal IG DCIS.      She saw Dr. Gonzalez in consultation on 7/13/17.  With the density of the right breast on mammogram and the mass on exam, she recommended that she have an MRI for further evaluation prior to planning surgery.    On 8/4/17, bilateral breast MRI showed the index cancer in the right breast at 3:00 measuring 2.5 cm in greatest extent. A 6 mm focus anterior to the cancer that was suspicious. In the right central breast toward 12:00 at posterior depth,  there was also a suspicious 6 mm round mass. In the left breast at 6:00 anterior depth, there was a 1.1 cm suspicious mass.     She was then seen by genetics and had her blood drawn on 8/10/17.  She is positive for one MUTYH gene mutation which has moderately increased risk of colon and possibly breast cancer.    As to follow up on her MRI, on 8/18/17,  ultrasound guided needle biopsy was completed on the right breast 11:30 retroareolar position and the left breast 6 o'clock position, 1 cm FN and both areas were negative for atypia or malignancy. However the radiologist was not sure if the right biopsy site correlated with the MRI abnormality.    Afterwards Ms. Sparrow said that the biopsies were very hard on her.  This coupled with her recently being found to be a carrier for MUTYH made her lean more toward bilateral mastectomies. However she was adamant about not having a SLNBx given the increased risk of lymphedema.     On 10/4/17 Dr. Gonzalez took her to the OR for bilateral masectomies.  During the operation Dr. Gonzalez palpated a suspicious right axillary lymph node and removed it.  Pathology revealed on the right two separate foci of G3 IDCA (3cm and 0.7cm) +LVSI/IG DCIS. Negative invasive and DCIS margins.  One 1.2cm lymph node with no TREMAYNE.  This gave her a pathologic stage of [id number], ER+/FL+. Her H2N results are not available to me at this time.Her postoperative course was complicated by developing a small seroma which was drained.    She has met with Tianna Santillan and is declining chemotherapy.    Currently she has some fatigue.  She also subjectively feels like she is losing weight after the surgery although her numbers have not changed significantly.   She has tightness in her chest at prior mastectomy site.  Takes laxatives for constipation. + Insomnia.  She specifically denies any other new masses, skin changes, shortness of breath, chest or bony pain, or new neurologic symptoms.  She is being seen  "here today for consideration of postoperative radiotherapy.     REVIEW OF SYSTEMS: As per HPI, a 14-point review of systems is otherwise negative.     PAST RADIATION THERAPY:  Denies.    PAST CTD/PACEMAKER: Denies     BREAST RISK FACTORS:  Premenopausal. Menarche age 13, last period started yesterday. G0.  No h/o HRT use. Used OCP for a few years as a teenager.  Uses condoms for contraception.  Maternal grandmother with breast cancer  in her 80 s.  Father with history of bladder cancer.  No family history of ovarian cancer.    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  U/s abd/pelvist     Monoallelic mutation of MUTYH gene 2017    Carrier of MUTYH-Associated Polyposis (MAP) MUTYH mutation p.G396D (c.1187G>A) [employer] 8/10/17     Vitamin D deficiency        Past Surgical History:   Procedure Laterality Date     Mammogram  ,        Family History   Problem Relation Age of Onset     DIABETES Mother 76     Obese     HEART DISEASE Mother      Hypertension Mother      CEREBROVASCULAR DISEASE Mother      Cardiovascular Mother      CVA     CANCER Father      bladder     CEREBROVASCULAR DISEASE Father 70     Breast Cancer Maternal Grandmother      CEREBROVASCULAR DISEASE Maternal Grandmother      Lipids Maternal Grandmother      HEART DISEASE Maternal Grandfather      heart disease     DIABETES Paternal Grandmother      CEREBROVASCULAR DISEASE Paternal Grandfather      HEART DISEASE Paternal Grandfather        Social History   Substance Use Topics     Smoking status: Never Smoker     Smokeless tobacco: Never Used     Alcohol use No       PHYSICAL EXAMINATION:  /75 (BP Location: Left arm, Patient Position: Sitting, Cuff Size: Adult Regular)  Pulse 84  Temp 98.1  F (36.7  C) (Oral)  Resp 14  Ht 5' 4\"  Wt 130 lb "  LMP 10/24/2017  SpO2 100%  BMI 22.31 kg/m2  GENERAL                         Well-appearing middle-aged woman in no acute distress.  HEENT                              Normocephalic, atraumatic.  Sclerae anicteric. She wears contacts  CARDIOVASCULAR      Regular rate and rhythm.  No murmurs, rubs, or gallops.  LUNGS                              Clear to auscultation bilaterally.  BREASTS                          Bilateral breasts are surgically absent.  CHEST             Right chest wall has approximately 2cm central area that is open/dehisced approximately 0.25cm. This is covered with gauze and at present shows good granulation tissue no signs of infection.  The rest of her right chest wall scar is well healed.  There is no other erythema, ulceration or suspicious nodularity within the right or left chest wall. Left chest wall scar is well healed.    LYMPH NODES                 No cervical, supraclavicular, infraclavicular, or axillary lymphadenopathy appreciated bilaterally.  ABDOMEN                        Soft.  No hepatosplenomegaly.  Positive bowel sounds.  EXTREMITIES                    No clubbing or cyanosis.    NEUROLOGICAL              Cranial nerves II-XII intact.  5/5 strength in bilateral upper and lower proximal and distal extremities. Sensation intact in all areas tested.  MUSCULOSKELETAL        Limited ROM in right arm. No spinal tenderness.  SKIN                                 Warm and well perfused.    PSYCHIATRIC                    Alert and oriented x 3     IMPRESSION/PLAN:  Ms. Sparrow is a 46 year-old premenopausal woman with a recent diagnosis of right breast cancer s/p bilateral mastectomy with right axillary single lymph node removal/biopsy  revealing [id number], ER+/WI+ referred for discussion regarding adjuvant radiation therapy. On physical exam I did not appreciate any LAD in her right axilla, but her scar has a small open area and she has some ROM limitations. She is currently declining  chemotherapy and any further surgery of her right axilla.  I recommend adjuvant XRT to improve local control and overall survival.       The risks, benefits, treatment rationale and regimen of radiation therapy to the right chest wall and regional nodes were discussed in great detail today with the patient.  Risks include but are not limited to skin erythema, desquamation, hyperpigmentation, fibrosis, rib fracture, pneumonitis, cardiac toxicity, wound breakdown, delay in wound healing and secondary malignancy. I also emphasized today that seeing that she was found to have sachi disease and opted for no further evaluation of the axilla, I would treat her axilla to a higher dose, thus putting her at increased risk for lymphedema. While the data for lymphedema risk from radiation is controversial I estimate her risk to be at least 20% ([place] SAMANIEGO et al IJROBP 2014).  She verbalized understanding and stated that she'd like to proceed with XRT.  She will have a CT simulation completed in our department 11/3.  XRT will start within 1-2 weeks after that.  Additional problem list to be addressed in the following manner:  1) Systemic Treatment: Premenopausal Woman, [id number], ER+/IN+.  Dr. Santillan (Med Onc) has given Ms. Sparrow her recommendations and she is declining chemotherapy.  She will have endocrine therapy to begin after XRT is completed.   2) ROM exercises discussed today.  3) Wound management discussed.  She will also follow up with Surgery RN.  There was ample time for questions and all were answered to the patient s satisfaction.  Thank you for allowing me to participate in the care of this patient.  If you have any questions please do not hesitate to contact me at my office.     Sincerely,  Guillermo Ledesma MD      Sincerely,        Guillermo Ledesma MD

## 2017-10-25 NOTE — PATIENT INSTRUCTIONS
Follow up with Dr Ledesma on 11/3/17 ar 9 am for CT Simulation     Please contact [facility] RN with questions or concerns following today's appointment: [phone].    Thank you!
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
## 2017-10-25 NOTE — LETTER
10/25/2017        RE: Sheryle Cruse  [address]  [facility] [address]        Dear Colleagues,  Today I had the pleasure of seeing this patient in consultation for her recent diagnosis of multifocal right breast cancer.     IDENTIFICATION: Ms. Sparrow is a 46 year-old premenopausal woman with a recent diagnosis of right breast cancer s/p bilateral mastectomy with right axillary single lymph node removal/biopsy  revealing [id number], ER+/NM+ referred for discussion regarding adjuvant radiation therapy.  She is declining chemotherapy.     HISTORY OF PRESENT ILLNESS: Ms. Sparrow is a 46 year woman who self palpated a breast mass in June of this year.  She specifically denies having any other new breast masses or other skin changes during this time.  She consulted her ObGyn.       On 6/30/17, she had a digital diagnostic bilateral mammogram that showed within the right breast 3:00 position, anterior depth a focal asymmetry. No significant change in the left breast.  Same day focused ultrasound demonstrates an irregular hypoechoic solid mass with microlobulations 3:00 position 4 cm from the nipple 1.4 cm in greatest dimension that correlated with mammography and palpable lump. Immediately above this mass is echogenic breast tissue. Right axillary survey demonstrated multiple benign-appearing lymph nodes.    On 7/7/17, ultrasound guided right breast biopsy of the mass at the 3:00 position, 4 cm FN showed invasive mammary carcinoma (NST), G3 with focal IG DCIS.      She saw Dr. Gonzalez in consultation on 7/13/17.  With the density of the right breast on mammogram and the mass on exam, she recommended that she have an MRI for further evaluation prior to planning surgery.    On 8/4/17, bilateral breast MRI showed the index cancer in the right breast at 3:00 measuring 2.5 cm in greatest extent. A 6 mm focus anterior to the cancer that was suspicious. In the right central breast toward 12:00 at posterior depth,  there was also a suspicious 6 mm round mass. In the left breast at 6:00 anterior depth, there was a 1.1 cm suspicious mass.     She was then seen by genetics and had her blood drawn on 8/10/17.  She is positive for one MUTYH gene mutation which has moderately increased risk of colon and possibly breast cancer.    As to follow up on her MRI, on 8/18/17,  ultrasound guided needle biopsy was completed on the right breast 11:30 retroareolar position and the left breast 6 o'clock position, 1 cm FN and both areas were negative for atypia or malignancy. However the radiologist was not sure if the right biopsy site correlated with the MRI abnormality.    Afterwards Ms. Sparrow said that the biopsies were very hard on her.  This coupled with her recently being found to be a carrier for MUTYH made her lean more toward bilateral mastectomies. However she was adamant about not having a SLNBx given the increased risk of lymphedema.     On 10/4/17 Dr. Gonzalez took her to the OR for bilateral masectomies.  During the operation Dr. Gonzalez palpated a suspicious right axillary lymph node and removed it.  Pathology revealed on the right two separate foci of G3 IDCA (3cm and 0.7cm) +LVSI/IG DCIS. Negative invasive and DCIS margins.  One 1.2cm lymph node with no TREMAYNE.  This gave her a pathologic stage of [id number], ER+/SD+. Her H2N results are not available to me at this time.Her postoperative course was complicated by developing a small seroma which was drained.    She has met with Tianna Santillan and is declining chemotherapy.    Currently she has some fatigue.  She also subjectively feels like she is losing weight after the surgery although her numbers have not changed significantly.   She has tightness in her chest at prior mastectomy site.  Takes laxatives for constipation. + Insomnia.  She specifically denies any other new masses, skin changes, shortness of breath, chest or bony pain, or new neurologic symptoms.  She is being seen  "here today for consideration of postoperative radiotherapy.     REVIEW OF SYSTEMS: As per HPI, a 14-point review of systems is otherwise negative.     PAST RADIATION THERAPY:  Denies.    PAST CTD/PACEMAKER: Denies     BREAST RISK FACTORS:  Premenopausal. Menarche age 13, last period started yesterday. G0.  No h/o HRT use. Used OCP for a few years as a teenager.  Uses condoms for contraception.  Maternal grandmother with breast cancer  in her 80 s.  Father with history of bladder cancer.  No family history of ovarian cancer.    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  U/s abd/pelvist     Monoallelic mutation of MUTYH gene 2017    Carrier of MUTYH-Associated Polyposis (MAP) MUTYH mutation p.G396D (c.1187G>A) [email or web address] 8/10/17     Vitamin D deficiency        Past Surgical History:   Procedure Laterality Date     Mammogram  ,        Family History   Problem Relation Age of Onset     DIABETES Mother 76     Obese     HEART DISEASE Mother      Hypertension Mother      CEREBROVASCULAR DISEASE Mother      Cardiovascular Mother      CVA     CANCER Father      bladder     CEREBROVASCULAR DISEASE Father 70     Breast Cancer Maternal Grandmother      CEREBROVASCULAR DISEASE Maternal Grandmother      Lipids Maternal Grandmother      HEART DISEASE Maternal Grandfather      heart disease     DIABETES Paternal Grandmother      CEREBROVASCULAR DISEASE Paternal Grandfather      HEART DISEASE Paternal Grandfather        Social History   Substance Use Topics     Smoking status: Never Smoker     Smokeless tobacco: Never Used     Alcohol use No       PHYSICAL EXAMINATION:  /75 (BP Location: Left arm, Patient Position: Sitting, Cuff Size: Adult Regular)  Pulse 84  Temp 98.1  F (36.7  C) (Oral)  Resp 14  Ht 5' 4\"  Wt 130 lb "  LMP 10/24/2017  SpO2 100%  BMI 22.31 kg/m2  GENERAL                         Well-appearing middle-aged woman in no acute distress.  HEENT                              Normocephalic, atraumatic.  Sclerae anicteric. She wears contacts  CARDIOVASCULAR      Regular rate and rhythm.  No murmurs, rubs, or gallops.  LUNGS                              Clear to auscultation bilaterally.  BREASTS                          Bilateral breasts are surgically absent.  CHEST             Right chest wall has approximately 2cm central area that is open/dehisced approximately 0.25cm. This is covered with gauze and at present shows good granulation tissue no signs of infection.  The rest of her right chest wall scar is well healed.  There is no other erythema, ulceration or suspicious nodularity within the right or left chest wall. Left chest wall scar is well healed.    LYMPH NODES                 No cervical, supraclavicular, infraclavicular, or axillary lymphadenopathy appreciated bilaterally.  ABDOMEN                        Soft.  No hepatosplenomegaly.  Positive bowel sounds.  EXTREMITIES                    No clubbing or cyanosis.    NEUROLOGICAL              Cranial nerves II-XII intact.  5/5 strength in bilateral upper and lower proximal and distal extremities. Sensation intact in all areas tested.  MUSCULOSKELETAL        Limited ROM in right arm. No spinal tenderness.  SKIN                                 Warm and well perfused.    PSYCHIATRIC                    Alert and oriented x 3     IMPRESSION/PLAN:  Ms. Sparrow is a 46 year-old premenopausal woman with a recent diagnosis of right breast cancer s/p bilateral mastectomy with right axillary single lymph node removal/biopsy  revealing [id number], ER+/CA+ referred for discussion regarding adjuvant radiation therapy. On physical exam I did not appreciate any LAD in her right axilla, but her scar has a small open area and she has some ROM limitations. She is currently declining  chemotherapy and any further surgery of her right axilla.  I recommend adjuvant XRT to improve local control and overall survival.       The risks, benefits, treatment rationale and regimen of radiation therapy to the right chest wall and regional nodes were discussed in great detail today with the patient.  Risks include but are not limited to skin erythema, desquamation, hyperpigmentation, fibrosis, rib fracture, pneumonitis, cardiac toxicity, wound breakdown, delay in wound healing and secondary malignancy. I also emphasized today that seeing that she was found to have sachi disease and opted for no further evaluation of the axilla, I would treat her axilla to a higher dose, thus putting her at increased risk for lymphedema. While the data for lymphedema risk from radiation is controversial I estimate her risk to be at least 20% (Guanaco SAMANIEGO et al IJROBP 2014).  She verbalized understanding and stated that she'd like to proceed with XRT.  She will have a CT simulation completed in our department 11/3.  XRT will start within 1-2 weeks after that.  Additional problem list to be addressed in the following manner:  1) Systemic Treatment: Premenopausal Woman, [id number], ER+/GA+.  Dr. Santillan (Med Onc) has given Ms. Sparrow her recommendations and she is declining chemotherapy.  She will have endocrine therapy to begin after XRT is completed.   2) ROM exercises discussed today.  3) Wound management discussed.  She will also follow up with Surgery RN.  There was ample time for questions and all were answered to the patient s satisfaction.  Thank you for allowing me to participate in the care of this patient.  If you have any questions please do not hesitate to contact me at my office.     Sincerely,  Guillermo Ledesma MD

## 2017-11-03 ENCOUNTER — APPOINTMENT (OUTPATIENT)
Dept: RADIATION ONCOLOGY | Facility: CLINIC | Age: 47
End: 2017-11-03
Payer: COMMERCIAL

## 2017-11-03 PROCEDURE — 77290 THER RAD SIMULAJ FIELD CPLX: CPT | Performed by: RADIOLOGY

## 2017-11-06 ENCOUNTER — TELEPHONE (OUTPATIENT)
Dept: RADIATION ONCOLOGY | Facility: CLINIC | Age: 47
End: 2017-11-06

## 2017-11-06 NOTE — TELEPHONE ENCOUNTER
Spoke with patient about radiation treatment schedule and patient verbalized understanding. Patient will be mailed copy of schedule and call if changes need to be made

## 2017-11-07 ENCOUNTER — TELEPHONE (OUTPATIENT)
Dept: CARE COORDINATION | Facility: CLINIC | Age: 47
End: 2017-11-07

## 2017-11-07 NOTE — TELEPHONE ENCOUNTER
Social Work Note: Telephone Call  Oncology Clinic    Data/Intervention:  Patient Name:  Sheryle Cruse  /Age:  1970 (46 year old)    Call From:  SW to pt  Reason for Call:  Follow-up regarding referral SW received from Sol Vazquez, RN, Breast Navigator, indicating that pt is interested in counseling together with both pt and spouse to assist in coping with pt's cancer diagnosis    Assessment:  Pt has anxiety at baseline and it has increased due to recent breast cancer diagnosis.  Pt has seen [facility], in [place] a couple of times.  Pt requesting counseling resources for both pt and spouse to go to counseling together to assist both in coping with pt's cancer diagnosis.      Plan:  SW attempted to contact pt via phone today to provide [facility] as an option for pt and spouse to get counseling.  SW unable to connect with pt via phone and LM for pt to call SW back.  SW will follow and await return call back from pt.      LAZARA Godinez     Social Work  [facility]  Office:  [phone]  E-Mail:  [email or web address]  2017 9:06 AM

## 2017-11-09 ENCOUNTER — TELEPHONE (OUTPATIENT)
Dept: PSYCHOLOGY | Facility: CLINIC | Age: 47
End: 2017-11-09

## 2017-11-10 DIAGNOSIS — C50.911 MALIGNANT NEOPLASM OF RIGHT FEMALE BREAST, UNSPECIFIED ESTROGEN RECEPTOR STATUS, UNSPECIFIED SITE OF BREAST (H): Primary | ICD-10-CM

## 2017-11-16 ENCOUNTER — TELEPHONE (OUTPATIENT)
Dept: CARE COORDINATION | Facility: CLINIC | Age: 47
End: 2017-11-16

## 2017-11-16 ENCOUNTER — APPOINTMENT (OUTPATIENT)
Dept: RADIATION ONCOLOGY | Facility: CLINIC | Age: 47
End: 2017-11-16
Payer: COMMERCIAL

## 2017-11-16 PROCEDURE — 77300 RADIATION THERAPY DOSE PLAN: CPT | Performed by: RADIOLOGY

## 2017-11-16 PROCEDURE — 77295 3-D RADIOTHERAPY PLAN: CPT | Performed by: RADIOLOGY

## 2017-11-16 PROCEDURE — 77263 THER RADIOLOGY TX PLNG CPLX: CPT | Performed by: RADIOLOGY

## 2017-11-16 PROCEDURE — 77334 RADIATION TREATMENT AID(S): CPT | Performed by: RADIOLOGY

## 2017-11-16 NOTE — TELEPHONE ENCOUNTER
Social Work Follow-Up Encounter Visit  Oncology Clinic    Data/Intervention:  Patient Name:  Sheryle Cruse  /Age:  1970 (46 year old)    Reason for Follow-Up:  To provide counseling resources for pt and spouse to assist them in coping with pt's cancer diagnosis/treatment    Collaborated With:    Sheryle    Resources Provided:  [facility] intake number, [phone] for couple's counseling    Assessment:  Pt experiences anxiety at baseline and cancer diagnosis/treatment has increased pt's anxiety.  Per pt, her spouse is also experiencing anxiety related to pt's illness    Plan:  Previously provided patient/family with writer's contact information and availability.   Per pt request, SW educated/emailed pt information regarding [facility].  SW will follow up with pt in 3 weeks.    LAZARA Godinez     Social Work  [facility]  Office:  [phone]  E-Mail:  [email or web address]  2017 10:31 AM

## 2017-11-17 ENCOUNTER — OFFICE VISIT (OUTPATIENT)
Dept: PSYCHOLOGY | Facility: CLINIC | Age: 47
End: 2017-11-17
Payer: COMMERCIAL

## 2017-11-17 DIAGNOSIS — F43.23 ADJUSTMENT DISORDER WITH MIXED ANXIETY AND DEPRESSED MOOD: Primary | ICD-10-CM

## 2017-11-17 PROCEDURE — 77280 THER RAD SIMULAJ FIELD SMPL: CPT | Performed by: RADIOLOGY

## 2017-11-17 PROCEDURE — 90791 PSYCH DIAGNOSTIC EVALUATION: CPT | Performed by: SOCIAL WORKER
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
## 2017-11-20 ENCOUNTER — TELEPHONE (OUTPATIENT)
Dept: ONCOLOGY | Facility: CLINIC | Age: 47
End: 2017-11-20

## 2017-11-20 ENCOUNTER — APPOINTMENT (OUTPATIENT)
Dept: RADIATION ONCOLOGY | Facility: CLINIC | Age: 47
End: 2017-11-20
Payer: COMMERCIAL

## 2017-11-20 PROCEDURE — 77412 RADIATION TX DELIVERY LVL 3: CPT | Performed by: RADIOLOGY

## 2017-11-20 NOTE — TELEPHONE ENCOUNTER
Nutrition Services:     Received a message from RN asking RD to reach out to patient regarding questions she has about nutrition and radiation.  RD saw patient for initial consult on 10/17/17 for pre surgery nutrition assessment.      She starts radiation today.      RD called pt today.  She inquires about what foods to avoid and what foods to consume during radiation treatment.  She inquires about protein needs and whether or not her needs change 6 weeks post op with radiation.     Interventions:  -Reviewed ways to [place] with Mucositis  -Reviewed foods to avoid  -Reviewed general healthful, vegan diet  -Reviewed protein needs - advised pt to aim for at least 75g/day (1.2g/kg).     Provided pt with RD contact information for further questions prn.     Brandie Wen RD, LD

## 2017-11-21 ENCOUNTER — APPOINTMENT (OUTPATIENT)
Dept: RADIATION ONCOLOGY | Facility: CLINIC | Age: 47
End: 2017-11-21
Payer: COMMERCIAL

## 2017-11-21 PROCEDURE — 77412 RADIATION TX DELIVERY LVL 3: CPT | Performed by: RADIOLOGY

## 2017-11-21 PROCEDURE — 77332 RADIATION TREATMENT AID(S): CPT | Performed by: RADIOLOGY

## 2017-11-22 ENCOUNTER — APPOINTMENT (OUTPATIENT)
Dept: RADIATION ONCOLOGY | Facility: CLINIC | Age: 47
End: 2017-11-22
Payer: COMMERCIAL

## 2017-11-22 ENCOUNTER — OFFICE VISIT (OUTPATIENT)
Dept: RADIATION ONCOLOGY | Facility: CLINIC | Age: 47
End: 2017-11-22
Payer: COMMERCIAL

## 2017-11-22 VITALS — WEIGHT: 133.4 LBS | BODY MASS INDEX: 22.9 KG/M2

## 2017-11-22 DIAGNOSIS — Z17.0 MALIGNANT NEOPLASM OF OVERLAPPING SITES OF RIGHT BREAST IN FEMALE, ESTROGEN RECEPTOR POSITIVE (H): Primary | ICD-10-CM

## 2017-11-22 DIAGNOSIS — C50.811 MALIGNANT NEOPLASM OF OVERLAPPING SITES OF RIGHT BREAST IN FEMALE, ESTROGEN RECEPTOR POSITIVE (H): Primary | ICD-10-CM

## 2017-11-22 PROCEDURE — 99207 ZZC DROP WITH A PROCEDURE: CPT | Performed by: RADIOLOGY

## 2017-11-22 PROCEDURE — 77412 RADIATION TX DELIVERY LVL 3: CPT | Performed by: RADIOLOGY

## 2017-11-22 ASSESSMENT — PAIN SCALES - GENERAL: PAINLEVEL: NO PAIN (0)

## 2017-11-22 NOTE — PATIENT INSTRUCTIONS
Please contact [facility] RN with questions or concerns following today's appointment: [phone].    Thank you!

## 2017-11-22 NOTE — MR AVS SNAPSHOT
After Visit Summary   11/22/2017    Sheryle Cruse    MRN: [id number]           Patient Information     Date Of Birth          1970        Visit Information        Provider Department      11/22/2017 4:45 PM Johana Ledesma MD [facility]        Today's Diagnoses     Malignant neoplasm of overlapping sites of right breast in female, estrogen receptor positive (H)    -  1      Care Instructions    Please contact [facility] RN with questions or concerns following today's appointment: [phone].    Thank you!            Follow-ups after your visit        Your next 10 appointments already scheduled     Nov 27, 2017  4:45 PM CST   TREATMENT with RADIATION THERAPIST   [facility]    [address] [place] [address]   [phone]            Nov 28, 2017  4:30 PM CST   TREATMENT with RADIATION THERAPIST   [facility]    [address] [place] [address]   [phone]            Nov 29, 2017  4:30 PM CST   TREATMENT with RADIATION THERAPIST   [facility]    [address] [place] [address]   [phone]            Nov 29, 2017  4:45 PM CST   on treatment visit with Johana Ledesma MD   [facility] ([facility])    [address] [place] [address]   [phone]            Nov 30, 2017  2:00 PM CST   Return Visit with JANE Tsang   [facility])    [address] [place] [address]   [phone]            Nov 30, 2017  4:30 PM CST   TREATMENT with RADIATION THERAPIST   [facility]    [address] [place] [address]   [phone]            Dec 01, 2017  4:30 PM CST   TREATMENT with RADIATION  THERAPIST   [facility]    [address] [facility] [address]   [phone]            Dec 04, 2017  4:30 PM CST   TREATMENT with RADIATION THERAPIST   [facility]    [address] [facility] [address]   [phone]            Dec 05, 2017  2:45 PM CST   TREATMENT with RADIATION THERAPIST   [facility]    [address] [facility] [address]   [phone]            Dec 06, 2017  4:00 PM CST   TREATMENT with RADIATION THERAPIST   [facility])    [address] [facility] [address]   [phone]              Who to contact     If you have questions or need follow up information about today's clinic visit or your schedule please contact [facility] directly at [phone].  Normal or non-critical lab and imaging results will be communicated to you by MyChart, letter or phone within 4 business days after the clinic has received the results. If you do not hear from us within 7 days, please contact the clinic through [employer] or phone. If you have a critical or abnormal lab result, we will notify you by phone as soon as possible.  Submit refill requests through [employer] or call your pharmacy and they will forward the refill request to us. Please allow 3 business days for your refill to be completed.          Additional Information About Your Visit        [employer] Information     [employer] is an electronic gateway that provides easy, online access to your medical records. With [employer], you can request a clinic appointment, read your test results, renew a prescription or communicate with your care team.     To sign up for [employer] visit the website at [email or web address]   You will be asked to enter the access code listed below, as well as some personal information.  Please follow the directions to create your username and password.     Your access code is: [email or web address]  Expires: 2017 10:09 AM     Your access code will  in 90 days. If you need help or a new code, please contact your [facility] or call [phone] for assistance.        Care EveryWhere ID     This is your Care EveryWhere ID. This could be used by other organizations to access your [place] medical records  [id number]        Your Vitals Were     Last Period BMI (Body Mass Index)                10/24/2017 22.9 kg/m2           Blood Pressure from Last 3 Encounters:   10/25/17 140/75   10/22/17 120/81   10/19/17 119/83    Weight from Last 3 Encounters:   17 133 lb 6.4 oz   10/25/17 130 lb   10/22/17 126 lb              Today, you had the following     No orders found for display       Primary Care Provider Office Phone # Fax #    [facility] [phone]       [address]  [facility] [address]        Equal Access to Services     JORGE HERNÁNDEZ : Hadii aad ku hadasho Soaquilesali, waaxda luqadaha, qaybta kaalmada sudha, malinda copeland . So [facility] [phone].    ATENCIÓN: Si habla español, tiene a spear disposición servicios gratuitos de asistencia lingüística. Ll[facility] [phone].    We comply with applicable federal civil rights laws and [place] laws. We do not discriminate on the basis of race, color, national origin, age, disability, sex, sexual orientation, or gender identity.            Thank you!     Thank you for choosing [facility]  for your care. Our goal is always to provide you with excellent care. Hearing back from our patients is one way we can continue to improve our services. Please take a few minutes to complete the written survey that you may receive in the mail after your visit with us. Thank you!             Your Updated Medication List - Protect others around you:  Learn how to safely use, store and throw away your medicines at www.disposemymeds.org.          This list is accurate as of: 11/22/17 11:59 PM.  Always use your most recent med list.                   Brand Name Dispense Instructions for use Diagnosis    acetaminophen 325 MG Suppository    TYLENOL     Place 325 mg rectally every 4 hours as needed for fever        CALCIUM MAGNESIUM PO      With zinc    RLQ abdominal pain       ONE-A-DAY ESSENTIAL Tabs      Take 1 tablet by mouth daily.        polyethylene glycol Packet    MIRALAX/GLYCOLAX     Take 1 packet by mouth daily

## 2017-11-24 ENCOUNTER — APPOINTMENT (OUTPATIENT)
Dept: RADIATION ONCOLOGY | Facility: CLINIC | Age: 47
End: 2017-11-24
Payer: COMMERCIAL

## 2017-11-24 ENCOUNTER — TELEPHONE (OUTPATIENT)
Dept: CARE COORDINATION | Facility: CLINIC | Age: 47
End: 2017-11-24

## 2017-11-24 PROCEDURE — 77412 RADIATION TX DELIVERY LVL 3: CPT | Performed by: RADIOLOGY

## 2017-11-24 NOTE — TELEPHONE ENCOUNTER
Social Work Note: Telephone Call  Oncology Clinic    Data/Intervention:  Patient Name:  Sheryle Cruse  /Age:  1970 (46 year old)    Call From:  MONA to Gwendolyn Garvey regarding pt's [employer] application for assistance with utility bill and transportation costs to/from radiation oncology treatments  Reason for Call:  As above    Assessment:  Pt/spouse have financial concerns and are having difficulty obtaining/affording transportation to/from radiation oncology treatments    Plan:  MONA faxed to Gwendolyn Garvey, who handles [employer] fund, the information that SW received from pt this AM regarding copy of her utility bill and difficulty obtaining/paying for transportation costs to/from radiation treatments at [facility].  MONA also provided Gwendolyn Garvey with details regarding pt's 5 more weeks of radiation treatments needed and the mileage from pt's home to clinic.  MONA also updated pt regarding above by via e-mail.  MONA will continue to follow and assist in cancer support/resources for pt.     LAZARA Godinez     Social Work  [facility]  Office:  [phone]  E-Mail:  [email or web address]  2017 10:24 AM

## 2017-11-27 ENCOUNTER — HOSPITAL ENCOUNTER (OUTPATIENT)
Dept: OCCUPATIONAL THERAPY | Facility: CLINIC | Age: 47
Setting detail: THERAPIES SERIES
End: 2017-11-27
Attending: INTERNAL MEDICINE
Payer: COMMERCIAL

## 2017-11-27 PROCEDURE — 77336 RADIATION PHYSICS CONSULT: CPT | Performed by: RADIOLOGY

## 2017-11-27 PROCEDURE — 97165 OT EVAL LOW COMPLEX 30 MIN: CPT | Mod: GO | Performed by: OCCUPATIONAL THERAPIST

## 2017-11-27 PROCEDURE — 77412 RADIATION TX DELIVERY LVL 3: CPT | Performed by: RADIOLOGY

## 2017-11-27 PROCEDURE — 77427 RADIATION TX MANAGEMENT X5: CPT | Performed by: RADIOLOGY

## 2017-11-27 PROCEDURE — 40000445 ZZHC STATISTIC OT VISIT, LYMPHEDEMA: Performed by: OCCUPATIONAL THERAPIST

## 2017-11-27 NOTE — PROGRESS NOTES
[facility] PHYSICIANS  SPECIALIZING IN BREAKTHROUGHS  Radiation Oncology    On Treatment Visit Note      Sheryle Cruse      Date: 2017   MRN: [id number]   : 1970  Diagnosis: Right Breast Cancer      Reason for Visit:  On Radiation Treatment Visit     Treatment Summary to Date  Treatment Site: R CW, R SCV, R Axilla Current Dose: 600/6000, 600/5000, 0/600 cGy Fractions: 3/30, 3/25, 0/3      Chemotherapy  Chemo concurrent with radx?: No    Subjective:     Pt doing well. C/o chest tightness from surgery.    Nursing ROS:   Nutrition Alteration  Diet Type: Patient's Preference  Skin  Skin Intervention: patient reports using both 100% aloe and aquaphor  Skin Note: patient reports slight erythema      Cardiovascular  Respiratory effort: 1 - Normal - without distress      Pain Assessment  0-10 Pain Scale: 0  Pain Descriptors: Tingling  Pain Treatment: patient reports taking tylenol PRN  Pain Note: right hand and fingers      Objective:   Wt 133 lb 6.4 oz  LMP 10/24/2017  BMI 22.9 kg/m2  NAD  Skin is CDI and scar is well healed. No erythema or desquamation    Labs:  CBC RESULTS:   Recent Labs   Lab Test  17   1652   WBC  6.7   RBC  4.10   HGB  12.9   HCT  37.1   MCV  91   MCH  31.5   MCHC  34.8   RDW  12.6   PLT  449     ELECTROLYTES:  Recent Labs   Lab Test  17   1652   NA  138   POTASSIUM  4.5   CHLORIDE  101   ZAKIYA  9.5   CO2  25   BUN  7   CR  0.53   GLC  92       Assessment:    Tolerating radiation therapy well.  All questions and concerns addressed.    Plan:   1. Continue current therapy.    2. Continue skin care.      Mosaiq chart and setup information reviewed  Ports checked    Medication Review  Med list reviewed with patient?: Yes           Guillermo Ledesma MD

## 2017-11-27 NOTE — PROGRESS NOTES
11/27/17 1410   Rehab Discipline   Discipline OT   Type of Visit   Type of visit Initial Edema Evaluation   General Information   Start of care 11/27/17   Referring physician Birdie Gonzalez MD   Orders Evaluate and treat as indicated   Order date 11/10/17   Medical diagnosis H/o right breast cancer with bilateral mastectomy and SLND of right axilla 10/4/17.  Decreased RUE ROM with prolonged positioning of RUE   Onset of illness / date of surgery 10/04/17   Affected body parts RUE;Head / Neck   Edema etiology Cancer with lymph node dissection;Radiation;Surgery   Location - Cancer with lymph node dissection right axilla   Location - Radiation right axilla and RUE   Radiation comments started 11/20/17   Pertinent history of current problem (PT: include personal factors and/or comorbidities that impact the POC; OT: include additional occupational profile info) no plans for reconstruction   Surgical / medical history reviewed Yes   Prior level of functional mobility IND   Community support Family / friend caregiver   Patient role / employment history Employed   Living environment House / townhome   Living environment comments no concerns   Patient / Family Goals   Patient / family goals statement to increase RUE ROM and learn about lymphedema   Pain   Pain comments discomfort at mastectomy sites   Cognitive Status   Orientation Orientation to person, place and time   Level of consciousness Alert   Follows commands and answers questions 100% of the time   Edema Exam / Assessment   Skin condition comments No edema of BUEs or LUQ.  Soft, puffy edema at most lateral portion of mastectomy scar, inferior axilla.   Scar Yes   Location bilateral mastectomy   Scar comments healed, flat, pink in color   Stemmer sign Negative   Ulceration No   Girth Measurements   Girth Measurements Refer to separate girth measurement flowsheet   Range of Motion   ROM comments LUE with full AROM, RUE with AROM distal to shoulder.  Right  shoulder HBH position difficult, unable to achieve full external rotation needed for radiation positioning   Posture   Posture Normal   Activities of Daily Living   Activities of Daily Living IND   Bed Mobility   Bed mobility IND   Transfers   Transfers IND   Gait / Locomotion   Gait / Locomotion IND   Sensory   Sensory perception comments tingling in right digits   Planned Edema Interventions   Planned edema interventions Manual lymph drainage;Fit for compression garment;Exercises;Precautions to prevent infection / exacerbation;Education;Manual therapy;ADL training;Skin care / precautions;Home management program development   Clinical Impression   Criteria for skilled therapeutic intervention met Yes   Therapy diagnosis RUE decreased ROM   Assessment of Occupational Performance 1-3 Performance Deficits   Identified Performance Deficits difficult to hold prolonged right, HBH, needed for radiation therapy   Clinical Decision Making (Complexity) Low complexity   Treatment frequency (2x/wk x2wkx, 1x/wk d1xdr=2 tx sessions)   Treatment duration x6 weeks; 1/8/18   Patient / family and/or staff in agreement with plan of care Yes   Risks and benefits of therapy have been explained Yes   Clinical impression comments Pt presents with decreased RUE AROM needed for prolonged HBH positioning for radiation treatment.  Pt will benefit from continued skilled OT intervention to increase RUE AROM and provide education on lymphedema.     Goals   Edema Eval Goals 1;2   Goal 1   Goal identifier HBH   Goal description Pt will demonstrate RUE HBH position with HOB flat for 7 minutes without pain or discomfort in RUE as needed for radiation treatment.     Target date 01/08/18   Goal 2   Goal identifier signs/symtpoms   Goal description Pt will, independently, verbalize the signs/symptoms of lymphedema, precautions and how to obtain a future lymphedema referral if edema presents to preserve skin integrity, prevent infection and preserve  functional mobility.      Target date 01/08/18   Total Evaluation Time   Total evaluation time 45

## 2017-11-28 ENCOUNTER — APPOINTMENT (OUTPATIENT)
Dept: RADIATION ONCOLOGY | Facility: CLINIC | Age: 47
End: 2017-11-28
Payer: COMMERCIAL

## 2017-11-28 PROCEDURE — 77412 RADIATION TX DELIVERY LVL 3: CPT | Performed by: RADIOLOGY

## 2017-11-29 ENCOUNTER — OFFICE VISIT (OUTPATIENT)
Dept: RADIATION ONCOLOGY | Facility: CLINIC | Age: 47
End: 2017-11-29
Payer: COMMERCIAL

## 2017-11-29 VITALS — WEIGHT: 133 LBS | BODY MASS INDEX: 22.83 KG/M2

## 2017-11-29 DIAGNOSIS — C50.811 MALIGNANT NEOPLASM OF OVERLAPPING SITES OF RIGHT BREAST IN FEMALE, ESTROGEN RECEPTOR POSITIVE (H): Primary | ICD-10-CM

## 2017-11-29 DIAGNOSIS — Z17.0 MALIGNANT NEOPLASM OF OVERLAPPING SITES OF RIGHT BREAST IN FEMALE, ESTROGEN RECEPTOR POSITIVE (H): Primary | ICD-10-CM

## 2017-11-29 PROCEDURE — 99207 ZZC DROP WITH A PROCEDURE: CPT | Performed by: RADIOLOGY

## 2017-11-29 PROCEDURE — 77412 RADIATION TX DELIVERY LVL 3: CPT | Performed by: RADIOLOGY

## 2017-11-29 ASSESSMENT — PAIN SCALES - GENERAL: PAINLEVEL: NO PAIN (0)

## 2017-11-29 NOTE — MR AVS SNAPSHOT
After Visit Summary   11/29/2017    Sheryle Cruse    MRN: [id number]           Patient Information     Date Of Birth          1970        Visit Information        Provider Department      11/29/2017 4:45 PM Johana Ledesma MD [facility]        Today's Diagnoses     Malignant neoplasm of overlapping sites of right breast in female, estrogen receptor positive (H)    -  1      Care Instructions    Please contact [facility] RN with questions or concerns following today's appointment: [phone].    Thank you!            Follow-ups after your visit        Your next 10 appointments already scheduled     Dec 04, 2017  4:30 PM CST   TREATMENT with RADIATION THERAPIST   [facility] ([facility])    [address] [place] [address]   [phone]            Dec 05, 2017  2:45 PM CST   TREATMENT with RADIATION THERAPIST   [facility] ([facility])    [address] [place] [address]   [phone]            Dec 06, 2017  4:00 PM CST   TREATMENT with RADIATION THERAPIST   [facility] ([facility])    [address] [facility] [address]   [phone]            Dec 06, 2017  4:15 PM CST   on treatment visit with Johana Ledesma MD   [facility] ([facility])    [address] [facility] [address]   [phone]            Dec 07, 2017  1:00 PM CST   Lymphedema Treatment with Brynn Tejeda OT   [facility] ([facility])    [address] [place] [address]   [phone]            Dec 07, 2017  3:00 PM CST   TREATMENT with RADIATION THERAPIST   [facility] ([facility])    [address] [facility] [address]   [phone]            Dec 08, 2017  1:00 PM CST   Lymphedema  Treatment with Brynn Tejeda OT   [facility] ([facility]    [address] [facility] [address]   [phone]            Dec 08, 2017  3:00 PM CST   TREATMENT with RADIATION THERAPIST   [facility] ([facility])    [address] [facility] [address]   [phone]            Dec 11, 2017  2:45 PM CST   TREATMENT with RADIATION THERAPIST   [facility] ([facility])    [address] [facility] [address]   [phone]            Dec 12, 2017  3:00 PM CST   TREATMENT with RADIATION THERAPIST   [facility] ([facility])    [address] [facility] [address] [phone]              Who to contact     If you have questions or need follow up information about today's clinic visit or your schedule please contact [facility] directly at [phone].  Normal or non-critical lab and imaging results will be communicated to you by PacketHophart, letter or phone within 4 business days after the clinic has received the results. If you do not hear from us within 7 days, please contact the clinic through PacketHophart or phone. If you have a critical or abnormal lab result, we will notify you by phone as soon as possible.  Submit refill requests through ClickMedix or call your pharmacy and they will forward the refill request to us. Please allow 3 business days for your refill to be completed.          Additional Information About Your Visit        ClickMedix Information     ClickMedix is an electronic gateway that provides easy, online access to your medical records. With ClickMedix, you can request a clinic appointment, read your test results, renew a prescription or communicate with your care team.     To sign up for ClickMedix visit the website at [email or web address]   You will be asked to enter the access code listed below, as  well as some personal information. Please follow the directions to create your username and password.     Your access code is: [email or web address]  Expires: 2017 10:09 AM     Your access code will  in 90 days. If you need help or a new code, please contact your [facility] or call [phone] for assistance.        Care EveryWhere ID     This is your Care EveryWhere ID. This could be used by other organizations to access your [place] medical records  [id number]        Your Vitals Were     BMI (Body Mass Index)                   22.83 kg/m2            Blood Pressure from Last 3 Encounters:   10/25/17 140/75   10/22/17 120/81   10/19/17 119/83    Weight from Last 3 Encounters:   17 133 lb   17 133 lb 6.4 oz   10/25/17 130 lb              Today, you had the following     No orders found for display       Primary Care Provider Office Phone # Fax #    [facility] [phone]       [address]  [place] [address]        Equal Access to Services     CAROLYN HERNÁNDEZ : Hadii aad ku hadasho Soaquilesali, waaxda luqadaha, qaybta kaalmada sudha, malinda copeland . So [facility] [phone].    ATENCIÓN: Si habla español, tiene a spear disposición servicios gratuitos de asistencia lingüística. [facility] [phone].    We comply with applicable federal civil rights laws and [place] laws. We do not discriminate on the basis of race, color, national origin, age, disability, sex, sexual orientation, or gender identity.            Thank you!     Thank you for choosing [facility]  for your care. Our goal is always to provide you with excellent care. Hearing back from our patients is one way we can continue to improve our services. Please take a few minutes to complete the written survey that you may receive in the mail after your visit with us. Thank you!             Your Updated Medication List - Protect  others around you: Learn how to safely use, store and throw away your medicines at www.disposemymeds.org.          This list is accurate as of: 11/29/17 11:59 PM.  Always use your most recent med list.                   Brand Name Dispense Instructions for use Diagnosis    acetaminophen 325 MG Suppository    TYLENOL     Place 325 mg rectally every 4 hours as needed for fever        CALCIUM MAGNESIUM PO      With zinc    RLQ abdominal pain       ONE-A-DAY ESSENTIAL Tabs      Take 1 tablet by mouth daily.        polyethylene glycol Packet    MIRALAX/GLYCOLAX     Take 1 packet by mouth daily

## 2017-11-29 NOTE — PROGRESS NOTES
[facility] PHYSICIANS  SPECIALIZING IN BREAKTHROUGHS  Radiation Oncology    On Treatment Visit Note      Sheryle Cruse      Date: 2017   MRN: [id number]   : 1970  Diagnosis: Right Breast Cancer      Reason for Visit:  On Radiation Treatment Visit     Treatment Summary to Date  Treatment Site: R CW, R SCV, R Axilla Current Dose: 1400/6000, 1400/5000, 0/600 cGy Fractions: , , 0/3      Chemotherapy  Chemo concurrent with radx?: No    Subjective:     Pt doing well with no significant complaints. Has noted some blushing in treatemtent site. C/o chest tightness from surgery.  No other pain.    Nursing ROS:   Nutrition Alteration  Diet Type: Patient's Preference  Skin  Skin Reaction: 1 - Faint erythema or dry desquamation (very faint)  Skin Intervention: patient using Aquaphor and 100% aloe vera gel    Cardiovascular  Respiratory effort: 1 - Normal - without distress     Pain Assessment  0-10 Pain Scale: 0  Pain Treatment: patient reports taking tylenol PRN      Objective:   Wt 133 lb  BMI 22.83 kg/m2  NAD  Minimal skin erythema within treatment field.  No desquamation    Labs:  CBC RESULTS:   Recent Labs   Lab Test  17   1652   WBC  6.7   RBC  4.10   HGB  12.9   HCT  37.1   MCV  91   MCH  31.5   MCHC  34.8   RDW  12.6   PLT  449     ELECTROLYTES:  Recent Labs   Lab Test  17   1652   NA  138   POTASSIUM  4.5   CHLORIDE  101   ZAKIYA  9.5   CO2  25   BUN  7   CR  0.53   GLC  92       Assessment:    Tolerating radiation therapy well.  All questions and concerns addressed.    Plan:   1. Continue current therapy.    2. Continue skin care with aquaphor BID.  3. Increase hydration  4. Continue range of motion exercises      Mosaiq chart and setup information reviewed  MVCT/IGRT images checked    Medication Review  Med list reviewed with patient?: Yes  Med list printed and given: Offered and declined    Educational Topic Discussed  Additional Instructions: patient seen by lymphedema  therapist on 11/27/2017  Education Instructions: reviewed skin changes and skin cares      Guillermo Ledesma MD

## 2017-11-29 NOTE — PATIENT INSTRUCTIONS
Please contact [facility] RN with questions or concerns following today's appointment: [phone].    Thank you!

## 2017-11-30 ENCOUNTER — OFFICE VISIT (OUTPATIENT)
Dept: PSYCHOLOGY | Facility: CLINIC | Age: 47
End: 2017-11-30
Payer: COMMERCIAL

## 2017-11-30 ENCOUNTER — APPOINTMENT (OUTPATIENT)
Dept: RADIATION ONCOLOGY | Facility: CLINIC | Age: 47
End: 2017-11-30
Payer: COMMERCIAL

## 2017-11-30 DIAGNOSIS — F43.23 ADJUSTMENT DISORDER WITH MIXED ANXIETY AND DEPRESSED MOOD: Primary | ICD-10-CM

## 2017-11-30 PROCEDURE — 77412 RADIATION TX DELIVERY LVL 3: CPT | Performed by: RADIOLOGY

## 2017-11-30 PROCEDURE — 77417 THER RADIOLOGY PORT IMAGE(S): CPT | Performed by: RADIOLOGY

## 2017-11-30 PROCEDURE — 90832 PSYTX W PT 30 MINUTES: CPT | Performed by: SOCIAL WORKER

## 2017-11-30 NOTE — PROGRESS NOTES
"[facility]  November 30, 2017      Behavioral Health Clinician Progress Note    Patient Name: Sheryle Cruse           Service Type:  Individual      Service Location:   Face to Face in Clinic     Session Start Time: 1:59 pm  Session End Time: 2: 33 pm      Session Length: 16 - 37      Attendees: Patient    Visit Activities (Refresh list every visit): [facility] Only    Diagnostic Assessment Date: 11/17/17  Treatment Plan Review Date: 11/30/17  See Flowsheets for today's PHQ-9 and MAGGY-7 results  Previous PHQ-9:   PHQ-9 SCORE 9/22/2017   Total Score 11     Previous MAGGY-7:   MAGGY-7 SCORE 9/22/2017   Total Score 14       FRANCISCO LEVEL:  FRANCISCO Score (Last Two) 8/22/2016 9/22/2017   FRANCISCO Raw Score 36 29   Activation Score 47.4 52.9   FRANCISCO Level 2 2       DATA  Extended Session (60+ minutes): No  Interactive Complexity: No  Crisis: No  [facility] Patient: No    Treatment Objective(s) Addressed in This Session:  Target Behavior(s): disease management/lifestyle changes , mental health management    Depressed Mood: Identify negative self-talk and behaviors: challenge core beliefs, myths, and actions    Current Stressors / Issues:  Patient continues to process thoughts and feelings secondary to cancer diagnosis. Since previous visit, patient reported that she has started radiation. Patient discussed range of emotions associated with treatment, including feeling limited to the type of emotions that she is allowed to feel.  Patient expressed feeling like a burden to her , and expressed tendency to personalize situations. She stated that she feels responsible for his emotions and that she is the reason that he is \"struggling\".  [facility] explored with patient cognitive techniques to assist her to examine evidence for and against these thoughts.  [facility] introduced additional cognitive techniques to assist her to shift the focus to reduce emotional intensity, to review the temporal nature of thoughts and feelings, and the evidence " "that supports that thoughts can be irrational, unhealthy, and incomplete.   [facility] discussed role of practicing new ways of thinking and viewing the situation until it feels automatic.  Patient also discussed tendency to ruminate over fear of \"death\" and that cancer diagnosis will cause her to die.     Progress on Treatment Objective(s) / Homework:  New Objective established this session - ACTION (Actively working towards change); Intervened by reinforcing change plan / affirming steps taken      Situation    having a difficult time coping with cancer diagnosis/radiation, work, and increased activities around the home     Automatic Thoughts  Cognitive Distortions   Personalization, Mental Filter   Feelings   Feels like a burden     Behavior   Trying to \"fix\" , problem solve for him     Questioning Thoughts   Additional reasons why her  may be stressed and overwhelmed? Who is within control of her 's emotions? How long has her  been having a difficult time coping with his emotions? Would this be present even if she was not in cancer treatment? How does she feel as a result of this cognitive distortion?            Care Plan review completed: Yes    Medication Review:  No current psychiatric medications prescribed    Medication Compliance:  NA    Changes in Health Issues:   Yes: Started radiation    Chemical Use Review:   Substance Use: Chemical use reviewed, no active concerns identified      Tobacco Use: No current tobacco use.      Assessment: Current Emotional / Mental Status (status of significant symptoms):  Risk status (Self / Other harm or suicidal ideation)  Patient denies a history of suicidal ideation, suicide attempts, self-injurious behavior, homicidal ideation, homicidal behavior and and other safety concerns  Patient denies current fears or concerns for personal safety.  Patient denies current or recent suicidal ideation or behaviors.  Patient denies current or recent " homicidal ideation or behaviors.  Patient denies current or recent self injurious behavior or ideation.  Patient denies other safety concerns.  A safety and risk management plan has not been developed at this time, however patient was encouraged to call [place] [phone] should there be a change in any of these risk factors.    Appearance:   Appropriate   Eye Contact:   Good   Psychomotor Behavior: Normal   Attitude:   Cooperative   Orientation:   All  Speech   Rate / Production: Normal    Volume:  Normal   Mood:    Normal  Affect:    Appropriate   Thought Content:  Clear   Thought Form:  Coherent  Logical   Insight:    Good     Diagnoses:  1. Adjustment disorder with mixed anxiety and depressed mood        Collateral Reports Completed:  Not Applicable    Plan: (Homework, other):  Patient was given information about behavioral services and encouraged to schedule a follow up appointment with the clinic [facility] as needed.  She was also given Cognitive Behavioral Therapy skills to practice when experiencing anxiety and depression.  CD Recommendations: No indications of CD issues.  JANE Anne      ______________________________________________________________________    Integrated Primary Care Behavioral Health Treatment Plan    Patient's Name: Sheryle Cruse  YOB: 1970    Date: 11/30/17    DSM-V Diagnoses: Adjustment Disorders  309.28 (F43.23) With mixed anxiety and depressed mood  Psychosocial / Contextual Factors: current cancer treatment  WHODAS: TBD    Referral / Collaboration:  Referral to another professional/service is not indicated at this time..    Anticipated number of session or this episode of care: 6-8      MeasurableTreatment Goal(s) related to diagnosis / functional impairment(s)  Goal 1: Patient will reduce feelings of anxiety as evidenced by lower MAGGY 7.    I will know I've met my goal when I feel that I can cope better with my anxiety.      Objective #A (Patient  Action)    Patient will use distraction each time intrusive worry surfaces.  Status: New - Date: 11/30/17     Intervention(s)  [facility] will assign homework to practice distraction techniques until feels automatic.    Objective #B  Patient will use cognitive strategies identified in therapy to challenge anxious thoughts.  Status: New - Date: 11/30/17     Intervention(s)  [facility] will role-play cognitive restructruing.    Objective #C  Patient will practice deep breathing at least 3 a day.  Status: New - Date: 11/30/17     Intervention(s)  [facility] will assign homework to practice skills.      Patient has reviewed and agreed to the above plan.      Blanca Burns, JANE  November 30, 2017

## 2017-12-01 ENCOUNTER — APPOINTMENT (OUTPATIENT)
Dept: RADIATION ONCOLOGY | Facility: CLINIC | Age: 47
End: 2017-12-01
Payer: COMMERCIAL

## 2017-12-01 PROCEDURE — 77412 RADIATION TX DELIVERY LVL 3: CPT | Performed by: RADIOLOGY

## 2017-12-04 ENCOUNTER — APPOINTMENT (OUTPATIENT)
Dept: RADIATION ONCOLOGY | Facility: CLINIC | Age: 47
End: 2017-12-04
Payer: COMMERCIAL

## 2017-12-04 PROCEDURE — 77412 RADIATION TX DELIVERY LVL 3: CPT | Performed by: RADIOLOGY

## 2017-12-04 PROCEDURE — 77427 RADIATION TX MANAGEMENT X5: CPT | Performed by: RADIOLOGY

## 2017-12-04 PROCEDURE — 77336 RADIATION PHYSICS CONSULT: CPT | Performed by: RADIOLOGY

## 2017-12-05 ENCOUNTER — APPOINTMENT (OUTPATIENT)
Dept: RADIATION ONCOLOGY | Facility: CLINIC | Age: 47
End: 2017-12-05
Payer: COMMERCIAL

## 2017-12-05 ENCOUNTER — TELEPHONE (OUTPATIENT)
Dept: CARE COORDINATION | Facility: CLINIC | Age: 47
End: 2017-12-05

## 2017-12-05 PROCEDURE — 77412 RADIATION TX DELIVERY LVL 3: CPT | Performed by: RADIOLOGY

## 2017-12-05 NOTE — TELEPHONE ENCOUNTER
Social Work Follow-Up Encounter Visit  Oncology Clinic    Data/Intervention:  Patient Name:  Sheryle Cruse  /Age:  1970 (47 year old)    Reason for Follow-Up:  Cancer support/resources    Collaborated With:    Gwendolyn Garvey, [employer]    Resources Provided:  [employer] for breast cancer patients    Assessment:  Pt has financial issues and would benefit from [employer] assistance.  [employer] was able to provide pt with utility bill coverage, [employer] gift cards, and transportation vouchers for pt to go to/from radiation oncology treatments.    Plan:  SW will continue to follow pt for cancer support/resources.  SW will reach out to pt again in 3-4 weeks.    Previously provided patient/family with writer's contact information and availability.     LAZARA Godinez     Social Work  [facility]  Office:  [phone]  E-Mail:  [email or web address]  2017 2:45 PM

## 2017-12-06 ENCOUNTER — OFFICE VISIT (OUTPATIENT)
Dept: RADIATION ONCOLOGY | Facility: CLINIC | Age: 47
End: 2017-12-06
Payer: COMMERCIAL

## 2017-12-06 ENCOUNTER — APPOINTMENT (OUTPATIENT)
Dept: RADIATION ONCOLOGY | Facility: CLINIC | Age: 47
End: 2017-12-06
Payer: COMMERCIAL

## 2017-12-06 VITALS — WEIGHT: 128.5 LBS | BODY MASS INDEX: 22.06 KG/M2

## 2017-12-06 DIAGNOSIS — C50.811 MALIGNANT NEOPLASM OF OVERLAPPING SITES OF RIGHT BREAST IN FEMALE, ESTROGEN RECEPTOR POSITIVE (H): Primary | ICD-10-CM

## 2017-12-06 DIAGNOSIS — Z17.0 MALIGNANT NEOPLASM OF OVERLAPPING SITES OF RIGHT BREAST IN FEMALE, ESTROGEN RECEPTOR POSITIVE (H): Primary | ICD-10-CM

## 2017-12-06 PROCEDURE — 99207 ZZC DROP WITH A PROCEDURE: CPT | Performed by: RADIOLOGY

## 2017-12-06 PROCEDURE — 77412 RADIATION TX DELIVERY LVL 3: CPT | Performed by: RADIOLOGY

## 2017-12-06 ASSESSMENT — PAIN SCALES - GENERAL: PAINLEVEL: NO PAIN (0)

## 2017-12-06 NOTE — PROGRESS NOTES
[facility] PHYSICIANS  SPECIALIZING IN BREAKTHROUGHS  Radiation Oncology    On Treatment Visit Note      Sheryle Cruse      Date: 2017   MRN: [id number]   : 1970  Diagnosis: Right Breast Cancer      Reason for Visit:  On Radiation Treatment Visit     Treatment Summary to Date  Treatment Site: R CW, R SCV, R Axilla Current Dose: 2400/6000, 2400/5000, 0/600 cGy Fractions: , , 0/3      Chemotherapy  Chemo concurrent with radx?: No    Subjective:     Pt has redness within chest wall and scv fields but otherwise no complaints.  Denies ha/fatigue.    Nursing ROS:   Nutrition Alteration  Diet Type: Patient's Preference  Skin  Skin Intervention: patient using Aquaphor and 100% aloe vera gel  Skin Note: patient reports slight erythema     Cardiovascular  Respiratory effort: 1 - Normal - without distress     Pain Assessment  0-10 Pain Scale: 0      Objective:   Wt 128 lb 8 oz  BMI 22.06 kg/m2  NAD  G1 erythema with no desquamation    Labs:  CBC RESULTS:   Recent Labs   Lab Test  17   1652   WBC  6.7   RBC  4.10   HGB  12.9   HCT  37.1   MCV  91   MCH  31.5   MCHC  34.8   RDW  12.6   PLT  449     ELECTROLYTES:  Recent Labs   Lab Test  17   1652   NA  138   POTASSIUM  4.5   CHLORIDE  101   ZAKIYA  9.5   CO2  25   BUN  7   CR  0.53   GLC  92       Assessment:    Tolerating radiation therapy well.  All questions and concerns addressed.    Plan:   1. Continue current therapy.    2. Continue skin care  3. Continue increased hydration      Mosaiq chart and setup information reviewed  Ports checked    Medication Review  Med list reviewed with patient?: Yes           Guillermo Ledesma MD

## 2017-12-06 NOTE — PATIENT INSTRUCTIONS
Please contact [facility] RN with questions or concerns following today's appointment: [phone].    Thank you!

## 2017-12-06 NOTE — MR AVS SNAPSHOT
After Visit Summary   12/6/2017    Sheryle Cruse    MRN: [id number]           Patient Information     Date Of Birth          1970        Visit Information        Provider Department      12/6/2017 4:15 PM Johana Ledesma MD [facility]        Today's Diagnoses     Malignant neoplasm of overlapping sites of right breast in female, estrogen receptor positive (H)    -  1      Care Instructions    Please contact [facility] RN with questions or concerns following today's appointment: [phone].    Thank you!            Follow-ups after your visit        Your next 10 appointments already scheduled     Dec 07, 2017  1:00 PM CST   Lymphedema Treatment with Brynn Tejeda OT   [facility] [place]    [address] [facility] [address]   [phone]            Dec 07, 2017  3:00 PM CST   TREATMENT with RADIATION THERAPIST   [facility] ([facility])    [address] [facility] [address]   [phone]            Dec 08, 2017  1:00 PM CST   Lymphedema Treatment with Brynn Tejeda OT   [facility] [place]    [address] [facility] [address]   [phone]            Dec 08, 2017  3:00 PM CST   TREATMENT with RADIATION THERAPIST   [facility] ([facility])    [address] [facility] [address]   [phone]            Dec 11, 2017  2:45 PM CST   TREATMENT with RADIATION THERAPIST   [facility] ([facility])    [address] [facility] [address]   [phone]            Dec 12, 2017  3:00 PM CST   TREATMENT with RADIATION THERAPIST   [facility] ([facility])    [address] [facility] [address]   [phone]            Dec 13, 2017 11:00 AM CST   Return  Visit with Tianna Santillan MD   [facility] ([facility]    [address] [place] [address] [phone]            Dec 13, 2017  1:00 PM CST   Lymphedema Treatment with Brynn Tejeda OT   [facility] ([facility]    [address] [place] [address]   [phone]            Dec 13, 2017  2:30 PM CST   TREATMENT with RADIATION THERAPIST   [facility] ([facility])    [address] [place] [id number] [phone]            Dec 13, 2017  2:45 PM CST   on treatment visit with Johana Ledesma MD   [facility] ([facility])    [address] [place] [phone]              Who to contact     If you have questions or need follow up information about today's clinic visit or your schedule please contact [facility] directly at [phone].  Normal or non-critical lab and imaging results will be communicated to you by MyChart, letter or phone within 4 business days after the clinic has received the results. If you do not hear from us within 7 days, please contact the clinic through MyChart or phone. If you have a critical or abnormal lab result, we will notify you by phone as soon as possible.  Submit refill requests through [employer] or call your pharmacy and they will forward the refill request to us. Please allow 3 business days for your refill to be completed.          Additional Information About Your Visit        [facility] Information     [employer] is an electronic gateway that provides easy, online access to your medical records. With [employer], you can request a clinic appointment, read your test results, renew a prescription or communicate with your care team.     To sign up for [employer] visit the website at [email or web address]   You will be asked to enter the access code  listed below, as well as some personal information. Please follow the directions to create your username and password.     Your access code is: [email or web address]  Expires: 2017 10:09 AM     Your access code will  in 90 days. If you need help or a new code, please contact your [facility] or call [phone] for assistance.        Care EveryWhere ID     This is your Care EveryWhere ID. This could be used by other organizations to access your [facility] medical records  [id number]        Your Vitals Were     BMI (Body Mass Index)                   22.06 kg/m2            Blood Pressure from Last 3 Encounters:   10/25/17 140/75   10/22/17 120/81   10/19/17 119/83    Weight from Last 3 Encounters:   17 128 lb 8 oz   17 133 lb   17 133 lb 6.4 oz              Today, you had the following     No orders found for display       Primary Care Provider Office Phone # Fax #    [facility] [phone]       [address]  [facility] [address]        Equal Access to Services     CAROLYN [facility] HospitalKRISTIE : Hadii aad ku hadasho Soomaali, waaxda luqadaha, qaybta kaalmada adeegyada, malinda uriarte haydalen lauren copeland . So [facility] [phone].    ATENCIÓN: Si habla español, tiene a spear disposición servicios gratuitos de asistencia lingüística. Llame al [phone].    We comply with applicable federal civil rights laws and [place] laws. We do not discriminate on the basis of race, color, national origin, age, disability, sex, sexual orientation, or gender identity.            Thank you!     Thank you for choosing [facility]  for your care. Our goal is always to provide you with excellent care. Hearing back from our patients is one way we can continue to improve our services. Please take a few minutes to complete the written survey that you may receive in the mail after your visit with us. Thank you!             Your Updated  Medication List - Protect others around you: Learn how to safely use, store and throw away your medicines at www.disposemymeds.org.          This list is accurate as of: 12/6/17 11:59 PM.  Always use your most recent med list.                   Brand Name Dispense Instructions for use Diagnosis    acetaminophen 325 MG Suppository    TYLENOL     Place 325 mg rectally every 4 hours as needed for fever        CALCIUM MAGNESIUM PO      With zinc    RLQ abdominal pain       ONE-A-DAY ESSENTIAL Tabs      Take 1 tablet by mouth daily.        polyethylene glycol Packet    MIRALAX/GLYCOLAX     Take 1 packet by mouth daily

## 2017-12-07 ENCOUNTER — HOSPITAL ENCOUNTER (OUTPATIENT)
Dept: OCCUPATIONAL THERAPY | Facility: CLINIC | Age: 47
Setting detail: THERAPIES SERIES
End: 2017-12-07
Attending: SURGERY
Payer: COMMERCIAL

## 2017-12-07 PROCEDURE — 97140 MANUAL THERAPY 1/> REGIONS: CPT | Mod: GO | Performed by: OCCUPATIONAL THERAPIST

## 2017-12-07 PROCEDURE — 40000445 ZZHC STATISTIC OT VISIT, LYMPHEDEMA: Performed by: OCCUPATIONAL THERAPIST

## 2017-12-07 PROCEDURE — 97110 THERAPEUTIC EXERCISES: CPT | Mod: GO | Performed by: OCCUPATIONAL THERAPIST

## 2017-12-07 PROCEDURE — 77417 THER RADIOLOGY PORT IMAGE(S): CPT | Performed by: RADIOLOGY

## 2017-12-07 PROCEDURE — 77412 RADIATION TX DELIVERY LVL 3: CPT | Performed by: RADIOLOGY

## 2017-12-08 ENCOUNTER — HOSPITAL ENCOUNTER (OUTPATIENT)
Dept: OCCUPATIONAL THERAPY | Facility: CLINIC | Age: 47
Setting detail: THERAPIES SERIES
End: 2017-12-08
Attending: SURGERY
Payer: COMMERCIAL

## 2017-12-08 PROCEDURE — 40000445 ZZHC STATISTIC OT VISIT, LYMPHEDEMA: Performed by: OCCUPATIONAL THERAPIST

## 2017-12-08 PROCEDURE — 97140 MANUAL THERAPY 1/> REGIONS: CPT | Mod: GO | Performed by: OCCUPATIONAL THERAPIST

## 2017-12-08 PROCEDURE — 77412 RADIATION TX DELIVERY LVL 3: CPT | Performed by: RADIOLOGY

## 2017-12-11 ENCOUNTER — APPOINTMENT (OUTPATIENT)
Dept: RADIATION ONCOLOGY | Facility: CLINIC | Age: 47
End: 2017-12-11
Payer: COMMERCIAL

## 2017-12-11 PROCEDURE — 77427 RADIATION TX MANAGEMENT X5: CPT | Performed by: RADIOLOGY

## 2017-12-11 PROCEDURE — 77336 RADIATION PHYSICS CONSULT: CPT | Performed by: RADIOLOGY

## 2017-12-11 PROCEDURE — 77412 RADIATION TX DELIVERY LVL 3: CPT | Performed by: RADIOLOGY

## 2017-12-12 ENCOUNTER — APPOINTMENT (OUTPATIENT)
Dept: RADIATION ONCOLOGY | Facility: CLINIC | Age: 47
End: 2017-12-12
Payer: COMMERCIAL

## 2017-12-12 PROCEDURE — 77412 RADIATION TX DELIVERY LVL 3: CPT | Performed by: RADIOLOGY

## 2017-12-13 ENCOUNTER — HOSPITAL ENCOUNTER (OUTPATIENT)
Dept: OCCUPATIONAL THERAPY | Facility: CLINIC | Age: 47
Setting detail: THERAPIES SERIES
End: 2017-12-13
Attending: SURGERY
Payer: COMMERCIAL

## 2017-12-13 ENCOUNTER — APPOINTMENT (OUTPATIENT)
Dept: RADIATION ONCOLOGY | Facility: CLINIC | Age: 47
End: 2017-12-13
Payer: COMMERCIAL

## 2017-12-13 ENCOUNTER — ONCOLOGY VISIT (OUTPATIENT)
Dept: ONCOLOGY | Facility: CLINIC | Age: 47
End: 2017-12-13
Payer: COMMERCIAL

## 2017-12-13 VITALS
RESPIRATION RATE: 16 BRPM | OXYGEN SATURATION: 99 % | SYSTOLIC BLOOD PRESSURE: 113 MMHG | BODY MASS INDEX: 22.01 KG/M2 | HEART RATE: 80 BPM | DIASTOLIC BLOOD PRESSURE: 76 MMHG | TEMPERATURE: 98.8 F | WEIGHT: 128.2 LBS

## 2017-12-13 DIAGNOSIS — Z17.0 MALIGNANT NEOPLASM OF LOWER-INNER QUADRANT OF LEFT BREAST IN FEMALE, ESTROGEN RECEPTOR POSITIVE (H): Primary | ICD-10-CM

## 2017-12-13 DIAGNOSIS — C50.312 MALIGNANT NEOPLASM OF LOWER-INNER QUADRANT OF LEFT BREAST IN FEMALE, ESTROGEN RECEPTOR POSITIVE (H): Primary | ICD-10-CM

## 2017-12-13 PROCEDURE — 77412 RADIATION TX DELIVERY LVL 3: CPT | Performed by: RADIOLOGY

## 2017-12-13 PROCEDURE — 40000445 ZZHC STATISTIC OT VISIT, LYMPHEDEMA: Performed by: OCCUPATIONAL THERAPIST

## 2017-12-13 PROCEDURE — 99214 OFFICE O/P EST MOD 30 MIN: CPT | Performed by: INTERNAL MEDICINE

## 2017-12-13 PROCEDURE — 97140 MANUAL THERAPY 1/> REGIONS: CPT | Mod: GO | Performed by: OCCUPATIONAL THERAPIST

## 2017-12-13 ASSESSMENT — PAIN SCALES - GENERAL: PAINLEVEL: MILD PAIN (2)

## 2017-12-13 NOTE — NURSING NOTE
"Oncology Rooming Note    December 13, 2017 11:10 AM   Sheryle Cruse is a 47 year old female who presents for:    Chief Complaint   Patient presents with     Oncology Clinic Visit     Initial Vitals: Wt 58.2 kg (128 lb 3.2 oz)  BMI 22.01 kg/m2 Estimated body mass index is 22.01 kg/(m^2) as calculated from the following:    Height as of 10/25/17: 1.626 m (5' 4\").    Weight as of this encounter: 58.2 kg (128 lb 3.2 oz). Body surface area is 1.62 meters squared.  Mild Pain (2) Comment: R sided breast mild discomfort   No LMP recorded. Patient is not currently having periods (Reason: Premenopausal).  Allergies reviewed: Yes  Medications reviewed: Yes    Medications: Medication refills not needed today.  [facility] into [facility] [id number] [place] [address] [address] [place] [address] AT [facility]    Clinical concerns: discuss plan of care MD was notified.    5 minutes for nursing intake (face to face time)     MAGI MIRELES RN              "

## 2017-12-13 NOTE — MR AVS SNAPSHOT
After Visit Summary   12/13/2017    Sheryle Cruse    MRN: [id number]           Patient Information     Date Of Birth          1970        Visit Information        Provider Department      12/13/2017 11:00 AM Tianna Santillan MD [facility]         Follow-ups after your visit        Your next 10 appointments already scheduled     Dec 13, 2017  1:00 PM CST   Lymphedema Treatment with Brynn Nora Hernandezole, OT   [facility] ([facility]    [address] [facility] [address]   [phone]            Dec 13, 2017  2:30 PM CST   TREATMENT with RADIATION THERAPIST   [facility] ([facility])    [address] [facility] [address]   [phone]            Dec 14, 2017  1:00 PM CST   Lymphedema Treatment with Brynn Nora Thole, OT   [facility] ([facility])    [address] [facility] [address]   [phone]            Dec 14, 2017  3:00 PM CST   TREATMENT with RADIATION THERAPIST   [facility] ([facility])    [address] [facility] [address]   [phone]            Dec 14, 2017  3:15 PM CST   on treatment visit with Johana Ledesma MD   [facility] ([facility])    [address] [facility] [address]   [phone]            Dec 15, 2017  3:00 PM CST   TREATMENT with RADIATION THERAPIST   [facility] ([facility])    [address] [facility] [address]   [phone]            Dec 18, 2017  7:15 AM CST   TREATMENT with RADIATION THERAPIST   [facility] ([facility])    [address] [facility] [address]   [phone]            Dec 19, 2017  3:00 PM CST   TREATMENT with RADIATION THERAPIST   [facility] ([facility])    [address] [place] [address] [phone]            Dec 20, 2017  3:00 PM CST   TREATMENT with RADIATION THERAPIST   [facility]    [address] [place] [phone]            Dec 20, 2017  3:15 PM CST   on treatment visit with Johana Ledesma MD   [facility] ([facility])    [address] [place] [phone]              Who to contact     If you have questions or need follow up information about today's clinic visit or your schedule please contact [facility] directly at [phone].  Normal or non-critical lab and imaging results will be communicated to you by "[employer]hart, letter or phone within 4 business days after the clinic has received the results. If you do not hear from us within 7 days, please contact the clinic through MyChart or phone. If you have a critical or abnormal lab result, we will notify you by phone as soon as possible.  Submit refill requests through [employer] or call your pharmacy and they will forward the refill request to us. Please allow 3 business days for your refill to be completed.          Additional Information About Your Visit        [employer] Information     [employer] is an electronic gateway that provides easy, online access to your medical records. With [employer], you can request a clinic appointment, read your test results, renew a prescription or communicate with your care team.     To sign up for [employer] visit the website at [email or web address]   You will be asked to enter the access code listed below, as well as some personal information. Please follow the directions to create your username and password.     Your access code is: [email or web address]  Expires: 2017 10:09 AM     Your access code will  in 90 days. If you need help or a new code, please contact your [facility] or call [phone] for assistance.        Care EveryWhere ID     This is your Care EveryWhere ID. This could be used by other organizations to access your [facility] medical records  [id number]        Your Vitals Were     Pulse Temperature Respirations Pulse Oximetry BMI (Body Mass Index)       80 98.8  F (37.1  C) (Oral) 16 99% 22.01 kg/m2        Blood Pressure from Last 3 Encounters:   12/13/17 113/76   10/25/17 140/75   10/22/17 120/81    Weight from Last 3 Encounters:   12/13/17 58.2 kg (128 lb 3.2 oz)   12/06/17 58.3 kg (128 lb 8 oz)   11/29/17 60.3 kg (133 lb)              Today, you had the following     No orders found for display       Primary Care Provider Office Phone # Fax #    [facility] [phone]       [address]  [facility] [address]        Equal Access to Services     JORGE HERNÁNDEZ : Hadii aad ku hadasho Soomaali, waaxda luqadaha, qaybta kaalmada adeegyada, waxay idiin hayaan adeeg kharabrodie lamir . So [facility] [phone].    ATENCIÓN: Si habla español, tiene a spear disposición servicios gratuitos de asistencia lingüística. Llame al [phone].    We comply with applicable federal civil rights laws and [place] laws. We do not discriminate on the basis of race, color, national origin, age, disability, sex, sexual orientation, or gender identity.            Thank you!     Thank you for choosing [facility]  for your care. Our goal is always to provide you with excellent care. Hearing back from our patients is one way we can continue to improve our services. Please take a few minutes to complete the written survey that you may receive in the mail after your visit with us. Thank you!             Your Updated Medication List - Protect others around you: Learn how to safely use, store and throw away your medicines at www.disposemymeds.org.          This list is accurate as of: 12/13/17 11:35 AM.  Always use your most recent  med list.                   Brand Name Dispense Instructions for use Diagnosis    acetaminophen 325 MG Suppository    TYLENOL     Place 325 mg rectally every 4 hours as needed for fever        CALCIUM MAGNESIUM PO      With zinc    RLQ abdominal pain       ONE-A-DAY ESSENTIAL Tabs      Take 1 tablet by mouth daily.        polyethylene glycol Packet    MIRALAX/GLYCOLAX     Take 1 packet by mouth daily

## 2017-12-13 NOTE — LETTER
12/13/2017         RE: Sheryle Cruse  [address]  [facility] [address]        Dear Colleague,    Thank you for referring your patient, Sheryle Cruse, to the [facility]. Please see a copy of my visit note below.    DATE OF VISIT:  12/13/2017      ONCOLOGY DIAGNOSIS:  October 2017: Diagnosed with stage IIB, T2N1 multifocal invasive ductal carcinoma of the right breast.  Final pathology showed 2 foci at 3 cm as well as separate 0.7 cm invasive ductal carcinoma, [place] grade III, poorly differentiated.  Ductal carcinoma in situ present.  One lymph node removed, having a 1.2 cm metastatic deposit.  The patient declined further axillary node dissection.      THERAPY TO DATE:   1.  October 4, 2017:  Bilateral simple mastectomies and right axillary sentinel node biopsy.   2.  November 2017 to present:  Radiotherapy.   3.  The patient declined chemotherapy.      The patient is unsure of endocrine therapy.      INTERVAL HISTORY:  Sheryle Cruse is a 47-year-old female diagnosed with stage IIB, T2N1 multifocal invasive ductal carcinoma of the right breast in 10/2017 after self-palpating a right breast mass in 06/2017.  The patient presents to clinic to discuss endocrine therapy.  When I last met with the patient in 10/2017, we had recommended systemic chemotherapy, which the patient declined.  Since the patient was adamant that she would not take any chemotherapy, we did not check an Oncotype.  The patient now has numerous questions regarding endocrine therapy.  She is currently receiving radiation, states that she has radiation changes, some burning, redness at the radiation sites, does have some pain between the radiation and surgery.  Otherwise, denies any fevers, chills, nausea, vomiting, chest pain, shortness of breath, cough.  No new palpable masses, and the remainder of her comprehensive review of systems is negative.      SOCIAL HISTORY:  .  Comes in alone.  Denies  current tobacco use.  Is a writer for food, weight and body.      PHYSICAL EXAMINATION:   VITAL SIGNS:  Blood pressure 113/76, pulse 80, respirations 16, temperature 98.8, pulse ox 99% on room air, weight 128 pounds.   GENERAL:  Comfortable, in no acute distress, pleasant.   HEENT:  Atraumatic, normocephalic.  Pupils equal, round, reactive.  Sclerae anicteric.  Oropharynx:  Moist mucous membranes.  No lesions, ulcers or exudate.   NECK:  Supple, full range of motion.  Trachea midline.   HEART:  Regular rate and rhythm, normal S1, S2.  No murmurs, gallops.  No edema.   LUNGS:  Clear to auscultation bilaterally.  No crackles or wheezes.  Normal respiratory effort.   ABDOMEN:  Positive bowel sounds, soft, nontender, nondistended, no hepatosplenomegaly.   EXTREMITIES:  No cyanosis, warm.   MUSCULOSKELETAL:  No point tenderness.   LYMPHATICS:  No cervical, supraclavicular, or axillary nodes palpable.   SKIN:  Radiation changes to her right mastectomy site.   NEUROLOGIC:  Alert and oriented.      ASSESSMENT AND PLAN:   1.  Stage IIB, T2N1 multifocal invasive ductal carcinoma of the right breast.  The patient declined chemotherapy, is currently undergoing radiation.  On today's visit, we discussed the importance of adjuvant endocrine therapy to decrease her risk of recurrence.  I would recommend ovarian suppression with an aromatase inhibitor.  Again, reviewed with the patient the potential risks, side effects, toxicities.  The patient has quite a number of concerns regarding the side effects. Stated that not all patients have side effects but this is the true potential.  However, I am very concerned that the patient is at high risk of recurrence and do recommend some form of endocrine therapy.  The other option is tamoxifen, though based on patient's pathology and age, I would favor ovarian suppression and aromatase inhibitor.  The patient is still unsure whether she would want to go down this road.  She was given the  names of the drugs we mentioned  In regards to ovarian suppression, the options are Zoladex versus oophorectomy.  The patient would like some more time to think about all of this.  I did offer for her to meet with my colleague, Dr. Hoff for a second opinion, which the patient is agreeable to.  We will help try to schedule this in January once the patient has completed radiation.     2.  MUTYH gene mutation.  On the patient's last visit, we had placed a Gastroenterology referral for colonoscopy.  The patient states that she has not yet gotten to this.   3.  Imaging:  On the patient's previous visit, I did recommend a PET scan since she was node positive.  The patient states that she has not yet gotten to this.  I do feel this is incredibly important since it will help with treatment recommendations.  Hopefully, she will have this done prior to seeing Dr. Hoff in January.         UMAIR SANTILLAN MD             D: 2017 11:35   T: 2017 20:31   MT:       Name:     CRUSE, SHERYLE   MRN:      [id number]        Account:      [id number]   :      1970           Visit Date:   2017      Document: [id number]       cc: Katie Billy NP       Again, thank you for allowing me to participate in the care of your patient.        Sincerely,        Umair Santillan MD

## 2017-12-14 ENCOUNTER — APPOINTMENT (OUTPATIENT)
Dept: RADIATION ONCOLOGY | Facility: CLINIC | Age: 47
End: 2017-12-14
Payer: COMMERCIAL

## 2017-12-14 ENCOUNTER — OFFICE VISIT (OUTPATIENT)
Dept: RADIATION ONCOLOGY | Facility: CLINIC | Age: 47
End: 2017-12-14
Payer: COMMERCIAL

## 2017-12-14 ENCOUNTER — HOSPITAL ENCOUNTER (OUTPATIENT)
Dept: OCCUPATIONAL THERAPY | Facility: CLINIC | Age: 47
Setting detail: THERAPIES SERIES
End: 2017-12-14
Attending: SURGERY
Payer: COMMERCIAL

## 2017-12-14 VITALS — BODY MASS INDEX: 21.97 KG/M2 | WEIGHT: 128 LBS

## 2017-12-14 DIAGNOSIS — Z17.0 MALIGNANT NEOPLASM OF OVERLAPPING SITES OF RIGHT BREAST IN FEMALE, ESTROGEN RECEPTOR POSITIVE (H): Primary | ICD-10-CM

## 2017-12-14 DIAGNOSIS — C50.811 MALIGNANT NEOPLASM OF OVERLAPPING SITES OF RIGHT BREAST IN FEMALE, ESTROGEN RECEPTOR POSITIVE (H): Primary | ICD-10-CM

## 2017-12-14 PROCEDURE — 40000445 ZZHC STATISTIC OT VISIT, LYMPHEDEMA: Performed by: OCCUPATIONAL THERAPIST

## 2017-12-14 PROCEDURE — 77412 RADIATION TX DELIVERY LVL 3: CPT | Performed by: RADIOLOGY

## 2017-12-14 PROCEDURE — 97140 MANUAL THERAPY 1/> REGIONS: CPT | Mod: GO | Performed by: OCCUPATIONAL THERAPIST

## 2017-12-14 PROCEDURE — 77417 THER RADIOLOGY PORT IMAGE(S): CPT | Performed by: RADIOLOGY

## 2017-12-14 PROCEDURE — 99207 ZZC DROP WITH A PROCEDURE: CPT | Performed by: RADIOLOGY

## 2017-12-14 ASSESSMENT — PAIN SCALES - GENERAL: PAINLEVEL: NO PAIN (0)

## 2017-12-14 NOTE — PROGRESS NOTES
DATE OF VISIT:  12/13/2017      ONCOLOGY DIAGNOSIS:  October 2017: Diagnosed with stage IIB, T2N1 multifocal invasive ductal carcinoma of the right breast.  Final pathology showed 2 foci at 3 cm as well as separate 0.7 cm invasive ductal carcinoma, Iman grade III, poorly differentiated.  Ductal carcinoma in situ present.  One lymph node removed, having a 1.2 cm metastatic deposit.  The patient declined further axillary node dissection.      THERAPY TO DATE:   1.  October 4, 2017:  Bilateral simple mastectomies and right axillary sentinel node biopsy.   2.  November 2017 to present:  Radiotherapy.   3.  The patient declined chemotherapy.      The patient is unsure of endocrine therapy.      INTERVAL HISTORY:  Sheryle Cruse is a 47-year-old female diagnosed with stage IIB, T2N1 multifocal invasive ductal carcinoma of the right breast in 10/2017 after self-palpating a right breast mass in 06/2017.  The patient presents to clinic to discuss endocrine therapy.  When I last met with the patient in 10/2017, we had recommended systemic chemotherapy, which the patient declined.  Since the patient was adamant that she would not take any chemotherapy, we did not check an Oncotype.  The patient now has numerous questions regarding endocrine therapy.  She is currently receiving radiation, states that she has radiation changes, some burning, redness at the radiation sites, does have some pain between the radiation and surgery.  Otherwise, denies any fevers, chills, nausea, vomiting, chest pain, shortness of breath, cough.  No new palpable masses, and the remainder of her comprehensive review of systems is negative.      SOCIAL HISTORY:  .  Comes in alone.  Denies current tobacco use.  Is a writer for food, weight and body.      PHYSICAL EXAMINATION:   VITAL SIGNS:  Blood pressure 113/76, pulse 80, respirations 16, temperature 98.8, pulse ox 99% on room air, weight 128 pounds.   GENERAL:  Comfortable, in no acute  distress, pleasant.   HEENT:  Atraumatic, normocephalic.  Pupils equal, round, reactive.  Sclerae anicteric.  Oropharynx:  Moist mucous membranes.  No lesions, ulcers or exudate.   NECK:  Supple, full range of motion.  Trachea midline.   HEART:  Regular rate and rhythm, normal S1, S2.  No murmurs, gallops.  No edema.   LUNGS:  Clear to auscultation bilaterally.  No crackles or wheezes.  Normal respiratory effort.   ABDOMEN:  Positive bowel sounds, soft, nontender, nondistended, no hepatosplenomegaly.   EXTREMITIES:  No cyanosis, warm.   MUSCULOSKELETAL:  No point tenderness.   LYMPHATICS:  No cervical, supraclavicular, or axillary nodes palpable.   SKIN:  Radiation changes to her right mastectomy site.   NEUROLOGIC:  Alert and oriented.      ASSESSMENT AND PLAN:   1.  Stage IIB, T2N1 multifocal invasive ductal carcinoma of the right breast.  The patient declined chemotherapy, is currently undergoing radiation.  On today's visit, we discussed the importance of adjuvant endocrine therapy to decrease her risk of recurrence.  I would recommend ovarian suppression with an aromatase inhibitor.  Again, reviewed with the patient the potential risks, side effects, toxicities.  The patient has quite a number of concerns regarding the side effects. Stated that not all patients have side effects but this is the true potential.  However, I am very concerned that the patient is at high risk of recurrence and do recommend some form of endocrine therapy.  The other option is tamoxifen, though based on patient's pathology and age, I would favor ovarian suppression and aromatase inhibitor.  The patient is still unsure whether she would want to go down this road.  She was given the names of the drugs we mentioned  In regards to ovarian suppression, the options are Zoladex versus oophorectomy.  The patient would like some more time to think about all of this.  I did offer for her to meet with my colleague, Dr. Hoff for a second  opinion, which the patient is agreeable to.  We will help try to schedule this in January once the patient has completed radiation.     2.  MUTYH gene mutation.  On the patient's last visit, we had placed a Gastroenterology referral for colonoscopy.  The patient states that she has not yet gotten to this.   3.  Imaging:  On the patient's previous visit, I did recommend a PET scan since she was node positive.  The patient states that she has not yet gotten to this.  I do feel this is incredibly important since it will help with treatment recommendations.  Hopefully, she will have this done prior to seeing Dr. Hoff in January.         UMAIR KEVIN MD             D: 2017 11:35   T: 2017 20:31   MT:       Name:     CRUSE, SHERYLE   MRN:      [id number]        Account:      [id number]   :      1970           Visit Date:   2017      Document: [id number]       cc: Katie Billy NP

## 2017-12-14 NOTE — PATIENT INSTRUCTIONS
Please contact [facility] RN with questions or concerns following today's appointment: [phone].    Thank you!

## 2017-12-14 NOTE — MR AVS SNAPSHOT
After Visit Summary   12/14/2017    Sheryle Cruse    MRN: [id number]           Patient Information     Date Of Birth          1970        Visit Information        Provider Department      12/14/2017 3:15 PM Lemuel Yung MD [facility]        Today's Diagnoses     Malignant neoplasm of overlapping sites of right breast in female, estrogen receptor positive (H)    -  1      Care Instructions    Please contact [facility] RN with questions or concerns following today's appointment: [phone].    Thank you!            Follow-ups after your visit        Your next 10 appointments already scheduled     Dec 15, 2017  3:00 PM CST   TREATMENT with RADIATION THERAPIST   [facility]    [address] [facility] [address]   [phone]            Dec 18, 2017  7:15 AM CST   TREATMENT with RADIATION THERAPIST   [facility] ([facility])    [address] [facility] [address]   [phone]            Dec 19, 2017  3:00 PM CST   TREATMENT with RADIATION THERAPIST   [facility] ([facility])    [address] [facility] [address]   [phone]            Dec 20, 2017  1:00 PM CST   Lymphedema Treatment with Brynn Tejeda OT   [facility] ([facility])    [address] [facility] [address]   [phone]            Dec 20, 2017  3:00 PM CST   TREATMENT with RADIATION THERAPIST   [facility] ([facility])    [address] [facility] [address]   [phone]            Dec 20, 2017  3:15 PM CST   on treatment visit with Johana Ledesma MD   [facility])    [address] [facility] [address]   [phone]            Dec 21, 2017  3:00 PM CST   TREATMENT with  RADIATION THERAPIST   [facility] ([facility])    [address] [facility] [address]   [phone]            Dec 22, 2017  3:00 PM CST   TREATMENT with RADIATION THERAPIST   [facility] ([facility])    [address] [facility] [address]   [phone]            Dec 26, 2017  3:00 PM CST   TREATMENT with RADIATION THERAPIST   [facility] ([facility])    [address] [facility] [address]   [phone]            Dec 27, 2017  3:00 PM CST   TREATMENT with RADIATION THERAPIST   [facility] ([facility])    [address] [facility] [address] [phone]              Who to contact     If you have questions or need follow up information about today's clinic visit or your schedule please contact [facility] directly at [phone].  Normal or non-critical lab and imaging results will be communicated to you by MyChart, letter or phone within 4 business days after the clinic has received the results. If you do not hear from us within 7 days, please contact the clinic through [employer] or phone. If you have a critical or abnormal lab result, we will notify you by phone as soon as possible.  Submit refill requests through [employer] or call your pharmacy and they will forward the refill request to us. Please allow 3 business days for your refill to be completed.          Additional Information About Your Visit        [employer] Information     [employer] is an electronic gateway that provides easy, online access to your medical records. With [employer], you can request a clinic appointment, read your test results, renew a prescription or communicate with your care team.     To sign up for [employer] visit the website at [email or web address]   You will be asked to enter the access code listed below, as well as some personal  information. Please follow the directions to create your username and password.     Your access code is: [email or web address]  Expires: 2017 10:09 AM     Your access code will  in 90 days. If you need help or a new code, please contact your [facility] or call [phone] for assistance.        Care EveryWhere ID     This is your Care EveryWhere ID. This could be used by other organizations to access your [place] medical records  [id number]        Your Vitals Were     BMI (Body Mass Index)                   21.97 kg/m2            Blood Pressure from Last 3 Encounters:   17 113/76   10/25/17 140/75   10/22/17 120/81    Weight from Last 3 Encounters:   17 128 lb   17 128 lb 3.2 oz   17 128 lb 8 oz              Today, you had the following     No orders found for display       Primary Care Provider Office Phone # Fax #    [facility] [place] [phone]       [address]  [facility] [address]        Equal Access to Services     [facility] HospitalKRISTIE : Hadii aad ku hadasho Soomaali, waaxda luqadaha, qaybta kaalmada adesanyayabree, malinda copeland . So [facility] [phone].    ATENCIÓN: Si habla español, tiene a spear disposición servicios gratuitos de asistencia lingüística. Nasir al [phone].    We comply with applicable federal civil rights laws and [place] laws. We do not discriminate on the basis of race, color, national origin, age, disability, sex, sexual orientation, or gender identity.            Thank you!     Thank you for choosing [facility]  for your care. Our goal is always to provide you with excellent care. Hearing back from our patients is one way we can continue to improve our services. Please take a few minutes to complete the written survey that you may receive in the mail after your visit with us. Thank you!             Your Updated Medication List - Protect others around you:  Learn how to safely use, store and throw away your medicines at www.disposemymeds.org.          This list is accurate as of: 12/14/17  3:24 PM.  Always use your most recent med list.                   Brand Name Dispense Instructions for use Diagnosis    acetaminophen 325 MG Suppository    TYLENOL     Place 325 mg rectally every 4 hours as needed for fever        CALCIUM MAGNESIUM PO      With zinc    RLQ abdominal pain       ONE-A-DAY ESSENTIAL Tabs      Take 1 tablet by mouth daily.        polyethylene glycol Packet    MIRALAX/GLYCOLAX     Take 1 packet by mouth daily

## 2017-12-14 NOTE — PROGRESS NOTES
[facility] PHYSICIANS  SPECIALIZING IN BREAKTHROUGHS  Radiation Oncology    On Treatment Visit Note      Sheryle Cruse      Date: 2017   MRN: [id number]   : 1970  Diagnosis: Right Breast Cancer      Reason for Visit:  On Radiation Treatment Visit     Treatment Summary to Date  Treatment Site: R CW, R SCV, R Axilla Current Dose: 3600/6000, 3600/5000, 0/600 cGy Fractions: , , 0/3      Chemotherapy  Chemo concurrent with radx?: No    Subjective:   Tolerating RT well thus far. No pain, just discomfort and redness in R breast. Energy level is adequate.    Nursing ROS:   Nutrition Alteration  Diet Type: Patient's Preference  Skin  Skin Reaction: 2 - Moderate to brisk erythema, patchy moist desquamation, mostly confined to skin folds and creases, moderate edema (no desquamation)  Skin Intervention: patient using Aquaphor and 100% aloe vera gel        Cardiovascular  Respiratory effort: 1 - Normal - without distress           Pain Assessment  0-10 Pain Scale: 0      Objective:   Wt 128 lb  BMI 21.97 kg/m2  NAD  Declined skin check by MD, but per RN there was erythema without desquamation in R breast    Labs:  CBC RESULTS:   Recent Labs   Lab Test  17   1652   WBC  6.7   RBC  4.10   HGB  12.9   HCT  37.1   MCV  91   MCH  31.5   MCHC  34.8   RDW  12.6   PLT  449     ELECTROLYTES:  Recent Labs   Lab Test  17   1652   NA  138   POTASSIUM  4.5   CHLORIDE  101   ZAKIYA  9.5   CO2  25   BUN  7   CR  0.53   GLC  92       Assessment:    Tolerating radiation therapy well.  All questions and concerns addressed.    Plan:   1. Continue current therapy.  Cont aquaphor on skin.      Mosaiq chart and setup information reviewed      Medication Review  Med list reviewed with patient?: Yes  Med list printed and given: Offered and declined    Educational Topic Discussed  Education Instructions: following with lymphedema therapy, denies concerns      Lemuel Yung MD

## 2017-12-15 ENCOUNTER — TELEPHONE (OUTPATIENT)
Dept: CARE COORDINATION | Facility: CLINIC | Age: 47
End: 2017-12-15

## 2017-12-15 ENCOUNTER — APPOINTMENT (OUTPATIENT)
Dept: RADIATION ONCOLOGY | Facility: CLINIC | Age: 47
End: 2017-12-15
Payer: COMMERCIAL

## 2017-12-15 PROCEDURE — 77412 RADIATION TX DELIVERY LVL 3: CPT | Performed by: RADIOLOGY

## 2017-12-15 NOTE — TELEPHONE ENCOUNTER
Social Work Follow-Up Encounter Visit  Oncology Clinic    Data/Intervention:  Patient Name:  Sheryle Cruse  /Age:  1970 (47 year old)    Reason for Follow-Up:  Cancer support/resources, financial concerns    Collaborated With:    Pt    Resources Provided:  [employer], pt already received [employer].    Assessment:  Pt continues to have financial need related to her cancer diagnosis and treatment.  MONA e-mailed to pt the [employer] contact information so pt can reach out to them.     Plan:  SW will continue to follow and reach out to pt again in 2 weeks regarding cancer support/resources.  Previously provided patient/family with writer's contact information and availability.     LAZARA Godinez     Social Work  [facility]  Office:  [phone]  E-Mail:  [email or web address]  12/15/2017 11:58 AM

## 2017-12-18 ENCOUNTER — APPOINTMENT (OUTPATIENT)
Dept: RADIATION ONCOLOGY | Facility: CLINIC | Age: 47
End: 2017-12-18
Payer: COMMERCIAL

## 2017-12-18 PROCEDURE — 77336 RADIATION PHYSICS CONSULT: CPT | Performed by: RADIOLOGY

## 2017-12-18 PROCEDURE — 77427 RADIATION TX MANAGEMENT X5: CPT | Performed by: RADIOLOGY

## 2017-12-18 PROCEDURE — 77412 RADIATION TX DELIVERY LVL 3: CPT | Performed by: RADIOLOGY

## 2017-12-20 ENCOUNTER — HOSPITAL ENCOUNTER (OUTPATIENT)
Dept: OCCUPATIONAL THERAPY | Facility: CLINIC | Age: 47
Setting detail: THERAPIES SERIES
End: 2017-12-20
Attending: SURGERY
Payer: COMMERCIAL

## 2017-12-20 ENCOUNTER — OFFICE VISIT (OUTPATIENT)
Dept: RADIATION ONCOLOGY | Facility: CLINIC | Age: 47
End: 2017-12-20
Payer: COMMERCIAL

## 2017-12-20 ENCOUNTER — DOCUMENTATION ONLY (OUTPATIENT)
Dept: SPIRITUAL SERVICES | Facility: CLINIC | Age: 47
End: 2017-12-20

## 2017-12-20 ENCOUNTER — APPOINTMENT (OUTPATIENT)
Dept: RADIATION ONCOLOGY | Facility: CLINIC | Age: 47
End: 2017-12-20
Payer: COMMERCIAL

## 2017-12-20 VITALS — BODY MASS INDEX: 21.61 KG/M2 | WEIGHT: 125.9 LBS

## 2017-12-20 DIAGNOSIS — Z71.81 SPIRITUAL OR RELIGIOUS COUNSELING: Primary | ICD-10-CM

## 2017-12-20 DIAGNOSIS — Z17.0 MALIGNANT NEOPLASM OF OVERLAPPING SITES OF RIGHT BREAST IN FEMALE, ESTROGEN RECEPTOR POSITIVE (H): Primary | ICD-10-CM

## 2017-12-20 DIAGNOSIS — C50.811 MALIGNANT NEOPLASM OF OVERLAPPING SITES OF RIGHT BREAST IN FEMALE, ESTROGEN RECEPTOR POSITIVE (H): Primary | ICD-10-CM

## 2017-12-20 PROCEDURE — 97140 MANUAL THERAPY 1/> REGIONS: CPT | Mod: GO | Performed by: OCCUPATIONAL THERAPIST

## 2017-12-20 PROCEDURE — 99207 ZZC DROP WITH A PROCEDURE: CPT | Performed by: RADIOLOGY

## 2017-12-20 PROCEDURE — 97110 THERAPEUTIC EXERCISES: CPT | Mod: GO | Performed by: OCCUPATIONAL THERAPIST

## 2017-12-20 PROCEDURE — 77412 RADIATION TX DELIVERY LVL 3: CPT | Performed by: RADIOLOGY

## 2017-12-20 PROCEDURE — 40000445 ZZHC STATISTIC OT VISIT, LYMPHEDEMA: Performed by: OCCUPATIONAL THERAPIST

## 2017-12-20 ASSESSMENT — PAIN SCALES - GENERAL: PAINLEVEL: NO PAIN (0)

## 2017-12-20 NOTE — MR AVS SNAPSHOT
After Visit Summary   12/20/2017    Sheryle Cruse    MRN: [id number]           Patient Information     Date Of Birth          1970        Visit Information        Provider Department      12/20/2017 3:15 PM Johana Ledesma MD [facility]        Today's Diagnoses     Malignant neoplasm of overlapping sites of right breast in female, estrogen receptor positive (H)    -  1       Follow-ups after your visit        Your next 10 appointments already scheduled     Dec 21, 2017  3:00 PM CST   TREATMENT with RADIATION THERAPIST   [facility]    [address] [facility] [address]   [phone]            Dec 22, 2017  3:00 PM CST   TREATMENT with RADIATION THERAPIST   [facility])    [address] [facility] [address]   [phone]            Dec 26, 2017  3:00 PM CST   TREATMENT with RADIATION THERAPIST   [facility])    [address] [facility] [address]   [phone]            Dec 27, 2017  1:00 PM CST   Lymphedema Treatment with Brynn Tejeda [facility] ([facility])    [address] [facility] [address]   [phone]            Dec 27, 2017  3:00 PM CST   TREATMENT with RADIATION THERAPIST   [facility]    [address] [facility] [address]   [phone]            Dec 27, 2017  3:15 PM CST   on treatment visit with Johana Ledesma MD   [facility])    [address] [facility] [address]   [phone]            Dec 28, 2017  8:40 AM CST   Return Visit with Birdie Gonzalez MD   [facility]    [address] [facility] [address]   [phone]            Dec  28, 2017  3:00 PM CST   TREATMENT with RADIATION THERAPIST   [facility])    [address] [facility] [address] [phone]            Dec 29, 2017  3:00 PM CST   TREATMENT with RADIATION THERAPIST   [facility] ([facility])    [address] [facility] [address] [phone]            Jan 02, 2018  3:00 PM CST   TREATMENT with RADIATION THERAPIST   [facility] ([facility])    [address] [facility] [address] [phone]              Who to contact     If you have questions or need follow up information about today's clinic visit or your schedule please contact [facility] directly at [phone].  Normal or non-critical lab and imaging results will be communicated to you by MyChart, letter or phone within 4 business days after the clinic has received the results. If you do not hear from us within 7 days, please contact the clinic through [employer] or phone. If you have a critical or abnormal lab result, we will notify you by phone as soon as possible.  Submit refill requests through [employer] or call your pharmacy and they will forward the refill request to us. Please allow 3 business days for your refill to be completed.          Additional Information About Your Visit        [employer] Information     [employer] is an electronic gateway that provides easy, online access to your medical records. With [employer], you can request a clinic appointment, read your test results, renew a prescription or communicate with your care team.     To sign up for [employer] visit the website at [email or web address]   You will be asked to enter the access code listed below, as well as some personal information. Please follow the directions to create your username and password.     Your access code is: [email or web address]  Expires: 12/21/2017 10:09 AM     Your access  code will  in 90 days. If you need help or a new code, please contact your [facility] or call [phone] for assistance.        Care EveryWhere ID     This is your Care EveryWhere ID. This could be used by other organizations to access your [place] medical records  [id number]        Your Vitals Were     BMI (Body Mass Index)                   21.61 kg/m2            Blood Pressure from Last 3 Encounters:   17 113/76   10/25/17 140/75   10/22/17 120/81    Weight from Last 3 Encounters:   17 125 lb 14.4 oz   17 128 lb   17 128 lb 3.2 oz              Today, you had the following     No orders found for display       Primary Care Provider Office Phone # Fax #    [facility] [phone] [address]  [facility] [address]        Equal Access to Services     JORGE HERNÁNDEZ : Hadii aad ku hadasho Soomaali, waaxda luqadaha, qaybta kaalmada adeegyada, waxay idiin hayaan lauren copeland . So [facility] [phone].    ATENCIÓN: Si habla español, tiene a spear disposición servicios gratuitos de asistencia lingüística. Narayaname al [phone].    We comply with applicable federal civil rights laws and [place] laws. We do not discriminate on the basis of race, color, national origin, age, disability, sex, sexual orientation, or gender identity.            Thank you!     Thank you for choosing [facility]  for your care. Our goal is always to provide you with excellent care. Hearing back from our patients is one way we can continue to improve our services. Please take a few minutes to complete the written survey that you may receive in the mail after your visit with us. Thank you!             Your Updated Medication List - Protect others around you: Learn how to safely use, store and throw away your medicines at www.disposemymeds.org.          This list is accurate as of: 17  4:02 PM.  Always use your  most recent med list.                   Brand Name Dispense Instructions for use Diagnosis    acetaminophen 325 MG Suppository    TYLENOL     Place 325 mg rectally every 4 hours as needed for fever        CALCIUM MAGNESIUM PO      With zinc    RLQ abdominal pain       ONE-A-DAY ESSENTIAL Tabs      Take 1 tablet by mouth daily.        polyethylene glycol Packet    MIRALAX/GLYCOLAX     Take 1 packet by mouth daily

## 2017-12-20 NOTE — PROGRESS NOTES
[facility] PHYSICIANS  SPECIALIZING IN BREAKTHROUGHS  Radiation Oncology    On Treatment Visit Note      Sheryle Cruse      Date: 2017   MRN: [id number]   : 1970  Diagnosis: Right Breast Cancer      Reason for Visit:  On Radiation Treatment Visit     Treatment Summary to Date  Treatment Site: R CW, R SCV, R Axilla Current Dose: 4200/6000, 4200/5000, 0/600 cGy Fractions: ,, 0/3      Chemotherapy  Chemo concurrent with radx?: No    Subjective:     Moderate erythema and hyperpigmentation within treatment field.  No desquamation.  + fatigue. Significant discomfort within axilla.    Nursing ROS:   Nutrition Alteration  Diet Type: Patient's Preference  Skin  Skin Intervention: patient using Aquaphor and 100% aloe vera gel. patient reports using hydrocortisone spray  Skin Note: patient reports feeling tight around right axilla. patient states skin feels sunburned.      Cardiovascular  Respiratory effort: 1 - Normal - without distress     Psychosocial  Mood - Anxiety: 0 - Normal  Mood - Depression: 0 - Normal  Pyschosocial Note: patient reports energy is good and not yet fatigued  Pain Assessment  0-10 Pain Scale: 0  Pain Descriptors: Discomfort  Pain Note: right axill/chest      Objective:   Wt 125 lb 14.4 oz  BMI 21.61 kg/m2  NAD  + moderate skin erythema within treatment field.  No desquamation.    Labs:  CBC RESULTS:   Recent Labs   Lab Test  17   1652   WBC  6.7   RBC  4.10   HGB  12.9   HCT  37.1   MCV  91   MCH  31.5   MCHC  34.8   RDW  12.6   PLT  449     ELECTROLYTES:  Recent Labs   Lab Test  17   1652   NA  138   POTASSIUM  4.5   CHLORIDE  101   ZAKIYA  9.5   CO2  25   BUN  7   CR  0.53   GLC  92       Assessment:    Tolerating radiation therapy well.  All questions and concerns addressed.  Skin changes as expected.    Plan:   1. Continue current therapy.    2. Continue ROM exercises as tolerated.  3. D/c aloe gel and hydrocortisone cream, ETOH likely contributing to  dryness.  4. Aquaphor BID to entire treatment field.  Miaderm to axilla prior to Aquaphor      Mosaiq chart and setup information reviewed  Ports checked                Guillermo Ledesma MD

## 2017-12-20 NOTE — PROGRESS NOTES
SPIRITUAL HEALTH SERVICES  SPIRITUAL ASSESSMENT Progress Note  [facility]     introduced himself to Sheryle Cruse and informed her of his availability.    Mahendra Porras M.Div., [facility]  Staff   Office tel: [phone]

## 2017-12-21 ENCOUNTER — TELEPHONE (OUTPATIENT)
Dept: RADIATION ONCOLOGY | Facility: CLINIC | Age: 47
End: 2017-12-21

## 2017-12-21 ENCOUNTER — APPOINTMENT (OUTPATIENT)
Dept: RADIATION ONCOLOGY | Facility: CLINIC | Age: 47
End: 2017-12-21
Payer: COMMERCIAL

## 2017-12-21 PROCEDURE — 77412 RADIATION TX DELIVERY LVL 3: CPT | Performed by: RADIOLOGY

## 2017-12-21 PROCEDURE — 77417 THER RADIOLOGY PORT IMAGE(S): CPT | Performed by: RADIOLOGY

## 2017-12-21 NOTE — TELEPHONE ENCOUNTER
Received message on Luana Matos's- RN care coordinator voicemail from patient. Patient requesting call back concerning new business card for Luana and requesting advice on deodorant called Josiah that patient has recently starting using. This writer spoke with Dr Ledesma about patients request. Dr Ledesma stated that Alra deodorant is alright to use and that she also recommends [employer] [place] for patient. Dr Ledesma stated that she will do more research on Alra. This writer contacted patient with Dr Ledesma advise about patients request. This writer stated per Callie that Alra is alright to use and that [employer] [place] is suggested. Patient verbalized understanding to Dr Ledesma's advice. Patient will contact this writer for Luana's business card once she arrives for radiation therapy

## 2017-12-22 ENCOUNTER — APPOINTMENT (OUTPATIENT)
Dept: RADIATION ONCOLOGY | Facility: CLINIC | Age: 47
End: 2017-12-22
Payer: COMMERCIAL

## 2017-12-22 PROCEDURE — 77280 THER RAD SIMULAJ FIELD SMPL: CPT | Performed by: RADIOLOGY

## 2017-12-22 PROCEDURE — 77412 RADIATION TX DELIVERY LVL 3: CPT | Performed by: RADIOLOGY

## 2017-12-26 ENCOUNTER — APPOINTMENT (OUTPATIENT)
Dept: RADIATION ONCOLOGY | Facility: CLINIC | Age: 47
End: 2017-12-26
Payer: COMMERCIAL

## 2017-12-26 ENCOUNTER — TELEPHONE (OUTPATIENT)
Dept: RADIATION ONCOLOGY | Facility: CLINIC | Age: 47
End: 2017-12-26

## 2017-12-26 DIAGNOSIS — L30.9 DERMATITIS: Primary | ICD-10-CM

## 2017-12-26 PROCEDURE — 77412 RADIATION TX DELIVERY LVL 3: CPT | Performed by: RADIOLOGY

## 2017-12-26 RX ORDER — SILVER SULFADIAZINE 10 MG/G
CREAM TOPICAL 2 TIMES DAILY
Qty: 85 G | Refills: 3 | Status: SHIPPED | OUTPATIENT
Start: 2017-12-26 | End: 2018-01-02

## 2017-12-26 NOTE — TELEPHONE ENCOUNTER
Received voicemail from patient requesting medication silvadene. Patient reported tightness, pain, and erythema in right chest and axilla. Patient stated that skin was on fire and wanted silvadene to help with the rest of treatment. Spoke with Dr Ledesma about patients request and Dr Ledesma ordered medication for patient. Spoke with patient to inform them that medication was ordered and can be picked up for use at [facility] pharmacy. Patient verbalized understanding     Star IRIZARRY

## 2017-12-27 ENCOUNTER — HOSPITAL ENCOUNTER (OUTPATIENT)
Dept: OCCUPATIONAL THERAPY | Facility: CLINIC | Age: 47
Setting detail: THERAPIES SERIES
End: 2017-12-27
Attending: SURGERY
Payer: COMMERCIAL

## 2017-12-27 ENCOUNTER — OFFICE VISIT (OUTPATIENT)
Dept: RADIATION ONCOLOGY | Facility: CLINIC | Age: 47
End: 2017-12-27
Payer: COMMERCIAL

## 2017-12-27 ENCOUNTER — APPOINTMENT (OUTPATIENT)
Dept: RADIATION ONCOLOGY | Facility: CLINIC | Age: 47
End: 2017-12-27
Payer: COMMERCIAL

## 2017-12-27 VITALS — BODY MASS INDEX: 21.63 KG/M2 | WEIGHT: 126 LBS

## 2017-12-27 DIAGNOSIS — Z17.0 MALIGNANT NEOPLASM OF OVERLAPPING SITES OF RIGHT BREAST IN FEMALE, ESTROGEN RECEPTOR POSITIVE (H): Primary | ICD-10-CM

## 2017-12-27 DIAGNOSIS — C50.811 MALIGNANT NEOPLASM OF OVERLAPPING SITES OF RIGHT BREAST IN FEMALE, ESTROGEN RECEPTOR POSITIVE (H): Primary | ICD-10-CM

## 2017-12-27 PROCEDURE — 77427 RADIATION TX MANAGEMENT X5: CPT | Performed by: RADIOLOGY

## 2017-12-27 PROCEDURE — 77336 RADIATION PHYSICS CONSULT: CPT | Performed by: RADIOLOGY

## 2017-12-27 PROCEDURE — 97140 MANUAL THERAPY 1/> REGIONS: CPT | Mod: GO | Performed by: OCCUPATIONAL THERAPIST

## 2017-12-27 PROCEDURE — 97535 SELF CARE MNGMENT TRAINING: CPT | Mod: GO | Performed by: OCCUPATIONAL THERAPIST

## 2017-12-27 PROCEDURE — 77412 RADIATION TX DELIVERY LVL 3: CPT | Performed by: RADIOLOGY

## 2017-12-27 PROCEDURE — 40000445 ZZHC STATISTIC OT VISIT, LYMPHEDEMA: Performed by: OCCUPATIONAL THERAPIST

## 2017-12-27 PROCEDURE — 99207 ZZC DROP WITH A PROCEDURE: CPT | Performed by: RADIOLOGY

## 2017-12-27 ASSESSMENT — PAIN SCALES - GENERAL: PAINLEVEL: NO PAIN (0)

## 2017-12-27 NOTE — MR AVS SNAPSHOT
After Visit Summary   12/27/2017    Sheryle Cruse    MRN: [id number]           Patient Information     Date Of Birth          1970        Visit Information        Provider Department      12/27/2017 3:15 PM Johana Ledesma MD [facility]        Today's Diagnoses     Malignant neoplasm of overlapping sites of right breast in female, estrogen receptor positive (H)    -  1      Care Instructions    Please contact [facility] RN with questions or concerns following today's appointment: [phone].    Thank you!            Follow-ups after your visit        Your next 10 appointments already scheduled     Dec 28, 2017  8:40 AM CST   [facility] Visit with Birdie Gonzalez MD   [facility]    [address] [facility] [address]   [phone]            Dec 28, 2017  3:00 PM CST   TREATMENT with RADIATION THERAPIST   [facility]    [address] [facility] [address]   [phone]            Dec 29, 2017  3:00 PM CST   TREATMENT with RADIATION THERAPIST   [facility]    [address] [facility] [address]   [phone]            Jan 02, 2018  3:00 PM CST   TREATMENT with RADIATION THERAPIST   [facility]    [address] [facility] [address]   [phone]            Jan 03, 2018  1:00 PM CST   Lymphedema Treatment with Brynn Tejeda OT   [facility] [place]    [address] [facility] [address]   [phone]            Jan 03, 2018  2:00 PM CST   TREATMENT with RADIATION THERAPIST   [facility]    [address] [facility] [address]   [phone]            Jan 03, 2018  2:15 PM CST   on treatment visit  with Lemuel Yung MD   [facility]    [address] [place] [id number] [phone]            2018  1:45 PM CST   TREATMENT with RADIATION THERAPIST   [facility]    [address] [place] [id number] [phone]            2018  1:00 PM CST   New Visit with Niurka Hoff MD   [facility]    [address] [place] [address]   [phone]              Who to contact     If you have questions or need follow up information about today's clinic visit or your schedule please contact [facility] directly at [phone].  Normal or non-critical lab and imaging results will be communicated to you by MyChart, letter or phone within 4 business days after the clinic has received the results. If you do not hear from us within 7 days, please contact the clinic through J [employer] R Renovationshart or phone. If you have a critical or abnormal lab result, we will notify you by phone as soon as possible.  Submit refill requests through Cook T[employer] Eat or call your pharmacy and they will forward the refill request to us. Please allow 3 business days for your refill to be completed.          Additional Information About Your Visit        [employer]ojio Information     Cook Taste Eat is an electronic gateway that provides easy, online access to your medical records. With [employer]aste Eat, you can request a clinic appointment, read your test results, renew a prescription or communicate with your care team.     To sign up for [employer] Eat visit the website at [email or web address]   You will be asked to enter the access code listed below, as well as some personal information. Please follow the directions to create your username and password.     Your access code is: [email or web address]  Expires: 3/27/2018  3:52 PM     Your access code will  in 90 days. If  you need help or a new code, please contact your [facility] or call [phone] for assistance.        Care EveryWhere ID     This is your Care EveryWhere ID. This could be used by other organizations to access your [place] medical records  [id number]        Your Vitals Were     BMI (Body Mass Index)                   21.63 kg/m2            Blood Pressure from Last 3 Encounters:   12/13/17 113/76   10/25/17 140/75   10/22/17 120/81    Weight from Last 3 Encounters:   12/27/17 126 lb   12/20/17 125 lb 14.4 oz   12/14/17 128 lb              Today, you had the following     No orders found for display       Primary Care Provider Office Phone # Fax #    [facility] [phone] [address]  [facility] [address]        Equal Access to Services     JORGE HERNÁNDEZ : Hadii aad ku hadasho Sobee, waaxda luqadaha, qaybta kaalmada adesanyayada, malinda copeland . So [facility] [phone].    ATENCIÓN: Si habla español, tiene a spear disposición servicios gratuitos de asistencia lingüística. Llame al [phone].    We comply with applicable federal civil rights laws and [place] laws. We do not discriminate on the basis of race, color, national origin, age, disability, sex, sexual orientation, or gender identity.            Thank you!     Thank you for choosing [facility]  for your care. Our goal is always to provide you with excellent care. Hearing back from our patients is one way we can continue to improve our services. Please take a few minutes to complete the written survey that you may receive in the mail after your visit with us. Thank you!             Your Updated Medication List - Protect others around you: Learn how to safely use, store and throw away your medicines at www.disposemymeds.org.          This list is accurate as of: 12/27/17  3:52 PM.  Always use your most recent med list.                    Brand Name Dispense Instructions for use Diagnosis    acetaminophen 325 MG Suppository    TYLENOL     Place 325 mg rectally every 4 hours as needed for fever        CALCIUM MAGNESIUM PO      With zinc    RLQ abdominal pain       ONE-A-DAY ESSENTIAL Tabs      Take 1 tablet by mouth daily.        polyethylene glycol Packet    MIRALAX/GLYCOLAX     Take 1 packet by mouth daily        silver sulfADIAZINE 1 % cream    SILVADENE    85 g    Apply topically 2 times daily for 7 days    Dermatitis

## 2017-12-28 ENCOUNTER — OFFICE VISIT (OUTPATIENT)
Dept: SURGERY | Facility: CLINIC | Age: 47
End: 2017-12-28
Payer: COMMERCIAL

## 2017-12-28 ENCOUNTER — TELEPHONE (OUTPATIENT)
Dept: SURGERY | Facility: CLINIC | Age: 47
End: 2017-12-28

## 2017-12-28 ENCOUNTER — APPOINTMENT (OUTPATIENT)
Dept: RADIATION ONCOLOGY | Facility: CLINIC | Age: 47
End: 2017-12-28
Payer: COMMERCIAL

## 2017-12-28 VITALS — SYSTOLIC BLOOD PRESSURE: 137 MMHG | HEART RATE: 123 BPM | DIASTOLIC BLOOD PRESSURE: 82 MMHG

## 2017-12-28 DIAGNOSIS — Z17.0 MALIGNANT NEOPLASM OF UPPER-OUTER QUADRANT OF RIGHT BREAST IN FEMALE, ESTROGEN RECEPTOR POSITIVE (H): Primary | ICD-10-CM

## 2017-12-28 DIAGNOSIS — C50.411 MALIGNANT NEOPLASM OF UPPER-OUTER QUADRANT OF RIGHT BREAST IN FEMALE, ESTROGEN RECEPTOR POSITIVE (H): Primary | ICD-10-CM

## 2017-12-28 PROCEDURE — 77412 RADIATION TX DELIVERY LVL 3: CPT | Performed by: RADIOLOGY

## 2017-12-28 PROCEDURE — 99024 POSTOP FOLLOW-UP VISIT: CPT | Performed by: SURGERY

## 2017-12-28 NOTE — LETTER
2017    Re: Sheryle Cruse - 1970    HISTORY OF PRESENT ILLNESS:  Sheryle Cruse is a 47 year old female who is seen in follow up s/p bilateral mastectomies and right lymph node biopsy on 10/4/17.  She is currently receiving right sided radiation therapy which will be completed in one week.  She states that she is tolerating it pretty well though she has a fair amount of skin burn and irritation.  Sheryle says that she is generally feeling very well.     REVIEW OF SYSTEMS:  Constitutional:  Negative for chills, fatigue, fever and weight change.  Eyes:  Negative for blurred vision, eye pain and photophobia.  ENT:  Negative for hearing problems, ENT pain, congestion, rhinorrhea, epistaxis, hoarseness and dental problems.  Cardiovascular:  Negative for chest pain, palpitations, tachycardia, orthopnea and edema.  Respiratory:  Negative for cough, dyspnea and hemoptysis.  Gastrointestinal:  Negative for abdominal pain, heartburn, constipation, diarrhea and stool changes.  Musculoskeletal:  Negative for arthralgias, back pain and myalgias.  Integumentary/Breast:  See HPI.     Vitals: /82 (BP Location: Left arm, Patient Position: Chair, Cuff Size: Adult Regular)  Pulse 123  BMI= There is no height or weight on file to calculate BMI.     EXAM:  GENERAL: healthy, alert and no distress   BREAST:  The breasts are absent and the mastectomy scars are intact and healing.  The skin on the right chest wall is red and irritated, but there are no open areas.  A small seroma is noted at the lateral end of the right mastectomy scar but it is asymptomatic.  The shoulder ROM is good.     ASSESSMENT:  Sheryle Cruse is doing well.  The radiation will be completed in one week.  She has tolerated the radiation nicely so far.  The small seroma is not concerning as it is asymptomatic.     PLAN:  Sheryle will follow up here with Dr. Ackerman in May.  She is soon meeting with Dr. Hoff to discuss adjuvant hormone  therapy.  She will continue to do her ROM exercises and work with PT.     Birdie Gonzalez MD

## 2017-12-28 NOTE — MR AVS SNAPSHOT
After Visit Summary   12/28/2017    Sheryle Cruse    MRN: [id number]           Patient Information     Date Of Birth          1970        Visit Information        Provider Department      12/28/2017 8:40 AM Birdie Gonzalez MD [facility]         Follow-ups after your visit        Your next 10 appointments already scheduled     Dec 28, 2017  3:00 PM CST   TREATMENT with RADIATION THERAPIST   [facility] ([facility])    [address] [facility] [address]   [phone]            Dec 29, 2017  3:00 PM CST   TREATMENT with RADIATION THERAPIST   [facility] ([facility])    [address] [facility] [address]   [phone]            Jan 02, 2018  3:00 PM CST   TREATMENT with RADIATION THERAPIST   [facility] ([facility])    [address] [facility] [address]   [phone]            Jan 03, 2018  1:00 PM CST   Lymphedema Treatment with Brynn Tejeda OT   [facility] [place]    [address] [facility] [address]   [phone]            Jan 03, 2018  2:00 PM CST   TREATMENT with RADIATION THERAPIST   [facility] ([facility])    [address] [facility] [address]   [phone]            Jan 03, 2018  2:15 PM CST   on treatment visit with Lemuel Yung MD   [facility] ([facility])    [address] [facility] [address]   [phone]            Jan 04, 2018  1:45 PM CST   TREATMENT with RADIATION THERAPIST   [facility] ([facility])    [address] [facility] [address]   [phone]            Jan 11, 2018  1:00 PM CST   New Visit with Niurka Hoff MD   [facility] ([facility])    [address]  [facility] [address] [phone]            May 10, 2018  8:00 AM CDT   Return Visit with Gwendolyn Ackerman MD   [facility]    [address] [facility] [address] [phone]              Who to contact     If you have questions or need follow up information about today's clinic visit or your schedule please contact [facility] directly at [phone].  Normal or non-critical lab and imaging results will be communicated to you by Dropcamhart, letter or phone within 4 business days after the clinic has received the results. If you do not hear from us within 7 days, please contact the clinic through Dropcamhart or phone. If you have a critical or abnormal lab result, we will notify you by phone as soon as possible.  Submit refill requests through [employer] Match or call your pharmacy and they will forward the refill request to us. Please allow 3 business days for your refill to be completed.          Additional Information About Your Visit        Advisor Client Match Information     [employer] Client Match is an electronic gateway that provides easy, online access to your medical records. With [employer] Match, you can request a clinic appointment, read your test results, renew a prescription or communicate with your care team.     To sign up for [employer] Match visit the website at [email or web address]   You will be asked to enter the access code listed below, as well as some personal information. Please follow the directions to create your username and password.     Your access code is: [email or web address]  Expires: 3/27/2018  3:52 PM     Your access code will  in 90 days. If you need help or a new code, please contact your [facility] or call [phone] for assistance.        Care EveryWhere ID     This is your Care EveryWhere ID. This could be used by other organizations to access your [facility] medical records  [id number]        Your  Vitals Were     Pulse                   123            Blood Pressure from Last 3 Encounters:   12/28/17 137/82   12/13/17 113/76   10/25/17 140/75    Weight from Last 3 Encounters:   12/27/17 57.2 kg (126 lb)   12/20/17 57.1 kg (125 lb 14.4 oz)   12/14/17 58.1 kg (128 lb)              Today, you had the following     No orders found for display       Primary Care Provider Office Phone # Fax #    [facility] [phone]       [address]  [facility] [address]        Equal Access to Services     [facility]: Hadii cecy fowler hadasho Soaquilesali, waaxda luqadaha, qaybta kaalmada adesanyayada, malinda hansen adesanya copeland . So [facility] [phone].    ATENCIÓN: Si habla español, tiene a spear disposición servicios gratuitos de asistencia lingüística. Llame al [phone].    We comply with applicable federal civil rights laws and [place] laws. We do not discriminate on the basis of race, color, national origin, age, disability, sex, sexual orientation, or gender identity.            Thank you!     Thank you for choosing [facility]  for your care. Our goal is always to provide you with excellent care. Hearing back from our patients is one way we can continue to improve our services. Please take a few minutes to complete the written survey that you may receive in the mail after your visit with us. Thank you!             Your Updated Medication List - Protect others around you: Learn how to safely use, store and throw away your medicines at www.disposemymeds.org.          This list is accurate as of: 12/28/17  9:22 AM.  Always use your most recent med list.                   Brand Name Dispense Instructions for use Diagnosis    acetaminophen 325 MG Suppository    TYLENOL     Place 325 mg rectally every 4 hours as needed for fever        CALCIUM MAGNESIUM PO      With zinc    RLQ abdominal pain       ONE-A-DAY ESSENTIAL Tabs      Take 1 tablet by mouth daily.         polyethylene glycol Packet    MIRALAX/GLYCOLAX     Take 1 packet by mouth daily        silver sulfADIAZINE 1 % cream    SILVADENE    85 g    Apply topically 2 times daily for 7 days    Dermatitis

## 2017-12-28 NOTE — NURSING NOTE
"Sheryle Cruse's goals for this visit include: return    She requests these members of her care team be copied on today's visit information:     PCP: [facility]    Referring Provider:  No referring provider defined for this encounter.    Chief Complaint   Patient presents with     RECHECK       Initial /82 (BP Location: Left arm, Patient Position: Chair, Cuff Size: Adult Regular)  Pulse 123 Estimated body mass index is 21.63 kg/(m^2) as calculated from the following:    Height as of 10/25/17: 1.626 m (5' 4\").    Weight as of 12/27/17: 57.2 kg (126 lb).  Medication Reconciliation: complete    Do you need any medication refills at today's visit? N/a.Tammie Carr RN      "

## 2017-12-28 NOTE — PROGRESS NOTES
CHIEF COMPLAINT:  Chief Complaint   Patient presents with     RECHECK       HISTORY OF PRESENT ILLNESS:  Sheryle Cruse is a 47 year old female who is seen in follow up s/p bilateral mastectomies and right lymph node biopsy on 10/4/17.  She is currently receiving right sided radiation therapy which will be completed in one week.  She states that she is tolerating it pretty well though she has a fair amount of skin burn and irritation.  Sheryle says that she is generally feeling very well.    REVIEW OF SYSTEMS:  Constitutional:  Negative for chills, fatigue, fever and weight change.  Eyes:  Negative for blurred vision, eye pain and photophobia.  ENT:  Negative for hearing problems, ENT pain, congestion, rhinorrhea, epistaxis, hoarseness and dental problems.  Cardiovascular:  Negative for chest pain, palpitations, tachycardia, orthopnea and edema.  Respiratory:  Negative for cough, dyspnea and hemoptysis.  Gastrointestinal:  Negative for abdominal pain, heartburn, constipation, diarrhea and stool changes.  Musculoskeletal:  Negative for arthralgias, back pain and myalgias.  Integumentary/Breast:  See HPI.    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 (c.1187G>A) [employer] 8/10/17     Vitamin D deficiency        Past Surgical History:   Procedure Laterality Date     Mammogram  2003, 2009       Family History   Problem Relation Age of Onset     DIABETES Mother 76     Obese     HEART DISEASE Mother      Hypertension Mother      CEREBROVASCULAR DISEASE Mother      Cardiovascular Mother      CVA     CANCER Father      bladder     CEREBROVASCULAR DISEASE Father 70     Breast Cancer Maternal Grandmother       CEREBROVASCULAR DISEASE Maternal Grandmother      Lipids Maternal Grandmother      HEART DISEASE Maternal Grandfather      heart disease     DIABETES Paternal Grandmother      CEREBROVASCULAR DISEASE Paternal Grandfather      HEART DISEASE Paternal Grandfather        Social History   Substance Use Topics     Smoking status: Never Smoker     Smokeless tobacco: Never Used     Alcohol use No       Patient Active Problem List   Diagnosis     Vitamin D deficiency     Vegetarianism     Hyperlipidemia LDL goal <160     Advance care planning     ERRONEOUS ENCOUNTER--DISREGARD     Monoallelic mutation of MUTYH gene     Malignant neoplasm of lower-inner quadrant of left breast in female, estrogen receptor positive (H)     Adjustment disorder with mixed anxiety and depressed mood     Allergies   Allergen Reactions     Contrast Dye      Pt does not recall which type of contrast     Diphenhydramine      Pcn [Bicillin C-R,] Rash     swelling     Current Outpatient Prescriptions   Medication Sig Dispense Refill     silver sulfADIAZINE (SILVADENE) 1 % cream Apply topically 2 times daily for 7 days 85 g 3     acetaminophen (TYLENOL) 325 MG Suppository Place 325 mg rectally every 4 hours as needed for fever       polyethylene glycol (MIRALAX/GLYCOLAX) Packet Take 1 packet by mouth daily       Calcium-Magnesium-Vitamin D (CALCIUM MAGNESIUM PO) With zinc       Multiple Vitamin (ONE-A-DAY ESSENTIAL) TABS Take 1 tablet by mouth daily.       Vitals: /82 (BP Location: Left arm, Patient Position: Chair, Cuff Size: Adult Regular)  Pulse 123  BMI= There is no height or weight on file to calculate BMI.    EXAM:  GENERAL: healthy, alert and no distress   BREAST:  The breasts are absent and the mastectomy scars are intact and healing.  The skin on the right chest wall is red and irritated, but there are no open areas.  A small seroma is noted at the lateral end of the right mastectomy scar but it is asymptomatic.  The shoulder ROM is  good.    ASSESSMENT:  Sheryle Cruse is doing well.  The radiation will be completed in one week.  She has tolerated the radiation nicely so far.  The small seroma is not concerning as it is asymptomatic.    PLAN:  Sheryle will follow up here with Dr. Ackerman in May.  She is soon meeting with Dr. Hoff to discuss adjuvant hormone therapy.  She will continue to do her ROM exercises and work with PT.    Birdie Gonzalez MD    Please route or send letter to:  GILDARDO Segura, CNP

## 2017-12-29 ENCOUNTER — TELEPHONE (OUTPATIENT)
Dept: CARE COORDINATION | Facility: CLINIC | Age: 47
End: 2017-12-29

## 2017-12-29 ENCOUNTER — APPOINTMENT (OUTPATIENT)
Dept: RADIATION ONCOLOGY | Facility: CLINIC | Age: 47
End: 2017-12-29
Payer: COMMERCIAL

## 2017-12-29 PROCEDURE — 77412 RADIATION TX DELIVERY LVL 3: CPT | Performed by: RADIOLOGY

## 2017-12-29 NOTE — TELEPHONE ENCOUNTER
Social Work Telephone Message Note  [facility]    Patient Name:  Sheryle Cruse  /Age:  1970 (47 year old)    Referral Source:  Sol Vazquez, RN, Breast Cancer Navigator  Reason for Referral:  Cancer support/resources    Per pt request, SW emailed pt regarding whether pt was able to follow-up with the [employer] to see if pt is eligible for financial assistance through them as pt/spouse are having difficulty paying bills.  SW also inquired regarding whether pt is in need of any other cancer support/resources at this time.  Pt will complete her radiation oncology treatments next week.  SW will follow and await response from pt.    LAZARA Godinez     Social Work  [facility]  Office:  [phone]  E-Mail:  [email or web address]  2017 9:04 AM

## 2018-01-02 ENCOUNTER — OFFICE VISIT (OUTPATIENT)
Dept: RADIATION ONCOLOGY | Facility: CLINIC | Age: 48
End: 2018-01-02
Payer: COMMERCIAL

## 2018-01-02 ENCOUNTER — APPOINTMENT (OUTPATIENT)
Dept: RADIATION ONCOLOGY | Facility: CLINIC | Age: 48
End: 2018-01-02
Payer: COMMERCIAL

## 2018-01-02 VITALS — WEIGHT: 125 LBS | BODY MASS INDEX: 21.46 KG/M2

## 2018-01-02 DIAGNOSIS — C50.811 MALIGNANT NEOPLASM OF OVERLAPPING SITES OF RIGHT BREAST IN FEMALE, ESTROGEN RECEPTOR POSITIVE (H): Primary | ICD-10-CM

## 2018-01-02 DIAGNOSIS — Z17.0 MALIGNANT NEOPLASM OF OVERLAPPING SITES OF RIGHT BREAST IN FEMALE, ESTROGEN RECEPTOR POSITIVE (H): Primary | ICD-10-CM

## 2018-01-02 PROCEDURE — 77412 RADIATION TX DELIVERY LVL 3: CPT | Performed by: RADIOLOGY

## 2018-01-02 PROCEDURE — 99207 ZZC DROP WITH A PROCEDURE: CPT | Performed by: RADIOLOGY

## 2018-01-02 ASSESSMENT — PAIN SCALES - GENERAL: PAINLEVEL: NO PAIN (0)

## 2018-01-02 NOTE — MR AVS SNAPSHOT
After Visit Summary   1/2/2018    Sheryle Cruse    MRN: [id number]           Patient Information     Date Of Birth          1970        Visit Information        Provider Department      1/2/2018 2:15 PM Lemuel Yung MD [facility]        Care Instructions    Follow up with Dr Ledesma on 1/18/18 at 3:30 pm    Please contact [facility] RN with questions or concerns following today's appointment: [phone].    Thank you!            Follow-ups after your visit        Your next 10 appointments already scheduled     Jan 02, 2018  3:00 PM CST   TREATMENT with RADIATION THERAPIST   [facility] ([facility])    [address] [facility] [address]   [phone]            Jan 03, 2018  1:00 PM CST   Lymphedema Treatment with Brynn Tejeda OT   [facility] ([facility]    [address] [place] [address]   [phone]            Jan 03, 2018  2:00 PM CST   TREATMENT with RADIATION THERAPIST   [facility] ([facility])    [address] [facility] [address]   [phone]            Jan 04, 2018  1:45 PM CST   TREATMENT with RADIATION THERAPIST   [facility] ([facility])    [address] [facility] [address]   [phone]            Jan 11, 2018  1:00 PM CST   New Visit with Niurka Hoff MD   [facility])    [address] [facility] [address]   [phone]            Jan 18, 2018  3:30 PM CST   Return Visit with Johana Ledesma MD   [facility])    [address] [facility] [address]   [phone]            May 10, 2018  8:00 AM CDT   Return Visit with Gwendolyn Ackerman MD   [facility])    [address]  [address] [place] [address] [phone]              Who to contact     If you have questions or need follow up information about today's clinic visit or your schedule please contact [facility] directly at [phone].  Normal or non-critical lab and imaging results will be communicated to you by MyChart, letter or phone within 4 business days after the clinic has received the results. If you do not hear from us within 7 days, please contact the clinic through MyChart or phone. If you have a critical or abnormal lab result, we will notify you by phone as soon as possible.  Submit refill requests through [employer] or call your pharmacy and they will forward the refill request to us. Please allow 3 business days for your refill to be completed.          Additional Information About Your Visit        [employer] Information     [employer] is an electronic gateway that provides easy, online access to your medical records. With [employer], you can request a clinic appointment, read your test results, renew a prescription or communicate with your care team.     To sign up for [employer] visit the website at [email or web address]   You will be asked to enter the access code listed below, as well as some personal information. Please follow the directions to create your username and password.     Your access code is: [email or web address]  Expires: 3/27/2018  3:52 PM     Your access code will  in 90 days. If you need help or a new code, please contact your [facility] or call [phone] for assistance.        Care EveryWhere ID     This is your Care EveryWhere ID. This could be used by other organizations to access your [facility] medical records  [id number]        Your Vitals Were     BMI (Body Mass Index)                   21.46 kg/m2            Blood Pressure from Last 3 Encounters:   17 137/82   17 113/76   10/25/17 140/75    Weight from Last 3  Encounters:   01/02/18 125 lb   12/27/17 126 lb   12/20/17 125 lb 14.4 oz              Today, you had the following     No orders found for display       Primary Care Provider Office Phone # Fax #    [facility] [phone] [address]  [facility] [address]        Equal Access to Services     CAROLYN HERNÁNDEZ : Hadii aad ku hadasho Soomaali, waaxda luqadaha, qaybta kaalmada adeegyada, waxay idiin hayaan adeeg kharash laanhn . So [facility] [phone].    ATENCIÓN: Si habla español, tiene a spear disposición servicios gratuitos de asistencia lingüística. Llame al [phone].    We comply with applicable federal civil rights laws and [place] laws. We do not discriminate on the basis of race, color, national origin, age, disability, sex, sexual orientation, or gender identity.            Thank you!     Thank you for choosing [facility]  for your care. Our goal is always to provide you with excellent care. Hearing back from our patients is one way we can continue to improve our services. Please take a few minutes to complete the written survey that you may receive in the mail after your visit with us. Thank you!             Your Updated Medication List - Protect others around you: Learn how to safely use, store and throw away your medicines at www.disposemymeds.org.          This list is accurate as of: 1/2/18  2:43 PM.  Always use your most recent med list.                   Brand Name Dispense Instructions for use Diagnosis    acetaminophen 325 MG Suppository    TYLENOL     Place 325 mg rectally every 4 hours as needed for fever        CALCIUM MAGNESIUM PO      With zinc    RLQ abdominal pain       ONE-A-DAY ESSENTIAL Tabs      Take 1 tablet by mouth daily.        polyethylene glycol Packet    MIRALAX/GLYCOLAX     Take 1 packet by mouth daily        silver sulfADIAZINE 1 % cream    SILVADENE    85 g    Apply topically 2 times daily for 7 days    Dermatitis

## 2018-01-02 NOTE — PROGRESS NOTES
"[facility]  [facility]CIAL[facility] IN BREAKTHROUGHS  Radiation Oncology    On Treatment Visit Note      Sheryle Cruse      Date: 2018   MRN: [id number]   : 1970  Diagnosis: Right Breast Cancer      Reason for Visit:  On Radiation Treatment Visit     Treatment Summary to Date  Treatment Site: R CW, R SCV, R Axilla Current Dose: 5600/6000, 5000/5000, 600/600 cGy Fractions: , , 3/3      Chemotherapy  Chemo concurrent with radx?: No    Subjective:   Having moist desquamation in the right lateral chest wall and axilla, only using miaderm and Aquaphor, has not tried Silvadene yet.  Skin feels tight, \"like a sunburn.\"  The remainder of chest wall is erythematous.    Nursing ROS:   Nutrition Alteration  Diet Type: Patient's Preference  Skin  Skin Intervention: patient using Aquaphor. patient has silvadene but not yet using. patient reports using meladerm  Skin Note: patient reports feeling tight around right axilla. patient states skin feels sunburned. patient reports peeling starting this weekend        Cardiovascular  Respiratory effort: 1 - Normal - without distress        Psychosocial  Mood - Anxiety: 0 - Normal  Mood - Depression: 0 - Normal  Pyschosocial Note: patient reports energy is good and not yet fatigued  Pain Assessment  0-10 Pain Scale: 0  Pain Descriptors: Discomfort  Pain Note: right axilla/chest      Objective:   Wt 125 lb  BMI 21.46 kg/m2  Erythema and hyperpigmentation in the right chest wall and axilla with 3-4 cm patch of moist desquamation in the right axilla    Labs:  CBC RESULTS:   Recent Labs   Lab Test  17   1652   WBC  6.7   RBC  4.10   HGB  12.9   HCT  37.1   MCV  91   MCH  31.5   MCHC  34.8   RDW  12.6   PLT  449     ELECTROLYTES:  Recent Labs   Lab Test  17   1652   NA  138   POTASSIUM  4.5   CHLORIDE  101   ZAKIYA  9.5   CO2  25   BUN  7   CR  0.53   GLC  92       Assessment:    Tolerating radiation therapy well.  All questions and concerns " addressed.    Plan:   1. Continue current therapy.  I instructed her to start applying Silvadene to the area of skin peeling in her right axilla and lateral chest wall, and to continue her normal skin care in the rest of her chest wall.      Mosaiq chart and setup information reviewed      Medication Review  Med list reviewed with patient?: Yes           Lemuel Yung MD

## 2018-01-02 NOTE — PATIENT INSTRUCTIONS
Follow up with Dr Ledesma on 1/18/18 at 3:30 pm    Please contact [facility] RN with questions or concerns following today's appointment: [phone].    Thank you!

## 2018-01-03 ENCOUNTER — HOSPITAL ENCOUNTER (OUTPATIENT)
Dept: OCCUPATIONAL THERAPY | Facility: CLINIC | Age: 48
Setting detail: THERAPIES SERIES
End: 2018-01-03
Attending: SURGERY
Payer: COMMERCIAL

## 2018-01-03 ENCOUNTER — APPOINTMENT (OUTPATIENT)
Dept: RADIATION ONCOLOGY | Facility: CLINIC | Age: 48
End: 2018-01-03
Payer: COMMERCIAL

## 2018-01-03 PROCEDURE — 97140 MANUAL THERAPY 1/> REGIONS: CPT | Mod: GO | Performed by: OCCUPATIONAL THERAPIST

## 2018-01-03 PROCEDURE — 40000445 ZZHC STATISTIC OT VISIT, LYMPHEDEMA: Performed by: OCCUPATIONAL THERAPIST

## 2018-01-03 PROCEDURE — 97535 SELF CARE MNGMENT TRAINING: CPT | Mod: GO | Performed by: OCCUPATIONAL THERAPIST

## 2018-01-03 PROCEDURE — 77412 RADIATION TX DELIVERY LVL 3: CPT | Performed by: RADIOLOGY

## 2018-01-03 NOTE — ADDENDUM NOTE
Encounter addended by: Brynn Tejeda, OT on: 1/3/2018  1:44 PM<BR>     Actions taken: Flowsheet accepted

## 2018-01-03 NOTE — PROGRESS NOTES
Outpatient Occupational Therapy Discharge Note     Patient: Sheryle Cruse  : 1970    Beginning/End Dates of Reporting Period:  17 to 1/3/2018    Referring Provider: Birdie Gonzalez MD    Therapy Diagnosis: RUE decreased ROM    Client Self Report:         Goals:   Goal Identifier HBH   Goal Description Pt will demonstrate RUE HBH position with HOB flat for 7 minutes without pain or discomfort in RUE as needed for radiation treatment.     Target Date 18   Date Met  18   Progress:     Goal Identifier signs/symtpoms   Goal Description Pt will, independently, verbalize the signs/symptoms of lymphedema, precautions and how to obtain a future lymphedema referral if edema presents to preserve skin integrity, prevent infection and preserve functional mobility.      Target Date 18   Date Met  18   Progress:         Progress Toward Goals:   All Goals MET        Plan:  Discharge from therapy.    Discharge:    Reason for Discharge: Patient has met all goals.    Equipment Issued: 0    Discharge Plan: Patient to continue home program:  6 exercises/stretches to RUQ/shoulder.  HBH with butterfly, seated or supine HBH with torso stretch and corner stretch. Watch for signs of lymphedema.

## 2018-01-04 ENCOUNTER — APPOINTMENT (OUTPATIENT)
Dept: RADIATION ONCOLOGY | Facility: CLINIC | Age: 48
End: 2018-01-04
Payer: COMMERCIAL

## 2018-01-04 ENCOUNTER — ONCOLOGY VISIT (OUTPATIENT)
Dept: RADIATION ONCOLOGY | Facility: CLINIC | Age: 48
End: 2018-01-04

## 2018-01-04 ENCOUNTER — CARE COORDINATION (OUTPATIENT)
Dept: RADIATION ONCOLOGY | Facility: CLINIC | Age: 48
End: 2018-01-04

## 2018-01-04 PROCEDURE — 77336 RADIATION PHYSICS CONSULT: CPT | Performed by: RADIOLOGY

## 2018-01-04 PROCEDURE — 77412 RADIATION TX DELIVERY LVL 3: CPT | Performed by: RADIOLOGY

## 2018-01-04 PROCEDURE — 77427 RADIATION TX MANAGEMENT X5: CPT | Performed by: RADIOLOGY

## 2018-01-04 NOTE — TELEPHONE ENCOUNTER
[facility]: Nurse (RN) Visit Note  SITUATION/BACKGROUND                                                      Sheryle Cruse is a 47 year old female, who presents to clinic for nurse visit for radiation skin assessment.    Current Status:  Onset: on-going  Description: patient completed her final radiation treatment today, 1/4/2018.  Dry desquamation noted with small patchy area of moist desquamation of right axilla.  Diffuse erythema of right chest wall.  Intensity: moderate  Progression of Symptoms:  Worsening as expected due to continued daily radiation treatment.  Accompanying Signs & Symptoms: patient reports itching of radiation treatment field, tightness.  Previous history of similar problem:  No  Precipitating factors: patient receiving daily radiation therapy    Alleviating factors: Patient currently using Silvadene cream, Miaderm and Aquaphor two times daily.      ASSESSMENT      Vital Signs:  There were no vitals taken for this visit.     Exam:  SKIN: Erythema and dry desquamation of right chest wall, patchy area of moist desquamation along right axilla. -       RECOMMENDATION/PLAN                                                      Medical Plan:  EDUCATION PROVIDED: reviewed continued skin cares, continued use of Silvadene cream for the next one week, Miaderm and Aquaphor for the next one month, reviewed use of Hydrocrortisone cream as needed for pruritis. by method of general discussion/verbal explanation. Also reviewed use of cool packs as needed for pruritis.  Reviewed use of Ibuprofen or Tylenol po as needed for pain control.      Today's visit was:  Completed within the RN scope of practice

## 2018-01-10 NOTE — PROGRESS NOTES
Oncology Consultation:  Date on this visit: 1/11/2018    Sheryle Cruse  is referred by Dr. Santillan for an oncology consultation. She requires evaluation and is seeking a second opinion for recently diagnosed locally advanced breast cancer.  PCP: Katie Billy  Surgeon: Dr. Audrey Gonzalez    History Of Present Illness:  Ms. Sparrow is a 47 year old female who presents for evaluation and second opinion of locally advanced ER+ Her 2 Adeel negative locally advanced breast cancer.  Esther first noted a lump in her right breast on 6/15/17.  This led to a diagnostic b/l mammogram and R breast US on 6/30/2018 which showed that in the right breast at 3:00 position, anterior depth there was a focal asymmetry. R breast US confirmed a solid mass at 3:00 position 4 cm from the nipple that measured 1.4 x 0.7 x 1.9 cm and correlated  with mammography and palpable lump. Right axillary survey by US  demonstrated multiple benign-appearing lymph. Her mammogram prior to that was in Aug 2013.  Nodes. An US guided core needle biopsy on 7/7/2017 revealed the mass to be high grade invasive ductal carcinoma that is strongly ER+ (>97%)/HI positive(>90%), Her 2 non amplified (HER2 Adeel/CHELSEA 17 ratio of 1.1).  She underwent bilateral simple mastectomies and right axillary sentinel node biopsy on 10/4/2017 by Dr. Gonzalez. She was not interested in breast reconstruction. I have reviewed pathology [place] [facility] [place]. Pathology from surgery demonstrated two foci of IDC, with largest foci of a 3 cm (measured microscopically) IDC, [place] grade 3 in the right breast, with associated DCIS, negative margins of resection for both DCIS and invasive carcinoma. One/one nonsentinel lymph node was positive for metastasis, 1.2 cm, with no extranodal extension. SLN was not identified in the specimen. LVI was present. She was staged at pT2(m) N1. No malignancy was found in the left breast.   She declined adjuvant systemic chemotherapy and thus OncotypeDx was not  ordered.   She completed radiation to right chest wall, R SCV LNs  and axilla on 1/3/2018.   She has been undergoing lymphedema therapy and following up with lymphedema therapist.  Sheryle reached menarche at age 13.  She is .  She continues to have regular menstrual periods.  FH is significant for her maternal GM having been diagnosed with breast cancer in her 60's and a paternal aunt being diagnosed in her 50's.  There is no FH for ovarian, colon, uterine, prostate or pancreatic CA. She underwent genetic testing and was found to be  POSITIVE for one MUTYH gene mutation. 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 Of note, Sheryle tested negative for mutations in the following genes by sequencing and deletion/duplication analysis: OLLIE, BARD1, BRCA1, BRCA2, BRIP1, CDH1, CHEK2, DICER1, EPCAM (deletions/duplications only), MLH1, MRE11A, MSH2, MSH6, MUTYH, NBN, NF1, PALB2, PMS2, PTEN, RAD50, RAD51C, RAD51D, SMARCA4, STK11, and TP53.  She completed radiation to right chest wall, R SCV LNs  and axilla on 1/3/2018. She is pain free but still has right chest wall redness from recent radiation.   She has met with Dr. Santillan and was recommended systemic hormonal therapy in the form of an AI plus ovarian suppression vs. AI following BSO and is here for a second opinion. She has not decided to proceed with adjuvant hormonal therapy at this time. She is here with her  to discuss further treatment options.  She has had anxiety/depression associated with her diagnosis of breast cancer. She has been seeing a therapist [place] [facility] [place]  She does have trouble sleeping.  In addition, a complete 12 point  review of systems is negative.         Past Medical/Surgical History:  Past Medical History:   Diagnosis Date     Anemia, iron deficiency       Depression past     Eating disorder age 20    [facility], In recovery for 15 yr     Fibrocystic breast      IBS (irritable bowel syndrome)     possible.  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 (c.1187G>A) AmbEnertiv 8/10/17     Vitamin D deficiency      Past Surgical History:   Procedure Laterality Date     Mammogram  2003, 2009       Allergies:  Allergies as of 01/11/2018 - Kev as Reviewed 01/02/2018   Allergen Reaction Noted     Contrast dye  06/30/2017     Diphenhydramine  08/31/2017     Pcn [bicillin c-r,] Rash 01/07/2011     Current Medications:  Current Outpatient Prescriptions   Medication Sig Dispense Refill     acetaminophen (TYLENOL) 325 MG Suppository Place 325 mg rectally every 4 hours as needed for fever       polyethylene glycol (MIRALAX/GLYCOLAX) Packet Take 1 packet by mouth daily       Calcium-Magnesium-Vitamin D (CALCIUM MAGNESIUM PO) With zinc       Multiple Vitamin (ONE-A-DAY ESSENTIAL) TABS Take 1 tablet by mouth daily.        Family History:  Family History   Problem Relation Age of Onset     DIABETES Mother 76     Obese     HEART DISEASE Mother      Hypertension Mother      CEREBROVASCULAR DISEASE Mother      Cardiovascular Mother      CVA     CANCER Father      bladder     CEREBROVASCULAR DISEASE Father 70     Breast Cancer Maternal Grandmother      CEREBROVASCULAR DISEASE Maternal Grandmother      Lipids Maternal Grandmother      HEART DISEASE Maternal Grandfather      heart disease     DIABETES Paternal Grandmother      CEREBROVASCULAR DISEASE Paternal Grandfather      HEART DISEASE Paternal Grandfather      Social History:  Social History     Social History     Marital status: Unknown     Spouse name: N/A     Number of children: N/A     Years of education: N/A     Occupational History     Not on file.     Social History Main Topics     Smoking status: Never Smoker     Smokeless  tobacco: Never Used     Alcohol use No     Drug use: No     Sexual activity: Yes     Partners: Male     Birth control/ protection: Condom     Other Topics Concern     Parent/Sibling W/ Cabg, Mi Or Angioplasty Before 65f 55m? No     Social History Narrative   SOCIAL HISTORY:  .    Denies current tobacco use.  Is a writer for food, weight and body.     Physical Exam:  /83  Pulse 105  Temp 98  F (36.7  C) (Oral)  Resp 16  Wt 58.1 kg (128 lb 1.6 oz)  SpO2 99%  BMI 21.99 kg/m2      GENERAL APPEARANCE: healthy, alert and no distress     HENT: Mouth without ulcers or lesions     NECK: no adenopathy, no asymmetry or masses     LYMPHATICS: No cervical, supraclavicular, axillary or inguinal lymphadenopathy     RESP: lungs clear to auscultation - no rales, rhonchi or wheezes     CARDIOVASCULAR: regular rates and rhythm, normal S1 S2, no S3 or S4 and no murmur.     ABDOMEN:  soft, nontender, no HSM or masses and bowel sounds normal     MUSCULOSKELETAL: extremities normal- no gross deformities noted, no evidence of inflammation in joints, FROM in all extremities. No edema b/l LE.     SKIN: no suspicious lesions or rashes     PSYCHIATRIC: mentation appears normal and affect normal  CHest wall: Erythema and skin desquamation over right chest wall in the XRT field. No open sores. No chest wall masses b/l. No axillary lymphadenopathy b/l.    Laboratory/Imaging Studies  Component      Latest Ref Rng & Units 8/31/2017 9/7/2017   Sodium      133 - 144 mmol/L 137    Potassium      3.4 - 5.3 mmol/L 4.4    Chloride      94 - 109 mmol/L 104    Carbon Dioxide      20 - 32 mmol/L 27    Anion Gap      3 - 14 mmol/L 6    Glucose      70 - 99 mg/dL 95    Urea Nitrogen      7 - 30 mg/dL 3 (L)    Creatinine      0.52 - 1.04 mg/dL 0.53    GFR Estimate      >60 mL/min/1.7m2 >90    GFR Estimate If Black      >60 mL/min/1.7m2 >90    Calcium      8.5 - 10.1 mg/dL 9.0    Bilirubin Total      0.2 - 1.3 mg/dL 0.8    Albumin      3.4 -  5.0 g/dL 4.2    Protein Total      6.8 - 8.8 g/dL 8.1    Alkaline Phosphatase      40 - 150 U/L 50    ALT      0 - 50 U/L 23    AST      0 - 45 U/L 18    WBC      4.0 - 11.0 10e9/L  6.7   RBC Count      3.8 - 5.2 10e12/L  4.10   Hemoglobin      11.7 - 15.7 g/dL  12.9   Hematocrit      35.0 - 47.0 %  37.1   MCV      78 - 100 fl  91   MCH      26.5 - 33.0 pg  31.5   MCHC      31.5 - 36.5 g/dL  34.8   RDW      10.0 - 15.0 %  12.6   Platelet Count      150 - 450 10e9/L  449     Results for orders placed or performed in visit on 10/25/17   US Breast Right    Narrative    Right breast ultrasound    Comparisons: Prior imaging including breast MRI 8/4/2017    History: Recent bilateral mastectomy with fullness/puffiness over the  right mastectomy site.    FINDINGS:     Targeted right breast ultrasound demonstrates mostly fat  accounting for the fullness along the lateral aspect of the mastectomy  scar. Inferior to the fullness, adjacent the chest wall is a very thin  small seroma measuring 5 mm thick.    Both the technologist and radiologist scanned the patient with  ultrasound.      Impression    IMPRESSION: BI-RADS CATEGORY: 2 - Benign Finding(s). Given the very  small thin sliver of fluid, aspiration was not performed.    RECOMMENDED FOLLOW-UP: Clinical follow-up.      The patient was given the results of the examination.    OSWALD BISWAS MD     ASSESSMENT/PLAN:  Sheryle is a 47 year old premenopausal woman with  stage IIB, T2N1 multifocalhigh grade, strongly ER and [place] positive, Her2 Adeel negative by FISH invasive ductal carcinoma of the right breast.  Final pathology showed 2 foci at 3 cm as well as separate 0.7 cm invasive ductal carcinoma, [place] grade III, poorly differentiated. She is s/p Bilateral simple mastectomies and right axillary sentinel node biopsy on 10/4/2017 by Dr. Gonzalez. One lymph node removed, having a 1.2 cm metastatic deposit.  The patient declined further axillary node dissection. She  has declined adjuvant chemotherapy. She has declined and continues to decline today a staging PET/CT scan. She declines any imaging with contrast since she has a contrast allergy to unknown type of contrast but when she was given steroids as premedication for her MRI with contrast, she had blurry vision. I have offered her to have a PET/CT scan without contrast but she is still not interested to proceed with any imaging at this time.  She completed radiation to right chest wall, R SCV LNs  and axilla on 1/3/2018.     1.  Stage IIB, T2N1 multifocal invasive ductal carcinoma of the right breast.  The patient declined chemotherapy and staging with PET/CT scan, as above.  On today's visit, we discussed the importance of adjuvant endocrine therapy to decrease her risk of local and distant disease recurrence.  I agree with Dr. Santillan and would recommend an aggressive systemic hormonal therapy regimen with ovarian suppression with monthly Zoladex injections in combination with an aromatase inhibitor, such as exemestane. BSO is another alternative to monthly Zoladex. Patient had multiple questions about laporoscopic BSO. She is not interested in Zoladex injections. She is not interested in systemic Tamoxifen as it has potential to increase risk of DVT/CVA and there is HX of recurrent CVAs in both her mother and father. I have given her contact information for Dr. Lerma and Dr. Meneses from gynecologic oncology and recommended that she schedule a consultation for discussion of BSO surgery. I answered all of Sheryle's questions to the best of my ability. The rationale behind using Anastrozole was discussed with the patient in detail. Once she undergoes BSO, she would be started on exemestane if she is agreeable. We have discussed the major side effects of exemestane including, but not limited to more immediate side effects of vasomotor symptoms, mood disturbance, fatigue and weakness, headache, joint pain, nausea/vomiting,  cough, sore throat, and vaginal infection/discharge as well as a small risk of long-term side effects such as DVT/PE, cardiovascular disease, high cholesterol level, high blood pressure, cataracts, and potential loss of bone mineral density and need to obtain DEXA scan. The patient was instructed to begin Calcium plus Vitamin D supplementation. The patient was given written information about Exemestane and BSO surgery.   She is not sure she will commit to any measures of systemic hormonal therapy. She will think about her options. She is leaning towards close observation. She is not sure if she will be following up with me or Dr. Santillan. I would recommend that she is followed closely in the first year, with H&P and chest wall exam q 3 months and labs including tumor marker q 3 months (she is at high risk of disease recurrence in absence of baseline staging and adjuvant systemic chemotherapy and hormonal therapy). She was recommended to schedule f/up appt and to contact us with any questions or concerns and her decision in the interim. She will be following up with Dr. Ledesma next week and with Dr. Ackerman from surgery in May. She will be seeing Katie Billy for her annual physical exam in Feb 2018.    2.  MUTYH gene mutation carrier -  Carriers of MAP have a very slightly increased risk of colon cancer, which could be as high  (~10-12%) as twice the population risk of 5-6%    3. S/p b/l mastectomy- she is not interested in breast reconstruction surgery.  4. Anxiety/depression associated with diagnosis of breast cancer- she has been seeing a counselor [facility] [place].  It was my pleasure to meet Sheryle and her .  At the end of our visit patient and  verbalized understanding and concurred with the plan.

## 2018-01-11 ENCOUNTER — ONCOLOGY VISIT (OUTPATIENT)
Dept: ONCOLOGY | Facility: CLINIC | Age: 48
End: 2018-01-11
Payer: COMMERCIAL

## 2018-01-11 VITALS
RESPIRATION RATE: 16 BRPM | TEMPERATURE: 98 F | BODY MASS INDEX: 21.99 KG/M2 | DIASTOLIC BLOOD PRESSURE: 83 MMHG | HEART RATE: 105 BPM | OXYGEN SATURATION: 99 % | WEIGHT: 128.1 LBS | SYSTOLIC BLOOD PRESSURE: 128 MMHG

## 2018-01-11 DIAGNOSIS — Z17.0 MALIGNANT NEOPLASM OF LOWER-INNER QUADRANT OF LEFT BREAST IN FEMALE, ESTROGEN RECEPTOR POSITIVE (H): Primary | ICD-10-CM

## 2018-01-11 DIAGNOSIS — C50.312 MALIGNANT NEOPLASM OF LOWER-INNER QUADRANT OF LEFT BREAST IN FEMALE, ESTROGEN RECEPTOR POSITIVE (H): Primary | ICD-10-CM

## 2018-01-11 PROCEDURE — 99215 OFFICE O/P EST HI 40 MIN: CPT | Performed by: INTERNAL MEDICINE

## 2018-01-11 ASSESSMENT — PAIN SCALES - GENERAL: PAINLEVEL: NO PAIN (0)

## 2018-01-11 NOTE — MR AVS SNAPSHOT
After Visit Summary   1/11/2018    Sheryle Cruse    MRN: [id number]           Patient Information     Date Of Birth          1970        Visit Information        Provider Department      1/11/2018 1:00 PM Niurka Hoff MD [facility]        Today's Diagnoses     Malignant neoplasm of lower-inner quadrant of left breast in female, estrogen receptor positive (H)    -  1       Follow-ups after your visit        Your next 10 appointments already scheduled     Jan 18, 2018  3:30 PM CST   [facility] Visit with Johana [facility]esma MD   [facility]    [address] [place] [id number]   [phone]            Feb 01, 2018  7:30 AM CST   PHYSICAL with GILDARDO Michaels CNP   [facility])    [address] [place] [id number]   [phone]            May 10, 2018  8:00 AM CDT   Return Visit with Gwendolyn Ackerman MD   [facility])    [address] [place] [id number]   [phone]              Future tests that were ordered for you today     Open Future Orders        Priority Expected Expires Ordered    CBC with platelets differential Routine 4/11/2018 1/11/2019 1/11/2018    Comprehensive metabolic panel Routine 4/11/2018 1/11/2019 1/11/2018    Ca27.29  breast tumor marker Routine 4/11/2018 1/11/2019 1/11/2018            Who to contact     If you have questions or need follow up information about today's clinic visit or your schedule please contact [facility] directly at [phone].  Normal or non-critical lab and imaging results will be communicated to you by MyChart, letter or phone within 4 business days after the clinic has received the results. If you do not hear from us within 7 days, please contact the clinic through MyChart or phone. If you have a critical or abnormal lab  result, we will notify you by phone as soon as possible.  Submit refill requests through [employer] or call your pharmacy and they will forward the refill request to us. Please allow 3 business days for your refill to be completed.          Additional Information About Your Visit        [employer] Information     [employer] is an electronic gateway that provides easy, online access to your medical records. With [employer], you can request a clinic appointment, read your test results, renew a prescription or communicate with your care team.     To sign up for [employer] visit the website at [email or web address]   You will be asked to enter the access code listed below, as well as some personal information. Please follow the directions to create your username and password.     Your access code is: [email or web address]  Expires: 3/27/2018  3:52 PM     Your access code will  in 90 days. If you need help or a new code, please contact your [facility] or call [phone] for assistance.        Care EveryWhere ID     This is your Care EveryWhere ID. This could be used by other organizations to access your [facility] medical records  [id number]        Your Vitals Were     Pulse Temperature Respirations Pulse Oximetry BMI (Body Mass Index)       105 98  F (36.7  C) (Oral) 16 99% 21.99 kg/m2        Blood Pressure from Last 3 Encounters:   18 128/83   17 137/82   17 113/76    Weight from Last 3 Encounters:   18 58.1 kg (128 lb 1.6 oz)   18 56.7 kg (125 lb)   17 57.2 kg (126 lb)               Primary Care Provider Office Phone # Fax #    [facility] [phone]       [address]  [facility] [address]        Equal Access to Services     JORGE HERNÁNDEZ : Hadii aad ku hadasho Soaquilesali, waaxda luqadaha, qaybta kaalmada adeegyada, malinda murry. So [facility] [phone].    ATENCIÓN: Si cici esplukasz, bharathi pickering spear  disposición servicios gratuitos de asistencia lingüística. Nasir hagan [phone].    We comply with applicable federal civil rights laws and [place] laws. We do not discriminate on the basis of race, color, national origin, age, disability, sex, sexual orientation, or gender identity.            Thank you!     Thank you for choosing [facility]  for your care. Our goal is always to provide you with excellent care. Hearing back from our patients is one way we can continue to improve our services. Please take a few minutes to complete the written survey that you may receive in the mail after your visit with us. Thank you!             Your Updated Medication List - Protect others around you: Learn how to safely use, store and throw away your medicines at www.disposemymeds.org.          This list is accurate as of: 1/11/18  5:07 PM.  Always use your most recent med list.                   Brand Name Dispense Instructions for use Diagnosis    acetaminophen 325 MG Suppository    TYLENOL     Place 325 mg rectally every 4 hours as needed for fever        CALCIUM MAGNESIUM PO      With zinc    RLQ abdominal pain       ONE-A-DAY ESSENTIAL Tabs      Take 1 tablet by mouth daily.        polyethylene glycol Packet    MIRALAX/GLYCOLAX     Take 1 packet by mouth daily

## 2018-01-11 NOTE — LETTER
1/11/2018         RE: Sheryle Cruse  [address]  [place] [facility] [address]        Dear Colleague,    Thank you for referring your patient, Sheryle Cruse, to the [facility]. Please see a copy of my visit note below.    Oncology Consultation:  Date on this visit: 1/11/2018    Sheryle Cruse  is referred by Dr. Santillan for an oncology consultation. She requires evaluation and is seeking a second opinion for recently diagnosed locally advanced breast cancer.  PCP: Katie Billy  Surgeon: Dr. Audrey Gonzalez    History Of Present Illness:  Ms. Sparrow is a 47 year old female who presents for evaluation and second opinion of locally advanced ER+ Her 2 Adeel negative locally advanced breast cancer.  Esther first noted a lump in her right breast on 6/15/17.   This led to a diagnostic b/l mammogram and R breast US on 6/30/2018 which showed that in the right breast at 3:00 position, anterior depth there was a focal asymmetry. R breast US confirmed a solid mass at 3:00 position 4 cm from the nipple that measured 1.4 x 0.7 x 1.9 cm and correlated  with mammography and palpable lump. Right axillary survey by US  demonstrated multiple benign-appearing lymph. Her mammogram prior to that was in Aug 2013.  Nodes.  An US guided core needle biopsy on 7/7/2017 revealed the mass to be high grade invasive ductal carcinoma that is strongly ER+ (>97%)/SC positive(>90%), Her 2 non amplified (HER2 Adeel/CHELSEA 17 ratio of 1.1).  She underwent bilateral simple mastectomies and right axillary sentinel node biopsy on 10/4/2017 by Dr. Gonzalez. She was not interested in breast reconstruction. I have [place] [facility] [place] Pathology from surgery demonstrated two foci of IDC, with largest foci of a 3 cm (measured microscopically) IDC, Jewell grade 3 in the right breast, with associated DCIS, negative margins of resection for both DCIS and invasive carcinoma. One/one nonsentinel lymph node was positive for  metastasis, 1.2 cm, with no extranodal extension. SLN was not identified in the specimen. LVI was present. She was staged at pT2(m) N1. No malignancy was found in the left breast.   She declined adjuvant systemic chemotherapy and thus OncotypeDx was not ordered.   She completed radiation to right chest wall, R SCV LNs  and axilla on 1/3/2018.   She has been undergoing lymphedema therapy and following up with lymphedema therapist.  Sheryle reached menarche at age 13.  She is .  She continues to have regular menstrual periods.  FH is significant for her maternal GM having been diagnosed with breast cancer in her 60's and a paternal aunt being diagnosed in her 50's.  There is no FH for ovarian, colon, uterine, prostate or pancreatic CA. She underwent genetic testing and was found to be  POSITIVE for one MUTYH gene mutation. 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 Of note, Sheryle tested negative for mutations in the following genes by sequencing and deletion/duplication analysis: OLLIE, BARD1, BRCA1, BRCA2, BRIP1, CDH1, CHEK2, DICER1, EPCAM (deletions/duplications only), MLH1, MRE11A, MSH2, MSH6, MUTYH, NBN, NF1, PALB2, PMS2, PTEN, RAD50, RAD51C, RAD51D, SMARCA4, STK11, and TP53.  She completed radiation to right chest wall, R SCV LNs  and axilla on 1/3/2018. She is pain free but still has right chest wall redness from recent radiation.   She has met with Dr. Santillan and was recommended systemic hormonal therapy in the form of an AI plus ovarian suppression vs. AI following BSO and is here for a second opinion. She has not decided to proceed with adjuvant hormonal therapy at this time. She is here with her  to discuss further treatment options.  She has had anxiety/depression associated with her diagnosis of breast  cancer. [place] [facility] [place]  She does have trouble sleeping.  In addition, a complete 12 point  review of systems is negative.         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 (c.1187G>A) C4M 8/10/17     Vitamin D deficiency      Past Surgical History:   Procedure Laterality Date     Mammogram  2003, 2009       Allergies:  Allergies as of 01/11/2018 - Kev as Reviewed 01/02/2018   Allergen Reaction Noted     Contrast dye  06/30/2017     Diphenhydramine  08/31/2017     Pcn [bicillin c-r,] Rash 01/07/2011     Current Medications:  Current Outpatient Prescriptions   Medication Sig Dispense Refill     acetaminophen (TYLENOL) 325 MG Suppository Place 325 mg rectally every 4 hours as needed for fever       polyethylene glycol (MIRALAX/GLYCOLAX) Packet Take 1 packet by mouth daily       Calcium-Magnesium-Vitamin D (CALCIUM MAGNESIUM PO) With zinc       Multiple Vitamin (ONE-A-DAY ESSENTIAL) TABS Take 1 tablet by mouth daily.        Family History:  Family History   Problem Relation Age of Onset     DIABETES Mother 76     Obese     HEART DISEASE Mother      Hypertension Mother      CEREBROVASCULAR DISEASE Mother      Cardiovascular Mother      CVA     CANCER Father      bladder     CEREBROVASCULAR DISEASE Father 70     Breast Cancer Maternal Grandmother      CEREBROVASCULAR DISEASE Maternal Grandmother      Lipids Maternal Grandmother      HEART DISEASE Maternal Grandfather      heart disease     DIABETES Paternal Grandmother      CEREBROVASCULAR DISEASE Paternal Grandfather      HEART DISEASE Paternal Grandfather      Social  History:  Social History     Social History     Marital status: Unknown     Spouse name: N/A     Number of children: N/A     Years of education: N/A     Occupational History     Not on file.     Social History Main Topics     Smoking status: Never Smoker     Smokeless tobacco: Never Used     Alcohol use No     Drug use: No     Sexual activity: Yes     Partners: Male     Birth control/ protection: Condom     Other Topics Concern     Parent/Sibling W/ Cabg, Mi Or Angioplasty Before 65f 55m? No     Social History Narrative   SOCIAL HISTORY:  .    Denies current tobacco use.  Is a writer for food, weight and body.     Physical Exam:  /83  Pulse 105  Temp 98  F (36.7  C) (Oral)  Resp 16  Wt 58.1 kg (128 lb 1.6 oz)  SpO2 99%  BMI 21.99 kg/m2      GENERAL APPEARANCE: healthy, alert and no distress     HENT: Mouth without ulcers or lesions     NECK: no adenopathy, no asymmetry or masses     LYMPHATICS: No cervical, supraclavicular, axillary or inguinal lymphadenopathy     RESP: lungs clear to auscultation - no rales, rhonchi or wheezes     CARDIOVASCULAR: regular rates and rhythm, normal S1 S2, no S3 or S4 and no murmur.     ABDOMEN:  soft, nontender, no HSM or masses and bowel sounds normal     MUSCULOSKELETAL: extremities normal- no gross deformities noted, no evidence of inflammation in joints, FROM in all extremities. No edema b/l LE.     SKIN: no suspicious lesions or rashes     PSYCHIATRIC: mentation appears normal and affect normal  CHest wall: Erythema and skin desquamation over right chest wall in the XRT field. No open sores. No chest wall masses b/l. No axillary lymphadenopathy b/l.    Laboratory/Imaging Studies  Component      Latest Ref Rng & Units 8/31/2017 9/7/2017   Sodium      133 - 144 mmol/L 137    Potassium      3.4 - 5.3 mmol/L 4.4    Chloride      94 - 109 mmol/L 104    Carbon Dioxide      20 - 32 mmol/L 27    Anion Gap      3 - 14 mmol/L 6    Glucose      70 - 99 mg/dL 95    Urea  Nitrogen      7 - 30 mg/dL 3 (L)    Creatinine      0.52 - 1.04 mg/dL 0.53    GFR Estimate      >60 mL/min/1.7m2 >90    GFR Estimate If Black      >60 mL/min/1.7m2 >90    Calcium      8.5 - 10.1 mg/dL 9.0    Bilirubin Total      0.2 - 1.3 mg/dL 0.8    Albumin      3.4 - 5.0 g/dL 4.2    Protein Total      6.8 - 8.8 g/dL 8.1    Alkaline Phosphatase      40 - 150 U/L 50    ALT      0 - 50 U/L 23    AST      0 - 45 U/L 18    WBC      4.0 - 11.0 10e9/L  6.7   RBC Count      3.8 - 5.2 10e12/L  4.10   Hemoglobin      11.7 - 15.7 g/dL  12.9   Hematocrit      35.0 - 47.0 %  37.1   MCV      78 - 100 fl  91   MCH      26.5 - 33.0 pg  31.5   MCHC      31.5 - 36.5 g/dL  34.8   RDW      10.0 - 15.0 %  12.6   Platelet Count      150 - 450 10e9/L  449     Results for orders placed or performed in visit on 10/25/17   US Breast Right    Narrative    Right breast ultrasound    Comparisons: Prior imaging including breast MRI 8/4/2017    History: Recent bilateral mastectomy with fullness/puffiness over the  right mastectomy site.    FINDINGS:     Targeted right breast ultrasound demonstrates mostly fat  accounting for the fullness along the lateral aspect of the mastectomy  scar. Inferior to the fullness, adjacent the chest wall is a very thin  small seroma measuring 5 mm thick.    Both the technologist and radiologist scanned the patient with  ultrasound.      Impression    IMPRESSION: BI-RADS CATEGORY: 2 - Benign Finding(s). Given the very  small thin sliver of fluid, aspiration was not performed.    RECOMMENDED FOLLOW-UP: Clinical follow-up.      The patient was given the results of the examination.    OSWALD BISWAS MD     ASSESSMENT/PLAN:  Sheryle is a 47 year old premenopausal woman with  stage IIB, T2N1 multifocalhigh grade, strongly ER and NV positive, Her2 Adeel negative by FISH invasive ductal carcinoma of the right breast.  Final pathology showed 2 foci at 3 cm as well as separate 0.7 cm invasive ductal carcinoma,  [place] grade III, poorly differentiated. She is s/p Bilateral simple mastectomies and right axillary sentinel node biopsy on 10/4/2017 by Dr. Gonzalez. One lymph node removed, having a 1.2 cm metastatic deposit.  The patient declined further axillary node dissection. She has declined adjuvant chemotherapy. She has declined and continues to decline today a staging PET/CT scan. She declines any imaging with contrast since she has a contrast allergy to unknown type of contrast but when she was given steroids as premedication for her MRI with contrast, she had blurry vision. I have offered her to have a PET/CT scan without contrast but she is still not interested to proceed with any imaging at this time.  She completed radiation to right chest wall, R SCV LNs  and axilla on 1/3/2018.     1.  Stage IIB, T2N1 multifocal invasive ductal carcinoma of the right breast.  The patient declined chemotherapy and staging with PET/CT scan, as above.  On today's visit, we discussed the importance of adjuvant endocrine therapy to decrease her risk of local and distant disease recurrence.  I  agree with Dr. Santillan and would recommend an aggressive systemic hormonal therapy regimen with ovarian suppression with monthly Zoladex injections in combination with an aromatase inhibitor, such as exemestane. BSO is another alternative to monthly Zoladex. Patient had multiple questions about laporoscopic BSO. She is not interested in Zoladex injections. She is not interested in systemic Tamoxifen as it has potential to increase risk of DVT/CVA and there is HX of recurrent CVAs in both her mother and father. I have given her contact information for Dr. Lerma and Dr. Meneses from gynecologic oncology and recommended that she schedule a consultation for discussion of BSO surgery. I answered all of Sheryle's questions to the best of my ability. The rationale behind using Anastrozole was discussed with the patient in detail. Once she undergoes  BSO, she would be started on exemestane if she is agreeable. We have discussed the major side effects of exemestane including, but not limited to more immediate side effects of vasomotor symptoms, mood disturbance, fatigue and weakness, headache, joint pain, nausea/vomiting, cough, sore throat, and vaginal infection/discharge as well as a small risk of long-term side effects such as DVT/PE, cardiovascular disease, high cholesterol level, high blood pressure, cataracts, and potential loss of bone mineral density and need to obtain DEXA scan. The patient was instructed to begin Calcium plus Vitamin D supplementation. The patient was given written information about Exemestane and BSO surgery.   She is not sure she will commit to any measures of systemic hormonal therapy. She will think about her options. She is leaning towards close observation. She is not sure if she will be following up with me or Dr. Santillan. I would recommend that she is followed closely in the first year, with H&P and chest wall exam q 3 months and labs including tumor marker q 3 months (she is at high risk of disease recurrence in absence of baseline staging and adjuvant systemic chemotherapy and hormonal therapy). She was recommended to schedule f/up appt and to contact us with any questions or concerns and her decision in the interim. She will be following up with Dr. Ledesma next week and with Dr. Ackerman from surgery in May. She will be seeing Katie Billy for her annual physical exam in Feb 2018.    2.  MUTYH gene mutation carrier -  Carriers of MAP have a very slightly increased risk of colon cancer, which could be as high  (~10-12%) as twice the population risk of 5-6%    3. S/p b/l mastectomy- she is not interested in breast reconstruction surgery.  4. Anxiety/depression associated with diagnosis of breast cancer- she has been seeing a counselor [place] [place].  It was my pleasure to meet Sheryle and her .  At the end of our  "visit patient and  verbalized understanding and concurred with the plan.      Oncology Rooming Note    January 11, 2018 1:18 PM   Sheryle Cruse is a 47 year old female who presents for:    Chief Complaint   Patient presents with     Oncology Clinic Visit     new patient     Initial Vitals: /83  Pulse 105  Temp 98  F (36.7  C) (Oral)  Resp 16  Wt 58.1 kg (128 lb 1.6 oz)  SpO2 99%  BMI 21.99 kg/m2 Estimated body mass index is 21.99 kg/(m^2) as calculated from the following:    Height as of 10/25/17: 1.626 m (5' 4\").    Weight as of this encounter: 58.1 kg (128 lb 1.6 oz). Body surface area is 1.62 meters squared.  No Pain (0) Comment: Data Unavailable   No LMP recorded. Patient is not currently having periods (Reason: Premenopausal).  Allergies reviewed: Yes  Medications reviewed: Yes    Medications: Medication refills not needed today.  Pharmacy name entered into HelloTel: [facility] [address] [place] [address] AT [facility]    Clinical concerns: Wants information on hormone blockers & surgical options  Depression/Anxiety - sees therapist in [facility] [place]  Skin - recovering from radiation   Dr Hoff was notified.    15 minutes for nursing intake (face to face time)     Keturah Keenan RN                Again, thank you for allowing me to participate in the care of your patient.        Sincerely,        Niurka Hoff MD, MD    "

## 2018-01-11 NOTE — PROGRESS NOTES
"Oncology Rooming Note    January 11, 2018 1:18 PM   Sheryle Cruse is a 47 year old female who presents for:    Chief Complaint   Patient presents with     Oncology Clinic Visit     new patient     Initial Vitals: /83  Pulse 105  Temp 98  F (36.7  C) (Oral)  Resp 16  Wt 58.1 kg (128 lb 1.6 oz)  SpO2 99%  BMI 21.99 kg/m2 Estimated body mass index is 21.99 kg/(m^2) as calculated from the following:    Height as of 10/25/17: 1.626 m (5' 4\").    Weight as of this encounter: 58.1 kg (128 lb 1.6 oz). Body surface area is 1.62 meters squared.  No Pain (0) Comment: Data Unavailable   No LMP recorded. Patient is not currently having periods (Reason: Premenopausal).  Allergies reviewed: Yes  Medications reviewed: Yes    Medications: Medication refills not needed today.  Pharmacy name entered into IAMINTOIT: [facility] [address] [place] [address] AT [facility]    Clinical concerns: Wants information on hormone blockers & surgical options  Depression/Anxiety - sees therapist in [facility], [place]  Skin - recovering from radiation   Dr Hoff was notified.    15 minutes for nursing intake (face to face time)     Keturah Keenan RN              "

## 2018-01-12 ENCOUNTER — TELEPHONE (OUTPATIENT)
Dept: FAMILY MEDICINE | Facility: CLINIC | Age: 48
End: 2018-01-12

## 2018-01-12 ENCOUNTER — CARE COORDINATION (OUTPATIENT)
Dept: RADIATION ONCOLOGY | Facility: CLINIC | Age: 48
End: 2018-01-12

## 2018-01-12 NOTE — TELEPHONE ENCOUNTER
Panel Management Review      BP Readings from Last 1 Encounters:   01/11/18 128/83    , No results found for: A1C, 9/1/2017  Last Office Visit with this department: 9/1/2017    Fail List measure: pap      Patient is due/failing the following:   PAP    Action needed:   Cervical cancer screening    Type of outreach:    Sent letter.    Questions for provider review:    None                                                                                                                                    Catarina Salamanca MA      Chart routed to  .

## 2018-01-12 NOTE — LETTER
January 12, 2018      Sheryle Cruse  [address]   [facility] [address]          Dear Sheryle Cruse,     At [facility] we care about your health and are committed to providing quality patient care.    Which includes staying current on preventive cancer screenings.  You can increase your chances of finding and treating cancers through regular screenings.      Our records indicate you may be due for the following preventive screening(s):    Cervical Cancer Screening    Pap smear is a screening test used to detect cervical cancer. It is recommended every three years for women 21 and older. The test should be done at least once every three years but women who are at greater risk for cervical cancer may need to have the test more often.    To schedule an appointment or discuss this screening further, you may contact us by phone at the [facility] at [phone] or online through the patient portal/[email or web address] @ [email or web address]    If you have had any of the screenings listed above at another facility, please call us so that we may update your chart.      Your partners in health,      Quality Committee at [facility]/TARYN

## 2018-01-12 NOTE — PROGRESS NOTES
"Received telephone message from patient reporting that she continues to experience \"irritation around radiation borders\" and questions if she can stop using Silvadene cream.  Patient reports she is using Silvadene cream, Miaderm and Aquaphor.  She states that she feels irritation increased when starting to use Silvadene.  Attempted return call to patient, no answer, voice message left informing patient that she can discontinue use of Silvadene cream and continue with use of Miaderm and Aquaphor two times daily.  Informed patient that she may again contact this RN with any additional questions or concerns.    Luana Matos, RN BSN OCN  "

## 2018-01-16 ENCOUNTER — TELEPHONE (OUTPATIENT)
Dept: RADIATION ONCOLOGY | Facility: CLINIC | Age: 48
End: 2018-01-16

## 2018-01-16 NOTE — PROCEDURES
Radiotherapy Treatment Summary          Date of Report: 2018     PATIENT: CRUSE, SHERYLE  MEDICAL RECORD NO: [id number]  : 1970     DIAGNOSIS: C50.811 Malignant neoplasm of overlapping sites of right female breast  INTENT OF RADIOTHERAPY: Cure  PATHOLOGY:         G3 IDCA                           STAGE:     [id number], ER+/IL+                      Details of the treatments summarized below are found in records kept in the Department of Radiation Oncology at [facility].     Treatment Summary:  Radiation Oncology - Course: 1 Protocol:   Treatment Site Current Dose Modality From  To  Elapsed Days Fx.  1 R CW   5,000 cGy 06 X  11/20/2017 2017  37  25  2 R SCV   5,000 cGy 6X/18X 11/20/2017 2017  37  25  1 R CW boost   1,000 cGy e06  2017   7  5  2 Axilla boost     600 cGy 10x/18x 12/28/2017  2018   5   3          Dose per Fraction:        Total Dose:        200cGy                                               5000cGy to the chest wall and supraclavicular fossae  200cGy                                               6000cGy to the chest wall scar  200cGy    5600cGy to the axillary nodes         COMMENTS:                      (Acute Toxicity Profile by CTC v4.0)  G2 Moderate to brisk erythema, patchy moist desquamation, mostly confined to skin folds and creases. Patient   also experienced, moderate dry desquamation along axilla.  G1 Fatigue relieved by rest     PAIN MANAGEMENT:                           Patient did not experience any significant pain while on treatment     FOLLOW UP PLAN:                         Follow up with Dr Ledesma on 18     Staff Physician: Guillermo Ledesma M.D.  Physicist: Kimberly Salamanca MS     CC: MD Tianna Shannon MD              Radiation Oncology [address] [place] [address] Phone: [phone]

## 2018-01-16 NOTE — TELEPHONE ENCOUNTER
Spoke with patient about follow up with Dr Ledesma date, time, and location. This writer asked patient to move up visit to early time per request of Dr Ledesma. Appointment moved to 1430 and patient verbalized understanding     Star IRIZARRY

## 2018-01-18 ENCOUNTER — OFFICE VISIT (OUTPATIENT)
Dept: RADIATION ONCOLOGY | Facility: CLINIC | Age: 48
End: 2018-01-18
Payer: COMMERCIAL

## 2018-01-18 VITALS
DIASTOLIC BLOOD PRESSURE: 87 MMHG | WEIGHT: 128 LBS | BODY MASS INDEX: 21.97 KG/M2 | SYSTOLIC BLOOD PRESSURE: 134 MMHG | HEART RATE: 126 BPM | OXYGEN SATURATION: 97 %

## 2018-01-18 DIAGNOSIS — Z17.0 MALIGNANT NEOPLASM OF OVERLAPPING SITES OF RIGHT BREAST IN FEMALE, ESTROGEN RECEPTOR POSITIVE (H): Primary | ICD-10-CM

## 2018-01-18 DIAGNOSIS — C50.811 MALIGNANT NEOPLASM OF OVERLAPPING SITES OF RIGHT BREAST IN FEMALE, ESTROGEN RECEPTOR POSITIVE (H): Primary | ICD-10-CM

## 2018-01-18 PROCEDURE — 99024 POSTOP FOLLOW-UP VISIT: CPT | Performed by: RADIOLOGY

## 2018-01-18 ASSESSMENT — PAIN SCALES - GENERAL: PAINLEVEL: NO PAIN (0)

## 2018-01-18 NOTE — NURSING NOTE
FOLLOW-UP VISIT    Patient Name: Sheryle Cruse      : 1970     Age: 47 year old        ______________________________________________________________________________     Chief Complaint   Patient presents with     RECHECK     breast cancer 2 week follow up     /87 (BP Location: Left arm, Patient Position: Sitting, Cuff Size: Adult Large)  Pulse 126  Wt 128 lb  SpO2 97%  BMI 21.97 kg/m2     Date Radiation Completed: 18 Right Breast    Pain  Denies    Labs  Other Labs: No    Imaging  None          Other Appointments:     MD Name: Dr Ackerman Appointment Date: 5/10/18   MD Name: Appointment Date:   MD Name: Appointment Date:   Other Appointment Notes:     Residual Radiation side effect: patient reports positive erythema and states she is using Aquaphor for skin. Patient denies any pain, tenderness, or peeling around radiation site. Patient reports energy level is better and is getting more sleep at night. Patient reports ROM is better and is continuing ROM exercises. Patient states she will follow up with lymphedema clinic in the future.    Additional Instructions:     Nurse face-to-face time: Level 3:  10 min face to face time    Star IRIZARRY

## 2018-01-18 NOTE — MR AVS SNAPSHOT
"              After Visit Summary   1/18/2018    Sheryle Cruse    MRN: [id number]           Patient Information     Date Of Birth          1970        Visit Information        Provider Department      1/18/2018 2:30 PM Johana Ledesma MD [facility]        Today's Diagnoses     Malignant neoplasm of overlapping sites of right breast in female, estrogen receptor positive (H)    -  1      Care Instructions    Please contact [facility] RN with questions or concerns following today's appointment: [phone].    Thank you!            Follow-ups after your visit        Additional Services     LYMPHEDEMA THERAPY REFERRAL       *This therapy referral will be filtered to a centralized scheduling office at [facility] and the patient will receive a call to schedule an appointment at a [place] location most convenient for them. *   If you have not heard from the scheduling office within 2 business days, please call [phone] for all locations, with the exception of [place], please call [phone].     Treatment: PT or OT Evaluation & Treatment  Special Instructions: Please provide lymphedema teaching for prevention.  Pt is s/p mastectomy.  Only one lymph node was removed but she did receive regional lymph node irradiation.    PT/OT Treatment Diagnosis: Lymphedema    Please be aware that coverage of these services is subject to the terms and limitations of your health insurance plan.  Call member services at your health plan with any benefit or coverage questions.      **Note to Provider:  If you are referring outside of [place] for the therapy appointment, please list the name of the location in the \"special instructions\" above, print the referral and give to the patient to schedule the appointment.                  Your next 10 appointments already scheduled     Jan 24, 2018  9:00 AM CST   Return Visit with JANE Tsang   [facility] " [facility] ([facility])    [address] [place] [address]   [phone]            Feb 01, 2018  7:30 AM CST   PHYSICAL with GILDARDO Michaels CNP   [facility]    [address] [place] [address]   [phone]            Feb 23, 2018  9:00 AM CST   New Visit with Tammie Vela MD   [facility]    [address] [place] [address]   [phone]            May 10, 2018  8:00 AM CDT   Return Visit with Gwendolyn Ackerman MD   [facility]    [address] [place] [address]   [phone]              Who to contact     If you have questions or need follow up information about today's clinic visit or your schedule please contact [facility] directly at [phone].  Normal or non-critical lab and imaging results will be communicated to you by MyChart, letter or phone within 4 business days after the clinic has received the results. If you do not hear from us within 7 days, please contact the clinic through [employer] or phone. If you have a critical or abnormal lab result, we will notify you by phone as soon as possible.  Submit refill requests through [employer] or call your pharmacy and they will forward the refill request to us. Please allow 3 business days for your refill to be completed.          Additional Information About Your Visit        [employer] Information     [employer] is an electronic gateway that provides easy, online access to your medical records. With [employer], you can request a clinic appointment, read your test results, renew a prescription or communicate with your care team.     To sign up for [employer] visit the website at [email or web address]   You will be asked to enter the access code listed below, as well as some personal information. Please  follow the directions to create your username and password.     Your access code is: [email or web address]  Expires: 3/27/2018  3:52 PM     Your access code will  in 90 days. If you need help or a new code, please contact your [facility] or call [phone] for assistance.        Care EveryWhere ID     This is your Care EveryWhere ID. This could be used by other organizations to access your [facility] medical records  [id number]        Your Vitals Were     Pulse Pulse Oximetry BMI (Body Mass Index)             126 97% 21.97 kg/m2          Blood Pressure from Last 3 Encounters:   18 134/87   18 128/83   17 137/82    Weight from Last 3 Encounters:   18 128 lb   18 128 lb 1.6 oz   18 125 lb              We Performed the Following     LYMPHEDEMA THERAPY REFERRAL        Primary Care Provider Office Phone # Fax #    [facility] [phone]       [address]  [facility] [address]        Equal Access to Services     CAROLYN [facility] HospitalKRISTIE : Hadii aad ku hadasho Soomaali, waaxda luqadaha, qaybta kaalmada adesanyayabree, malinda copeland . So [facility] [phone].    ATENCIÓN: Si habla español, tiene a spear disposición servicios gratuitos de asistencia lingüística. Nasir al [phone].    We comply with applicable federal civil rights laws and [place] laws. We do not discriminate on the basis of race, color, national origin, age, disability, sex, sexual orientation, or gender identity.            Thank you!     Thank you for choosing [facility]  for your care. Our goal is always to provide you with excellent care. Hearing back from our patients is one way we can continue to improve our services. Please take a few minutes to complete the written survey that you may receive in the mail after your visit with us. Thank you!             Your Updated Medication List - Protect others around you: Learn  how to safely use, store and throw away your medicines at www.disposemymeds.org.          This list is accurate as of: 1/18/18 11:59 PM.  Always use your most recent med list.                   Brand Name Dispense Instructions for use Diagnosis    CALCIUM MAGNESIUM PO      With zinc    RLQ abdominal pain       ONE-A-DAY ESSENTIAL Tabs      Take 1 tablet by mouth daily.        polyethylene glycol Packet    MIRALAX/GLYCOLAX     Take 1 packet by mouth daily

## 2018-01-18 NOTE — PATIENT INSTRUCTIONS
Please contact [facility] RN with questions or concerns following today's appointment: [phone].    Thank you!

## 2018-01-18 NOTE — LETTER
1/18/2018        RE: Sheryle Cruse  [address]  [facility] [address]        Dear Colleagues,  Today I had the pleasure of seeing this patient in follow up.  IDENTIFICATION: Ms. Sparrow is a 46 year-old premenopausal woman with a recent diagnosis of right breast cancer s/p bilateral mastectomy with right axillary single lymph node removal/biopsy revealing [id number], ER+/VA+. She completed adjuvant radiation therapy to the right chest wall and regional lymph nodes on 1/2/18 and is now being seen here in follow up.        INTERVAL HISTORY: Ms. Sparrow was last seen in our clinic on her last day of treatment. While on treatment she had complaints of fatigue, erythema and areas of skin peeling.  Post treatment she states that her symptoms have mostly resolved. She returns today in follow up and has no clinical complaints. Denies new HA, CP, SOB, bony or joint pain.      REVIEW OF SYSTEMS: As per HPI, a 14-point review of system is otherwise negative.      PHYSICAL EXAMINATION:  /87 (BP Location: Left arm, Patient Position: Sitting, Cuff Size: Adult Large)  Pulse 126  Wt 128 lb  SpO2 97%  BMI 21.97 kg/m2  GENERAL                         Well-appearing middle-aged woman in no acute distress.  HEENT                              Normocephalic, atraumatic.  Sclerae anicteric.  CARDIOVASCULAR         Regular rate and rhythm.  No murmurs, rubs, or gallops.  LUNGS                              Clear to auscultation bilaterally.  BREASTS                        Bilateral breasts are surgically absent.  CHEST                             Right chest wall scar is well healed.   Diffuse erythema within the treatment field but no desquamation.  No ulceration or suspicious nodularity within the right or left chest wall. Left chest wall scar is well healed.    LYMPH NODES                No cervical, supraclavicular, infraclavicular, or axillary lymphadenopathy appreciated bilaterally.  ABDOMEN                         deferred.  EXTREMITIES                   No clubbing or cyanosis.    NEUROLOGICAL              Cranial nerves grossly intact.    MUSCULOSKELETAL      Good ROM.   SKIN                                  Warm and well perfused.    PSYCHIATRIC                  Alert and oriented x 3          IMPRESSION/PLAN: Ms. Sparrow is a 46 year-old premenopausal woman with a recent diagnosis of right breast cancer s/p bilateral mastectomy with right axillary single lymph node removal/biopsy revealing [id number], ER+/NC+. She completed adjuvant radiation therapy to the right chest wall and regional lymph nodes on 1/2/18 and is now being seen here in follow up. Her range of motion has improved.  Her acute radiation induced side effects are resolving and the erythema will likely clear within the next month or so. She should use aquaphor and miaderm until that time. She will have some residual skin dryness and hyperpigmentation within the treatment field.  This is to be expected. At that time recommend generous use of sunblock and moisturizer in previously treated area. She can continue to follow up with me prn.        Additional problem list to be addressed in the following manner:  1) Systemic Treatment: Follow up with Med Onc for hormonal therapy as previously recommended.  Future imaging per Med Onc.    2) Lymphedema Risk: Recommend follow up in lymphedema clinic.   There was ample time for questions and all were answered to the patient's satisfaction. Thank you for allowing me to participate in the care of this pleasant patient. If you have any questions, please do not hesitate to contact my office.      Sincerely,  Guillermo Ledesma MD

## 2018-01-18 NOTE — PROGRESS NOTES
Dear Colleagues,  Today I had the pleasure of seeing this patient in follow up.  IDENTIFICATION: Ms. Sparrow is a 46 year-old premenopausal woman with a recent diagnosis of right breast cancer s/p bilateral mastectomy with right axillary single lymph node removal/biopsy revealing [id number], ER+/KS+. She completed adjuvant radiation therapy to the right chest wall and regional lymph nodes on 1/2/18 and is now being seen here in follow up.        INTERVAL HISTORY: Ms. Sparrow was last seen in our clinic on her last day of treatment. While on treatment she had complaints of fatigue, erythema and areas of skin peeling.  Post treatment she states that her symptoms have mostly resolved. She returns today in follow up and has no clinical complaints. Denies new HA, CP, SOB, bony or joint pain.      REVIEW OF SYSTEMS: As per HPI, a 14-point review of system is otherwise negative.      PHYSICAL EXAMINATION:  /87 (BP Location: Left arm, Patient Position: Sitting, Cuff Size: Adult Large)  Pulse 126  Wt 128 lb  SpO2 97%  BMI 21.97 kg/m2  GENERAL                         Well-appearing middle-aged woman in no acute distress.  HEENT                              Normocephalic, atraumatic.  Sclerae anicteric.  CARDIOVASCULAR         Regular rate and rhythm.  No murmurs, rubs, or gallops.  LUNGS                              Clear to auscultation bilaterally.  BREASTS                        Bilateral breasts are surgically absent.  CHEST                             Right chest wall scar is well healed.  Diffuse erythema within the treatment field but no desquamation.  No ulceration or suspicious nodularity within the right or left chest wall. Left chest wall scar is well healed.    LYMPH NODES                No cervical, supraclavicular, infraclavicular, or axillary lymphadenopathy appreciated bilaterally.  ABDOMEN                        deferred.  EXTREMITIES                   No clubbing or cyanosis.    NEUROLOGICAL               Cranial nerves grossly intact.    MUSCULOSKELETAL      Good ROM.   SKIN                                  Warm and well perfused.    PSYCHIATRIC                  Alert and oriented x 3          IMPRESSION/PLAN: Ms. Sparrow is a 46 year-old premenopausal woman with a recent diagnosis of right breast cancer s/p bilateral mastectomy with right axillary single lymph node removal/biopsy revealing [id number], ER+/MO+. She completed adjuvant radiation therapy to the right chest wall and regional lymph nodes on 1/2/18 and is now being seen here in follow up. Her range of motion has improved.  Her acute radiation induced side effects are resolving and the erythema will likely clear within the next month or so. She should use aquaphor and miaderm until that time. She will have some residual skin dryness and hyperpigmentation within the treatment field.  This is to be expected. At that time recommend generous use of sunblock and moisturizer in previously treated area. She can continue to follow up with me prn.        Additional problem list to be addressed in the following manner:  1) Systemic Treatment: Follow up with Med Onc for hormonal therapy as previously recommended.  Future imaging per Med Onc.    2) Lymphedema Risk: Recommend follow up in lymphedema clinic.   There was ample time for questions and all were answered to the patient's satisfaction. Thank you for allowing me to participate in the care of this pleasant patient. If you have any questions, please do not hesitate to contact my office.      Sincerely,  Guillermo Ledesma MD

## 2018-01-19 ENCOUNTER — TELEPHONE (OUTPATIENT)
Dept: CARE COORDINATION | Facility: CLINIC | Age: 48
End: 2018-01-19

## 2018-01-19 NOTE — TELEPHONE ENCOUNTER
"Social Work Follow-Up Encounter Visit  Oncology Clinic    Data/Intervention:  Patient Name:  Sheryle Cruse  /Age:  1970 (47 year old)    Reason for Follow-Up:  SW received message from pt indicating that she received information regarding some of her radiation charge not being covered.      Collaborated With:    Dr Ledesma, Radiation Oncology, pt    Resources Provided:  NA this encounter    Assessment:  SW consulted with Dr Ledesma, who provided SW with copy of pt's \"Notice of Denial of Payment\" and informed SW that Keturah Arteaga is working on an appeal for this for pt.  Clinic financial counselor will call pt to explain this process.  SW called pt back and explained above.  Pt experiences significant anxiety and this situation increases her anxiety as pt also has financial concerns.  SW provided active listening, validation of feelings, and support to pt during contact.  Pt indicated that she received a message from Blanca Augustin, [facility] Financial Counselor, and SW encouraged pt to call her back.            Plan:  SW will follow and reach out to pt again in 3 weeks regarding cancer support/resources.      "

## 2018-01-22 ENCOUNTER — CARE COORDINATION (OUTPATIENT)
Dept: ONCOLOGY | Facility: CLINIC | Age: 48
End: 2018-01-22

## 2018-01-22 NOTE — PROGRESS NOTES
Received call from patient stating she was asked questions regarding stress of cancer on her at her last visit by a nurse.  Asked pt if it was the oncology distress screening and she thought that was what she was referring to. Patient would like a copy of this. Printed screening form from her visit 1/11/18 and placed in an envelope at cancer center check in. Patient states she will  1/24/18.  Dalia Coreas  RN, BSN, OCN

## 2018-01-24 ENCOUNTER — OFFICE VISIT (OUTPATIENT)
Dept: PSYCHOLOGY | Facility: CLINIC | Age: 48
End: 2018-01-24
Payer: COMMERCIAL

## 2018-01-24 DIAGNOSIS — F43.23 ADJUSTMENT DISORDER WITH MIXED ANXIETY AND DEPRESSED MOOD: Primary | ICD-10-CM

## 2018-01-24 PROCEDURE — 90834 PSYTX W PT 45 MINUTES: CPT | Performed by: SOCIAL WORKER

## 2018-01-24 NOTE — PROGRESS NOTES
[facility]  January 24, 2017      Behavioral Health Clinician Progress Note    Patient Name: Sheryle Cruse           Service Type:  Individual      Service Location:   Face to Face in Clinic     Session Start Time: 9 : 06 am  Session End Time: 9: 57 am      Session Length: 38 - 52      Attendees: Patient    Visit Activities (Refresh list every visit): [facility] Only    Diagnostic Assessment Date: 11/17/17  Treatment Plan Review Date: 11/30/17  See Flowsheets for today's PHQ-9 and MAGGY-7 results  Previous PHQ-9:   PHQ-9 SCORE 9/22/2017   Total Score 11     Previous MAGGY-7:   MAGGY-7 SCORE 9/22/2017   Total Score 14       FRANCISCO LEVEL:  FRANCISCO Score (Last Two) 8/22/2016 9/22/2017   FRANCISCO Raw Score 36 29   Activation Score 47.4 52.9   FRANCISCO Level 2 2       DATA  Extended Session (60+ minutes): No  Interactive Complexity: No  Crisis: No  [facility] Patient: No    Treatment Objective(s) Addressed in This Session:  Target Behavior(s): disease management/lifestyle changes , mental health management    Depressed Mood: Identify negative self-talk and behaviors: challenge core beliefs, myths, and actions    Current Stressors / Issues:  Patient continues to process thoughts and feelings secondary to cancer diagnosis, completion of treatment, and managing psychosocial stressors outside of her medical treatment.  Patient discussed heightened anxiety, and awareness that she does not like how the heightened anxiety feels.  Per patient, she is attempting to problem solve on how to best move forward within her medical treatment and how to cope with stressors.  [facility] discussed cognitive techniques to assist with reduction in anxiety, including structured worry time and thought stopping techniques.  Patient expressed goal of increase of writing for self- care, and stated that it has been difficult to complete due to anxiety and stress secondary to her cancer.  [facility] also provided recommendation to consider mediation to assist with symptoms.   Patient expressed potential interest, and was receptive to further discussing with PCP during upcoming appointment next week.         Progress on Treatment Objective(s) / Homework:  No improvement - ACTION (Actively working towards change); Intervened by reinforcing change plan / affirming steps taken    Motivational Interviewing    MI Intervention: Expressed Empathy/Understanding, Supported Autonomy, Collaboration, Evocation, Permission to raise concern or advise, Open-ended questions and Reframe     Change Talk Expressed by the Patient: Ability to change Reasons to change Need to change    Provider Response to Change Talk: E - Evoked more info from patient about behavior change, A - Affirmed patient's thoughts, decisions, or attempts at behavior change, R - Reflected patient's change talk and S - Summarized patient's change talk statements    Also provided psychoeducation about behavioral health condition, symptoms, and treatment options        Care Plan review completed: Yes    Medication Review:  No current psychiatric medications prescribed    Medication Compliance:  NA    Changes in Health Issues:   Yes: radiation completed, needing to make decisions on next steps in treatment    Chemical Use Review:   Substance Use: Chemical use reviewed, no active concerns identified      Tobacco Use: No current tobacco use.      Assessment: Current Emotional / Mental Status (status of significant symptoms):  Risk status (Self / Other harm or suicidal ideation)  Patient denies a history of suicidal ideation, suicide attempts, self-injurious behavior, homicidal ideation, homicidal behavior and and other safety concerns  Patient denies current fears or concerns for personal safety.  Patient denies current or recent suicidal ideation or behaviors.  Patient denies current or recent homicidal ideation or behaviors.  Patient denies current or recent self injurious behavior or ideation.  Patient denies other safety concerns.  A  safety and risk management plan has not been developed at this time, however patient was encouraged to call [place] [phone] should there be a change in any of these risk factors.    Appearance:   Appropriate   Eye Contact:   Good   Psychomotor Behavior: Normal   Attitude:   Cooperative   Orientation:   All  Speech   Rate / Production: Normal    Volume:  Normal   Mood:    Normal  Affect:    Appropriate   Thought Content:  Clear   Thought Form:  Coherent  Logical   Insight:    Good     Diagnoses:  1. Adjustment disorder with mixed anxiety and depressed mood        Collateral Reports Completed:  Communicated with: PCP. Patient has upcoming appointment with PCP on 2/1.  [facility] to provide recommendations to have conversation about medications for anxiety    Plan: (Homework, other):  Patient was given information about behavioral services and encouraged to schedule a follow up appointment with the clinic [facility] in 1 week.  She was also given Cognitive Behavioral Therapy skills to practice when experiencing anxiety and depression.  CD Recommendations: No indications of CD issues.  JANE Anne      ______________________________________________________________________    Integrated Primary Care Behavioral Health Treatment Plan    Patient's Name: Sheryle Cruse  YOB: 1970    Date: 11/30/17    DSM-V Diagnoses: Adjustment Disorders  309.28 (F43.23) With mixed anxiety and depressed mood  Psychosocial / Contextual Factors: current cancer treatment  WHODAS: TBD    Referral / Collaboration:  Referral to another professional/service is not indicated at this time..    Anticipated number of session or this episode of care: 6-8      MeasurableTreatment Goal(s) related to diagnosis / functional impairment(s)  Goal 1: Patient will reduce feelings of anxiety as evidenced by lower MAGGY 7.    I will know I've met my goal when I feel that I can cope better with my anxiety.      Objective #A (Patient Action)    Patient  will use distraction each time intrusive worry surfaces.  Status: New - Date: 11/30/17     Intervention(s)  [facility] will assign homework to practice distraction techniques until feels automatic.    Objective #B  Patient will use cognitive strategies identified in therapy to challenge anxious thoughts.  Status: New - Date: 11/30/17     Intervention(s)  [facility] will role-play cognitive restructruing.    Objective #C  Patient will practice deep breathing at least 3 a day.  Status: New - Date: 11/30/17     Intervention(s)  [facility] will assign homework to practice skills.      Patient has reviewed and agreed to the above plan.      Blanca Burns, [facility]  November 30, 2017

## 2018-02-01 ENCOUNTER — OFFICE VISIT (OUTPATIENT)
Dept: PEDIATRICS | Facility: CLINIC | Age: 48
End: 2018-02-01
Payer: COMMERCIAL

## 2018-02-01 ENCOUNTER — TELEPHONE (OUTPATIENT)
Dept: CARE COORDINATION | Facility: CLINIC | Age: 48
End: 2018-02-01

## 2018-02-01 ENCOUNTER — OFFICE VISIT (OUTPATIENT)
Dept: PSYCHOLOGY | Facility: CLINIC | Age: 48
End: 2018-02-01
Payer: COMMERCIAL

## 2018-02-01 VITALS
OXYGEN SATURATION: 99 % | SYSTOLIC BLOOD PRESSURE: 110 MMHG | DIASTOLIC BLOOD PRESSURE: 60 MMHG | HEART RATE: 117 BPM | BODY MASS INDEX: 22.18 KG/M2 | WEIGHT: 129.9 LBS | HEIGHT: 64 IN | TEMPERATURE: 96.7 F

## 2018-02-01 DIAGNOSIS — Z13.6 CARDIOVASCULAR SCREENING; LDL GOAL LESS THAN 160: ICD-10-CM

## 2018-02-01 DIAGNOSIS — R79.89 ELEVATED LFTS: ICD-10-CM

## 2018-02-01 DIAGNOSIS — M54.50 BILATERAL LOW BACK PAIN WITHOUT SCIATICA, UNSPECIFIED CHRONICITY: ICD-10-CM

## 2018-02-01 DIAGNOSIS — E55.9 VITAMIN D DEFICIENCY: ICD-10-CM

## 2018-02-01 DIAGNOSIS — Z13.29 SCREENING FOR THYROID DISORDER: ICD-10-CM

## 2018-02-01 DIAGNOSIS — R20.2 PARESTHESIA OF RIGHT UPPER EXTREMITY: ICD-10-CM

## 2018-02-01 DIAGNOSIS — F41.1 GENERALIZED ANXIETY DISORDER: Primary | ICD-10-CM

## 2018-02-01 DIAGNOSIS — L65.9 HAIR LOSS: Primary | ICD-10-CM

## 2018-02-01 DIAGNOSIS — Z13.1 SCREENING FOR DIABETES MELLITUS: ICD-10-CM

## 2018-02-01 DIAGNOSIS — I89.0 LYMPHEDEMA OF BOTH UPPER EXTREMITIES: ICD-10-CM

## 2018-02-01 LAB
ALBUMIN SERPL-MCNC: 4.2 G/DL (ref 3.4–5)
ALP SERPL-CCNC: 61 U/L (ref 40–150)
ALT SERPL W P-5'-P-CCNC: 69 U/L (ref 0–50)
ANION GAP SERPL CALCULATED.3IONS-SCNC: 13 MMOL/L (ref 3–14)
AST SERPL W P-5'-P-CCNC: 46 U/L (ref 0–45)
BILIRUB SERPL-MCNC: 1.6 MG/DL (ref 0.2–1.3)
BUN SERPL-MCNC: 14 MG/DL (ref 7–30)
CALCIUM SERPL-MCNC: 9.1 MG/DL (ref 8.5–10.1)
CHLORIDE SERPL-SCNC: 101 MMOL/L (ref 94–109)
CHOLEST SERPL-MCNC: 250 MG/DL
CO2 SERPL-SCNC: 23 MMOL/L (ref 20–32)
CREAT SERPL-MCNC: 0.61 MG/DL (ref 0.52–1.04)
DEPRECATED CALCIDIOL+CALCIFEROL SERPL-MC: 27 UG/L (ref 20–75)
ERYTHROCYTE [DISTWIDTH] IN BLOOD BY AUTOMATED COUNT: 12.7 % (ref 10–15)
GFR SERPL CREATININE-BSD FRML MDRD: >90 ML/MIN/1.7M2
GLUCOSE SERPL-MCNC: 83 MG/DL (ref 70–99)
HCT VFR BLD AUTO: 40.2 % (ref 35–47)
HDLC SERPL-MCNC: 79 MG/DL
HGB BLD-MCNC: 13.3 G/DL (ref 11.7–15.7)
LDLC SERPL CALC-MCNC: 153 MG/DL
MCH RBC QN AUTO: 30.4 PG (ref 26.5–33)
MCHC RBC AUTO-ENTMCNC: 33.1 G/DL (ref 31.5–36.5)
MCV RBC AUTO: 92 FL (ref 78–100)
NONHDLC SERPL-MCNC: 171 MG/DL
PLATELET # BLD AUTO: 398 10E9/L (ref 150–450)
POTASSIUM SERPL-SCNC: 4.4 MMOL/L (ref 3.4–5.3)
PROT SERPL-MCNC: 8.4 G/DL (ref 6.8–8.8)
RBC # BLD AUTO: 4.37 10E12/L (ref 3.8–5.2)
SODIUM SERPL-SCNC: 137 MMOL/L (ref 133–144)
TRIGL SERPL-MCNC: 90 MG/DL
TSH SERPL DL<=0.005 MIU/L-ACNC: 1.53 MU/L (ref 0.4–4)
VIT B12 SERPL-MCNC: 618 PG/ML (ref 193–986)
WBC # BLD AUTO: 6.2 10E9/L (ref 4–11)

## 2018-02-01 PROCEDURE — 99214 OFFICE O/P EST MOD 30 MIN: CPT | Performed by: NURSE PRACTITIONER

## 2018-02-01 PROCEDURE — 82306 VITAMIN D 25 HYDROXY: CPT | Performed by: NURSE PRACTITIONER

## 2018-02-01 PROCEDURE — 80053 COMPREHEN METABOLIC PANEL: CPT | Performed by: NURSE PRACTITIONER

## 2018-02-01 PROCEDURE — 36415 COLL VENOUS BLD VENIPUNCTURE: CPT | Performed by: NURSE PRACTITIONER

## 2018-02-01 PROCEDURE — 82607 VITAMIN B-12: CPT | Performed by: NURSE PRACTITIONER

## 2018-02-01 PROCEDURE — 90834 PSYTX W PT 45 MINUTES: CPT | Performed by: SOCIAL WORKER

## 2018-02-01 PROCEDURE — 85027 COMPLETE CBC AUTOMATED: CPT | Performed by: NURSE PRACTITIONER

## 2018-02-01 PROCEDURE — 80061 LIPID PANEL: CPT | Performed by: NURSE PRACTITIONER

## 2018-02-01 PROCEDURE — 84443 ASSAY THYROID STIM HORMONE: CPT | Performed by: NURSE PRACTITIONER

## 2018-02-01 ASSESSMENT — ANXIETY QUESTIONNAIRES
1. FEELING NERVOUS, ANXIOUS, OR ON EDGE: NEARLY EVERY DAY
GAD7 TOTAL SCORE: 14
5. BEING SO RESTLESS THAT IT IS HARD TO SIT STILL: SEVERAL DAYS
IF YOU CHECKED OFF ANY PROBLEMS ON THIS QUESTIONNAIRE, HOW DIFFICULT HAVE THESE PROBLEMS MADE IT FOR YOU TO DO YOUR WORK, TAKE CARE OF THINGS AT HOME, OR GET ALONG WITH OTHER PEOPLE: VERY DIFFICULT
6. BECOMING EASILY ANNOYED OR IRRITABLE: SEVERAL DAYS
3. WORRYING TOO MUCH ABOUT DIFFERENT THINGS: MORE THAN HALF THE DAYS
2. NOT BEING ABLE TO STOP OR CONTROL WORRYING: MORE THAN HALF THE DAYS
7. FEELING AFRAID AS IF SOMETHING AWFUL MIGHT HAPPEN: NEARLY EVERY DAY

## 2018-02-01 ASSESSMENT — PATIENT HEALTH QUESTIONNAIRE - PHQ9: 5. POOR APPETITE OR OVEREATING: MORE THAN HALF THE DAYS

## 2018-02-01 NOTE — Clinical Note
Hi Krista Sheryle Cruse is noticing some swelling in her right arm and wondering if she could or should go ahead and start on her lymphedema treatment Thanks Cecilia

## 2018-02-01 NOTE — Clinical Note
Darrin Valdivia If you have any time today to talk to Sheryle  She is seeing Gwendolyn later this morning  Thanks Cecilia
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
## 2018-02-01 NOTE — PROGRESS NOTES
SUBJECTIVE:   Sheryle Cruse is a 47 year old female who presents to clinic today for the following health issues:    1. Would like scar site to be checked, has been going through cancer treatment, hair loss-would like to know if this is due to treatment.  Has noticed hair loss for several and has been told aging   Has tried Rogaine in the past   Was told could be stress   Hair loss mostly noticed and top and the crown and will see it all over.     2. Tingling in the right arm x 1.5 week  Has had bilateral mastectomy October 4 and removed lymph nodes  Has finished radiation   Has appointment with GYN [facility] yet   Weighing in hormonal blockers   Still have some swelling on the right with lymphedema   The numbness will come and go   Will feel first thing in the morning     Back Pain       Duration: flare-up x 1 week, has been ongoing for 10 years        Specific cause: none    Description:   Location of pain: low back right  Character of pain: dull ache  Pain radiation:none  New numbness or weakness in legs, not attributed to pain:  no     Intensity: Currently 2-3/10    History:   Pain interferes with job: No,  History of back problems: recurrent self limited episodes of low back pain in the past  Any previous MRI or X-rays: Yes- at [place].  Date 9/22/17  Sees a specialist for back pain:  PT 2 years ago  Therapies tried without relief: cold, heat, NSAIDs and Physical Therapy    Alleviating factors:   Improved by: cold and heat      Precipitating factors:  Worsened by: Sitting and towards the end of the menstrual cycle     Functional and Psychosocial Screen (Noemi STarT Back):      Not performed today        Accompanying Signs & Symptoms:  Risk of Fracture:  None  Risk of Cauda Equina:  None  Risk of Infection:  None  Risk of Cancer:  History of cancer  Risk of Ankylosing Spondylitis:  Onset at age <35, male, AND morning back stiffness. no     Had physical therapy about 2 years ago  Has had issue with this for several  years       Problem list and histories reviewed & adjusted, as indicated.  Additional history: as documented    Patient Active Problem List   Diagnosis     Vitamin D deficiency     Vegetarianism     Hyperlipidemia LDL goal <160     Advance care planning     ERRONEOUS ENCOUNTER--DISREGARD     Monoallelic mutation of MUTYH gene     Malignant neoplasm of lower-inner quadrant of left breast in female, estrogen receptor positive (H)     Adjustment disorder with mixed anxiety and depressed mood     Past Surgical History:   Procedure Laterality Date     Mammogram  2003, 2009       Social History   Substance Use Topics     Smoking status: Never Smoker     Smokeless tobacco: Never Used     Alcohol use No     Family History   Problem Relation Age of Onset     DIABETES Mother 76     Obese     HEART DISEASE Mother      Hypertension Mother      CEREBROVASCULAR DISEASE Mother      Cardiovascular Mother      CVA     CANCER Father      bladder     CEREBROVASCULAR DISEASE Father 70     Breast Cancer Maternal Grandmother      CEREBROVASCULAR DISEASE Maternal Grandmother      Lipids Maternal Grandmother      HEART DISEASE Maternal Grandfather      heart disease     DIABETES Paternal Grandmother      CEREBROVASCULAR DISEASE Paternal Grandfather      HEART DISEASE Paternal Grandfather          Current Outpatient Prescriptions   Medication Sig Dispense Refill     polyethylene glycol (MIRALAX/GLYCOLAX) Packet Take 1 packet by mouth daily       Calcium-Magnesium-Vitamin D (CALCIUM MAGNESIUM PO) With zinc       Multiple Vitamin (ONE-A-DAY ESSENTIAL) TABS Take 1 tablet by mouth daily.       Allergies   Allergen Reactions     Contrast Dye      Pt does not recall which type of contrast     Diphenhydramine      Pcn [Bicillin C-R,] Rash     swelling     BP Readings from Last 3 Encounters:   02/01/18 110/60   01/18/18 134/87   01/11/18 128/83    Wt Readings from Last 3 Encounters:   02/01/18 129 lb 14.4 oz (58.9 kg)   01/18/18 128 lb (58.1 kg)  "  01/11/18 128 lb 1.6 oz (58.1 kg)                  Labs reviewed in EPIC    Reviewed and updated as needed this visit by clinical staff  Tobacco  Allergies  Meds  Med Hx  Surg Hx  Fam Hx  Soc Hx      Reviewed and updated as needed this visit by Provider         ROS:  CONSTITUTIONAL:NEGATIVE for fever, chills, change in weight  INTEGUMENTARY/SKIN: NEGATIVE for non-healing lesion  and POSITIVE for alopecia  ENT/MOUTH: NEGATIVE for ear, mouth and throat problems  RESP:NEGATIVE for significant cough or SOB  CV: NEGATIVE for chest pain, palpitations or peripheral edema  GI: NEGATIVE for nausea, poor appetite, vomiting and weight loss  MUSCULOSKELETAL: POSITIVE  for back pain  and NEGATIVE for joint swelling , joint warmth , paresthesias and radicular pain   INTEGUMENTARY/SKIN: NEGATIVE for changing lesion  and POSITIVE for healing wound and hair loss   NEURO: POSITIVE for paresthesias right arm  and NEGATIVE for involuntary movements, gait disturbance and syncope  PSYCHIATRIC: POSITIVE forHx anxiety and NEGATIVE forthoughts of hurting someone else and thoughts of self harm    OBJECTIVE:     /60 (BP Location: Right arm, Patient Position: Sitting, Cuff Size: Adult Regular)  Pulse 117  Temp 96.7  F (35.9  C) (Temporal)  Ht 5' 4\" (1.626 m)  Wt 129 lb 14.4 oz (58.9 kg)  LMP 01/19/2018  SpO2 99%  Breastfeeding? Yes  BMI 22.3 kg/m2  Body mass index is 22.3 kg/(m^2).   Wt Readings from Last 4 Encounters:   02/01/18 129 lb 14.4 oz (58.9 kg)   01/18/18 128 lb (58.1 kg)   01/11/18 128 lb 1.6 oz (58.1 kg)   01/02/18 125 lb (56.7 kg)       GENERAL APPEARANCE: alert, active and no distress  HENT: ear canals and TM's normal and nose and mouth without ulcers or lesions  RESP: lungs clear to auscultation - no rales, rhonchi or wheezes  CV: regular rates and rhythm and no murmur, click or rub  ABDOMEN: soft, non-tender  MS: extremities normal- no gross deformities noted and peripheral pulses normal  Lymphedema in " upper extremities   ORTHO: Lumbosacral spine area reveals no local tenderness or mass.  Full and painless lumbosacral range of motion is noted. Straight leg raise is negative at 90 degrees on both sides. DTR's, motor strength and sensation normal, including heel and toe gait.  Peripheral pulses are palpable. Hips and knees have full range of motion without pain.   SKIN: no suspicious lesions or rashes. Hair loss on top of scalp   NEURO: Normal strength and tone, mentation intact and speech normal  PSYCH: mentation appears normal and affect normal/bright    Diagnostic Test Results:  Results for orders placed or performed in visit on 02/01/18   Lipid panel reflex to direct LDL Fasting   Result Value Ref Range    Cholesterol 250 (H) <200 mg/dL    Triglycerides 90 <150 mg/dL    HDL Cholesterol 79 >49 mg/dL    LDL Cholesterol Calculated 153 (H) <100 mg/dL    Non HDL Cholesterol 171 (H) <130 mg/dL   CBC with platelets   Result Value Ref Range    WBC 6.2 4.0 - 11.0 10e9/L    RBC Count 4.37 3.8 - 5.2 10e12/L    Hemoglobin 13.3 11.7 - 15.7 g/dL    Hematocrit 40.2 35.0 - 47.0 %    MCV 92 78 - 100 fl    MCH 30.4 26.5 - 33.0 pg    MCHC 33.1 31.5 - 36.5 g/dL    RDW 12.7 10.0 - 15.0 %    Platelet Count 398 150 - 450 10e9/L   Comprehensive metabolic panel   Result Value Ref Range    Sodium 137 133 - 144 mmol/L    Potassium 4.4 3.4 - 5.3 mmol/L    Chloride 101 94 - 109 mmol/L    Carbon Dioxide 23 20 - 32 mmol/L    Anion Gap 13 3 - 14 mmol/L    Glucose 83 70 - 99 mg/dL    Urea Nitrogen 14 7 - 30 mg/dL    Creatinine 0.61 0.52 - 1.04 mg/dL    GFR Estimate >90 >60 mL/min/1.7m2    GFR Estimate If Black >90 >60 mL/min/1.7m2    Calcium 9.1 8.5 - 10.1 mg/dL    Bilirubin Total 1.6 (H) 0.2 - 1.3 mg/dL    Albumin 4.2 3.4 - 5.0 g/dL    Protein Total 8.4 6.8 - 8.8 g/dL    Alkaline Phosphatase 61 40 - 150 U/L    ALT 69 (H) 0 - 50 U/L    AST 46 (H) 0 - 45 U/L   TSH with free T4 reflex   Result Value Ref Range    TSH 1.53 0.40 - 4.00 mU/L    Vitamin B12   Result Value Ref Range    Vitamin B12 618 193 - 986 pg/mL   Vitamin D Deficiency   Result Value Ref Range    Vitamin D Deficiency screening 27 20 - 75 ug/L   Hepatic panel   Result Value Ref Range    Bilirubin Direct Canceled, Test credited 0.0 - 0.2 mg/dL    Bilirubin Total Canceled, Test credited 0.2 - 1.3 mg/dL    Albumin Canceled, Test credited 3.4 - 5.0 g/dL    Protein Total Canceled, Test credited 6.8 - 8.8 g/dL    Alkaline Phosphatase Canceled, Test credited 40 - 150 U/L    ALT Canceled, Test credited 0 - 50 U/L    AST Canceled, Test credited 0 - 45 U/L       ASSESSMENT/PLAN:       Sheryle was seen today for back pain, derm problem and numbness.    Diagnoses and all orders for this visit:    Hair loss  -     Comprehensive metabolic panel  -     JUST IN CASE  -     Vitamin B12  -     DERMATOLOGY REFERRAL    Bilateral low back pain without sciatica, unspecified chronicity  -     PHYSICAL THERAPY REFERRAL  -     tiZANidine (ZANAFLEX) 4 MG tablet; Take 0.5-1 tablets (2-4 mg) by mouth 3 times daily as needed for muscle spasms  Symptomatic therapy suggested:   prescription for muscle relaxant, referral to Physical Therapy    Paresthesia of right upper extremity  -     CBC with platelets  -     Comprehensive metabolic panel  -     JUST IN CASE  -     Vitamin B12  Will follow up and/or notify patient on results via phone or mail to determine further need for followup    Lymphedema of both upper extremities  -     PHYSICAL THERAPY REFERRAL  -     LYMPHEDEMA THERAPY REFERRAL    Vitamin D deficiency  -     Vitamin D Deficiency    Elevated LFTs  -     Hepatic panel  -     Hepatic panel; Future    Screening for thyroid disorder  -     TSH with free T4 reflex    Screening for diabetes mellitus  -     Comprehensive metabolic panel    CARDIOVASCULAR SCREENING; LDL GOAL LESS THAN 160  -     Lipid panel reflex to direct LDL Fasting    PLAN:   1.   Symptomatic therapy suggested:   prescription for muscle  relaxant, referral to Physical Therapy    2.  Orders Placed This Encounter   Procedures     Lipid panel reflex to direct LDL Fasting     CBC with platelets     Comprehensive metabolic panel     TSH with free T4 reflex     JUST IN CASE     Vitamin B12     Vitamin D Deficiency     DERMATOLOGY REFERRAL     PHYSICAL THERAPY REFERRAL       3. Patient needs to follow up in if no improvement,or sooner if worsening of symptoms or other symptoms develop.  CONSULTATION/REFERRAL to Dermatology   Will follow up and/or notify patient on results via phone or mail to determine further need for followup    See Patient Instructions    GILDARDO Michaels [facility]

## 2018-02-01 NOTE — NURSING NOTE
"Chief Complaint   Patient presents with     Back Pain     lower back flare-up x 1 week     Derm Problem     would like scar site to be checked, has been going through cancer treatment, hair loss     Numbness     tingling in the right arm x 1.5 week       Initial /60 (BP Location: Right arm, Patient Position: Sitting, Cuff Size: Adult Regular)  Pulse 117  Temp 96.7  F (35.9  C) (Temporal)  Ht 5' 4\" (1.626 m)  Wt 129 lb 14.4 oz (58.9 kg)  LMP 01/19/2018  SpO2 99%  Breastfeeding? Yes  BMI 22.3 kg/m2 Estimated body mass index is 22.3 kg/(m^2) as calculated from the following:    Height as of this encounter: 5' 4\" (1.626 m).    Weight as of this encounter: 129 lb 14.4 oz (58.9 kg).  Medication Reconciliation: complete      EDIE Ley      "

## 2018-02-01 NOTE — MR AVS SNAPSHOT
After Visit Summary   2/1/2018    Sheryle Cruse    MRN: [id number]           Patient Information     Date Of Birth          1970        Visit Information        Provider Department      2/1/2018 7:30 AM Katie Billy APRN CNP [facility]        Today's Diagnoses     Hair loss    -  1    Bilateral low back pain without sciatica, unspecified chronicity        Vitamin D deficiency        Screening for thyroid disorder        Screening for diabetes mellitus        CARDIOVASCULAR SCREENING; LDL GOAL LESS THAN 160        Paresthesia of right upper extremity          Care Instructions    PLAN:   1.   Symptomatic therapy suggested:   prescription for muscle relaxant, referral to Physical Therapy    2.  Orders Placed This Encounter   Procedures     Lipid panel reflex to direct LDL Fasting     CBC with platelets     Comprehensive metabolic panel     TSH with free T4 reflex     JUST IN CASE     Vitamin B12     Vitamin D Deficiency     DERMATOLOGY REFERRAL     PHYSICAL THERAPY REFERRAL       3. Patient needs to follow up in if no improvement,or sooner if worsening of symptoms or other symptoms develop.  CONSULTATION/REFERRAL to Dermatology   Will follow up and/or notify patient on results via phone or mail to determine further need for followup    It was a pleasure seeing you today at the [facility]. Thank you for allowing us to care for you today. We truly hope we provided you with the excellent service you deserve. Please let us know if there is anything else we can do for you so we can be sure you are leaving completley satisfied with your care experience.       General information about your clinic   Clinic Hours Lab Hours (Appointments are required)   Mon-Thurs: 7:30 AM - 7 PM Mon-Thurs: 7:30 AM - 7 PM   Fri: 7:30 AM - 5 PM Fri: 7:30 AM - 5 PM        After Hours Nurse Advise & Appts:  [facility] Nurse Advisors: [phone]  [facility] On Call: to  make appointments anytime: [phone] On Call Physician: call [phone] and answering service will page the on call physician.        For urgent appointments, please call [phone] and ask for the triage nurse or your care team clinic nurse.  How to contact my care team:  [place]: [email or web address]   Phone: [phone]   Fax: [phone]       [facility]:   Phone: [phone]  Hours: 8:00 AM - 6:00 PM  Medication Refills:  Call your pharmacy and they will forward the refill to us. Please allow 3 business days for your refills to be completed.       Normal or non-critical lab and imaging results will be communicated to you by MyChart, letter or phone within 7 days.  If you do not hear from us within 10 days, please call the clinic. If you have a critical or abnormal lab result, we will notify you by phone as soon as possible.       We now have PWIC (Pediatric Walk in Care)  Monday-Friday from 7:30-4. Simply walk in and be seen for your urgent needs like cough, fever, rash, diarrhea or vomiting, pink eye, UTI. No appointments needed. Ask one of the team for more information      -Your Care Team:    Dr. Stephanie Theodore - Internal Medicine/Pediatrics   Dr. Shirley Wesley - Family Medicine  Dr. Loli Nichols - Pediatrics  Katie Billy CNP - Family Practice Nurse Practitioner  Dr. Vale Santillan - Pediatrics                       Follow-ups after your visit        Additional Services     DERMATOLOGY REFERRAL       Your provider has referred you to: [facility]: [facility] [phone]   [email or web address]    Please be aware that coverage of these services is subject to the terms and limitations of your health insurance plan.  Call member services at your health plan with any benefit or coverage questions.      Please bring the following with you to your appointment:    (1) Any X-Rays, CTs or MRIs which have been performed.   "Contact the facility where they were done to arrange for  prior to your scheduled appointment.    (2) List of current medications  (3) This referral request   (4) Any documents/labs given to you for this referral            PHYSICAL THERAPY REFERRAL       *This therapy referral will be filtered to a centralized scheduling office at [facility] and the patient will receive a call to schedule an appointment at a [place] location most convenient for them. *     [facility] provides Physical Therapy evaluation and treatment and many specialty services across the [place] system.  If requesting a specialty program, please choose from the list below.    If you have not heard from the scheduling office within 2 business days, please call [phone] for all locations, with the exception of [place], please call [phone].  Treatment: Evaluation & Treatment  Special Instructions/Modalities:   Special Programs: None    Please be aware that coverage of these services is subject to the terms and limitations of your health insurance plan.  Call member services at your health plan with any benefit or coverage questions.      **Note to Provider:  If you are referring outside of [place] for the therapy appointment, please list the name of the location in the \"special instructions\" above, print the referral and give to the patient to schedule the appointment.                  Your next 10 appointments already scheduled     Feb 01, 2018  8:30 AM CST   Return Visit with JANE Tsang   [facility])    [address] [place] [id number]   [phone]            Feb 23, 2018  9:00 AM CST   New Visit with Tammie Vela MD   [facility])    [address] [place] [address]   [phone]            May 10, 2018  8:00 AM CDT   Return Visit with " Gwendolyn Ackerman MD   [facility]    [address] [place] [address] [phone]              Who to contact     If you have questions or need follow up information about today's clinic visit or your schedule please contact [facility] directly at [phone].  Normal or non-critical lab and imaging results will be communicated to you by MyChart, letter or phone within 4 business days after the clinic has received the results. If you do not hear from us within 7 days, please contact the clinic through MyChart or phone. If you have a critical or abnormal lab result, we will notify you by phone as soon as possible.  Submit refill requests through [employer] or call your pharmacy and they will forward the refill request to us. Please allow 3 business days for your refill to be completed.          Additional Information About Your Visit        [employer] Information     [employer] is an electronic gateway that provides easy, online access to your medical records. With [employer], you can request a clinic appointment, read your test results, renew a prescription or communicate with your care team.     To sign up for [employer] visit the website at [email or web address]   You will be asked to enter the access code listed below, as well as some personal information. Please follow the directions to create your username and password.     Your access code is: [email or web address]  Expires: 3/27/2018  3:52 PM     Your access code will  in 90 days. If you need help or a new code, please contact your [facility] or call [phone] for assistance.        Care EveryWhere ID     This is your Care EveryWhere ID. This could be used by other organizations to access your [facility] medical records  [id number]        Your Vitals Were     Pulse Temperature Height Last Period Pulse Oximetry Breastfeeding?    117 96.7  F (35.9  C)  "(Temporal) 5' 4\" (1.626 m) 01/19/2018 99% Yes    BMI (Body Mass Index)                   22.3 kg/m2            Blood Pressure from Last 3 Encounters:   02/01/18 110/60   01/18/18 134/87   01/11/18 128/83    Weight from Last 3 Encounters:   02/01/18 129 lb 14.4 oz (58.9 kg)   01/18/18 128 lb (58.1 kg)   01/11/18 128 lb 1.6 oz (58.1 kg)              We Performed the Following     CBC with platelets     Comprehensive metabolic panel     DERMATOLOGY REFERRAL     JUST IN CASE     Lipid panel reflex to direct LDL Fasting     PHYSICAL THERAPY REFERRAL     TSH with free T4 reflex     Vitamin B12     Vitamin D Deficiency          Today's Medication Changes          These changes are accurate as of 2/1/18  8:11 AM.  If you have any questions, ask your nurse or doctor.               Start taking these medicines.        Dose/Directions    tiZANidine 4 MG tablet   Commonly known as:  ZANAFLEX   Used for:  Bilateral low back pain without sciatica, unspecified chronicity   Started by:  Katie Billy APRN CNP        Dose:  2-4 mg   Take 0.5-1 tablets (2-4 mg) by mouth 3 times daily as needed for muscle spasms   Quantity:  30 tablet   Refills:  1            Where to get your medicines      These medications were sent to [facility] - [place], [place] - [address], [address]  [address], [facility] [address]     Phone:  [phone]     tiZANidine 4 MG tablet                Primary Care Provider Office Phone # Fax #    [facility] [phone]       [address]  [facility] [address]        Equal Access to Services     [facility]RISTIE : Hadii cecy Quan, alpada lulynette, qaybta kaalmabree haley, malinda murry. So [facility] [phone].    ATENCIÓN: Si habla español, tiene a spear disposición servicios gratuitos de asistencia lingüística. Llame al [phone].    We comply with applicable federal civil rights " laws and [place] laws. We do not discriminate on the basis of race, color, national origin, age, disability, sex, sexual orientation, or gender identity.            Thank you!     Thank you for choosing [facility]  for your care. Our goal is always to provide you with excellent care. Hearing back from our patients is one way we can continue to improve our services. Please take a few minutes to complete the written survey that you may receive in the mail after your visit with us. Thank you!             Your Updated Medication List - Protect others around you: Learn how to safely use, store and throw away your medicines at www.disposemymeds.org.          This list is accurate as of 2/1/18  8:11 AM.  Always use your most recent med list.                   Brand Name Dispense Instructions for use Diagnosis    CALCIUM MAGNESIUM PO      With zinc    RLQ abdominal pain       ONE-A-DAY ESSENTIAL Tabs      Take 1 tablet by mouth daily.        polyethylene glycol Packet    MIRALAX/GLYCOLAX     Take 1 packet by mouth daily        tiZANidine 4 MG tablet    ZANAFLEX    30 tablet    Take 0.5-1 tablets (2-4 mg) by mouth 3 times daily as needed for muscle spasms    Bilateral low back pain without sciatica, unspecified chronicity

## 2018-02-01 NOTE — PATIENT INSTRUCTIONS
PLAN:   1.   Symptomatic therapy suggested:   prescription for muscle relaxant, referral to Physical Therapy    2.  Orders Placed This Encounter   Procedures     Lipid panel reflex to direct LDL Fasting     CBC with platelets     Comprehensive metabolic panel     TSH with free T4 reflex     JUST IN CASE     Vitamin B12     Vitamin D Deficiency     DERMATOLOGY REFERRAL     PHYSICAL THERAPY REFERRAL       3. Patient needs to follow up in if no improvement,or sooner if worsening of symptoms or other symptoms develop.  CONSULTATION/REFERRAL to Dermatology   Will follow up and/or notify patient on results via phone or mail to determine further need for followup    It was a pleasure seeing you today at the [facility]. Thank you for allowing us to care for you today. We truly hope we provided you with the excellent service you deserve. Please let us know if there is anything else we can do for you so we can be sure you are leaving completley satisfied with your care experience.       General information about your clinic   Clinic Hours Lab Hours (Appointments are required)   Mon-Thurs: 7:30 AM - 7 PM Mon-Thurs: 7:30 AM - 7 PM   Fri: 7:30 AM - 5 PM Fri: 7:30 AM - 5 PM        After Hours Nurse Advise & Appts:  [facility] Nurse Advisors: [phone]  [facility] On Call: to make appointments anytime: [phone] On Call Physician: call [phone] and answering service will page the on call physician.        For urgent appointments, please call [phone] and ask for the triage nurse or your care team clinic nurse.  How to contact my care team:  MyChart: [email or web address]   Phone: [phone]   Fax: [phone]       [facility]:   Phone: [phone]  Hours: 8:00 AM - 6:00 PM  Medication Refills:  Call your pharmacy and they will forward the refill to us. Please allow 3 business days for your refills to be completed.       Normal or non-critical lab and imaging results will be  communicated to you by MyChart, letter or phone within 7 days.  If you do not hear from us within 10 days, please call the clinic. If you have a critical or abnormal lab result, we will notify you by phone as soon as possible.       We now have PWIC (Pediatric Walk in Care)  Monday-Friday from 7:30-4. Simply walk in and be seen for your urgent needs like cough, fever, rash, diarrhea or vomiting, pink eye, UTI. No appointments needed. Ask one of the team for more information      -Your Care Team:    Dr. Stephanie Theodore - Internal Medicine/Pediatrics   Dr. Shirley Wesley - Family Medicine  Dr. Loli Nichols - Pediatrics  Katie Billy CNP - Family Practice Nurse Practitioner  Dr. Vale Santillan - Pediatrics

## 2018-02-01 NOTE — TELEPHONE ENCOUNTER
Social Work Follow-Up Encounter Visit  [facility]    Data/Intervention:  Patient Name:  Sheryle Cruse  /Age:  1970 (47 year old)    Reason for Follow-Up:  Pt continues to have financial need, pt's spouse is having some health issues, been doctoring, and is now going to have some tests to determine diagnosis.  Pt concerned that diagnosis may be serious.    Collaborated With:    Katie Billy, Pt    Resources Provided:  Re-discussed/provided pt with [employer] information as pt did not complete applying for financial assistance through them.  Pt also has the application information regarding [employer] financial assistance though has not completed it.  Also, discussed/completed/submitted application for [employer] myra to see if pt eligible for that.  SW discussed [facility] financial assistance though pt indicated that they have tried to apply for this though are over income.  SW also inquired of [employer] [facility] if pt can ever get additional assistance from them when they have already received financaial assistance from them once.  Lastly, SW provided pt with information regarding food shelves in her area.        Assessment:  Pt continues to have financial need and pt's spouse is experiencing some health issues.  Pt has been seeing [facility], as pt experiences significant anxiety relating to her cancer diagnosis.  SW provided active listening, validation of feelings and affirmations to pt during contact.     Plan:  SW will follow and reach out to pt again in about one month.    LAZARA Godinez     Social Work  [facility]  Office:  [phone]  E-Mail:  [email or web address]  2018 3:38 PM

## 2018-02-01 NOTE — Clinical Note
Please let patient know consulted with physical therapy and can start lymphedema therapy and requested therapy for low back as well.  Take care Katie Billy, BARB, APRN CNP

## 2018-02-02 ENCOUNTER — TELEPHONE (OUTPATIENT)
Dept: PEDIATRICS | Facility: CLINIC | Age: 48
End: 2018-02-02

## 2018-02-02 ASSESSMENT — ANXIETY QUESTIONNAIRES: GAD7 TOTAL SCORE: 14

## 2018-02-02 ASSESSMENT — PATIENT HEALTH QUESTIONNAIRE - PHQ9: SUM OF ALL RESPONSES TO PHQ QUESTIONS 1-9: 6

## 2018-02-02 NOTE — PROGRESS NOTES
Please call  Sheryle Cruse,    Attached are your test results.  -Liver and gallbladder tests (ALT,AST, Alk phos,bilirubin) are mildly  elevated. ADVISE: further testing - recheck not fasting in the next couple weeks. If still elevated not fasting will order abdomen ultrasound   (DX: elevated LFTs)  -Kidney function (GFR) is normal.  -Sodium is normal.  -Potassium is normal.  -Glucose is normal.  -LDL(bad) cholesterol level is elevated,  A diet high in fat and simple carbohydrates, genetics and being overweight can contribute to this. ADVISE: Exercise, a low fat, low carbohydrate diet and weight control are helpful to improve this.  Rechecking your fasting cholesterol panel in 12 months is recommended (Lipid w/ LDL reflex, DX:hyperlipidemia)  -TSH (thyroid stimulating hormone) level is normal which indicates normal thyroid function.  -Normal red blood cell (hgb) levels, normal white blood cell count and normal platelet levels.  -Vitamin D level is below normal, 1000 IU daily supplements is recommended.    Please contact us if you have any questions.    Katie Billy, CNP

## 2018-02-02 NOTE — TELEPHONE ENCOUNTER
Katie Billy, APRN CNP  [facility]                     Please let patient know consulted with physical therapy and can start lymphedema therapy and requested therapy for low back as well.   Take care   Katie Billy, BARB, APRN CNP           Patient informed that Cecilia Billy has ordered lymphadema therapy for her through the PT department and has also ordered PT for her low back as well.  Patient states that she has had multiple phone calls from throughout the building today.  She has low back PT scheduled already.  She was told by the back PT that she cannot have the lymphadema therapy on the same day.  This is not very convenient for her.  She is waiting to hear back from Brynn regarding the lymphadema therapy appointments.    Shira Pinedo RN,   [facility]

## 2018-02-02 NOTE — TELEPHONE ENCOUNTER
Katie haywood APRN CNP  [facility]                   Please call  Sheryle Cruse,     Attached are your test results.   -Liver and gallbladder tests (ALT,AST, Alk phos,bilirubin) are mildly  elevated. ADVISE: further testing - recheck not fasting in the next couple weeks. If still elevated not fasting will order abdomen ultrasound   (DX: elevated LFTs)   -Kidney function (GFR) is normal.   -Sodium is normal.   -Potassium is normal.   -Glucose is normal.   -LDL(bad) cholesterol level is elevated,  A diet high in fat and simple carbohydrates, genetics and being overweight can contribute to this. ADVISE: Exercise, a low fat, low carbohydrate diet and weight control are helpful to improve this.  Rechecking your fasting cholesterol panel in 12 months is recommended (Lipid w/ LDL reflex, DX:hyperlipidemia)   -TSH (thyroid stimulating hormone) level is normal which indicates normal thyroid function.   -Normal red blood cell (hgb) levels, normal white blood cell count and normal platelet levels.   -Vitamin D level is below normal, 1000 IU daily supplements is recommended.    Please contact us if you have any questions.     Katie Billy, CNP

## 2018-02-02 NOTE — TELEPHONE ENCOUNTER
Called patient and gave message per Cecilia Billy NP.  Patient verbalized understanding and agreement to plan.     Scheduled lab appt to recheck liver functions on 2/21/18.    Tresa Bailey RN, [facility]

## 2018-02-04 ENCOUNTER — HEALTH MAINTENANCE LETTER (OUTPATIENT)
Age: 48
End: 2018-02-04

## 2018-02-05 ENCOUNTER — TELEPHONE (OUTPATIENT)
Dept: PEDIATRICS | Facility: CLINIC | Age: 48
End: 2018-02-05

## 2018-02-05 NOTE — TELEPHONE ENCOUNTER
Hepatic panel   Status:  Final result   Visible to patient:  No (Inaccessible in MyChart) Dx:  Elevated LFTs Order: [id number] - Reflex for Order [id number]       Notes Recorded by Ktaie Billy APRN CNP on 2/4/2018 at 3:57 PM  Please call lab to remove LFT on 2/1 as would be duplicate           Ref Range & Units 4d ago  (2/1/18) 4d ago  (2/1/18) 5mo ago  (8/31/17)      Bilirubin Direct 0.0 - 0.2 mg/dL 0.2        Bilirubin Total 0.2 - 1.3 mg/dL 1.6 (H) 1.6 (H) 0.8      Albumin 3.4 - 5.0 g/dL 4.3 4.2 4.2      Protein Total 6.8 - 8.8 g/dL 8.4 8.4 8.1      Alkaline Phosphatase 40 - 150 U/L 61 61 50      ALT 0 - 50 U/L 69 (H) 69 (H) 23      AST 0 - 45 U/L 47 (H) 46 (H) 18     Resulting Agency  [facility]          Specimen Collected: 02/01/18  8:15 AM Last Resulted: 02/02/18  8:14 AM                      Mayte Mancilla RN

## 2018-02-06 LAB
ALBUMIN SERPL-MCNC: NORMAL G/DL (ref 3.4–5)
ALP SERPL-CCNC: NORMAL U/L (ref 40–150)
ALT SERPL W P-5'-P-CCNC: NORMAL U/L (ref 0–50)
AST SERPL W P-5'-P-CCNC: NORMAL U/L (ref 0–45)
BILIRUB DIRECT SERPL-MCNC: NORMAL MG/DL (ref 0–0.2)
BILIRUB SERPL-MCNC: NORMAL MG/DL (ref 0.2–1.3)
PROT SERPL-MCNC: NORMAL G/DL (ref 6.8–8.8)

## 2018-02-06 NOTE — TELEPHONE ENCOUNTER
Called lab, it is resulted already, but they can cancel the Hepatic Panel.    Tresa Bailey RN, [facility]

## 2018-02-07 ENCOUNTER — HOSPITAL ENCOUNTER (OUTPATIENT)
Dept: OCCUPATIONAL THERAPY | Facility: CLINIC | Age: 48
Setting detail: THERAPIES SERIES
End: 2018-02-07
Attending: SURGERY
Payer: COMMERCIAL

## 2018-02-07 ENCOUNTER — THERAPY VISIT (OUTPATIENT)
Dept: PHYSICAL THERAPY | Facility: CLINIC | Age: 48
End: 2018-02-07
Payer: COMMERCIAL

## 2018-02-07 DIAGNOSIS — M54.50 RIGHT-SIDED LOW BACK PAIN WITHOUT SCIATICA: Primary | ICD-10-CM

## 2018-02-07 PROCEDURE — 97110 THERAPEUTIC EXERCISES: CPT | Mod: GP | Performed by: PHYSICAL THERAPIST

## 2018-02-07 PROCEDURE — 97161 PT EVAL LOW COMPLEX 20 MIN: CPT | Mod: GP | Performed by: PHYSICAL THERAPIST

## 2018-02-07 PROCEDURE — 97140 MANUAL THERAPY 1/> REGIONS: CPT | Mod: GP | Performed by: PHYSICAL THERAPIST

## 2018-02-07 PROCEDURE — 97165 OT EVAL LOW COMPLEX 30 MIN: CPT | Mod: GO | Performed by: OCCUPATIONAL THERAPIST

## 2018-02-07 PROCEDURE — 97140 MANUAL THERAPY 1/> REGIONS: CPT | Mod: GO | Performed by: OCCUPATIONAL THERAPIST

## 2018-02-07 PROCEDURE — 40000445 ZZHC STATISTIC OT VISIT, LYMPHEDEMA: Performed by: OCCUPATIONAL THERAPIST

## 2018-02-07 NOTE — PROGRESS NOTES
[facility] Initial Evaluation -- Lumbar    Date: February 7, 2018  Sheryle Cruse is a 47 year old female with a lumbar condition.   Referral: 2/1/2018  Work mechanical stresses:  Prolonged sitting  Employment status:  Employed as a writer  Leisure mechanical stresses: Regular exercise-stairmaster, light weight training  Functional disability score (LUCIO/STarT Back): LUCIO 40, STarT Back 2/low  VAS score (0-10): 2/10  Patient goals/expectations:  Abolish LBP    HISTORY:    Present symptoms: R LBP  Pain quality (sharp/shooting/stabbing/aching/burning/cramping):  Aching, sometimes a pulling or burning sensation also   Paresthesia (yes/no):  no    Present since (onset date): Current exacerbation about 2 weeks.     Symptoms (improving/unchanging/worsening):  unchanging.     Symptoms commenced as a result of: no apparent reason, started at end of menstrual period   Condition occurred in the following environment:   home     Symptoms at onset (back/thigh/leg): R LB  Constant symptoms (back/thigh/leg): R LB  Intermittent symptoms (back/thigh/leg): radiation into R hip in the past    Symptoms are made worse with the following: Always Sitting, Always Lying and Other - Lifting   Symptoms are made better with the following: Other - ice    Disturbed sleep (yes/no):  no Sleeping postures (prone/sup/side R/L): supine-d/t recovering from masectomy    Previous episodes (0/1-5/6-10/11+): 11+ Year of first episode: About 15 years ago    Previous history: Long history of LBP exacerbations for the past 15 years  Previous treatments: PT-no significant change, did frequent press ups      Specific Questions:  Cough/Sneeze/Strain (pos/neg): no  Bowel/Bladder (normal/abnormal): no  Gait (normal/abnormal): normal  Medications (nil/NSAIDS/analg/steroids/anticoag/other):  Other - Muscle relaxants  Medical allergies:  Penicillin, contrast dye  General health (excellent/good/fair/poor):  fair  Pertinent medical history:  Cancer,  Seizures, Overweight, Depression, Anemia and Recovering from eating disorders  Imaging (None/Xray/MRI/Other):  X-ray-degenerative changes  Recent or major surgery (yes/no):  B mastectomy 10/17  Night pain (yes/no): no  Accidents (yes/no): no  Unexplained weight loss (yes/no): no  Barriers at home: no  Other red flags: no    EXAMINATION    Posture:   Sitting (good/fair/poor): fair  Standing (good/fair/poor):good  Lordosis (red/acc/normal): red  Correction of posture (better/worse/no effect):     Lateral Shift (right/left/nil): nil  Relevant (yes/no):    Other Observations:     Neurological:    Motor deficit:  Strong, equal B LE  Reflexes:  n/t  Sensory deficit:  None   Dural signs:  (-)slump B    Movement Loss:   Tang Mod Min Nil Pain   Flexion    x NE   Extension  x   Dec   Side Gliding R    x NE   Side Gliding L    x NE     Test Movements:   During: produces, abolishes, increases, decreases, no effect, centralizing, peripheralizing   After: better, worse, no better, no worse, no effect, centralized, peripheralized    Pretest symptoms standin/10 R LBP   Symptoms During Symptoms After ROM increased ROM decreased No Effect   FIS No Effect    No Effect         Rep FIS          EIS Decreases    No Better         Rep EIS No Effect    Slightly Better    x     Pretest symptoms lying:    Symptoms During Symptoms After ROM increased ROM decreased No Effect   MARSHALL          Rep MARSHALL          EIL          Rep EIL          If required, pretest symptoms:    Symptoms During Symptoms After ROM increased ROM decreased No Effect   SGIS - R          Rep SGIS - R          SGIS - L          Rep SGIS - L            Static Tests:  Sitting slouched:     Sitting erect:    Standing slouched    Standing erect:    Lying prone in extension:    Long sitting:      Other Tests: (+) Tyrell test on R.  Dec sx after hip ext in kneeling.  Dec sx after extension mobilizations to lumbar spine.    Provisional Classification:  Derangement - Asymmetrical,  unilateral, symptoms above knee    Principle of Management:  Education:  Posture    Equipment provided:  Lumbar roll  Mechanical therapy (Y/N):  Y   Extension principle:  EIS, hip ext  Lateral Principle:    Flexion principle:      Other:      ASSESSMENT/PLAN:    Patient is a 47 year old female with lumbar complaints.    Patient has the following significant findings with corresponding treatment plan.                Diagnosis 1:  LBP  Pain -  manual therapy, directional preference exercise and home program  Decreased ROM/flexibility - manual therapy, therapeutic exercise and home program  Decreased function - therapeutic activities and functional performance testing  Impaired posture - neuro re-education, therapeutic activities and home program    Therapy Evaluation Codes:   1) History comprised of:   Personal factors that impact the plan of care:      None.    Comorbidity factors that impact the plan of care are:      Cancer.     Medications impacting care: None.  2) Examination of Body Systems comprised of:   Body structures and functions that impact the plan of care:      Lumbar spine.   Activity limitations that impact the plan of care are:      Sitting.  3) Clinical presentation characteristics are:   Stable/Uncomplicated.  4) Decision-Making    Low complexity using standardized patient assessment instrument and/or measureable assessment of functional outcome.  Cumulative Therapy Evaluation is: Low complexity.    Previous and current functional limitations:  (See Goal Flow Sheet for this information)    Short term and Long term goals: (See Goal Flow Sheet for this information)     Communication ability:  Patient appears to be able to clearly communicate and understand verbal and written communication and follow directions correctly.  Treatment Explanation - The following has been discussed with the patient:   RX ordered/plan of care  Anticipated outcomes  Possible risks and side effects  This patient would  benefit from PT intervention to resume normal activities.   Rehab potential is good.    Frequency:  1 X week, once daily  Duration:  for 8 weeks  Discharge Plan:  Achieve all LTG.  Independent in home treatment program.  Reach maximal therapeutic benefit.    Please refer to the daily flowsheet for treatment today, total treatment time and time spent performing 1:1 timed codes.

## 2018-02-07 NOTE — MR AVS SNAPSHOT
After Visit Summary   2/7/2018    Sheryle Cruse    MRN: [id number]           Patient Information     Date Of Birth          1970        Visit Information        Provider Department      2/7/2018 12:00 PM Nancy Claudio, PT [facility]        Today's Diagnoses     Right-sided low back pain without sciatica    -  1       Follow-ups after your visit        Your next 10 appointments already scheduled     Feb 14, 2018  7:30 AM CST   Lymphedema Treatment with Brynn Tejeda OT   [facility] ([facility])    [address] [facility] [address]   [phone]            Feb 14, 2018  8:20 AM CST   AMBER Spine with Debby Holley PTA   [facility] ([facility]  )    [address] [facility] [address] [phone]            Feb 21, 2018  7:10 AM CST   LAB with LAB FIRST FLOOR [facility])    [id number] [place] [id number] [phone]           Please do not eat 10-12 hours before your appointment if you are coming in fasting for labs on lipids, cholesterol, or glucose (sugar). This does not apply to pregnant women. Water, hot tea and black coffee (with nothing added) are okay. Do not drink other fluids, diet soda or chew gum.            Feb 21, 2018  8:20 AM CST   AMBER Spine with Debby Holley PTA   [facility] ([facility]  )    [address] [facility] [address] [phone]            Feb 23, 2018  9:00 AM CST   New Visit with Tammie Vela MD   [facility])    [address] [place] [address] [phone]            Feb 23, 2018 10:00 AM CST   Return Visit with JANE Tsang   [facility])    [address] [place] [address]  "[facility] [address]   [phone]            Feb 28, 2018  7:30 AM CST   Lymphedema Treatment with Brynn Tejeda OT   [facility] ([facility])    [address] [facility] [address]   [phone]            Mar 09, 2018  7:30 AM CST   PHYSICAL with GILDARDO Michaels CNP   [facility]note 2])    [address] [place] [address]   [phone]            May 10, 2018  8:00 AM CDT   Return Visit with Gwendolyn Ackerman MD   [facility]note 2[facility]    [address] [place] [address]   [phone]            Jul 24, 2018  1:00 PM CDT   New Visit with Ibis Galvan MD   [facility]note 2[facility]    [address] [place] [address]   [phone]              Who to contact     If you have questions or need follow up information about today's clinic visit or your schedule please contact [facility] directly at [phone].  Normal or non-critical lab and imaging results will be communicated to you by MyChart, letter or phone within 4 business days after the clinic has received the results. If you do not hear from us within 7 days, please contact the clinic through MyChart or phone. If you have a critical or abnormal lab result, we will notify you by phone as soon as possible.  Submit refill requests through [employer] or call your pharmacy and they will forward the refill request to us. Please allow 3 business days for your refill to be completed.          Additional Information About Your Visit        [employer] Information     [employer] lets you send messages to your doctor, view your test results, renew your prescriptions, schedule appointments and more. To sign up, go to [email or web address] . Click on \"Log in\" on the left side of the screen, which will take you " "to the Welcome page. Then click on \"Sign up Now\" on the right side of the page.     You will be asked to enter the access code listed below, as well as some personal information. Please follow the directions to create your username and password.     Your access code is: [email or web address]  Expires: 3/27/2018  3:52 PM     Your access code will  in 90 days. If you need help or a new code, please call your [facility] clinic or [phone].        Care EveryWhere ID     This is your Care EveryWhere ID. This could be used by other organizations to access your [facility] medical records  [id number]        Your Vitals Were     Last Period                   2018            Blood Pressure from Last 3 Encounters:   18 110/60   18 134/87   18 128/83    Weight from Last 3 Encounters:   18 58.9 kg (129 lb 14.4 oz)   18 58.1 kg (128 lb)   18 58.1 kg (128 lb 1.6 oz)              We Performed the Following     HC PT EVAL, LOW COMPLEXITY     AMBER INITIAL EVAL REPORT     MANUAL THER TECH,1+REGIONS,EA 15 MIN     THERAPEUTIC EXERCISES        Primary Care Provider Office Phone # Fax #    Katie GILDARDO [facility] [phone]       [address]  [place] [address]        Equal Access to Services     [facility] : Hadii aad ku hadasho Soomaali, waaxda luqadaha, qaybta kaalmada adeegyada, malinda copeland . So [facility] [phone].    ATENCIÓN: Si habla español, tiene a spear disposición servicios gratuitos de asistencia lingüística. Llame al [phone].    We comply with applicable federal civil rights laws and [place] laws. We do not discriminate on the basis of race, color, national origin, age, disability, sex, sexual orientation, or gender identity.            Thank you!     Thank you for choosing [facility]  for your care. Our goal is always to provide you with excellent care. Hearing back from our " patients is one way we can continue to improve our services. Please take a few minutes to complete the written survey that you may receive in the mail after your visit with us. Thank you!             Your Updated Medication List - Protect others around you: Learn how to safely use, store and throw away your medicines at www.disposemymeds.org.          This list is accurate as of 2/7/18  2:35 PM.  Always use your most recent med list.                   Brand Name Dispense Instructions for use Diagnosis    CALCIUM MAGNESIUM PO      With zinc    RLQ abdominal pain       ONE-A-DAY ESSENTIAL Tabs      Take 1 tablet by mouth daily.        polyethylene glycol Packet    MIRALAX/GLYCOLAX     Take 1 packet by mouth daily        tiZANidine 4 MG tablet    ZANAFLEX    30 tablet    Take 0.5-1 tablets (2-4 mg) by mouth 3 times daily as needed for muscle spasms    Bilateral low back pain without sciatica, unspecified chronicity

## 2018-02-08 NOTE — PROGRESS NOTES
02/07/18 1031   Rehab Discipline   Discipline OT   Type of Visit   Type of visit Initial Edema Evaluation   General Information   Start of care 02/07/18   Referring physician Katei Billy CNP   Orders Evaluate and treat as indicated   Order date 02/01/18   Medical diagnosis H/o right breast cancer with bilateral mastectomy and SLND of right axilla 10/4/17.  Edema present of right lateral trunk/inferior axilla.    Onset of illness / date of surgery 10/04/17   Edema onset 02/01/18   Affected body parts Other  (RUQ)   Edema etiology Cancer with lymph node dissection;Radiation;Surgery   Location - Cancer with lymph node dissection right axilla   Location - Radiation RUQ, right axilla   Pertinent history of current problem (PT: include personal factors and/or comorbidities that impact the POC; OT: include additional occupational profile info) concerned about edema of right, lateral trunk and an achiness at this area   Surgical / medical history reviewed Yes   Prior level of functional mobility IND   Prior treatment MLD  (and manual work of RUQ/RUE for decreasd RUE ROM)   Community support Family / friend caregiver   Patient role / employment history Disabled   Living environment Apartment / condo   System Outcome Measures   Outcome Measures Lymphedema   Subjective Report   Patient report of symptoms achiness of RUQ/RUE, skin puffiness of inferior axilla   Patient / Family Goals   Patient / family goals statement to reduce edema in order to be fit for mastectomy bras   Pain   Pain comments discomfort in RUE/RUQ, plastic-y feeling of bilateral mastectomy sites   Cognitive Status   Orientation Orientation to person, place and time   Level of consciousness Alert   Follows commands and answers questions 100% of the time   Edema Exam / Assessment   Skin condition comments Skin of RUQ and RUE intact.  Soft, mild edema at RUQ above mastectomy scar, at inferior axilla and of upper arm.     Scar Yes   Location bilateral  mastectomy scars   Mobility flat   Scar comments healed, light pink in color   Ulceration No   Girth Measurements   Girth Measurements Refer to separate girth measurement flowsheet   Volume UE   UE volume comparison (RUE demonstrated a 45mL increase from 11/27/17)   Range of Motion   ROM No deficits were identified   Posture   Posture Normal   Activities of Daily Living   Activities of Daily Living IND   Bed Mobility   Bed mobility IND   Transfers   Transfers IND   Gait / Locomotion   Gait / Locomotion IND   Sensory   Sensory perception comments change of sensation in RUQ/RUE-achy, tingling   Planned Edema Interventions   Planned edema interventions Manual lymph drainage;Gradient compression bandaging;Fit for compression garment;Exercises;Precautions to prevent infection / exacerbation;Education;Manual therapy;ADL training;Skin care / precautions;Scar mobilization;Home management program development   Clinical Impression   Criteria for skilled therapeutic intervention met Yes   Therapy diagnosis RUE and RUQ lymphedema   Assessment of Occupational Performance 1-3 Performance Deficits   Identified Performance Deficits at risk for the progression of lymphedema, discomfort of RUQ   Clinical Decision Making (Complexity) Low complexity   Treatment frequency 1 time / week   Treatment duration x7 weeks, within an eight week period; 4/4/18    Patient / family and/or staff in agreement with plan of care Yes   Risks and benefits of therapy have been explained Yes   Clinical impression comments Pt presents with mild edema of proximal RUE, inferior axilla and RUQ superior to mastectomy scar.  Pt will benefit from continued skilled OT intervention to address edema to prevent progression of lymphedema, prevent infection and to preserve skin integrity.     Goals   Edema Eval Goals 1;2;3;4   Goal 1   Goal identifier home program   Goal description Pt will demonstrate IND with home program including: wear of compression to inferior  axilla, self MLD and HEP to reduce edema to improve skin integrity, prevent infection and to be fit for compression garments for edema management at discharge.   Target date 04/04/18   Goal 2   Goal identifier volume   Goal description RUE will demonstrate a 45mL decrease in volume to prevent progression of lymphedema and to preserve skin integrity.   Target date 04/04/18   Goal 3   Goal identifier compression   Goal description Pt yves be IND to jerson compression bra and verbalize wear/wash/replace schedule to manage edema reduction at discharge.   Target date 04/04/18   Goal 4   Goal identifier discomfort   Goal description Pt will report no discomfort in BUQ due to a reduction in volume.     Target date 04/04/18   Total Evaluation Time   Total evaluation time 10

## 2018-02-12 ENCOUNTER — TELEPHONE (OUTPATIENT)
Dept: GENERAL RADIOLOGY | Facility: CLINIC | Age: 48
End: 2018-02-12

## 2018-02-12 NOTE — TELEPHONE ENCOUNTER
received a voicemail message from Sheryle requesting contact information for a financial counselor due to billing concerns/coverage.  left a voicemail with the contact information for the financial counselor Blanca GARCIA at the [place] location.

## 2018-02-21 ENCOUNTER — TELEPHONE (OUTPATIENT)
Dept: PEDIATRICS | Facility: CLINIC | Age: 48
End: 2018-02-21

## 2018-02-21 ENCOUNTER — THERAPY VISIT (OUTPATIENT)
Dept: PHYSICAL THERAPY | Facility: CLINIC | Age: 48
End: 2018-02-21
Payer: COMMERCIAL

## 2018-02-21 DIAGNOSIS — M54.50 RIGHT-SIDED LOW BACK PAIN WITHOUT SCIATICA: ICD-10-CM

## 2018-02-21 DIAGNOSIS — R79.89 ELEVATED LFTS: ICD-10-CM

## 2018-02-21 LAB
ALBUMIN SERPL-MCNC: 4.1 G/DL (ref 3.4–5)
ALP SERPL-CCNC: 65 U/L (ref 40–150)
ALT SERPL W P-5'-P-CCNC: 31 U/L (ref 0–50)
AST SERPL W P-5'-P-CCNC: 32 U/L (ref 0–45)
BILIRUB DIRECT SERPL-MCNC: 0.2 MG/DL (ref 0–0.2)
BILIRUB SERPL-MCNC: 1 MG/DL (ref 0.2–1.3)
PROT SERPL-MCNC: 8.3 G/DL (ref 6.8–8.8)

## 2018-02-21 PROCEDURE — 97140 MANUAL THERAPY 1/> REGIONS: CPT | Mod: GP | Performed by: PHYSICAL THERAPY ASSISTANT

## 2018-02-21 PROCEDURE — 36415 COLL VENOUS BLD VENIPUNCTURE: CPT | Performed by: NURSE PRACTITIONER

## 2018-02-21 PROCEDURE — 97110 THERAPEUTIC EXERCISES: CPT | Mod: GP | Performed by: PHYSICAL THERAPY ASSISTANT

## 2018-02-21 PROCEDURE — 97112 NEUROMUSCULAR REEDUCATION: CPT | Mod: GP | Performed by: PHYSICAL THERAPY ASSISTANT

## 2018-02-21 PROCEDURE — 80076 HEPATIC FUNCTION PANEL: CPT | Performed by: NURSE PRACTITIONER

## 2018-02-21 NOTE — TELEPHONE ENCOUNTER
Hepatic panel   Status:  Final result   Visible to patient:  No (Not Released) Dx:  Elevated LFTs Order: [id number] - Reflex for Order [id number]       Notes Recorded by Katie Billy, GILDARDO CNP on 2/21/2018 at 10:44 AM  Please call   -Liver and gallbladder tests (ALT,AST, Alk phos,bilirubin) are normal.  Katie Billy, NP, GILDARDO CNP             Ref Range & Units 7:04 AM  (2/21/18) 2wk ago  (2/1/18) 2wk ago  (2/1/18)      Bilirubin Direct 0.0 - 0.2 mg/dL 0.2 Canceled, Test creditedVC, CM       Bilirubin Total 0.2 - 1.3 mg/dL 1.0 Canceled, Test creditedVC, CM 1.6 (H)      Albumin 3.4 - 5.0 g/dL 4.1 Canceled, Test creditedVC, CM 4.2      Protein Total 6.8 - 8.8 g/dL 8.3 Canceled, Test creditedVC, CM 8.4      Alkaline Phosphatase 40 - 150 U/L 65 Canceled, Test creditedVC, CM 61      ALT 0 - 50 U/L 31 Canceled, Test creditedVC, CM 69 (H)      AST 0 - 45 U/L 32 Canceled, Test creditedVC, CM 46 (H)     Resulting Agency  [facility]          Specimen Collected: 02/21/18  7:04 AM Last Resulted: 02/21/18  7:28 AM                      Mayte Mancilla RN

## 2018-02-21 NOTE — MR AVS SNAPSHOT
After Visit Summary   2/21/2018    Sheryle Cruse    MRN: [id number]           Patient Information     Date Of Birth          1970        Visit Information        Provider Department      2/21/2018 8:20 AM Debby Holley PTA [facility] Physical Therapy        Today's Diagnoses     Right-sided low back pain without sciatica           Follow-ups after your visit        Your next 10 appointments already scheduled     Feb 23, 2018  9:00 AM CST   New Visit with Tammie Vela MD   [facility])    [address] [facility] [address]   [phone]            Feb 23, 2018 10:00 AM CST   Return Visit with JANE Tsang   [facility])    [address] [address]   [phone]            Feb 28, 2018  7:30 AM CST   Lymphedema Treatment with Brynn Tejeda, OT   [facility] ([facility])    [address] [facility] [address]   [phone]            Feb 28, 2018 10:40 AM CST   AMBER Spine with Nancy Claudio, PT   [facility] ([facility]  )    [address] [facility] [address]   [phone]            Mar 07, 2018  8:15 AM CST   Lymphedema Treatment with Brynn Tejeda, OT   [facility] ([facility])    [address] [facility] [address]   [phone]            Mar 09, 2018  7:30 AM CST   PHYSICAL with GILDARDO Michaels CNP   [facility])    [address] [facility] [address]   [phone]            May 10, 2018  8:00 AM CDT   Return Visit with Gwendolyn Ackerman MD   [facility])    [address] [facility] [address]   [phone]            Jul 24, 2018   "1:00 PM CDT   New Visit with Ibis Galvan MD   [facility] ([facility])    [address] [place] [address] [phone]              Who to contact     If you have questions or need follow up information about today's clinic visit or your schedule please contact [facility] PH[facility]ICAL THERAPY directly at [phone].  Normal or non-critical lab and imaging results will be communicated to you by [employer], letter or phone within 4 business days after the clinic has received the results. If you do not hear from us within 7 days, please contact the clinic through [email or web address] or phone. If you have a critical or abnormal lab result, we will notify you by phone as soon as possible.  Submit refill requests through [employer] or call your pharmacy and they will forward the refill request to us. Please allow 3 business days for your refill to be completed.          Additional Information About Your Visit        [employer] Information     [employer] lets you send messages to your doctor, view your test results, renew your prescriptions, schedule appointments and more. To sign up, go to [email or web address] . Click on \"Log in\" on the left side of the screen, which will take you to the Welcome page. Then click on \"Sign up Now\" on the right side of the page.     You will be asked to enter the access code listed below, as well as some personal information. Please follow the directions to create your username and password.     Your access code is: [email or web address]  Expires: 3/27/2018  3:52 PM     Your access code will  in 90 days. If you need help or a new code, please call your [facility] clinic or [phone].        Care EveryWhere ID     This is your Care EveryWhere ID. This could be used by other organizations to access your [facility] medical records  [id number]         Blood Pressure from Last 3 Encounters:   18 110/60   18 134/87   18 128/83    " Weight from Last 3 Encounters:   02/01/18 58.9 kg (129 lb 14.4 oz)   01/18/18 58.1 kg (128 lb)   01/11/18 58.1 kg (128 lb 1.6 oz)              We Performed the Following     MANUAL THER TECH,1+REGIONS,EA 15 MIN     NEUROMUSCULAR RE-EDUCATION     THERAPEUTIC EXERCISES        Primary Care Provider Office Phone # Fax #    Katie Billy, APRN [phone]       [address]  [place] [address]        Equal Access to Services     [facility]: Hadii aad ku hadasho Soomaali, waaxda luqadaha, qaybta kaalmada adeegyada, waxay idiin hayaan adesanya kharabrodie copeland . So [facility] [phone].    ATENCIÓN: Si habla español, tiene a spear disposición servicios gratuitos de asistencia lingüística. Llame al [phone].    We comply with applicable federal civil rights laws and [place] laws. We do not discriminate on the basis of race, color, national origin, age, disability, sex, sexual orientation, or gender identity.            Thank you!     Thank you for choosing [facility] PHYSICAL THERAPY  for your care. Our goal is always to provide you with excellent care. Hearing back from our patients is one way we can continue to improve our services. Please take a few minutes to complete the written survey that you may receive in the mail after your visit with us. Thank you!             Your Updated Medication List - Protect others around you: Learn how to safely use, store and throw away your medicines at www.disposemymeds.org.          This list is accurate as of 2/21/18  9:23 AM.  Always use your most recent med list.                   Brand Name Dispense Instructions for use Diagnosis    CALCIUM MAGNESIUM PO      With zinc    RLQ abdominal pain       ONE-A-DAY ESSENTIAL Tabs      Take 1 tablet by mouth daily.        polyethylene glycol Packet    MIRALAX/GLYCOLAX     Take 1 packet by mouth daily        tiZANidine 4 MG tablet    ZANAFLEX    30 tablet    Take 0.5-1 tablets (2-4 mg)  by mouth 3 times daily as needed for muscle spasms    Bilateral low back pain without sciatica, unspecified chronicity

## 2018-02-23 ENCOUNTER — OFFICE VISIT (OUTPATIENT)
Dept: PSYCHOLOGY | Facility: CLINIC | Age: 48
End: 2018-02-23
Payer: COMMERCIAL

## 2018-02-23 ENCOUNTER — ONCOLOGY VISIT (OUTPATIENT)
Dept: ONCOLOGY | Facility: CLINIC | Age: 48
End: 2018-02-23
Payer: COMMERCIAL

## 2018-02-23 VITALS
OXYGEN SATURATION: 98 % | WEIGHT: 130 LBS | HEIGHT: 64 IN | SYSTOLIC BLOOD PRESSURE: 119 MMHG | BODY MASS INDEX: 22.2 KG/M2 | HEART RATE: 96 BPM | TEMPERATURE: 98.5 F | RESPIRATION RATE: 15 BRPM | DIASTOLIC BLOOD PRESSURE: 80 MMHG

## 2018-02-23 DIAGNOSIS — C50.919 MALIGNANT NEOPLASM OF FEMALE BREAST, UNSPECIFIED ESTROGEN RECEPTOR STATUS, UNSPECIFIED LATERALITY, UNSPECIFIED SITE OF BREAST (H): Primary | ICD-10-CM

## 2018-02-23 DIAGNOSIS — F41.1 GENERALIZED ANXIETY DISORDER: Primary | ICD-10-CM

## 2018-02-23 PROCEDURE — 90834 PSYTX W PT 45 MINUTES: CPT | Performed by: SOCIAL WORKER

## 2018-02-23 PROCEDURE — 99204 OFFICE O/P NEW MOD 45 MIN: CPT | Performed by: OBSTETRICS & GYNECOLOGY

## 2018-02-23 ASSESSMENT — PAIN SCALES - GENERAL: PAINLEVEL: NO PAIN (0)

## 2018-02-23 NOTE — PROGRESS NOTES
Consult Notes on Referred Patient        Dr. Niurka Hoff MD  [address] [place] [address]       RE: Sheryle Cruse  : 1970  SHAWNA: 2018    Dear Dr. Niurka Hoff:    I had the pleasure of seeing your patient Sheryle Cruse here at the Gynecologic Cancer Clinic at the [facility] on 2018.  As you know she is a very pleasant 47 year old woman with a recent diagnosis of hormone receptor positive breast cancer.  Given these findings she was subsequently sent to the Gynecologic Cancer Clinic for new patient consultation.  She is unaccompanied today.    Patient was diagnosed with a hormone receptor positive breast cancer and has undergone bilateral mastectomy with adjuvant radiation therapy.  She declined systemic chemotherapy.  She has been recommended to undergo hormonal therapy, however she is uncertain if she would like to proceed.  She is here to discuss option of BSO for possible AI use.    8/10/17:  MUTYH pathogenic mutation, specifically p.G396D      Review of Systems:  Systemic           no weight changes; no fever; no chills; no night sweats; no appetite changes  Skin           no rashes, or lesions; + hair loss  Eye           no irritation; no changes in vision; + spots/floaters  Alfonso-Laryngeal           no dysphagia; no hoarseness   Pulmonary    no cough; no shortness of breath; + sputum  Cardiovascular    no chest pain; no palpitations  Gastrointestinal    no diarrhea; no constipation; no abdominal pain; no changes in bowel  habits; no blood in stool  Genitourinary   no urinary frequency; no urinary urgency; no dysuria; no pain; no abnormal vaginal discharge; no abnormal vaginal bleeding; + dysparuenia  Breast    no breast discharge; no breast changes; no breast pain  Musculoskeletal    no myalgias; no arthralgias; no back pain; + weakness/cramps  Psychiatric           + depressed mood; + anxiety; + nervousness    Hematologic            no tender lymph nodes; no  noticeable swellings or lumps   Endocrine    no hot flashes; no heat/cold intolerance         Neurological   no tremor; no numbness and tingling; no headaches; + difficulty sleeping    Obstetrics and Gynecology History:  G0  Regular menses, no intermenstrual bleeding      Past Medical History:  Past Medical History:   Diagnosis Date     Anemia, iron deficiency      Breast cancer (H)      Depression past     Eating disorder age 20    [facility] Program, In recovery for 15 yr     IBS (irritable bowel syndrome)     possible.  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 (c.1187G>A) HistoryFile 8/10/17     Vitamin D deficiency        Past Surgical History:  Past Surgical History:   Procedure Laterality Date     MASTECTOMY MODIFIED RADICAL BILATERAL         Health Maintenance:  Last Pap Smear: 2014              Result: normal  She has not had a history of abnormal Pap smears.    Last Mammogram: History of breast cancer    Last Colonoscopy: N/A      Current Medications:   has a current medication list which includes the following prescription(s): tizanidine, polyethylene glycol, calcium-magnesium-vitamin d, and one-a-day essential.     Allergies:   Contrast dye; Diphenhydramine; and Pcn [bicillin c-r,]      Social History:  Social History     Social History     Marital status:      Spouse name: N/A     Number of children: N/A     Years of education: N/A     Occupational History     Not on file.     Social History Main Topics     Smoking status: Never Smoker     Smokeless tobacco: Never Used     Alcohol use No     Drug use: No     Sexual activity: Yes     Partners: Male     Birth control/ protection: Condom     Other Topics Concern     Parent/Sibling W/ Cabg, Mi Or Angioplasty Before 65f 55m? No     Social History Narrative     Lives with , feels safe at home.  Works as a writer.  Enjoys painting, drawing, her cats,  "reading.  Does have an advanced directive on file and would like her  to be her POA.  Would like full resuscitation if reversible cause is identified, however would not like to be kept on life sustaining measures long-term.     Family History:   The patient's family history is significant for.  Family History   Problem Relation Age of Onset     DIABETES Mother 76     Obese     HEART DISEASE Mother      Hypertension Mother      CEREBROVASCULAR DISEASE Mother      Cardiovascular Mother      CVA     CANCER Father      bladder     CEREBROVASCULAR DISEASE Father 70     Breast Cancer Maternal Grandmother      CEREBROVASCULAR DISEASE Maternal Grandmother      Lipids Maternal Grandmother      HEART DISEASE Maternal Grandfather      heart disease     DIABETES Paternal Grandmother      CEREBROVASCULAR DISEASE Paternal Grandfather      HEART DISEASE Paternal Grandfather          Physical Exam:   /80  Pulse 96  Temp 98.5  F (36.9  C)  Resp 15  Ht 1.626 m (5' 4.02\")  Wt 59 kg (130 lb)  LMP 02/14/2018  SpO2 98%  BMI 22.3 kg/m2  Body mass index is 22.3 kg/(m^2).    General Appearance: healthy and alert, no distress    Pt declined exam today    Assessment:    Sheryle Cruse is a 47 year old woman with a diagnosis of hormone receptor positive breast cancer here for consult regarding possible BSO.     A total of 45 minutes was spent with the patient, >50% f which were spent in counseling the patient and/or treatment planning.      Plan:     1.)    Hormone receptor positive breast cancer.  We discussed the procedure in detail including risks, benefits, indications and alternatives.  We discussed surgical menopause and the risks associated with this. She had many questions regarding the procedure, post-operative course, recovery, hormonal therapy and menopause which were answered to her satisfaction.  At the completion of the procedure she would like to think about her options for surgical vs medical menopause and " whether or not she would like to proceed with hormonal therapy for her breast cancer.  I have told her that if she decides to proceed to call my  and we will have her return for pre-operative visit and consents.     2.) Genetic risk factors were assessed and the patient has had genetic testing which was negative.      3.) Labs and/or tests ordered include:  none.     4.) Health maintenance issues addressed today include pt is due for a pap smear, however she has a visit scheduled already to have this done.          Thank you for allowing us to participate in the care of your patient.         Sincerely,    Tammie Meneses MD  Gynecologic Oncology  [facility]       TIERNEY LOPEZ

## 2018-02-23 NOTE — MR AVS SNAPSHOT
After Visit Summary   2/23/2018    Sheryle Cruse    MRN: [id number]           Patient Information     Date Of Birth          1970        Visit Information        Provider Department      2/23/2018 9:00 AM Tammie Holloway MD [facility]        Today's Diagnoses     Malignant neoplasm of female breast, unspecified estrogen receptor status, unspecified laterality, unspecified site of breast (H)    -  1       Follow-ups after your visit        Your next 10 appointments already scheduled     Feb 28, 2018  7:30 AM CST   Lymphedema Treatment with Brynn Tejeda, OT   [facility] ([facility])    [address] [facility] [address]   [phone]            Feb 28, 2018 10:40 AM CST   AMBER Spine with Nancy Claudio, PT   [facility] ([facility]  )    [address] [facility] [address]   [phone]            Mar 07, 2018  8:15 AM CST   Lymphedema Treatment with Brynn Tejeda, OT   [facility] ([facility])    [address] [facility] [address]   [phone]            Mar 07, 2018 11:00 AM CST   Return Visit with JANE Tsang   [facility])    [address] [facility] [address]   [phone]            Mar 09, 2018  7:30 AM CST   PHYSICAL with GILDARDO Michaels CNP   [facility])    [address] [facility] [address]   [phone]            May 10, 2018  8:00 AM CDT   Return Visit with Gwendolyn Ackerman MD   [facility])    [address] [facility] [address]   [phone]            Jul 24, 2018  1:00 PM CDT   New Visit with Ibis Galvan MD   [facility])    [address]  [facility] [address] [phone]              Who to contact     If you have questions or need follow up information about today's clinic visit or your schedule please contact [facility] directly at [phone].  Normal or non-critical lab and imaging results will be communicated to you by MyChart, letter or phone within 4 business days after the clinic has received the results. If you do not hear from us within 7 days, please contact the clinic through MyChart or phone. If you have a critical or abnormal lab result, we will notify you by phone as soon as possible.  Submit refill requests through mohchi or call your pharmacy and they will forward the refill request to us. Please allow 3 business days for your refill to be completed.          Additional Information About Your Visit        mohchi Information     mohchi is an electronic gateway that provides easy, online access to your medical records. With mohchi, you can request a clinic appointment, read your test results, renew a prescription or communicate with your care team.     To sign up for mohchi visit the website at [email or web address]   You will be asked to enter the access code listed below, as well as some personal information. Please follow the directions to create your username and password.     Your access code is: [email or web address]  Expires: 3/27/2018  3:52 PM     Your access code will  in 90 days. If you need help or a new code, please contact your [facility] or call [phone] for assistance.        Care EveryWhere ID     This is your Care EveryWhere ID. This could be used by other organizations to access your [facility] medical records  [id number]        Your Vitals Were     Pulse Temperature Respirations Height Last Period Pulse Oximetry    96 98.5  F (36.9  C) 15 1.626 m (5' 4.02\") 2018 98%    BMI (Body Mass Index)                   22.3 kg/m2            " Blood Pressure from Last 3 Encounters:   02/23/18 119/80   02/01/18 110/60   01/18/18 134/87    Weight from Last 3 Encounters:   02/23/18 59 kg (130 lb)   02/01/18 58.9 kg (129 lb 14.4 oz)   01/18/18 58.1 kg (128 lb)              Today, you had the following     No orders found for display       Primary Care Provider Office Phone # Fax #    Katie Billy, APRN [facility] [phone]       [address]  [place] [address]        Equal Access to Services     [facility] : Hadii aad ku hadasho Soomaali, waaxda luqadaha, qaybta kaalmada adeegyada, malinda uriarte hayelvin copeland . So [facility] [phone].    ATENCIÓN: Si habla español, tiene a spear disposición servicios gratuitos de asistencia lingüística. Llame al [phone].    We comply with applicable federal civil rights laws and [place] laws. We do not discriminate on the basis of race, color, national origin, age, disability, sex, sexual orientation, or gender identity.            Thank you!     Thank you for choosing [facility]  for your care. Our goal is always to provide you with excellent care. Hearing back from our patients is one way we can continue to improve our services. Please take a few minutes to complete the written survey that you may receive in the mail after your visit with us. Thank you!             Your Updated Medication List - Protect others around you: Learn how to safely use, store and throw away your medicines at www.disposemymeds.org.          This list is accurate as of 2/23/18  3:12 PM.  Always use your most recent med list.                   Brand Name Dispense Instructions for use Diagnosis    CALCIUM MAGNESIUM PO      With zinc    RLQ abdominal pain       ONE-A-DAY ESSENTIAL Tabs      Take 1 tablet by mouth daily.        polyethylene glycol Packet    MIRALAX/GLYCOLAX     Take 1 packet by mouth daily        tiZANidine 4 MG tablet    ZANAFLEX    30 tablet    Take 0.5-1 tablets (2-4  mg) by mouth 3 times daily as needed for muscle spasms    Bilateral low back pain without sciatica, unspecified chronicity

## 2018-02-23 NOTE — NURSING NOTE
"Oncology Rooming Note    February 23, 2018 9:14 AM   Sheryle Cruse is a 47 year old female who presents for:    Chief Complaint   Patient presents with     Oncology Clinic Visit     new patient     Initial Vitals: /80  Pulse 96  Temp 98.5  F (36.9  C)  Resp 15  Ht 1.626 m (5' 4.02\")  Wt 59 kg (130 lb)  LMP 02/14/2018  SpO2 98%  BMI 22.3 kg/m2 Estimated body mass index is 22.3 kg/(m^2) as calculated from the following:    Height as of this encounter: 1.626 m (5' 4.02\").    Weight as of this encounter: 59 kg (130 lb). Body surface area is 1.63 meters squared.  No Pain (0) Comment: Data Unavailable   Patient's last menstrual period was 02/14/2018.  Allergies reviewed: Yes  Medications reviewed: Yes    Medications: Medication refills not needed today.  Pharmacy name entered into [facility]: [facility] [address] [facility] [address] [facility] [address] [place]        5 minutes for nursing intake (face to face time)     Brook Leslie LPN              "

## 2018-02-23 NOTE — LETTER
2018         RE: Sheryle Cruse  [address] [address]        Dear Colleague,    Thank you for referring your patient, Sheryle Cruse, to the [facility]. Please see a copy of my visit note below.    Consult Notes on Referred Patient        Dr. Niurka Hoff MD  [address] [place] [address]       RE: Sheryle Cruse  : 1970  SHAWNA: 2018    Dear Dr. Niurka Hoff:    I had the pleasure of seeing your patient Sheryle Cruse here at the Gynecologic Cancer Clinic at the [facility] on 2018.  As you know she is a very pleasant 47 year old woman with a recent diagnosis of hormone receptor positive breast cancer.  Given these findings she was subsequently sent to the Gynecologic Cancer Clinic for new patient consultation.  She is unaccompanied today.    Patient was diagnosed with a hormone receptor positive breast cancer and has undergone bilateral mastectomy with adjuvant radiation therapy.  She declined systemic chemotherapy.  She has been recommended to undergo hormonal therapy, however she is uncertain if she would like to proceed.  She is here to discuss option of BSO for possible AI use.    8/10/17:  MUTYH pathogenic mutation, specifically p.G396D      Review of Systems:  Systemic           no weight changes; no fever; no chills; no night sweats; no appetite changes  Skin           no rashes, or lesions; + hair loss  Eye           no irritation; no changes in vision; + spots/floaters  Alfonso-Laryngeal           no dysphagia; no hoarseness   Pulmonary    no cough; no shortness of breath; + sputum  Cardiovascular    no chest pain; no palpitations  Gastrointestinal    no diarrhea; no constipation; no abdominal pain; no changes in bowel  habits; no blood in stool  Genitourinary   no urinary frequency; no urinary urgency; no dysuria; no pain; no abnormal vaginal discharge; no abnormal vaginal bleeding; + dysparuenia  Breast     no breast discharge; no breast changes; no breast pain  Musculoskeletal    no myalgias; no arthralgias; no back pain; + weakness/cramps  Psychiatric           + depressed mood; + anxiety; + nervousness    Hematologic            no tender lymph nodes; no noticeable swellings or lumps   Endocrine    no hot flashes; no heat/cold intolerance         Neurological   no tremor; no numbness and tingling; no headaches; + difficulty sleeping    Obstetrics and Gynecology History:  G0  Regular menses, no intermenstrual bleeding      Past Medical History:  Past Medical History:   Diagnosis Date     Anemia, iron deficiency      Breast cancer (H)      Depression past     Eating disorder age 20    [facility], In recovery for 15 yr     IBS (irritable bowel syndrome)     possible.  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 (c.1187G>A) EpicPledge 8/10/17     Vitamin D deficiency        Past Surgical History:  Past Surgical History:   Procedure Laterality Date     MASTECTOMY MODIFIED RADICAL BILATERAL         Health Maintenance:  Last Pap Smear: 2014              Result: normal  She has not had a history of abnormal Pap smears.    Last Mammogram: History of breast cancer    Last Colonoscopy: N/A      Current Medications:   has a current medication list which includes the following prescription(s): tizanidine, polyethylene glycol, calcium-magnesium-vitamin d, and one-a-day essential.     Allergies:   Contrast dye; Diphenhydramine; and Pcn [bicillin c-r,]      Social History:  Social History     Social History     Marital status:      Spouse name: N/A     Number of children: N/A     Years of education: N/A     Occupational History     Not on file.     Social History Main Topics     Smoking status: Never Smoker     Smokeless tobacco: Never Used     Alcohol use No     Drug use: No     Sexual activity: Yes     Partners: Male      "Birth control/ protection: Condom     Other Topics Concern     Parent/Sibling W/ Cabg, Mi Or Angioplasty Before 65f 55m? No     Social History Narrative     Lives with , feels safe at home.  Works as a writer.  Enjoys painting, drawing, her cats, reading.  Does have an advanced directive on file and would like her  to be her POA.  Would like full resuscitation if reversible cause is identified, however would not like to be kept on life sustaining measures long-term.     Family History:   The patient's family history is significant for.  Family History   Problem Relation Age of Onset     DIABETES Mother 76     Obese     HEART DISEASE Mother      Hypertension Mother      CEREBROVASCULAR DISEASE Mother      Cardiovascular Mother      CVA     CANCER Father      bladder     CEREBROVASCULAR DISEASE Father 70     Breast Cancer Maternal Grandmother      CEREBROVASCULAR DISEASE Maternal Grandmother      Lipids Maternal Grandmother      HEART DISEASE Maternal Grandfather      heart disease     DIABETES Paternal Grandmother      CEREBROVASCULAR DISEASE Paternal Grandfather      HEART DISEASE Paternal Grandfather          Physical Exam:   /80  Pulse 96  Temp 98.5  F (36.9  C)  Resp 15  Ht 1.626 m (5' 4.02\")  Wt 59 kg (130 lb)  LMP 02/14/2018  SpO2 98%  BMI 22.3 kg/m2  Body mass index is 22.3 kg/(m^2).    General Appearance: healthy and alert, no distress    Pt declined exam today    Assessment:    Sheryle Cruse is a 47 year old woman with a diagnosis of hormone receptor positive breast cancer here for consult regarding possible BSO.     A total of 45 minutes was spent with the patient, >50% f which were spent in counseling the patient and/or treatment planning.      Plan:     1.)    Hormone receptor positive breast cancer.  We discussed the procedure in detail including risks, benefits, indications and alternatives.  We discussed surgical menopause and the risks associated with this. She had many " questions regarding the procedure, post-operative course, recovery, hormonal therapy and menopause which were answered to her satisfaction.  At the completion of the procedure she would like to think about her options for surgical vs medical menopause and whether or not she would like to proceed with hormonal therapy for her breast cancer.  I have told her that if she decides to proceed to call my  and we will have her return for pre-operative visit and consents.     2.) Genetic risk factors were assessed and the patient has had genetic testing which was negative.      3.) Labs and/or tests ordered include:  none.     4.) Health maintenance issues addressed today include pt is due for a pap smear, however she has a visit scheduled already to have this done.          Thank you for allowing us to participate in the care of your patient.         Sincerely,    Tammie Meneses MD  Gynecologic Oncology  [facility]       TIERNEY LOPEZ

## 2018-02-23 NOTE — PROGRESS NOTES
[facility]  February 23, 2017      Behavioral Health Clinician Progress Note    Patient Name: Sheryle Cruse           Service Type:  Individual      Service Location:   Face to Face in Clinic     Session Start Time:  10:05 am  Session End Time: 10: 54 am      Session Length: 38 - 52      Attendees: Patient    Visit Activities (Refresh list every visit): [facility] Only    Diagnostic Assessment Date: 11/17/17  Treatment Plan Review Date: 11/30/17  See Flowsheets for today's PHQ-9 and MAGGY-7 results  Previous PHQ-9:   PHQ-9 SCORE 9/22/2017 2/1/2018   Total Score 11 6     Previous MAGGY-7:   MAGGY-7 SCORE 9/22/2017 2/1/2018   Total Score 14 14       FRANCISCO LEVEL:  FRANCISCO Score (Last Two) 8/22/2016 9/22/2017   FRANCISCO Raw Score 36 29   Activation Score 47.4 52.9   FRANCISCO Level 2 2       DATA  Extended Session (60+ minutes): No  Interactive Complexity: No  Crisis: No  [facility] Patient: No    Treatment Objective(s) Addressed in This Session:  Target Behavior(s): disease management/lifestyle changes , mental health management    Depressed Mood: Identify negative self-talk and behaviors: challenge core beliefs, myths, and actions    Current Stressors / Issues:  Patient reported improvement in anxiety since previous visit. She stated that she has been writing more frequently, and stated that it has been beneficial. She stated that she has been writing about her cancer treatment, but will then shift focus onto other topics such as her history of disordered eating. Patient acknowledged benefits of taking breaks from thinking about, writing about, and processing her cancer diagnosis and subsequent treatments.  Patient reported that she has been attempting to increase time to relax and not think about her health, her 's health or her mother. She discussed attempts to work out, listen to music that helps her to release stress, pray, and spend time with her kittens.  Per patient, it has been helpful to incorporate these activities  "into her daily routine, but expressed concern that anxiety will increase if/when there is an upcoming appointment that may \"deliver bad news\".  [facility] provided patient with psycho-education on CBT, including information regarding how thought may be impacted by an increase in anxiety.  [facility] provided patient with a thought log in order to help determine thought patterns when anxiety is activated.     Progress on Treatment Objective(s) / Homework:  Satisfactory progress - ACTION (Actively working towards change); Intervened by reinforcing change plan / affirming steps taken    Motivational Interviewing    MI Intervention: Expressed Empathy/Understanding, Supported Autonomy, Collaboration, Evocation, Permission to raise concern or advise, Open-ended questions and Reframe     Change Talk Expressed by the Patient: Ability to change Reasons to change Need to change    Provider Response to Change Talk: E - Evoked more info from patient about behavior change, A - Affirmed patient's thoughts, decisions, or attempts at behavior change, R - Reflected patient's change talk and S - Summarized patient's change talk statements    Also provided psychoeducation about behavioral health condition, symptoms, and treatment options    Care Plan review completed: Yes    Medication Review:  No current psychiatric medications prescribed    Medication Compliance:  NA    Changes in Health Issues:   Yes: radiation completed, needing to make decisions on next steps in treatment    Chemical Use Review:   Substance Use: Chemical use reviewed, no active concerns identified      Tobacco Use: No current tobacco use.      Assessment: Current Emotional / Mental Status (status of significant symptoms):  Risk status (Self / Other harm or suicidal ideation)  Patient denies a history of suicidal ideation, suicide attempts, self-injurious behavior, homicidal ideation, homicidal behavior and and other safety concerns  Patient denies current fears or concerns for " personal safety.  Patient denies current or recent suicidal ideation or behaviors.  Patient denies current or recent homicidal ideation or behaviors.  Patient denies current or recent self injurious behavior or ideation.  Patient denies other safety concerns.  A safety and risk management plan has not been developed at this time, however patient was encouraged to call [place] [phone] should there be a change in any of these risk factors.    Appearance:   Appropriate   Eye Contact:   Good   Psychomotor Behavior: Normal   Attitude:   Cooperative   Orientation:   All  Speech   Rate / Production: Normal    Volume:  Normal   Mood:    Normal  Affect:    Appropriate   Thought Content:  Clear   Thought Form:  Coherent  Logical   Insight:    Good     Diagnoses:  1. Generalized anxiety disorder        Collateral Reports Completed:  Not Applicable    Plan: (Homework, other):  Patient was given information about behavioral services and encouraged to schedule a follow up appointment with the clinic [facility] in 2 weeks.  She was also given Cognitive Behavioral Therapy skills to practice when experiencing anxiety and depression.  CD Recommendations: No indications of CD issues.  Blanca Burns, [facility]      ______________________________________________________________________    Integrated Primary Care Behavioral Health Treatment Plan    Patient's Name: Sheryle Cruse  YOB: 1970    Date: 11/30/17    DSM-V Diagnoses: 300.02 (F41.1) Generalized Anxiety Disorder  Psychosocial / Contextual Factors: current cancer treatment  WHODAS: TBD    Referral / Collaboration:  The following referral(s) was/were discussed but client declines follow up at this time. Referral to [facility] therapist for trauma therapy. Patient continues to contemplate readiness for medication for symptom management.     Anticipated number of session or this episode of care: 8-10      MeasurableTreatment Goal(s) related to diagnosis / functional  impairment(s)  Goal 1: Patient will reduce feelings of anxiety as evidenced by lower MAGGY 7.    I will know I've met my goal when I feel that I can cope better with my anxiety.      Objective #A (Patient Action)    Patient will use distraction each time intrusive worry surfaces.  Status: New - Date: 11/30/17     Intervention(s)  [facility] will assign homework to practice distraction techniques until feels automatic.    Objective #B  Patient will use cognitive strategies identified in therapy to challenge anxious thoughts.  Status: New - Date: 11/30/17     Intervention(s)  [facility] will role-play cognitive restructruing.    Objective #C  Patient will practice deep breathing at least 3 a day.  Status: New - Date: 11/30/17     Intervention(s)  [facility] will assign homework to practice skills.      Patient has reviewed and agreed to the above plan.      Blanca Burns, JANE  November 30, 2017

## 2018-03-07 ENCOUNTER — OFFICE VISIT (OUTPATIENT)
Dept: PSYCHOLOGY | Facility: CLINIC | Age: 48
End: 2018-03-07
Payer: COMMERCIAL

## 2018-03-07 ENCOUNTER — HOSPITAL ENCOUNTER (OUTPATIENT)
Dept: OCCUPATIONAL THERAPY | Facility: CLINIC | Age: 48
Setting detail: THERAPIES SERIES
End: 2018-03-07
Attending: SURGERY
Payer: COMMERCIAL

## 2018-03-07 DIAGNOSIS — F41.1 GENERALIZED ANXIETY DISORDER: Primary | ICD-10-CM

## 2018-03-07 PROCEDURE — 97140 MANUAL THERAPY 1/> REGIONS: CPT | Mod: GO | Performed by: OCCUPATIONAL THERAPIST

## 2018-03-07 PROCEDURE — 90832 PSYTX W PT 30 MINUTES: CPT | Performed by: SOCIAL WORKER

## 2018-03-07 PROCEDURE — 40000445 ZZHC STATISTIC OT VISIT, LYMPHEDEMA: Performed by: OCCUPATIONAL THERAPIST

## 2018-03-07 NOTE — MR AVS SNAPSHOT
After Visit Summary   3/7/2018    Sheryle Cruse    MRN: [id number]           Patient Information     Date Of Birth          1970        Visit Information        Provider Department      3/7/2018 11:00 AM Blanca Burns LICSW [facility]        Today's Diagnoses     Generalized anxiety disorder    -  1       Follow-ups after your visit        Your next 10 appointments already scheduled     Mar 09, 2018  7:30 AM CST   PHYSICAL with GILDARDO Michaels CNP   [facility])    [address] [facility] [address]   [phone]            Mar 22, 2018  2:00 PM CDT   Return Visit with JANE Tsang   [facility])    [address] [address]   [phone]            Mar 22, 2018  4:00 PM CDT   Lymphedema Treatment with Brynn Nora Thole, OT   [facility] ([facility] [place] [place])    [address] [facility] [address]   [phone]            Mar 22, 2018  4:30 PM CDT   Return Visit with Niurka Hoff MD   [facility])    [address] [facility] [address]   [phone]            Mar 28, 2018  8:15 AM CDT   Lymphedema Treatment with Brynn Nora Thole, OT   [facility] ([facility] [place] [place])    [address] [facility] [address]   [phone]            Apr 04, 2018  8:15 AM CDT   Lymphedema Treatment with Brynn Nora Thole, OT   [facility] ([facility] [place] [place])    [address] [facility] [address]   [phone]            May 10, 2018  8:00 AM CDT   Return Visit with Gwendolyn Ackerman MD   [facility])    [address] [facility] [address]   [phone]            Jul 24, 2018  1:00 PM CDT    New Visit with Ibis Galvan MD   [facility]    [address] [place] [address] [phone]              Who to contact     If you have questions or need follow up information about today's clinic visit or your schedule please contact [facility] directly at [phone].  Normal or non-critical lab and imaging results will be communicated to you by MyChart, letter or phone within 4 business days after the clinic has received the results. If you do not hear from us within 7 days, please contact the clinic through MyChart or phone. If you have a critical or abnormal lab result, we will notify you by phone as soon as possible.  Submit refill requests through [employer] or call your pharmacy and they will forward the refill request to us. Please allow 3 business days for your refill to be completed.          Additional Information About Your Visit        MyChart Information     [employer] is an electronic gateway that provides easy, online access to your medical records. With [employer], you can request a clinic appointment, read your test results, renew a prescription or communicate with your care team.     To sign up for [employer] visit the website at [email or web address]   You will be asked to enter the access code listed below, as well as some personal information. Please follow the directions to create your username and password.     Your access code is: [email or web address]  Expires: 3/27/2018  3:52 PM     Your access code will  in 90 days. If you need help or a new code, please contact your [facility] or call [phone] for assistance.        Care EveryWhere ID     This is your Care EveryWhere ID. This could be used by other organizations to access your [place] medical records  [id number]        Your Vitals Were     Last Period                   2018            Blood Pressure from Last 3  Encounters:   02/23/18 119/80   02/01/18 110/60   01/18/18 134/87    Weight from Last 3 Encounters:   02/23/18 59 kg (130 lb)   02/01/18 58.9 kg (129 lb 14.4 oz)   01/18/18 58.1 kg (128 lb)              Today, you had the following     No orders found for display       Primary Care Provider Office Phone # Fax #    Katie Billy, APRN [facility] [phone]       [address]  [place] [address]        Equal Access to Services     [facility]: Hadii aad ku hadasho Soomaali, waaxda luqadaha, qaybta kaalmada adesanyayada, malinda copeland . So [facility] [phone].    ATENCIÓN: Si habla español, tiene a spear disposición servicios gratuitos de asistencia lingüística. LlSelect [facility] [phone].    We comply with applicable federal civil rights laws and [place] laws. We do not discriminate on the basis of race, color, national origin, age, disability, sex, sexual orientation, or gender identity.            Thank you!     Thank you for choosing [facility]  for your care. Our goal is always to provide you with excellent care. Hearing back from our patients is one way we can continue to improve our services. Please take a few minutes to complete the written survey that you may receive in the mail after your visit with us. Thank you!             Your Updated Medication List - Protect others around you: Learn how to safely use, store and throw away your medicines at www.disposemymeds.org.          This list is accurate as of 3/7/18 11:53 AM.  Always use your most recent med list.                   Brand Name Dispense Instructions for use Diagnosis    CALCIUM MAGNESIUM PO      With zinc    RLQ abdominal pain       ONE-A-DAY ESSENTIAL Tabs      Take 1 tablet by mouth daily.        polyethylene glycol Packet    MIRALAX/GLYCOLAX     Take 1 packet by mouth daily        tiZANidine 4 MG tablet    ZANAFLEX    30 tablet    Take 0.5-1 tablets (2-4 mg) by mouth 3 times daily  as needed for muscle spasms    Bilateral low back pain without sciatica, unspecified chronicity

## 2018-03-07 NOTE — Clinical Note
Hi Lori, Sheryle has a physical scheduled with you for Friday.  I recently diagnosed her with generalized anxiety disorder, and we have been discussing potential benefits of taking medication (maybe Prozac due to her anxiety, racing thoughts?).  She has never been on any medications for anxiety before, and does have anxiety about it.  She may or not bring this up on Friday, but just wanted to keep you updated and informed-- and I do think she would benefit from medication.  Thanks, Blanca

## 2018-03-07 NOTE — PROGRESS NOTES
"[facility]  March 7, 2017      Behavioral Health Clinician Progress Note    Patient Name: Sheryle Cruse           Service Type:  Individual      Service Location:   Face to Face in Clinic     Session Start Time:  11:04 am  Session End Time: 11: 30 am      Session Length: 38 - 52      Attendees: Patient    Visit Activities (Refresh list every visit): [facility] Only    Diagnostic Assessment Date: 11/17/17  Treatment Plan Review Date: 11/30/17  See Flowsheets for today's PHQ-9 and MAGGY-7 results  Previous PHQ-9:   PHQ-9 SCORE 9/22/2017 2/1/2018   Total Score 11 6     Previous MAGGY-7:   MAGGY-7 SCORE 9/22/2017 2/1/2018   Total Score 14 14       FRANCISCO LEVEL:  FRANCISCO Score (Last Two) 8/22/2016 9/22/2017   FRANCISCO Raw Score 36 29   Activation Score 47.4 52.9   FRANCISCO Level 2 2       DATA  Extended Session (60+ minutes): No  Interactive Complexity: No  Crisis: No  [facility] Patient: No    Treatment Objective(s) Addressed in This Session:  Target Behavior(s): disease management/lifestyle changes , mental health management    Depressed Mood: Identify negative self-talk and behaviors: challenge core beliefs, myths, and actions    Current Stressors / Issues:  Patient reported recent stress of learning that she needs to move. She expressed heightened anxiety as she prepares to move since she knows that moving will be more difficult because of both her's and her 's physical limitations.  Patient shared that she hopes that friends and support will be able to assist her, and denied any difficulties asking for and receiving help.  Patient and [facility] explored how to view stressor as temporary and situational, with potential gains of having a new apartment and a new place to feel safe and secure.    Patient given thought log during previous visit. She discussed themes of anxiety are centered around making appointments, \"staying on top of appointments\", and ensuring that everything is \"taken care of\". She stated that she has a \"self " "imposed pressure\" to have results, and reported that she never feels as though she is doing enough. [facility] and patient began to explore how this approach impacts anxiety. Patient recognized benefits of changing her approach, but stated that she has never known how to begin to change her approach.  [facility] and patient began to discuss restructuring techniques to assist her to reduce anxiety as she approaches new situations.    Progress on Treatment Objective(s) / Homework:  Satisfactory progress - ACTION (Actively working towards change); Intervened by reinforcing change plan / affirming steps taken    Motivational Interviewing    MI Intervention: Expressed Empathy/Understanding, Supported Autonomy, Collaboration, Evocation, Permission to raise concern or advise, Open-ended questions and Reframe     Change Talk Expressed by the Patient: Ability to change Reasons to change Need to change    Provider Response to Change Talk: E - Evoked more info from patient about behavior change, A - Affirmed patient's thoughts, decisions, or attempts at behavior change, R - Reflected patient's change talk and S - Summarized patient's change talk statements    Also provided psychoeducation about behavioral health condition, symptoms, and treatment options    Care Plan review completed: Yes    Medication Review:  No current psychiatric medications prescribed    Medication Compliance:  NA    Changes in Health Issues:   Yes: radiation completed, needing to make decisions on next steps in treatment    Chemical Use Review:   Substance Use: Chemical use reviewed, no active concerns identified      Tobacco Use: No current tobacco use.      Assessment: Current Emotional / Mental Status (status of significant symptoms):  Risk status (Self / Other harm or suicidal ideation)  Patient denies a history of suicidal ideation, suicide attempts, self-injurious behavior, homicidal ideation, homicidal behavior and and other safety concerns  Patient denies " current fears or concerns for personal safety.  Patient denies current or recent suicidal ideation or behaviors.  Patient denies current or recent homicidal ideation or behaviors.  Patient denies current or recent self injurious behavior or ideation.  Patient denies other safety concerns.  A safety and risk management plan has not been developed at this time, however patient was encouraged to call [place] [phone] should there be a change in any of these risk factors.    Appearance:   Appropriate   Eye Contact:   Good   Psychomotor Behavior: Normal   Attitude:   Cooperative   Orientation:   All  Speech   Rate / Production: Normal    Volume:  Normal   Mood:    Normal  Affect:    Appropriate   Thought Content:  Clear   Thought Form:  Coherent  Logical   Insight:    Good     Diagnoses:  1. Generalized anxiety disorder        Collateral Reports Completed:  Routed note to PCP    Plan: (Homework, other):  Patient was given information about behavioral services and encouraged to schedule a follow up appointment with the clinic [facility] in 2 weeks.  She was also given Cognitive Behavioral Therapy skills to practice when experiencing anxiety and depression.  CD Recommendations: No indications of CD issues.  Blanca Burns, [facility]      ______________________________________________________________________    Integrated Primary Care Behavioral Health Treatment Plan    Patient's Name: Sheryle Cruse  YOB: 1970    Date: 11/30/17    DSM-V Diagnoses: 300.02 (F41.1) Generalized Anxiety Disorder  Psychosocial / Contextual Factors: current cancer treatment  WHODAS: TBD    Referral / Collaboration:  The following referral(s) was/were discussed but client declines follow up at this time. Referral to [facility] therapist for trauma therapy. Patient continues to contemplate readiness for medication for symptom management.     Anticipated number of session or this episode of care: 8-10      MeasurableTreatment Goal(s) related to  diagnosis / functional impairment(s)  Goal 1: Patient will reduce feelings of anxiety as evidenced by lower MAGGY 7.    I will know I've met my goal when I feel that I can cope better with my anxiety.      Objective #A (Patient Action)    Patient will use distraction each time intrusive worry surfaces.  Status: New - Date: 11/30/17     Intervention(s)  [facility] will assign homework to practice distraction techniques until feels automatic.    Objective #B  Patient will use cognitive strategies identified in therapy to challenge anxious thoughts.  Status: New - Date: 11/30/17     Intervention(s)  [facility] will role-play cognitive restructruing.    Objective #C  Patient will practice deep breathing at least 3 a day.  Status: New - Date: 11/30/17     Intervention(s)  [facility] will assign homework to practice skills.      Patient has reviewed and agreed to the above plan.      JANE Anne  November 30, 2017

## 2018-03-12 ENCOUNTER — TELEPHONE (OUTPATIENT)
Dept: OPHTHALMOLOGY | Facility: CLINIC | Age: 48
End: 2018-03-12

## 2018-03-12 NOTE — TELEPHONE ENCOUNTER
Sheryle called to schedule a visit with Dr. Mckeon.  Sheryle stated she has had floaters for 2 weeks.  Offered first available but the visit did not work for her schedule.  Visit scheduled 3/20/2018. Just wanted to let the care team know a symptomatic patient was scheduled out a week.  Thank you.

## 2018-03-13 ENCOUNTER — TELEPHONE (OUTPATIENT)
Dept: CARE COORDINATION | Facility: CLINIC | Age: 48
End: 2018-03-13
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
## 2018-03-16 ENCOUNTER — TELEPHONE (OUTPATIENT)
Dept: CARE COORDINATION | Facility: CLINIC | Age: 48
End: 2018-03-16
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
## 2018-03-19 NOTE — PROGRESS NOTES
Oncology Follow-up visit:  Date on this visit: Mar 22, 2018      PCP: Katie Billy  Surgeon: Dr. Audrey Gonzalez    Diagnosis:    Locally advanced breast cancer      Oncologic History:  1. Locally advanced right breast cancer-    Esther first noted a lump in her right breast on 6/15/17.  This led to a diagnostic b/l mammogram and R breast US on 6/30/2018 which showed that in the right breast at 3:00 position, anterior depth there was a focal asymmetry. R breast US confirmed a solid mass at 3:00 position 4 cm from the nipple that measured 1.4 x 0.7 x 1.9 cm and correlated with mammography and palpable lump. Right axillary survey by US  demonstrated multiple benign-appearing lymph nodes. Her mammogram prior to that was in Aug 2013.  An US guided core needle biopsy on 7/7/2017 revealed the mass to be high grade invasive ductal carcinoma that is strongly ER+ (>97%)/VT positive(>90%), Her 2 non amplified (HER2 Adeel/CHELSEA 17 ratio of 1.1).  She underwent bilateral simple mastectomies and right axillary sentinel node biopsy on 10/4/2017 by Dr. Gonzalez. She was not interested in breast reconstruction. Pathology from surgery demonstrated two foci of IDC, with largest foci of a 3 cm (measured microscopically) IDC, [place] grade 3 in the right breast, with associated DCIS, negative margins of resection for both DCIS and invasive carcinoma. One/one nonsentinel lymph node was positive for metastasis, 1.2 cm, with no extranodal extension. SLN was not identified in the specimen. LVI was present. She was staged at pT2(m) N1. No malignancy was found in the left breast.   She declined adjuvant systemic chemotherapy and thus OncotypeDx was not ordered.   She completed radiation to right chest wall, R SCV LNs  and axilla on 1/3/2018.     2. MUTYH mutation heterozygote-  FHx is significant for her maternal GM  diagnosed with breast cancer in her 60's and a paternal aunt being diagnosed in her 50's.  There is no FH for ovarian, colon,  uterine, prostate or pancreatic CA. She underwent genetic testing and was found to be  POSITIVE for one MUTYH gene mutation. 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 Of note, Sheryle tested negative for mutations in the following genes by sequencing and deletion/duplication analysis: OLLIE, BARD1, BRCA1, BRCA2, BRIP1, CDH1, CHEK2, DICER1, EPCAM (deletions/duplications only), MLH1, MRE11A, MSH2, MSH6, MUTYH, NBN, NF1, PALB2, PMS2, PTEN, RAD50, RAD51C, RAD51D, SMARCA4, STK11, and TP53.        History Of Present Illness:  Ms. Sparrow is a 47 year old premenopausal  female who presents for f/up of opinion of locally advanced ER+ Her 2 Adeel negative right sided breast cancer.  She completed radiation to right chest wall, R SCV LNs  and axilla on 1/3/2018.  She has declined chemotherapy and today expressed her wishes against systemic hormonal therapy again  She has been undergoing lymphedema therapy and has been following up with lymphedema therapist.  She saw Dr. Meneses in Feb 2018 for consultation of prophylactic BSO but decided not to proceed.  She has had anxiety/depression associated with her diagnosis of breast cancer. She has been seeing a therapist here at [facility].  She is here with her . She is feeling well otherwise. Her mother's 80th birthday is coming up.  In addition, a complete 12 point  review of systems is negative.         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  "(c.1187G>A) IPM [place] 8/10/17     Vitamin D deficiency      Past Surgical History:   Procedure Laterality Date     MASTECTOMY MODIFIED RADICAL BILATERAL       Family and SocHx reviewed:  SOCIAL HISTORY:  .    Denies current tobacco use.  Is a writer for food, weight and body.    Allergies:  Allergies as of 03/22/2018 - Kev as Reviewed 02/23/2018   Allergen Reaction Noted     Contrast dye  06/30/2017     Diphenhydramine  08/31/2017     Pcn [bicillin c-r,] Rash 01/07/2011     Current Medications:  Current Outpatient Prescriptions   Medication Sig Dispense Refill     tiZANidine (ZANAFLEX) 4 MG tablet Take 0.5-1 tablets (2-4 mg) by mouth 3 times daily as needed for muscle spasms 30 tablet 1     polyethylene glycol (MIRALAX/GLYCOLAX) Packet Take 1 packet by mouth daily       Calcium-Magnesium-Vitamin D (CALCIUM MAGNESIUM PO) With zinc       Multiple Vitamin (ONE-A-DAY ESSENTIAL) TABS Take 1 tablet by mouth daily.               Physical Exam:  /82  Pulse 114  Temp 98  F (36.7  C)  Resp 15  Ht 1.626 m (5' 4.02\")  Wt 58.5 kg (129 lb)  SpO2 96%  BMI 22.13 kg/m2        GENERAL APPEARANCE: healthy, alert and no distress     HENT: Mouth without ulcers or lesions     NECK: no adenopathy, no asymmetry or masses     LYMPHATICS: No cervical, supraclavicular, axillary or inguinal lymphadenopathy     RESP: lungs clear to auscultation - no rales, rhonchi or wheezes     CARDIOVASCULAR: regular rates and rhythm, normal S1 S2, no S3 or S4 and no murmur.     ABDOMEN:  soft, nontender, no HSM or masses and bowel sounds normal     MUSCULOSKELETAL: extremities normal- no gross deformities noted, no evidence of inflammation in joints, FROM in all extremities. No edema b/l LE. + RUE lymphedema, RUE circumference in the arm slightly more than LUE.     SKIN: no suspicious lesions or rashes     PSYCHIATRIC: mentation appears normal and affect normal  CHest wall: Skin changes c/w XRT Right chest wall.  No chest wall " masses b/l. No axillary lymphadenopathy b/l.    Laboratory/Imaging Studies  Component      Latest Ref Rng & Units 2/1/2018   Sodium      133 - 144 mmol/L 137   Potassium      3.4 - 5.3 mmol/L 4.4   Chloride      94 - 109 mmol/L 101   Carbon Dioxide      20 - 32 mmol/L 23   Anion Gap      3 - 14 mmol/L 13   Glucose      70 - 99 mg/dL 83   Urea Nitrogen      7 - 30 mg/dL 14   Creatinine      0.52 - 1.04 mg/dL 0.61   GFR Estimate      >60 mL/min/1.7m2 >90   GFR Estimate If Black      >60 mL/min/1.7m2 >90   Calcium      8.5 - 10.1 mg/dL 9.1   Bilirubin Total      0.2 - 1.3 mg/dL 1.6 (H)   Albumin      3.4 - 5.0 g/dL 4.2   Protein Total      6.8 - 8.8 g/dL 8.4   Alkaline Phosphatase      40 - 150 U/L 61   ALT      0 - 50 U/L 69 (H)   AST      0 - 45 U/L 46 (H)   WBC      4.0 - 11.0 10e9/L 6.2   RBC Count      3.8 - 5.2 10e12/L 4.37   Hemoglobin      11.7 - 15.7 g/dL 13.3   Hematocrit      35.0 - 47.0 % 40.2   MCV      78 - 100 fl 92   MCH      26.5 - 33.0 pg 30.4   MCHC      31.5 - 36.5 g/dL 33.1   RDW      10.0 - 15.0 % 12.7   Platelet Count      150 - 450 10e9/L 398     Results for orders placed or performed in visit on 02/21/18   Hepatic panel   Result Value Ref Range    Bilirubin Direct 0.2 0.0 - 0.2 mg/dL    Bilirubin Total 1.0 0.2 - 1.3 mg/dL    Albumin 4.1 3.4 - 5.0 g/dL    Protein Total 8.3 6.8 - 8.8 g/dL    Alkaline Phosphatase 65 40 - 150 U/L    ALT 31 0 - 50 U/L    AST 32 0 - 45 U/L     ASSESSMENT/PLAN:  Sheryle is a 47 year old premenopausal woman with  stage IIB, T2N1 multifocalhigh grade, strongly ER and WI positive, Her2 Adeel negative by FISH invasive ductal carcinoma of the right breast.  Final pathology showed 2 foci at 3 cm as well as separate 0.7 cm invasive ductal carcinoma, [place] grade III, poorly differentiated. She is s/p Bilateral simple mastectomies and right axillary sentinel node biopsy on 10/4/2017 by Dr. Gonzalez. One lymph node removed, having a 1.2 cm metastatic deposit.  The patient  declined further axillary node dissection. She has declined adjuvant chemotherapy. She completed radiation to right chest wall, R SCV LNs  and axilla on 1/3/2018. She has declined a staging PET/CT scan (even without contrast). She met with Dr. Meneses and has not decided in favor of prophylactic BSO. She has declined and continues to decline adjuvant hormonal therapy.      1.  Stage IIB, T2N1 multifocal invasive ductal carcinoma of the right breast. She prefers to be observed closely. Since she is at high risk for disease recurrence because she declined ALND dissection, adjuvant chemotherapy and adjuvant hormonal therapy, we'll include ca27-29 in her f/up labs. I will plan to see her back in 3 months, with labs- cbcd, cmp, ca27-29.    2.  MUTYH gene mutation carrier -  Carriers of MAP have a very slightly increased risk of colon cancer, which could be as high  (~10-12%) as twice the population risk of 5-6%. It is currently uncertain if a specialized screening plan is necessary for MAP mutation carriers who have do not have a family history of colon cancer.   As Sheryle does not have a first degree relative with colon cancer, she could consider beginning colonoscopies at age 50 and repeated every 10 years, or per colonoscopy findings.    3. S/p b/l mastectomy- she is not interested in breast reconstruction surgery.    4. Anxiety/depression associated with diagnosis of breast cancer- she has been seeing a counselor.    5. Pt requests all future imaging to be noncontrast- She declines any imaging with contrast since she has a contrast allergy to unknown type of contrast but when she was given steroids as premedication for her MRI with contrast, she had blurry vision.     At the end of our visit patient and  verbalized understanding and concurred with the plan.    Addendum:  Pt missed f/up appt on 5/15/2018. Labs within normal limits. We'll have her see our NP in survivorship visit in 3 months and I will plan to  see her back in 6 months, with cbc, cmp, [id number]  Component      Latest Ref Rng & Units 5/10/2018   WBC      4.0 - 11.0 10e9/L 5.2   RBC Count      3.8 - 5.2 10e12/L 4.32   Hemoglobin      11.7 - 15.7 g/dL 13.6   Hematocrit      35.0 - 47.0 % 40.5   MCV      78 - 100 fl 94   MCH      26.5 - 33.0 pg 31.5   MCHC      31.5 - 36.5 g/dL 33.6   RDW      10.0 - 15.0 % 11.5   Platelet Count      150 - 450 10e9/L 389   Diff Method       Automated Method   % Neutrophils      % 62.2   % Lymphocytes      % 22.7   % Monocytes      % 12.0   % Eosinophils      % 2.3   % Basophils      % 0.6   % Immature Granulocytes      % 0.2   Absolute Neutrophil      1.6 - 8.3 10e9/L 3.2   Absolute Lymphocytes      0.8 - 5.3 10e9/L 1.2   Absolute Monocytes      0.0 - 1.3 10e9/L 0.6   Absolute Eosinophils      0.0 - 0.7 10e9/L 0.1   Absolute Basophils      0.0 - 0.2 10e9/L 0.0   Abs Immature Granulocytes      0 - 0.4 10e9/L 0.0   Sodium      133 - 144 mmol/L 137   Potassium      3.4 - 5.3 mmol/L 4.8   Chloride      94 - 109 mmol/L 101   Carbon Dioxide      20 - 32 mmol/L 28   Anion Gap      3 - 14 mmol/L 8   Glucose      70 - 99 mg/dL 80   Urea Nitrogen      7 - 30 mg/dL 14   Creatinine      0.52 - 1.04 mg/dL 0.67   GFR Estimate      >60 mL/min/1.7m2 >90   GFR Estimate If Black      >60 mL/min/1.7m2 >90   Calcium      8.5 - 10.1 mg/dL 10.1   Bilirubin Total      0.2 - 1.3 mg/dL 0.8   Albumin      3.4 - 5.0 g/dL 4.3   Protein Total      6.8 - 8.8 g/dL 8.8   Alkaline Phosphatase      40 - 150 U/L 64   ALT      0 - 50 U/L 25   AST      0 - 45 U/L 25   CA 27-29      0 - 39 U/mL 21

## 2018-03-20 ENCOUNTER — OFFICE VISIT (OUTPATIENT)
Dept: OPHTHALMOLOGY | Facility: CLINIC | Age: 48
End: 2018-03-20
Payer: COMMERCIAL

## 2018-03-20 DIAGNOSIS — H04.123 INSUFFICIENCY OF TEAR FILM OF BOTH EYES: ICD-10-CM

## 2018-03-20 DIAGNOSIS — H52.13 MYOPIA WITH ASTIGMATISM AND PRESBYOPIA, BILATERAL: Primary | ICD-10-CM

## 2018-03-20 DIAGNOSIS — H52.4 MYOPIA WITH ASTIGMATISM AND PRESBYOPIA, BILATERAL: Primary | ICD-10-CM

## 2018-03-20 DIAGNOSIS — H52.203 MYOPIA WITH ASTIGMATISM AND PRESBYOPIA, BILATERAL: Primary | ICD-10-CM

## 2018-03-20 PROCEDURE — 92002 INTRM OPH EXAM NEW PATIENT: CPT | Performed by: OPHTHALMOLOGY

## 2018-03-20 PROCEDURE — 92015 DETERMINE REFRACTIVE STATE: CPT | Performed by: OPHTHALMOLOGY

## 2018-03-20 ASSESSMENT — SLIT LAMP EXAM - LIDS
COMMENTS: NORMAL
COMMENTS: NORMAL

## 2018-03-20 ASSESSMENT — REFRACTION_WEARINGRX
OS_CYLINDER: +1.50
OD_CYLINDER: +1.75
OS_SPHERE: -5.75
OS_AXIS: 080
OS_SPHERE: -6.00
OD_AXIS: 087
OD_SPHERE: -3.75
OD_AXIS: 089
SPECS_TYPE: SVL
OD_CYLINDER: -1.50
OS_AXIS: 087
OD_SPHERE: -5.25
OS_CYLINDER: +1.50

## 2018-03-20 ASSESSMENT — EXTERNAL EXAM - RIGHT EYE: OD_EXAM: NORMAL

## 2018-03-20 ASSESSMENT — REFRACTION_MANIFEST
OS_ADD: +1.75
OD_CYLINDER: +1.25
OS_AXIS: 087
OD_ADD: +1.75
OD_AXIS: 083
OS_SPHERE: -6.50
OD_SPHERE: -5.00
OS_CYLINDER: +1.50

## 2018-03-20 ASSESSMENT — CUP TO DISC RATIO
OD_RATIO: 0.3
OS_RATIO: 0.3

## 2018-03-20 ASSESSMENT — VISUAL ACUITY
OS_CC: 20/25
OS_CC+: -1
CORRECTION_TYPE: GLASSES
METHOD: SNELLEN - LINEAR
OD_CC: 20/20
OD_CC+: -1

## 2018-03-20 ASSESSMENT — TONOMETRY
IOP_METHOD: TONOPEN
OD_IOP_MMHG: 13
OS_IOP_MMHG: 12

## 2018-03-20 ASSESSMENT — EXTERNAL EXAM - LEFT EYE: OS_EXAM: NORMAL

## 2018-03-20 NOTE — MR AVS SNAPSHOT
After Visit Summary   3/20/2018    Sheryle Cruse    MRN: [id number]           Patient Information     Date Of Birth          1970        Visit Information        Provider Department      3/20/2018 8:30 AM Femi Mckeon MD [facility]        Today's Diagnoses     Myopia with astigmatism and presbyopia, bilateral    -  1    Insufficiency of tear film of both eyes          Care Instructions    I recommend eye lubrication with artificial tear drops liberally (at least 4-6 times daily) to both eyes.  Preservative-free drops are best if you will be using them more than 6 x daily; some brands include: Celluvisc, Refresh, Systane, Blink, Optive.     Also, use lubricating artificial tear ointment at bedtime in both eyes every night.  Genteal and Refresh PM are preservative-free; generic brands and Lacrilube are not.      What Are Flashes and Floaters?  Have you ever seen flashes of light, stars, or streaks that aren t really there? A few of these flashes are seen by everyone from time to time. Usually you see them in one eye at a time. Flashes are often caused by the vitreous (the gel filling the inside of your eye) pulling on the retina (a membrane that lines the inside of your eye). Floaters look like dark specks, clouds, threads, or spider webs moving through your vision. Most people see them once in a while. Floaters may be pieces of gel or other material floating inside your eye. They are usually harmless.  Who Gets Flashes?  As you age or if you are nearsighted (have fuzzy distance vision), you are more likely to see flashes. Sometimes, flashes are signs of other eye problems that need care.  Who Gets Floaters?  The older you get, the more likely you ll notice floaters. Floaters can also be caused by an eye injury or surgery. People who are very nearsighted also get more floaters. If floaters appear suddenly or greatly increase in number, they may be a sign of an eye  problem that needs care.    Treating Flashes and Floaters  Most often, seeing a few flashes and floaters is normal. Also, some people may notice them for a while after eye surgery. Most flashes and floaters require no treatment. But sometimes they can be signs of a serious eye problem.    When Do Flashes Need Treatment?  Flashes that appear all of a sudden or greatly increase in number may be a sign of a problem. They may be caused by the vitreous pulling too hard on the retina. A retinal tear is an emergency because it can cause the retina to detach from the back of your eye. Rapid vision loss can result. Your eye doctor can find the cause of flashes and decide if treatment is needed.  When Do Floaters Need Treatment?  A sudden increase in the number of floaters you see may be a sign of a tear in the retina or of some other eye problem. Over time, a tear can cause the retina to detach from the back of the eye. Your eye doctor can find out what is causing the floaters and suggest a treatment plan, if needed.  Warning Signs  If you have a sudden increase in floaters or flashes, or if part of your vision is missing, see your eye doctor right away. These may be symptoms of a retinal tear or detachment, which can cause loss of vision. You will need a complete, dilated eye exam to determine the problem.              Follow-ups after your visit        Follow-up notes from your care team     Return in about 1 year (around 3/20/2019) for Annual Eye Exam.      Your next 10 appointments already scheduled     Mar 22, 2018  2:00 PM CDT   [facility] JANE Tsang   [facility]    [address] [place] [address]   [phone]            Mar 22, 2018  4:00 PM CDT   Lymphedema Treatment with Brynn Tejeda OT   [facility] ([facility])    [address] [place], [place] [address]e [facility] [address] [phone]             Mar 22, 2018  4:30 PM CDT   Return Visit with Niurka Hoff MD   [facility]    [address] [facility] [address]   [phone]            Mar 28, 2018  8:15 AM CDT   Lymphedema Treatment with Brynn Nora Thole, OT   [facility] ([facility])    [address] [facility] [address]   [phone]            Apr 04, 2018  8:15 AM CDT   Lymphedema Treatment with Brynn Nora Thole, OT   [facility] ([facility])    [address] [address] [address]   [phone]            May 10, 2018  8:00 AM CDT   Return Visit with Gwendolyn Ackerman MD   [facility] ([facility])    [address] [place] [address]   [phone]            Jul 24, 2018  1:00 PM CDT   New Visit with Ibis Galvan MD   [facility] ([facility])    [address] [place] [address]   [phone]              Who to contact     If you have questions or need follow up information about today's clinic visit or your schedule please contact [facility] directly at [phone].  Normal or non-critical lab and imaging results will be communicated to you by MyChart, letter or phone within 4 business days after the clinic has received the results. If you do not hear from us within 7 days, please contact the clinic through [employer] or phone. If you have a critical or abnormal lab result, we will notify you by phone as soon as possible.  Submit refill requests through [employer] or call your pharmacy and they will forward the refill request to us. Please allow 3 business days for your refill to be completed.          Additional Information About Your Visit        [employer] Information     [employer] is an electronic gateway that provides easy, online access to your medical records. With  [employer], you can request a clinic appointment, read your test results, renew a prescription or communicate with your care team.     To sign up for [employer] visit the website at [email or web address]   You will be asked to enter the access code listed below, as well as some personal information. Please follow the directions to create your username and password.     Your access code is: [email or web address]  Expires: 3/27/2018  4:52 PM     Your access code will  in 90 days. If you need help or a new code, please contact your [facility] or call [phone] for assistance.        Care EveryWhere ID     This is your Care EveryWhere ID. This could be used by other organizations to access your [place] medical records  [id number]         Blood Pressure from Last 3 Encounters:   18 119/80   18 110/60   18 134/87    Weight from Last 3 Encounters:   18 59 kg (130 lb)   18 58.9 kg (129 lb 14.4 oz)   18 58.1 kg (128 lb)              Today, you had the following     No orders found for display       Primary Care Provider Office Phone # Fax #    Katie GILDARDO [facility] [phone]       [address]  [place] [address]        Equal Access to Services     CAROLYN HERNÁNDEZ : Hadii cecy ku hadasho Soomaali, waaxda luqadaha, qaybta kaalmada adesanyayada, malinda copeland . So [facility] [phone].    ATENCIÓN: Si habla español, tiene a spear disposición servicios gratuitos de asistencia lingüística. Llame al [phone].    We comply with applicable federal civil rights laws and [place] laws. We do not discriminate on the basis of race, color, national origin, age, disability, sex, sexual orientation, or gender identity.            Thank you!     Thank you for choosing [facility]  for your care. Our goal is always to provide you with excellent care. Hearing back from our patients is one way we can  continue to improve our services. Please take a few minutes to complete the written survey that you may receive in the mail after your visit with us. Thank you!             Your Updated Medication List - Protect others around you: Learn how to safely use, store and throw away your medicines at www.disposemymeds.org.          This list is accurate as of 3/20/18  9:20 AM.  Always use your most recent med list.                   Brand Name Dispense Instructions for use Diagnosis    CALCIUM MAGNESIUM PO      With zinc    RLQ abdominal pain       ONE-A-DAY ESSENTIAL Tabs      Take 1 tablet by mouth daily.        polyethylene glycol Packet    MIRALAX/GLYCOLAX     Take 1 packet by mouth daily        tiZANidine 4 MG tablet    ZANAFLEX    30 tablet    Take 0.5-1 tablets (2-4 mg) by mouth 3 times daily as needed for muscle spasms    Bilateral low back pain without sciatica, unspecified chronicity

## 2018-03-20 NOTE — PROGRESS NOTES
Assessment & Plan   Sheryle Cruse is a 47 year old female who presents with:   Review of systems for the eyes was negative other than the pertinent positives and negatives noted in the HPI.  History is obtained from the patient.    Myopia with astigmatism and presbyopia, bilateral  - Rx per MR for glasses   - Return for CL fit w/ Dr Argueta if desired  - Discussed Lasik, vs. Glasses if desired f/u in [place] for testing if interested in LASIK  - REFRACTION    Insufficiency of tear film of both eyes  - Recommend ATs frequently throughout the day.    Return in 1 year for annual exam    Documentation for today's encounter was performed by Raven Soria [employer]. OSC. Acting as a scribe in my presence. I have reviewed and verified that it is an accurate recording of today's encounter.    Attending Physician Attestation:  Complete documentation of historical and exam elements from today's encounter can be found in the full encounter summary report (not reduplicated in this progress note).  I personally obtained the chief complaint(s) and history of present illness.  I confirmed and edited as necessary the review of systems, past medical/surgical history, family history, social history, and examination findings as documented by others; and I examined the patient myself.  I personally reviewed the relevant tests, images, and reports as documented above.  I formulated and edited as necessary the assessment and plan and discussed the findings and management plan with the patient and family. - Femi Mckeon MD

## 2018-03-20 NOTE — PATIENT INSTRUCTIONS
I recommend eye lubrication with artificial tear drops liberally (at least 4-6 times daily) to both eyes.  Preservative-free drops are best if you will be using them more than 6 x daily; some brands include: Celluvisc, Refresh, Systane, Blink, Optive.     Also, use lubricating artificial tear ointment at bedtime in both eyes every night.  Genteal and Refresh PM are preservative-free; generic brands and Lacrilube are not.      What Are Flashes and Floaters?  Have you ever seen flashes of light, stars, or streaks that aren t really there? A few of these flashes are seen by everyone from time to time. Usually you see them in one eye at a time. Flashes are often caused by the vitreous (the gel filling the inside of your eye) pulling on the retina (a membrane that lines the inside of your eye). Floaters look like dark specks, clouds, threads, or spider webs moving through your vision. Most people see them once in a while. Floaters may be pieces of gel or other material floating inside your eye. They are usually harmless.  Who Gets Flashes?  As you age or if you are nearsighted (have fuzzy distance vision), you are more likely to see flashes. Sometimes, flashes are signs of other eye problems that need care.  Who Gets Floaters?  The older you get, the more likely you ll notice floaters. Floaters can also be caused by an eye injury or surgery. People who are very nearsighted also get more floaters. If floaters appear suddenly or greatly increase in number, they may be a sign of an eye problem that needs care.    Treating Flashes and Floaters  Most often, seeing a few flashes and floaters is normal. Also, some people may notice them for a while after eye surgery. Most flashes and floaters require no treatment. But sometimes they can be signs of a serious eye problem.    When Do Flashes Need Treatment?  Flashes that appear all of a sudden or greatly increase in number may be a sign of a problem. They may be caused by the  vitreous pulling too hard on the retina. A retinal tear is an emergency because it can cause the retina to detach from the back of your eye. Rapid vision loss can result. Your eye doctor can find the cause of flashes and decide if treatment is needed.  When Do Floaters Need Treatment?  A sudden increase in the number of floaters you see may be a sign of a tear in the retina or of some other eye problem. Over time, a tear can cause the retina to detach from the back of the eye. Your eye doctor can find out what is causing the floaters and suggest a treatment plan, if needed.  Warning Signs  If you have a sudden increase in floaters or flashes, or if part of your vision is missing, see your eye doctor right away. These may be symptoms of a retinal tear or detachment, which can cause loss of vision. You will need a complete, dilated eye exam to determine the problem.

## 2018-03-22 ENCOUNTER — HOSPITAL ENCOUNTER (OUTPATIENT)
Dept: OCCUPATIONAL THERAPY | Facility: CLINIC | Age: 48
Setting detail: THERAPIES SERIES
End: 2018-03-22
Attending: SURGERY
Payer: COMMERCIAL

## 2018-03-22 ENCOUNTER — OFFICE VISIT (OUTPATIENT)
Dept: PSYCHOLOGY | Facility: CLINIC | Age: 48
End: 2018-03-22
Payer: COMMERCIAL

## 2018-03-22 ENCOUNTER — ONCOLOGY VISIT (OUTPATIENT)
Dept: ONCOLOGY | Facility: CLINIC | Age: 48
End: 2018-03-22
Payer: COMMERCIAL

## 2018-03-22 VITALS
HEART RATE: 114 BPM | BODY MASS INDEX: 22.02 KG/M2 | RESPIRATION RATE: 15 BRPM | OXYGEN SATURATION: 96 % | TEMPERATURE: 98 F | SYSTOLIC BLOOD PRESSURE: 126 MMHG | DIASTOLIC BLOOD PRESSURE: 82 MMHG | WEIGHT: 129 LBS | HEIGHT: 64 IN

## 2018-03-22 DIAGNOSIS — Z17.0 MALIGNANT NEOPLASM OF LOWER-INNER QUADRANT OF LEFT BREAST IN FEMALE, ESTROGEN RECEPTOR POSITIVE (H): Primary | ICD-10-CM

## 2018-03-22 DIAGNOSIS — F41.1 GENERALIZED ANXIETY DISORDER: Primary | ICD-10-CM

## 2018-03-22 DIAGNOSIS — C50.312 MALIGNANT NEOPLASM OF LOWER-INNER QUADRANT OF LEFT BREAST IN FEMALE, ESTROGEN RECEPTOR POSITIVE (H): Primary | ICD-10-CM

## 2018-03-22 PROCEDURE — 97140 MANUAL THERAPY 1/> REGIONS: CPT | Mod: GO | Performed by: OCCUPATIONAL THERAPIST

## 2018-03-22 PROCEDURE — 99214 OFFICE O/P EST MOD 30 MIN: CPT | Performed by: INTERNAL MEDICINE

## 2018-03-22 PROCEDURE — 40000445 ZZHC STATISTIC OT VISIT, LYMPHEDEMA: Performed by: OCCUPATIONAL THERAPIST

## 2018-03-22 PROCEDURE — 90834 PSYTX W PT 45 MINUTES: CPT | Performed by: SOCIAL WORKER

## 2018-03-22 ASSESSMENT — PAIN SCALES - GENERAL: PAINLEVEL: NO PAIN (0)

## 2018-03-22 NOTE — PROGRESS NOTES
"[facility]  March 22, 2018      Behavioral Health Clinician Progress Note    Patient Name: Sheryle Cruse           Service Type:  Individual      Service Location:   Face to Face in Clinic     Session Start Time:  2:00 pm  Session End Time: 2: 50 pm      Session Length: 38 - 52      Attendees: Patient    Visit Activities (Refresh list every visit): [facility] Only    Diagnostic Assessment Date: 11/17/17  Treatment Plan Review Date: 11/30/17  See Flowsheets for today's PHQ-9 and MAGGY-7 results  Previous PHQ-9:   PHQ-9 SCORE 9/22/2017 2/1/2018   Total Score 11 6     Previous MAGGY-7:   MAGGY-7 SCORE 9/22/2017 2/1/2018   Total Score 14 14       FRANCISCO LEVEL:  FRANCISCO Score (Last Two) 8/22/2016 9/22/2017   FRANCISCO Raw Score 36 29   Activation Score 47.4 52.9   FRANCISCO Level 2 2       DATA  Extended Session (60+ minutes): No  Interactive Complexity: No  Crisis: No  [facility] Patient: No    Treatment Objective(s) Addressed in This Session:  Target Behavior(s): disease management/lifestyle changes , mental health management    Depressed Mood: Identify negative self-talk and behaviors: challenge core beliefs, myths, and actions    Current Stressors / Issues:  Patient reported current anxiety as she prepares for her upcoming oncology appointment and prepares for her mother's 80th birthday party on 3/23. She stated that it will be the first time she sees her mother since her breast cancer diagnosis. She stated that she does not want the focus to be on her since it is her mother's birthday. Patient discussed efforts to have responses to potential questions, and stated that she feels comfortable with shifting the focus back onto her mother as needed.   Patient and [facility] discussed creation of a \"coping ahead plan\" to help her process and reduce any anxiety after these events.     Patient that she has been focusing on \"letting go\" of responsibilities that she does not need to be responsible for. During previous visit, she felt need to be " "organizing and taking care of all responsibilities. She stated that she worried that if she did not complete the tasks, it would not happen. Per patient, she has been asking her  to help when she feels overwhelmed, and has been more okay with \"saying no\" or taking a step back. She stated that she has recognized that bad outcomes have not happened as a result of taking a step back.    Patient reported other noted theme in anxiety has been assuming that the worst case scenario will happen, and perceptions that all events could pose a potential threat.  She recognized how this is linked to her childhood, and how situations trigger a \"fight or flight\" response. Patient stated that she has noted early signs that she is going into \"fight or flight mode\" and then begins to analyze what is going on. She stated that she has been using cognitive techniques to reduce anxiety.  [facility] and patient reviewed additional cognitive techniques to help reduce anxiety.     Patient stated that she continues to write to help her process her feelings, but reported that she has also been focusing on trying to shift her focus onto other self-care/activities that provide value and meaning that are not related to her cancer. She stated that it has been beneficial and helpful.     Progress on Treatment Objective(s) / Homework:  Satisfactory progress - ACTION (Actively working towards change); Intervened by reinforcing change plan / affirming steps taken    Motivational Interviewing    MI Intervention: Expressed Empathy/Understanding, Supported Autonomy, Collaboration, Evocation, Permission to raise concern or advise, Open-ended questions and Reframe     Change Talk Expressed by the Patient: Ability to change Reasons to change Need to change    Provider Response to Change Talk: E - Evoked more info from patient about behavior change, A - Affirmed patient's thoughts, decisions, or attempts at behavior change, R - Reflected patient's change " talk and S - Summarized patient's change talk statements    Also provided psychoeducation about behavioral health condition, symptoms, and treatment options    Care Plan review completed: Yes    Medication Review:  No current psychiatric medications prescribed    Medication Compliance:  NA    Changes in Health Issues:   Yes: radiation completed, needing to make decisions on next steps in treatment    Chemical Use Review:   Substance Use: Chemical use reviewed, no active concerns identified      Tobacco Use: No current tobacco use.      Assessment: Current Emotional / Mental Status (status of significant symptoms):  Risk status (Self / Other harm or suicidal ideation)  Patient denies a history of suicidal ideation, suicide attempts, self-injurious behavior, homicidal ideation, homicidal behavior and and other safety concerns  Patient denies current fears or concerns for personal safety.  Patient denies current or recent suicidal ideation or behaviors.  Patient denies current or recent homicidal ideation or behaviors.  Patient denies current or recent self injurious behavior or ideation.  Patient denies other safety concerns.  A safety and risk management plan has not been developed at this time, however patient was encouraged to call [place] [phone] should there be a change in any of these risk factors.    Appearance:   Appropriate   Eye Contact:   Good   Psychomotor Behavior: Normal   Attitude:   Cooperative   Orientation:   All  Speech   Rate / Production: Normal    Volume:  Normal   Mood:    Normal  Affect:    Appropriate   Thought Content:  Clear   Thought Form:  Coherent  Logical   Insight:    Good     Diagnoses:  1. Generalized anxiety disorder        Collateral Reports Completed:  Not Applicable    Plan: (Homework, other):  Patient was given information about behavioral services and encouraged to schedule a follow up appointment with the clinic [facility] as needed, Patient unable to schedule at this time, but  will call back to schedule.  She was also given Cognitive Behavioral Therapy skills to practice when experiencing anxiety and depression.  CD Recommendations: No indications of CD issues.  JANE Anne      ______________________________________________________________________    Integrated Primary Care Behavioral Health Treatment Plan    Patient's Name: Sheryle Cruse  YOB: 1970    Date: 11/30/17    DSM-V Diagnoses: 300.02 (F41.1) Generalized Anxiety Disorder  Psychosocial / Contextual Factors: current cancer treatment  WHODAS: TBD    Referral / Collaboration:  The following referral(s) was/were discussed but client declines follow up at this time. Referral to [facility] therapist for trauma therapy. Patient continues to contemplate readiness for medication for symptom management.     Anticipated number of session or this episode of care: 8-10      MeasurableTreatment Goal(s) related to diagnosis / functional impairment(s)  Goal 1: Patient will reduce feelings of anxiety as evidenced by lower MAGGY 7.    I will know I've met my goal when I feel that I can cope better with my anxiety.      Objective #A (Patient Action)    Patient will use distraction each time intrusive worry surfaces.  Status: New - Date: 11/30/17     Intervention(s)  [facility] will assign homework to practice distraction techniques until feels automatic.    Objective #B  Patient will use cognitive strategies identified in therapy to challenge anxious thoughts.  Status: New - Date: 11/30/17     Intervention(s)  [facility] will role-play cognitive restructruing.    Objective #C  Patient will practice deep breathing at least 3 a day.  Status: New - Date: 11/30/17     Intervention(s)  [facility] will assign homework to practice skills.      Patient has reviewed and agreed to the above plan.      JANE Anne  November 30, 2017

## 2018-03-22 NOTE — MR AVS SNAPSHOT
After Visit Summary   3/22/2018    Sheryle Cruse    MRN: [id number]           Patient Information     Date Of Birth          1970        Visit Information        Provider Department      3/22/2018 4:30 PM Niurka Hoff MD [facility]        Today's Diagnoses     Malignant neoplasm of lower-inner quadrant of left breast in female, estrogen receptor positive (H)    -  1       Follow-ups after your visit        Your next 10 appointments already scheduled     Mar 27, 2018  2:00 PM CDT   Return Visit with Blanca Burns [facility] ([facility])    [address] [place] [address]   [phone]            Mar 28, 2018  8:15 AM CDT   Lymphedema Treatment with Brynn Tejeda OT   [facility] ([facility]    [address] [place] [address]   [phone]            Apr 04, 2018  8:15 AM CDT   Lymphedema Treatment with Brynn Tejeda OT   [facility] ([facility]    [address] [place] [address]   [phone]            Apr 18, 2018  9:40 AM CDT   New Visit with Micha Argueta OD   [facility]    [address] [place] [address]   [phone]            May 10, 2018  8:00 AM CDT   Return Visit with Gwendolyn Ackerman MD   [facility])    [address] [place] [address]   [phone]            May 15, 2018  3:45 PM CDT   LAB with [facility])    [address] [place] [address]   [phone]           Please do not eat 10-12 hours before your appointment if you are coming in fasting for labs on lipids, cholesterol, or glucose (sugar). This does not apply to pregnant women. Water, hot tea and black coffee  (with nothing added) are okay. Do not drink other fluids, diet soda or chew gum.            May 15, 2018  4:30 PM CDT   Return Visit with Niurka Hoff MD   [facility])    [address] [place] [address] [phone]            2018  1:00 PM CDT   New Visit with Ibis Galvan MD   [facility] ([facility])    [address] [address] [address] [phone]              Who to contact     If you have questions or need follow up information about today's clinic visit or your schedule please contact [facility] directly at [phone].  Normal or non-critical lab and imaging results will be communicated to you by [employer], letter or phone within 4 business days after the clinic has received the results. If you do not hear from us within 7 days, please contact the clinic through MyChart or phone. If you have a critical or abnormal lab result, we will notify you by phone as soon as possible.  Submit refill requests through [employer] or call your pharmacy and they will forward the refill request to us. Please allow 3 business days for your refill to be completed.          Additional Information About Your Visit        [employer] Information     [employer] is an electronic gateway that provides easy, online access to your medical records. With [employer], you can request a clinic appointment, read your test results, renew a prescription or communicate with your care team.     To sign up for [employer] visit the website at [email or web address]   You will be asked to enter the access code listed below, as well as some personal information. Please follow the directions to create your username and password.     Your access code is: [email or web address]  Expires: 3/27/2018  4:52 PM     Your access code will  in 90 days. If you need help or a new code, please contact your [facility] or call [phone] for assistance.        Care EveryWhere ID     This is your Care EveryWhere ID. This could be used by other organizations to access your [facility] medical records  [id number]        Your Vitals Were     Pulse Temperature Respirations Height Pulse Oximetry BMI (Body Mass Index)    114 98  F (36.7  C) 15 1.626 m (5' 4.02\") 96% 22.13 kg/m2       Blood Pressure from Last 3 Encounters:   03/22/18 126/82   02/23/18 119/80   02/01/18 110/60    Weight from Last 3 Encounters:   03/22/18 58.5 kg (129 lb)   02/23/18 59 kg (130 lb)   02/01/18 58.9 kg (129 lb 14.4 oz)              Today, you had the following     No orders found for display       Primary Care Provider Office Phone # Fax #    Katie Oliverladan Billy, APRN [facility] [phone]       [address]  [place] [address]        Equal Access to Services     [facility]: Hadii aad ku hadasho Soomaali, waaxda luqadaha, qaybta kaalmada adeegyada, waxay kalani hayelvin copeland . So [facility] [phone].    ATENCIÓN: Si habla español, tiene a spear disposición servicios gratuitos de asistencia lingüística. Llame al [phone].    We comply with applicable federal civil rights laws and [place] laws. We do not discriminate on the basis of race, color, national origin, age, disability, sex, sexual orientation, or gender identity.            Thank you!     Thank you for choosing [facility]  for your care. Our goal is always to provide you with excellent care. Hearing back from our patients is one way we can continue to improve our services. Please take a few minutes to complete the written survey that you may receive in the mail after your visit with us. Thank you!             Your Updated Medication List - Protect others around you: Learn how to safely use, store and throw away your medicines at www.disposemymeds.org.          This list is accurate as of 3/22/18 11:59 PM.  Always use your " most recent med list.                   Brand Name Dispense Instructions for use Diagnosis    CALCIUM MAGNESIUM PO      With zinc    RLQ abdominal pain       ONE-A-DAY ESSENTIAL Tabs      Take 1 tablet by mouth daily.        polyethylene glycol Packet    MIRALAX/GLYCOLAX     Take 1 packet by mouth daily        tiZANidine 4 MG tablet    ZANAFLEX    30 tablet    Take 0.5-1 tablets (2-4 mg) by mouth 3 times daily as needed for muscle spasms    Bilateral low back pain without sciatica, unspecified chronicity

## 2018-03-22 NOTE — LETTER
3/22/2018         RE: Sheryle Cruse  [address] [address]        Dear Colleague,    Thank you for referring your patient, Sheryle Cruse, to the [facility]. Please see a copy of my visit note below.    Oncology Follow-up visit:  Date on this visit: Mar 22, 2018      PCP: Katie Billy  Surgeon: Dr. Audrey Gonzalez    Diagnosis:    Locally advanced breast cancer      Oncologic History:  1. Locally advanced right breast cancer-    Esther first noted a lump in her right breast on 6/15/17.  This led to a diagnostic b/l mammogram and R breast US on 6/30/2018 which showed that in the right breast at 3:00 position, anterior depth there was a focal asymmetry. R breast US confirmed a solid mass at 3:00 position 4 cm from the nipple that measured 1.4 x 0.7 x 1.9 cm and correlated with mammography and palpable lump. Right axillary survey by US  demonstrated multiple benign-appearing lymph nodes. Her mammogram prior to that was in Aug 2013.  An US guided core needle biopsy on 7/7/2017 revealed the mass to be high grade invasive ductal carcinoma that is strongly ER+ (>97%)/WI positive(>90%), Her 2 non amplified (HER2 Adeel/CHELSEA 17 ratio of 1.1).  She underwent bilateral simple mastectomies and right axillary sentinel node biopsy on 10/4/2017 by Dr. Gonzalez. She was not interested in breast reconstruction. Pathology from surgery demonstrated two foci of IDC, with largest foci of a 3 cm (measured microscopically) IDC, [place] grade 3 in the right breast, with associated DCIS, negative margins of resection for both DCIS and invasive carcinoma. One/one nonsentinel lymph node was positive for metastasis, 1.2 cm, with no extranodal extension. SLN was not identified in the specimen. LVI was present. She was staged at pT2(m) N1. No malignancy was found in the left breast.   She declined adjuvant systemic chemotherapy and thus OncotypeDx was not ordered.   She completed radiation  to right chest wall, R SCV LNs  and axilla on 1/3/2018.     2. MUTYH mutation heterozygote-  FH x is significant for her maternal GM  diagnosed with breast cancer in her 60's and a paternal aunt being diagnosed in her 50's.  There is no FH for ovarian, colon, uterine, prostate or pancreatic CA. She underwent genetic testing and was found to be  POSITIVE for one MUTYH gene mutation. 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 Of note, Sheryle tested negative for mutations in the following genes by sequencing and deletion/duplication analysis: OLLIE, BARD1, BRCA1, BRCA2, BRIP1, CDH1, CHEK2, DICER1, EPCAM (deletions/duplications only), MLH1, MRE11A, MSH2, MSH6, MUTYH, NBN, NF1, PALB2, PMS2, PTEN, RAD50, RAD51C, RAD51D, SMARCA4, STK11, and TP53.        History Of Present Illness:  Ms. Sparrow is a 47 year old premenopausal  female who presents for f/up of opinion of locally advanced ER+ Her 2 Adeel negative right sided breast cancer.  She completed radiation to right chest wall, R SCV LNs  and axilla on 1/3/2018.  She has declined chemotherapy and today expressed her wishes against systemic hormonal therapy again  She has been undergoing lymphedema therapy and has been following up with lymphedema therapist.  She saw Dr. Meneses in Feb 2018 for consultation of prophylactic BSO but decided not to proceed.  She has had anxiety/depression associated with her diagnosis of breast cancer. She has been seeing a [facility].  She is here with her . She is feeling well otherwise. Her mother's 80th birthday is coming up.  In addition, a complete 12 point  review of systems is negative.         Past Medical/Surgical History:  Past Medical History:   Diagnosis Date     Anemia, iron deficiency      Breast cancer (H)      Depression past  "    Eating disorder age 20    [facility], In recovery for 15 yr     IBS (irritable bowel syndrome)     possible.  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 (c.1187G>A) [employer] 8/10/17     Vitamin D deficiency      Past Surgical History:   Procedure Laterality Date     MASTECTOMY MODIFIED RADICAL BILATERAL       Family and SocHx reviewed:  SOCIAL HISTORY:  .    Denies current tobacco use.  Is a writer for food, weight and body.    Allergies:  Allergies as of 03/22/2018 - Kev as Reviewed 02/23/2018   Allergen Reaction Noted     Contrast dye  06/30/2017     Diphenhydramine  08/31/2017     Pcn [bicillin c-r,] Rash 01/07/2011     Current Medications:  Current Outpatient Prescriptions   Medication Sig Dispense Refill     tiZANidine (ZANAFLEX) 4 MG tablet Take 0.5-1 tablets (2-4 mg) by mouth 3 times daily as needed for muscle spasms 30 tablet 1     polyethylene glycol (MIRALAX/GLYCOLAX) Packet Take 1 packet by mouth daily       Calcium-Magnesium-Vitamin D (CALCIUM MAGNESIUM PO) With zinc       Multiple Vitamin (ONE-A-DAY ESSENTIAL) TABS Take 1 tablet by mouth daily.               Physical Exam:  /82  Pulse 114  Temp 98  F (36.7  C)  Resp 15  Ht 1.626 m (5' 4.02\")  Wt 58.5 kg (129 lb)  SpO2 96%  BMI 22.13 kg/m2        GENERAL APPEARANCE: healthy, alert and no distress     HENT: Mouth without ulcers or lesions     NECK: no adenopathy, no asymmetry or masses     LYMPHATICS: No cervical, supraclavicular, axillary or inguinal lymphadenopathy     RESP: lungs clear to auscultation - no rales, rhonchi or wheezes     CARDIOVASCULAR: regular rates and rhythm, normal S1 S2, no S3 or S4 and no murmur.     ABDOMEN:  soft, nontender, no HSM or masses and bowel sounds normal     MUSCULOSKELETAL: extremities normal- no gross deformities noted, no evidence of inflammation in joints, FROM in all extremities. No edema " b/l LE. + RUE lymphedema, RUE circumference in the arm slightly more than LUE.     SKIN: no suspicious lesions or rashes     PSYCHIATRIC: mentation appears normal and affect normal  CHest wall: Skin changes c/w XRT Right chest wall.  No chest wall masses b/l. No axillary lymphadenopathy b/l.    Laboratory/Imaging Studies  Component      Latest Ref Rng & Units 2/1/2018   Sodium      133 - 144 mmol/L 137   Potassium      3.4 - 5.3 mmol/L 4.4   Chloride      94 - 109 mmol/L 101   Carbon Dioxide      20 - 32 mmol/L 23   Anion Gap      3 - 14 mmol/L 13   Glucose      70 - 99 mg/dL 83   Urea Nitrogen      7 - 30 mg/dL 14   Creatinine      0.52 - 1.04 mg/dL 0.61   GFR Estimate      >60 mL/min/1.7m2 >90   GFR Estimate If Black      >60 mL/min/1.7m2 >90   Calcium      8.5 - 10.1 mg/dL 9.1   Bilirubin Total      0.2 - 1.3 mg/dL 1.6 (H)   Albumin      3.4 - 5.0 g/dL 4.2   Protein Total      6.8 - 8.8 g/dL 8.4   Alkaline Phosphatase      40 - 150 U/L 61   ALT      0 - 50 U/L 69 (H)   AST      0 - 45 U/L 46 (H)   WBC      4.0 - 11.0 10e9/L 6.2   RBC Count      3.8 - 5.2 10e12/L 4.37   Hemoglobin      11.7 - 15.7 g/dL 13.3   Hematocrit      35.0 - 47.0 % 40.2   MCV      78 - 100 fl 92   MCH      26.5 - 33.0 pg 30.4   MCHC      31.5 - 36.5 g/dL 33.1   RDW      10.0 - 15.0 % 12.7   Platelet Count      150 - 450 10e9/L 398     Results for orders placed or performed in visit on 02/21/18   Hepatic panel   Result Value Ref Range    Bilirubin Direct 0.2 0.0 - 0.2 mg/dL    Bilirubin Total 1.0 0.2 - 1.3 mg/dL    Albumin 4.1 3.4 - 5.0 g/dL    Protein Total 8.3 6.8 - 8.8 g/dL    Alkaline Phosphatase 65 40 - 150 U/L    ALT 31 0 - 50 U/L    AST 32 0 - 45 U/L     ASSESSMENT/PLAN:  Sheryle is a 47 year old premenopausal woman with  stage IIB, T2N1 multifocalhigh grade, strongly ER and [place] positive, Her2 Adeel negative by FISH invasive ductal carcinoma of the right breast.  Final pathology showed 2 foci at 3 cm as well as separate 0.7 cm invasive  ductal carcinoma, [place] grade III, poorly differentiated. She is s/p Bilateral simple mastectomies and right axillary sentinel node biopsy on 10/4/2017 by Dr. Gonzalez. One lymph node removed, having a 1.2 cm metastatic deposit.  The patient declined further axillary node dissection. She has declined adjuvant chemotherapy. She completed radiation to right chest wall, R SCV LNs  and axilla on 1/3/2018. She has declined a staging PET/CT scan (even without contrast). She met with Dr. Meneses and has not decided in favor of prophylactic BSO. She has declined and continues to decline adjuvant hormonal therapy.      1.  Stage IIB, T2N1 multifocal invasive ductal carcinoma of the right breast. She prefers to be observed closely. Since she is at high risk for disease recurrence because she declined ALND dissection, adjuvant chemotherapy and adjuvant hormonal therapy, we'll include ca27-29 in her f/up labs. I will plan to see her back in 3 months, with labs- cbcd, cmp, ca27-29.    2.  MUTYH gene mutation carrier -  Carriers of MAP have a very slightly increased risk of colon cancer, which could be as high  (~10-12%) as twice the population risk of 5-6%. It is currently uncertain if a specialized screening plan is necessary for MAP mutation carriers who have do not have a family history of colon cancer.   As Sheryle does not have a first degree relative with colon cancer, she could consider beginning colonoscopies at age 50 and repeated every 10 years, or per colonoscopy findings.    3. S/p b/l mastectomy- she is not interested in breast reconstruction surgery.    4. Anxiety/depression associated with diagnosis of breast cancer- she has been seeing a counselor.    5. Pt requests all future imaging to be noncontrast- She declines any imaging with contrast since she has a contrast allergy to unknown type of contrast but when she was given steroids as premedication for her MRI with contrast, she had blurry vision.     At the  end of our visit patient and  verbalized understanding and concurred with the plan.    Addendum:  Pt missed f/up appt on 5/15/2018. Labs within normal limits. We'll have her see our NP in survivorship visit in 3 months and I will plan to see her back in 6 months, with cbc, cmp, [id number]  Component      Latest Ref Rng & Units 5/10/2018   WBC      4.0 - 11.0 10e9/L 5.2   RBC Count      3.8 - 5.2 10e12/L 4.32   Hemoglobin      11.7 - 15.7 g/dL 13.6   Hematocrit      35.0 - 47.0 % 40.5   MCV      78 - 100 fl 94   MCH      26.5 - 33.0 pg 31.5   MCHC      31.5 - 36.5 g/dL 33.6   RDW      10.0 - 15.0 % 11.5   Platelet Count      150 - 450 10e9/L 389   Diff Method       Automated Method   % Neutrophils      % 62.2   % Lymphocytes      % 22.7   % Monocytes      % 12.0   % Eosinophils      % 2.3   % Basophils      % 0.6   % Immature Granulocytes      % 0.2   Absolute Neutrophil      1.6 - 8.3 10e9/L 3.2   Absolute Lymphocytes      0.8 - 5.3 10e9/L 1.2   Absolute Monocytes      0.0 - 1.3 10e9/L 0.6   Absolute Eosinophils      0.0 - 0.7 10e9/L 0.1   Absolute Basophils      0.0 - 0.2 10e9/L 0.0   Abs Immature Granulocytes      0 - 0.4 10e9/L 0.0   Sodium      133 - 144 mmol/L 137   Potassium      3.4 - 5.3 mmol/L 4.8   Chloride      94 - 109 mmol/L 101   Carbon Dioxide      20 - 32 mmol/L 28   Anion Gap      3 - 14 mmol/L 8   Glucose      70 - 99 mg/dL 80   Urea Nitrogen      7 - 30 mg/dL 14   Creatinine      0.52 - 1.04 mg/dL 0.67   GFR Estimate      >60 mL/min/1.7m2 >90   GFR Estimate If Black      >60 mL/min/1.7m2 >90   Calcium      8.5 - 10.1 mg/dL 10.1   Bilirubin Total      0.2 - 1.3 mg/dL 0.8   Albumin      3.4 - 5.0 g/dL 4.3   Protein Total      6.8 - 8.8 g/dL 8.8   Alkaline Phosphatase      40 - 150 U/L 64   ALT      0 - 50 U/L 25   AST      0 - 45 U/L 25   CA 27-29      0 - 39 U/mL 21       Again, thank you for allowing me to participate in the care of your patient.        Sincerely,        Niurka Hoff MD,  MD

## 2018-03-22 NOTE — NURSING NOTE
"Oncology Rooming Note    March 22, 2018 4:22 PM   Sheryle Cruse is a 47 year old female who presents for:    Chief Complaint   Patient presents with     Oncology Clinic Visit     follow up to discuss treatment     Initial Vitals: /82  Pulse 114  Temp 98  F (36.7  C)  Resp 15  Ht 1.626 m (5' 4.02\")  Wt 58.5 kg (129 lb)  SpO2 96%  BMI 22.13 kg/m2 Estimated body mass index is 22.13 kg/(m^2) as calculated from the following:    Height as of this encounter: 1.626 m (5' 4.02\").    Weight as of this encounter: 58.5 kg (129 lb). Body surface area is 1.63 meters squared.  No Pain (0) Comment: Data Unavailable   No LMP recorded.  Allergies reviewed: Yes  Medications reviewed: Yes    Medications: Medication refills not needed today.  Pharmacy name entered into [facility]: [facility] [address] [place] [address] AT [facility]AN[facility] [place]        5 minutes for nursing intake (face to face time)     Brook Leslie LPN              "

## 2018-03-27 ENCOUNTER — OFFICE VISIT (OUTPATIENT)
Dept: PSYCHOLOGY | Facility: CLINIC | Age: 48
End: 2018-03-27
Payer: COMMERCIAL

## 2018-03-27 DIAGNOSIS — F41.1 GENERALIZED ANXIETY DISORDER: Primary | ICD-10-CM

## 2018-03-27 PROCEDURE — 90834 PSYTX W PT 45 MINUTES: CPT | Performed by: SOCIAL WORKER

## 2018-03-27 NOTE — PROGRESS NOTES
[facility]  March 27, 2018      Behavioral Health Clinician Progress Note    Patient Name: Sheryle Cruse           Service Type:  Individual      Service Location:   Face to Face in Clinic     Session Start Time:  1: 47 pm  Session End Time:  2: 30 pm      Session Length: 38 - 52      Attendees: Patient    Visit Activities (Refresh list every visit): [facility] Only    Diagnostic Assessment Date: 11/17/17  Treatment Plan Review Date: 11/30/17  See Flowsheets for today's PHQ-9 and MAGGY-7 results  Previous PHQ-9:   PHQ-9 SCORE 9/22/2017 2/1/2018   Total Score 11 6     Previous MAGGY-7:   MAGGY-7 SCORE 9/22/2017 2/1/2018   Total Score 14 14       FRANCISCO LEVEL:  FRANCISCO Score (Last Two) 8/22/2016 9/22/2017   FRANCISCO Raw Score 36 29   Activation Score 47.4 52.9   FRANCISCO Level 2 2       DATA  Extended Session (60+ minutes): No  Interactive Complexity: No  Crisis: No  [facility] Patient: No    Treatment Objective(s) Addressed in This Session:  Target Behavior(s): disease management/lifestyle changes , mental health management    Depressed Mood: Identify negative self-talk and behaviors: challenge core beliefs, myths, and actions    Current Stressors / Issues:  Patient reported that her appointments and mother's birthday party went better than anticipated. She stated that she was worried about all the potential scenarios, but shared that none of the scenarios occurred.  Per patient, it was a pleasant experience to go to her mother's birthday, and felt that it was a good party, well planned, and her mother expressed appreciation. Patient discussed awareness of how these experiences will assist her in management of anxiety in the future (anxiety assumes worst, worst is often not likely to happen, cannot believe everything that anxiety tells us). Patient discussed ongoing themes of how to support her mother while at the same time establishing boundaries. Patient and [facility] reviewed strategies and techniques for ongoing management of  anxiety.    Progress on Treatment Objective(s) / Homework:  Satisfactory progress - ACTION (Actively working towards change); Intervened by reinforcing change plan / affirming steps taken    Motivational Interviewing    MI Intervention: Expressed Empathy/Understanding, Supported Autonomy, Collaboration, Evocation, Permission to raise concern or advise, Open-ended questions and Reframe     Change Talk Expressed by the Patient: Ability to change Reasons to change Need to change    Provider Response to Change Talk: E - Evoked more info from patient about behavior change, A - Affirmed patient's thoughts, decisions, or attempts at behavior change, R - Reflected patient's change talk and S - Summarized patient's change talk statements    Also provided psychoeducation about behavioral health condition, symptoms, and treatment options    Care Plan review completed: Yes    Medication Review:  No current psychiatric medications prescribed    Medication Compliance:  NA    Changes in Health Issues:   Yes: radiation completed, needing to make decisions on next steps in treatment    Chemical Use Review:   Substance Use: Chemical use reviewed, no active concerns identified      Tobacco Use: No current tobacco use.      Assessment: Current Emotional / Mental Status (status of significant symptoms):  Risk status (Self / Other harm or suicidal ideation)  Patient denies a history of suicidal ideation, suicide attempts, self-injurious behavior, homicidal ideation, homicidal behavior and and other safety concerns  Patient denies current fears or concerns for personal safety.  Patient denies current or recent suicidal ideation or behaviors.  Patient denies current or recent homicidal ideation or behaviors.  Patient denies current or recent self injurious behavior or ideation.  Patient denies other safety concerns.  A safety and risk management plan has not been developed at this time, however patient was encouraged to call [facility]  / 911 should there be a change in any of these risk factors.    Appearance:   Appropriate   Eye Contact:   Good   Psychomotor Behavior: Normal   Attitude:   Cooperative   Orientation:   All  Speech   Rate / Production: Normal    Volume:  Normal   Mood:    Normal  Affect:    Appropriate   Thought Content:  Clear   Thought Form:  Coherent  Logical   Insight:    Good     Diagnoses:  1. Generalized anxiety disorder        Collateral Reports Completed:  Not Applicable    Plan: (Homework, other):  Patient was given information about behavioral services and encouraged to schedule a follow up appointment with the clinic [facility] as needed, Patient unable to schedule at this time, but will call back to schedule.  She was also given Cognitive Behavioral Therapy skills to practice when experiencing anxiety and depression.  CD Recommendations: No indications of CD issues.  Blanca Burns, [facility]      ______________________________________________________________________    Integrated Primary Care Behavioral Health Treatment Plan    Patient's Name: Sheryle Cruse  YOB: 1970    Date: 11/30/17    DSM-V Diagnoses: 300.02 (F41.1) Generalized Anxiety Disorder  Psychosocial / Contextual Factors: current cancer treatment  WHODAS: TBD    Referral / Collaboration:  The following referral(s) was/were discussed but client declines follow up at this time. Referral to [facility] therapist for trauma therapy. Patient continues to contemplate readiness for medication for symptom management.     Anticipated number of session or this episode of care: 8-10      MeasurableTreatment Goal(s) related to diagnosis / functional impairment(s)  Goal 1: Patient will reduce feelings of anxiety as evidenced by lower MAGGY 7.    I will know I've met my goal when I feel that I can cope better with my anxiety.      Objective #A (Patient Action)    Patient will use distraction each time intrusive worry surfaces.  Status: New - Date: 11/30/17      Intervention(s)  [facility] will assign homework to practice distraction techniques until feels automatic.    Objective #B  Patient will use cognitive strategies identified in therapy to challenge anxious thoughts.  Status: New - Date: 11/30/17     Intervention(s)  C will role-play cognitive restructruing.    Objective #C  Patient will practice deep breathing at least 3 a day.  Status: New - Date: 11/30/17     Intervention(s)  [facility] will assign homework to practice skills.      Patient has reviewed and agreed to the above plan.      JANE Anne  November 30, 2017

## 2018-03-28 ENCOUNTER — HOSPITAL ENCOUNTER (OUTPATIENT)
Dept: OCCUPATIONAL THERAPY | Facility: CLINIC | Age: 48
Setting detail: THERAPIES SERIES
End: 2018-03-28
Attending: SURGERY
Payer: COMMERCIAL

## 2018-03-28 PROCEDURE — 97140 MANUAL THERAPY 1/> REGIONS: CPT | Mod: GO | Performed by: OCCUPATIONAL THERAPIST

## 2018-03-28 PROCEDURE — 40000445 ZZHC STATISTIC OT VISIT, LYMPHEDEMA: Performed by: OCCUPATIONAL THERAPIST

## 2018-03-30 ENCOUNTER — TELEPHONE (OUTPATIENT)
Dept: CARE COORDINATION | Facility: CLINIC | Age: 48
End: 2018-03-30

## 2018-03-30 NOTE — TELEPHONE ENCOUNTER
Social Work Note: Telephone Call  [facility]    Data/Intervention:  Patient Name:  Sheryle Cruse  /Age:  1970 (47 year old)    Call From:  MONA Godinez, to pt  Reason for Call:  To follow-up with pt regarding [employer] being able to provide pt with some financial assistance for moving.    Assessment:  Pt/spouse need to move out of current apt building due to smoke in the building which pt feels is impacting her health.  SW contacted pt via phone today to let her know that the [employer] fund is willing to provide $500.00 in assistance to pt for moving costs.  Pt very appreciative of this financial assistance.  Gwendolynladan Singh will send check directly to pt.  Pt denies other SW needs at this time.     Plan:  SW will no longer reach out to pt on a regular basis though SW remains available for potential future needs of pt.  Pt is good about reaching out to SW when she has a SW need.    LAZARA Godinez     Social Work  [facility]  Office:  [phone]  E-Mail:  [email or web address]  3/30/2018 3:37 PM

## 2018-05-10 ENCOUNTER — OFFICE VISIT (OUTPATIENT)
Dept: SURGERY | Facility: CLINIC | Age: 48
End: 2018-05-10
Payer: COMMERCIAL

## 2018-05-10 VITALS — DIASTOLIC BLOOD PRESSURE: 75 MMHG | SYSTOLIC BLOOD PRESSURE: 113 MMHG | HEART RATE: 82 BPM

## 2018-05-10 DIAGNOSIS — Z17.0 MALIGNANT NEOPLASM OF LOWER-INNER QUADRANT OF LEFT BREAST IN FEMALE, ESTROGEN RECEPTOR POSITIVE (H): ICD-10-CM

## 2018-05-10 DIAGNOSIS — Z17.0 MALIGNANT NEOPLASM OF UPPER-OUTER QUADRANT OF RIGHT BREAST IN FEMALE, ESTROGEN RECEPTOR POSITIVE (H): Primary | ICD-10-CM

## 2018-05-10 DIAGNOSIS — C50.312 MALIGNANT NEOPLASM OF LOWER-INNER QUADRANT OF LEFT BREAST IN FEMALE, ESTROGEN RECEPTOR POSITIVE (H): ICD-10-CM

## 2018-05-10 DIAGNOSIS — C50.411 MALIGNANT NEOPLASM OF UPPER-OUTER QUADRANT OF RIGHT BREAST IN FEMALE, ESTROGEN RECEPTOR POSITIVE (H): Primary | ICD-10-CM

## 2018-05-10 LAB
ALBUMIN SERPL-MCNC: 4.3 G/DL (ref 3.4–5)
ALP SERPL-CCNC: 64 U/L (ref 40–150)
ALT SERPL W P-5'-P-CCNC: 25 U/L (ref 0–50)
ANION GAP SERPL CALCULATED.3IONS-SCNC: 8 MMOL/L (ref 3–14)
AST SERPL W P-5'-P-CCNC: 25 U/L (ref 0–45)
BASOPHILS # BLD AUTO: 0 10E9/L (ref 0–0.2)
BASOPHILS NFR BLD AUTO: 0.6 %
BILIRUB SERPL-MCNC: 0.8 MG/DL (ref 0.2–1.3)
BUN SERPL-MCNC: 14 MG/DL (ref 7–30)
CALCIUM SERPL-MCNC: 10.1 MG/DL (ref 8.5–10.1)
CANCER AG27-29 SERPL-ACNC: 21 U/ML (ref 0–39)
CHLORIDE SERPL-SCNC: 101 MMOL/L (ref 94–109)
CO2 SERPL-SCNC: 28 MMOL/L (ref 20–32)
CREAT SERPL-MCNC: 0.67 MG/DL (ref 0.52–1.04)
DIFFERENTIAL METHOD BLD: NORMAL
EOSINOPHIL # BLD AUTO: 0.1 10E9/L (ref 0–0.7)
EOSINOPHIL NFR BLD AUTO: 2.3 %
ERYTHROCYTE [DISTWIDTH] IN BLOOD BY AUTOMATED COUNT: 11.5 % (ref 10–15)
GFR SERPL CREATININE-BSD FRML MDRD: >90 ML/MIN/1.7M2
GLUCOSE SERPL-MCNC: 80 MG/DL (ref 70–99)
HCT VFR BLD AUTO: 40.5 % (ref 35–47)
HGB BLD-MCNC: 13.6 G/DL (ref 11.7–15.7)
IMM GRANULOCYTES # BLD: 0 10E9/L (ref 0–0.4)
IMM GRANULOCYTES NFR BLD: 0.2 %
LYMPHOCYTES # BLD AUTO: 1.2 10E9/L (ref 0.8–5.3)
LYMPHOCYTES NFR BLD AUTO: 22.7 %
MCH RBC QN AUTO: 31.5 PG (ref 26.5–33)
MCHC RBC AUTO-ENTMCNC: 33.6 G/DL (ref 31.5–36.5)
MCV RBC AUTO: 94 FL (ref 78–100)
MONOCYTES # BLD AUTO: 0.6 10E9/L (ref 0–1.3)
MONOCYTES NFR BLD AUTO: 12 %
NEUTROPHILS # BLD AUTO: 3.2 10E9/L (ref 1.6–8.3)
NEUTROPHILS NFR BLD AUTO: 62.2 %
PLATELET # BLD AUTO: 389 10E9/L (ref 150–450)
POTASSIUM SERPL-SCNC: 4.8 MMOL/L (ref 3.4–5.3)
PROT SERPL-MCNC: 8.8 G/DL (ref 6.8–8.8)
RBC # BLD AUTO: 4.32 10E12/L (ref 3.8–5.2)
SODIUM SERPL-SCNC: 137 MMOL/L (ref 133–144)
WBC # BLD AUTO: 5.2 10E9/L (ref 4–11)

## 2018-05-10 PROCEDURE — 99205 OFFICE O/P NEW HI 60 MIN: CPT | Performed by: SURGERY

## 2018-05-10 PROCEDURE — 36415 COLL VENOUS BLD VENIPUNCTURE: CPT | Performed by: INTERNAL MEDICINE

## 2018-05-10 PROCEDURE — 85025 COMPLETE CBC W/AUTO DIFF WBC: CPT | Performed by: INTERNAL MEDICINE

## 2018-05-10 PROCEDURE — 80053 COMPREHEN METABOLIC PANEL: CPT | Performed by: INTERNAL MEDICINE

## 2018-05-10 PROCEDURE — 86300 IMMUNOASSAY TUMOR CA 15-3: CPT | Performed by: INTERNAL MEDICINE

## 2018-05-10 NOTE — LETTER
5/10/2018         RE: Sheryle Cruse  [address]  [place] [id number]        Dear Colleague,    Thank you for referring your patient, Sheryle Cruse, to the [facility]. Please see a copy of my visit note below.    [facility] Follow Up Note    CHIEF COMPLAINT:  Chief Complaint   Patient presents with     RECHECK     breast cancer follow up       HISTORY OF PRESENT ILLNESS:  Sheryle Cruse is a 47 year old female who is seen in consultation at the request of  Dr. Billy for evaluation of follow up for right breast cancer. 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 She underwent adjuvant radiation therapy. She declined hormonal therapy and chemotherapy thus no oncotype was ordered. She is overall doing well. She is very worried about lymphedema. She has seen physical therapy in the past. She feels more tight on the right than the left. She does not have any noticeable swelling on the right. Occasional twinges of pain in the right arm/axilla.    REVIEW OF SYSTEMS:  Constitutional:  Negative for chills, fatigue, fever and weight change.  Eyes:  Negative for blurred vision, eye pain and photophobia.  ENT:  Negative for hearing problems, ENT pain, congestion, rhinorrhea, epistaxis, hoarseness and dental problems.  Cardiovascular:  Negative for chest pain, palpitations, tachycardia, orthopnea and edema.  Respiratory:  Negative for cough, dyspnea and hemoptysis.  Gastrointestinal:  Negative for abdominal pain, heartburn, constipation, diarrhea and stool changes.  Musculoskeletal:  Negative for arthralgias, back pain and myalgias.  Integumentary/Breast:  See HPI.    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  U/s abd/pelvist      Monoallelic mutation of MUTYH gene 8/30/2017    Carrier of MUTYH-Associated Polyposis (MAP) MUTYH mutation p.G396D (c.1187G>A) [employer] 8/10/17     Vitamin D deficiency        Past Surgical History:   Procedure Laterality Date     MASTECTOMY MODIFIED RADICAL BILATERAL         Family History   Problem Relation Age of Onset     DIABETES Mother 76     Obese     HEART DISEASE Mother      Hypertension Mother      CEREBROVASCULAR DISEASE Mother      Cardiovascular Mother      CVA     Cataracts Mother      CANCER Father      bladder     CEREBROVASCULAR DISEASE Father 70     Breast Cancer Maternal Grandmother      CEREBROVASCULAR DISEASE Maternal Grandmother      Lipids Maternal Grandmother      HEART DISEASE Maternal Grandfather      heart disease     Macular Degeneration Maternal Grandfather      DIABETES Paternal Grandmother      CEREBROVASCULAR DISEASE Paternal Grandfather      HEART DISEASE Paternal Grandfather      Glaucoma No family hx of        Social History   Substance Use Topics     Smoking status: Never Smoker     Smokeless tobacco: Never Used     Alcohol use No       Patient Active Problem List   Diagnosis     Vitamin D deficiency     Vegetarianism     Hyperlipidemia LDL goal <160     Advance care planning     ERRONEOUS ENCOUNTER--DISREGARD     Monoallelic mutation of MUTYH gene     Malignant neoplasm of lower-inner quadrant of left breast in female, estrogen receptor positive (H)     Generalized anxiety disorder     Right-sided low back pain without sciatica     Allergies   Allergen Reactions     Contrast Dye      Pt does not recall which type of contrast     Diphenhydramine      Pcn [Bicillin C-R,] Rash     swelling     Current Outpatient Prescriptions   Medication Sig Dispense Refill     Calcium-Magnesium-Vitamin D (CALCIUM MAGNESIUM PO) With zinc       Multiple Vitamin (ONE-A-DAY ESSENTIAL) TABS Take 1 tablet by mouth daily.       polyethylene glycol (MIRALAX/GLYCOLAX) Packet Take 1 packet by mouth  daily       tiZANidine (ZANAFLEX) 4 MG tablet Take 0.5-1 tablets (2-4 mg) by mouth 3 times daily as needed for muscle spasms 30 tablet 1     Vitals: /75  Pulse 82  BMI= There is no height or weight on file to calculate BMI.    EXAM:  GENERAL: healthy, alert and no distress   BREAST:  The breasts absent bilaterally. Well healed mastectomy incisions. She has excess axillary tissue laterally on the right. No lymphedema noted on clinical exam. Arms are symmetric, no measurements completed. No masses present. She has skin changes consistent with radiation with hyperpigmentation and thickening on right.   There is no axillary or supraclavicular lymphadenopathy.  CARDIOVASCULAR:  RRR  RESPIRATORY: nonlabored breathing  NECK: Neck supple. No adenopathy. Thyroid symmetric, normal size,, Carotids without bruits.  SKIN: No suspicious lesions or rashes  LYMPH: Normal cervical lymph nodes    LABS: none    ASSESSMENT:  Sheryle Cruse is a 47yof w h/o locally advanced right breast cancer s/p bilateral mastectomies, right SLNB and radiation. No evidence of recurrence on exam. Given her tightness and concern for lymphedema - I would like her to return to see physical therapy for ongoing treatment. She will follow up with me in 6 months.   1. Malignant neoplasm of upper-outer quadrant of right breast in female, estrogen receptor positive (H)  - PHYSICAL THERAPY REFERRAL      PLAN:  PT  Follow up with me in 6months for clinical breast exam    Gwendolyn Ackerman MD  [facility]  [phone]        Please route or send letter to:  Primary Care Provider (PCP) and Referring Provider        Again, thank you for allowing me to participate in the care of your patient.        Sincerely,        Gwendolyn Ackerman MD

## 2018-05-10 NOTE — NURSING NOTE
Sheryle Cruse's goals for this visit include: breast cancer follow up  She requests these members of her care team be copied on today's visit information: no    PCP: Katie Billy    Referring Provider:  No referring provider defined for this encounter.    @[facility]@    Do you need any medication refills at today's visit? No    Esther Valerio LPN

## 2018-05-10 NOTE — MR AVS SNAPSHOT
"              After Visit Summary   5/10/2018    Sheryle Cruse    MRN: [id number]           Patient Information     Date Of Birth          1970        Visit Information        Provider Department      5/10/2018 8:00 AM Gwendolyn Ackerman MD [facility]        Today's Diagnoses     Malignant neoplasm of upper-outer quadrant of right breast in female, estrogen receptor positive (H)    -  1       Follow-ups after your visit        Additional Services     PHYSICAL THERAPY REFERRAL       *This therapy referral will be filtered to a centralized scheduling office at [facility] and the patient will receive a call to schedule an appointment at a [place] location most convenient for them. *     [facility] provides Physical Therapy evaluation and treatment and many specialty services across the [place] system.  If requesting a specialty program, please choose from the list below.    If you have not heard from the scheduling office within 2 business days, please call [phone] for all locations, with the exception of [place], please call [phone] and [facility], please call [phone]  Treatment: Evaluation & Treatment  Special Instructions/Modalities: chest wall tightness after mastectomy  Special Programs: [facility]    Please be aware that coverage of these services is subject to the terms and limitations of your health insurance plan.  Call member services at your health plan with any benefit or coverage questions.      **Note to Provider:  If you are referring outside of [place] for the therapy appointment, please list the name of the location in the \"special instructions\" above, print the referral and give to the patient to schedule the appointment.                  Your next 10 appointments already scheduled     May 15, 2018  3:00 PM CDT   Return Visit with JANE Tsang   [facility] ([facility])    [address] [place] [address]   [phone]            May 15, 2018  4:30 PM CDT   [facility]    [address] [address]   [phone]            May 23, 2018  8:40 AM CDT   [facility]    [address] [place] [address]   [phone]            Jul 24, 2018  1:00 PM CDT   [facility]    [address] [place] [address]   [phone]            Nov 08, 2018  8:00 AM CST   Return Visit with Gwendolyn Ackerman MD   [facility])    [address] [place] [id number] [phone]              Who to contact     If you have questions or need follow up information about today's clinic visit or your schedule please contact [facility] directly at [phone].  Normal or non-critical lab and imaging results will be communicated to you by MyChart, letter or phone within 4 business days after the clinic has received the results. If you do not hear from us within 7 days, please contact the clinic through MyChart or phone. If you have a critical or abnormal lab result, we will notify you by phone as soon as possible.  Submit refill requests through [employer] or call your pharmacy and they will forward the refill request to us. Please allow 3 business days for your refill to be completed.          Additional Information About Your Visit        [employer] Information     [employer] is an electronic gateway that provides easy, online access to your medical records. With [employer], you can request a clinic appointment, read your test results, renew a prescription or communicate with your care team.     To sign up for [employer]  visit the website at [email or web address]   You will be asked to enter the access code listed below, as well as some personal information. Please follow the directions to create your username and password.     Your access code is: [email or web address]  Expires: 2018  8:29 AM     Your access code will  in 90 days. If you need help or a new code, please contact your [facility] or call [phone] for assistance.        Care EveryWhere ID     This is your Care EveryWhere ID. This could be used by other organizations to access your [place] medical records  [id number]        Your Vitals Were     Pulse                   82            Blood Pressure from Last 3 Encounters:   05/10/18 113/75   18 126/82   18 119/80    Weight from Last 3 Encounters:   18 58.5 kg (129 lb)   18 59 kg (130 lb)   18 58.9 kg (129 lb 14.4 oz)              We Performed the Following     PHYSICAL THERAPY REFERRAL        Primary Care Provider Office Phone # Fax #    Katie Carlotta Billy, APRN [phone]       [address]  [place] [address]        Equal Access to Services     JORGE HERNÁNDEZ : Hadii aad ku hadasho Soomaali, waaxda luqadaha, qaybta kaalmada adeegyada, waxay idiin hayaan lauren wang lamir . So [facility] [phone].    ATENCIÓN: Si habla español, tiene a spear disposición servicios gratuitos de asistencia lingüística. Llame al [phone].    We comply with applicable federal civil rights laws and [place] laws. We do not discriminate on the basis of race, color, national origin, age, disability, sex, sexual orientation, or gender identity.            Thank you!     Thank you for choosing [facility]  for your care. Our goal is always to provide you with excellent care. Hearing back from our patients is one way we can continue to improve our services. Please take a few minutes to complete the written survey that you  may receive in the mail after your visit with us. Thank you!             Your Updated Medication List - Protect others around you: Learn how to safely use, store and throw away your medicines at www.disposemymeds.org.          This list is accurate as of 5/10/18  8:29 AM.  Always use your most recent med list.                   Brand Name Dispense Instructions for use Diagnosis    CALCIUM MAGNESIUM PO      With zinc    RLQ abdominal pain       ONE-A-DAY ESSENTIAL Tabs      Take 1 tablet by mouth daily.        polyethylene glycol Packet    MIRALAX/GLYCOLAX     Take 1 packet by mouth daily        tiZANidine 4 MG tablet    ZANAFLEX    30 tablet    Take 0.5-1 tablets (2-4 mg) by mouth 3 times daily as needed for muscle spasms    Bilateral low back pain without sciatica, unspecified chronicity

## 2018-05-10 NOTE — PROGRESS NOTES
[facility] Follow Up Note    CHIEF COMPLAINT:  Chief Complaint   Patient presents with     RECHECK     breast cancer follow up       HISTORY OF PRESENT ILLNESS:  Sheryle Cruse is a 47 year old female who is seen in consultation at the request of  Dr. Billy for evaluation of follow up for right breast cancer. 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 She underwent adjuvant radiation therapy. She declined hormonal therapy and chemotherapy thus no oncotype was ordered. She is overall doing well. She is very worried about lymphedema. She has seen physical therapy in the past. She feels more tight on the right than the left. She does not have any noticeable swelling on the right. Occasional twinges of pain in the right arm/axilla.    REVIEW OF SYSTEMS:  Constitutional:  Negative for chills, fatigue, fever and weight change.  Eyes:  Negative for blurred vision, eye pain and photophobia.  ENT:  Negative for hearing problems, ENT pain, congestion, rhinorrhea, epistaxis, hoarseness and dental problems.  Cardiovascular:  Negative for chest pain, palpitations, tachycardia, orthopnea and edema.  Respiratory:  Negative for cough, dyspnea and hemoptysis.  Gastrointestinal:  Negative for abdominal pain, heartburn, constipation, diarrhea and stool changes.  Musculoskeletal:  Negative for arthralgias, back pain and myalgias.  Integumentary/Breast:  See HPI.    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 (c.1187G>A) [facility] 8/10/17     Vitamin D deficiency        Past Surgical History:   Procedure Laterality Date     MASTECTOMY MODIFIED  RADICAL BILATERAL         Family History   Problem Relation Age of Onset     DIABETES Mother 76     Obese     HEART DISEASE Mother      Hypertension Mother      CEREBROVASCULAR DISEASE Mother      Cardiovascular Mother      CVA     Cataracts Mother      CANCER Father      bladder     CEREBROVASCULAR DISEASE Father 70     Breast Cancer Maternal Grandmother      CEREBROVASCULAR DISEASE Maternal Grandmother      Lipids Maternal Grandmother      HEART DISEASE Maternal Grandfather      heart disease     Macular Degeneration Maternal Grandfather      DIABETES Paternal Grandmother      CEREBROVASCULAR DISEASE Paternal Grandfather      HEART DISEASE Paternal Grandfather      Glaucoma No family hx of        Social History   Substance Use Topics     Smoking status: Never Smoker     Smokeless tobacco: Never Used     Alcohol use No       Patient Active Problem List   Diagnosis     Vitamin D deficiency     Vegetarianism     Hyperlipidemia LDL goal <160     Advance care planning     ERRONEOUS ENCOUNTER--DISREGARD     Monoallelic mutation of MUTYH gene     Malignant neoplasm of lower-inner quadrant of left breast in female, estrogen receptor positive (H)     Generalized anxiety disorder     Right-sided low back pain without sciatica     Allergies   Allergen Reactions     Contrast Dye      Pt does not recall which type of contrast     Diphenhydramine      Pcn [Bicillin C-R,] Rash     swelling     Current Outpatient Prescriptions   Medication Sig Dispense Refill     Calcium-Magnesium-Vitamin D (CALCIUM MAGNESIUM PO) With zinc       Multiple Vitamin (ONE-A-DAY ESSENTIAL) TABS Take 1 tablet by mouth daily.       polyethylene glycol (MIRALAX/GLYCOLAX) Packet Take 1 packet by mouth daily       tiZANidine (ZANAFLEX) 4 MG tablet Take 0.5-1 tablets (2-4 mg) by mouth 3 times daily as needed for muscle spasms 30 tablet 1     Vitals: /75  Pulse 82  BMI= There is no height or weight on file to calculate BMI.    EXAM:  GENERAL: healthy,  alert and no distress   BREAST:  The breasts absent bilaterally. Well healed mastectomy incisions. She has excess axillary tissue laterally on the right. No lymphedema noted on clinical exam. Arms are symmetric, no measurements completed. No masses present. She has skin changes consistent with radiation with hyperpigmentation and thickening on right.   There is no axillary or supraclavicular lymphadenopathy.  CARDIOVASCULAR:  RRR  RESPIRATORY: nonlabored breathing  NECK: Neck supple. No adenopathy. Thyroid symmetric, normal size,, Carotids without bruits.  SKIN: No suspicious lesions or rashes  LYMPH: Normal cervical lymph nodes    LABS: none    ASSESSMENT:  Sheryle Cruse is a 47yof w h/o locally advanced right breast cancer s/p bilateral mastectomies, right SLNB and radiation. No evidence of recurrence on exam. Given her tightness and concern for lymphedema - I would like her to return to see physical therapy for ongoing treatment. She will follow up with me in 6 months.   1. Malignant neoplasm of upper-outer quadrant of right breast in female, estrogen receptor positive (H)  - PHYSICAL THERAPY REFERRAL      PLAN:  PT  Follow up with me in 6months for clinical breast exam    Gwendolyn Ackerman MD  [facility]  [phone]        Please route or send letter to:  Primary Care Provider (PCP) and Referring Provider

## 2018-05-14 ENCOUNTER — NURSE TRIAGE (OUTPATIENT)
Dept: NURSING | Facility: CLINIC | Age: 48
End: 2018-05-14

## 2018-05-14 ENCOUNTER — TELEPHONE (OUTPATIENT)
Dept: PSYCHOLOGY | Facility: CLINIC | Age: 48
End: 2018-05-14

## 2018-05-14 ENCOUNTER — TELEPHONE (OUTPATIENT)
Dept: ONCOLOGY | Facility: CLINIC | Age: 48
End: 2018-05-14

## 2018-05-14 ENCOUNTER — TELEPHONE (OUTPATIENT)
Dept: FAMILY MEDICINE | Facility: CLINIC | Age: 48
End: 2018-05-14

## 2018-05-14 DIAGNOSIS — R60.0 LOCALIZED EDEMA: Primary | ICD-10-CM

## 2018-05-14 NOTE — TELEPHONE ENCOUNTER
Clinic Action Needed:  Patient unable to keep appointment scheduled for 5/15/18 at 4:30PM.  Reason for Call:  Patient requests provider email results of blood work or call patient with results.  Email is [email or web address]; preferred phone number is [phone].  Routed to:  PCP

## 2018-05-14 NOTE — TELEPHONE ENCOUNTER
Clinic Action Needed:  Patient unable to keep appointment scheduled for 5/16/18 @ 3PM.  Reason for Call:  Please contact patient at [phone] either late afternoon on 5/16/18, or on 5/17/18.  Routed to:  Provider

## 2018-05-14 NOTE — TELEPHONE ENCOUNTER
Panel Management Review      BP Readings from Last 1 Encounters:   05/10/18 113/75      Last Office Visit with this department: 1/12/2018    Fail List measure: pap      Patient is due/failing the following:   PAP    Action needed:   Patient needs office visit for physical.    Type of outreach:    Sent letter.    Questions for provider review:    None                                                                                                                                    Michelle Guillaume MA      Chart routed to  .

## 2018-05-14 NOTE — LETTER
[facility]       [address]  [place] [address]      May 14, 2018      Sheryle Cruse  [address] [address]    Dear Sheryle Cruse, [id number]    At [facility] we care about your health and are committed to providing quality patient care, which includes staying current on preventative cancer screenings.  You can increase your chances of finding and treating cancers through regular screenings.      Our records show that you are due for the following screening(s):    Pap Smear for cervical cancer  Recommended every three years for women 21 and older  A Pap test is used to detect cervical cancer.  The test should be taken at least once every three years but women who are at a greater risk for cervical cancer may need to have the test more often.      You are at a greater risk for cervical cancer if:   - You have had a sexually transmitted disease   - You have had more than one sex partner   - You have had an abnormal pap test in the past    You may contact the [facility] at [phone] to schedule the screening test(s) at your earliest convenience.    If you have already had one or all of the above screening tests at another facility, please call us so that we may update your chart.      Your partners in health,      Quality Committee   [facility]

## 2018-05-14 NOTE — TELEPHONE ENCOUNTER
Patient states she is unable to keep appointment scheduled for 5/16/18 at 3PM; requests message to provider to contact her.  Also, unable to keep appointment scheduled for 5/16/18 at 4:30PM, requests message to provider to email results of blood work or call patient with results.  FNA routed messages to both providers.

## 2018-05-15 DIAGNOSIS — Z90.11 STATUS POST MASTECTOMY, RIGHT: ICD-10-CM

## 2018-05-15 DIAGNOSIS — R60.9 EDEMA: Primary | ICD-10-CM

## 2018-05-16 ENCOUNTER — CARE COORDINATION (OUTPATIENT)
Dept: ONCOLOGY | Facility: CLINIC | Age: 48
End: 2018-05-16

## 2018-05-16 NOTE — TELEPHONE ENCOUNTER
[facility] contacted patient, per patient request. Patient apologized for late cancellation earlier this week, but stated situational stress with family member.  Patient requested to reschedule appointment for next Wednedsay, 5/23.

## 2018-05-16 NOTE — PROGRESS NOTES
Contacted patient with her recent lab results (patient missed her follow-up visit yesterday with Dr. Hoff) advised they were normal; copy per her request was sent in the mail to her.  Also advised her next appointment would be with BARB Van for a survivorship visit and thereafter a 6 month follow-up with Dr. Hoff.  Patient agrees with plan; advised our schedulers will reach out to her with dates and times of appointments.

## 2018-06-14 ENCOUNTER — OFFICE VISIT (OUTPATIENT)
Dept: PEDIATRICS | Facility: CLINIC | Age: 48
End: 2018-06-14
Payer: COMMERCIAL

## 2018-06-14 ENCOUNTER — TELEPHONE (OUTPATIENT)
Dept: PEDIATRICS | Facility: CLINIC | Age: 48
End: 2018-06-14

## 2018-06-14 ENCOUNTER — RADIANT APPOINTMENT (OUTPATIENT)
Dept: GENERAL RADIOLOGY | Facility: CLINIC | Age: 48
End: 2018-06-14
Attending: NURSE PRACTITIONER
Payer: COMMERCIAL

## 2018-06-14 ENCOUNTER — OFFICE VISIT (OUTPATIENT)
Dept: PSYCHOLOGY | Facility: CLINIC | Age: 48
End: 2018-06-14
Payer: COMMERCIAL

## 2018-06-14 VITALS
WEIGHT: 128.8 LBS | TEMPERATURE: 98.4 F | HEIGHT: 64 IN | DIASTOLIC BLOOD PRESSURE: 72 MMHG | SYSTOLIC BLOOD PRESSURE: 102 MMHG | BODY MASS INDEX: 21.99 KG/M2 | HEART RATE: 124 BPM | OXYGEN SATURATION: 98 %

## 2018-06-14 DIAGNOSIS — F41.1 GENERALIZED ANXIETY DISORDER: Primary | ICD-10-CM

## 2018-06-14 DIAGNOSIS — M54.50 RIGHT-SIDED LOW BACK PAIN WITHOUT SCIATICA, UNSPECIFIED CHRONICITY: Primary | ICD-10-CM

## 2018-06-14 DIAGNOSIS — M54.50 RIGHT-SIDED LOW BACK PAIN WITHOUT SCIATICA, UNSPECIFIED CHRONICITY: ICD-10-CM

## 2018-06-14 PROCEDURE — 72100 X-RAY EXAM L-S SPINE 2/3 VWS: CPT | Performed by: RADIOLOGY

## 2018-06-14 PROCEDURE — 90834 PSYTX W PT 45 MINUTES: CPT | Performed by: SOCIAL WORKER

## 2018-06-14 PROCEDURE — 99214 OFFICE O/P EST MOD 30 MIN: CPT | Performed by: NURSE PRACTITIONER

## 2018-06-14 RX ORDER — NAPROXEN 500 MG/1
500 TABLET ORAL 2 TIMES DAILY PRN
Qty: 30 TABLET | Refills: 1 | Status: SHIPPED | OUTPATIENT
Start: 2018-06-14 | End: 2019-07-15

## 2018-06-14 NOTE — PATIENT INSTRUCTIONS
PLAN:   1.   Symptomatic therapy suggested:   apply heat to affected area, apply ice to affected area, prescription for NSAID given, referral to Physical Therapy    2.  Orders Placed This Encounter   Medications     naproxen (NAPROSYN) 500 MG tablet     Sig: Take 1 tablet (500 mg) by mouth 2 times daily as needed for moderate pain     Dispense:  30 tablet     Refill:  1     Orders Placed This Encounter   Procedures     XR Lumbar Spine 2/3 Views     PHYSICAL THERAPY REFERRAL       3. Patient needs to follow up in if no improvement,or sooner if worsening of symptoms or other symptoms develop.  Will follow up and/or notify patient of  results via My Chart to determine further need for followup    It was a pleasure seeing you today at the [facility]. Thank you for allowing us to care for you today. We truly hope we provided you with the excellent service you deserve. Please let us know if there is anything else we can do for you so we can be sure you are leaving completley satisfied with your care experience.       General information about your clinic   Clinic Hours Lab Hours (Appointments are required)   Mon-Thurs: 7:30 AM - 7 PM Mon-Thurs: 7:30 AM - 7 PM   Fri: 7:30 AM - 5 PM Fri: 7:30 AM - 5 PM        After Hours Nurse Advise & Appts:  [facility]: [phone]  [facility] On Call: to make appointments anytime: [phone] On Call Physician: call [phone] and answering service will page the on call physician.        For urgent appointments, please call [phone] and ask for the triage nurse or your care team clinic nurse.  How to contact my care team:  MyChart: [email or web address]   Phone: [phone]   Fax: [phone]       [facility]:   Phone: [phone]  Hours: 8:00 AM - 6:00 PM  Medication Refills:  Call your pharmacy and they will forward the refill to us. Please allow 3 business days for your refills to be completed.       Normal or  non-critical lab and imaging results will be communicated to you by MyChart, letter or phone within 7 days.  If you do not hear from us within 10 days, please call the clinic. If you have a critical or abnormal lab result, we will notify you by phone as soon as possible.       We now have PWIC (Pediatric Walk in Care)  Monday-Friday from 7:30-4. Simply walk in and be seen for your urgent needs like cough, fever, rash, diarrhea or vomiting, pink eye, UTI. No appointments needed. Ask one of the team for more information      -Your Care Team:    Dr. Stephanie Theodore - Internal Medicine/Pediatrics   Dr. Shirley Wesley - Family Medicine  Dr. Loli Nichols - Pediatrics  Dr. Vale Santillan - Pediatrics  Katie Billy CNP - Family Practice Nurse Practitioner

## 2018-06-14 NOTE — TELEPHONE ENCOUNTER
Attempt #1    Left message for patient to return call to clinic.  Clinic number given.    Whitney Wakefield CMA                    XR Lumbar Spine 2/3 Views   Status:  Final result   Visible to patient:  No (Not Released) Dx:  Right-sided low back pain without sci... Order: [id number]       Notes Recorded by Whitney Wakefield CMA on 6/14/2018 at 3:27 PM  Telephone encounter created for message above per Cecilia Wakefield CMA  ------    Notes Recorded by Katie Billy, APRN CNP on 6/14/2018 at 2:45 PM  Please call   ddd lumbar spine but not significantly changed.   Katie Billy, NP, APRN CNP         Details        Reading Physician Reading Date Result Priority       Mariola Salazar DO 6/14/2018            Narrative             3 views lumbar spine radiographs 6/14/2018 1:20 PM    History: ; Right-sided low back pain without sciatica, unspecified  chronicity     Comparison: 9/22/2017    Findings:    Standing  AP, lateral including lumbosacral spot film lateral view  views of the lumbar spine were obtained.    5  lumbar type vertebral bodies are assumed for the purpose of this  dictation.    There is no acute osseous abnormality.      There is multilevel degenerative changes of the lumbar spine, most  pronounced at L5-S1 with significant disc space loss, not  substantially changed. There is also lower lumbar predominant facet  arthropathy. Spinous process hypertrophy as can be seen in Baastrup's  disease.    Nonobstructive bowel gas pattern.             Impression             Impression:  1.  No acute osseous abnormality.  2.  Multilevel degenerative changes most pronounced at L5-S1 with  significant disc space loss, not substantially changed.    MARIOLA SALAZAR MD ([facility])

## 2018-06-14 NOTE — PROGRESS NOTES
SUBJECTIVE:   Sheryle Cruse is a 47 year old female who presents to clinic today for the following health issues:      Back Pain       Duration: Since May 2018/patient lost a pet cat in April and was very stressed out and feels this might have brought on the most recent flare up.          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    Description:   Location of pain: low back right  Character of pain: dull ache and constant  Pain radiation:none  New numbness or weakness in legs, not attributed to pain:  no     Intensity: Currently 1-2/10 today at the doctors office otherwise a 4-5 rating    History:   Pain interferes with job: No  History of back problems: recurrent self limited episodes of low back pain in the past  Any previous MRI or X-rays: Yes--at [facility]-X-ray.    Sees a specialist for back pain:  Physical therapy but needs a new referral for more visits  Therapies tried without relief: Medications seem to help a little but she prefers not to take medications due to the side effects.    Alleviating factors:   Improved by: Physical therapy and other exercises/uses a back pillow    Precipitating factors:  Worsened by: constant dull ache    Functional and Psychosocial Screen (Noemi STarT Back):      Not performed today    Has had issues with her back for years  Has a flare up before   Since late April and May has been under stress with cat dying   Now the issue is about one or two   Is doing some PT exercises in the back   No radiating in to her legs   Seems to localize to the right lumbar region     Problem list and histories reviewed & adjusted, as indicated.  Additional history: as documented    Patient Active Problem List   Diagnosis     Vitamin D deficiency     Vegetarianism     Hyperlipidemia LDL goal <160     Advance care planning     ERRONEOUS ENCOUNTER--DISREGARD     Monoallelic mutation of MUTYH gene     Malignant neoplasm of lower-inner quadrant of left  breast in female, estrogen receptor positive (H)     Generalized anxiety disorder     Right-sided low back pain without sciatica     Past Surgical History:   Procedure Laterality Date     MASTECTOMY MODIFIED RADICAL BILATERAL         Social History   Substance Use Topics     Smoking status: Never Smoker     Smokeless tobacco: Never Used     Alcohol use No     Family History   Problem Relation Age of Onset     DIABETES Mother 76     Obese     HEART DISEASE Mother      Hypertension Mother      CEREBROVASCULAR DISEASE Mother      Cardiovascular Mother      CVA     Cataracts Mother      CANCER Father      bladder     CEREBROVASCULAR DISEASE Father 70     Breast Cancer Maternal Grandmother      CEREBROVASCULAR DISEASE Maternal Grandmother      Lipids Maternal Grandmother      HEART DISEASE Maternal Grandfather      heart disease     Macular Degeneration Maternal Grandfather      DIABETES Paternal Grandmother      CEREBROVASCULAR DISEASE Paternal Grandfather      HEART DISEASE Paternal Grandfather      Glaucoma No family hx of          Current Outpatient Prescriptions   Medication Sig Dispense Refill     Calcium-Magnesium-Vitamin D (CALCIUM MAGNESIUM PO) With zinc       Multiple Vitamin (ONE-A-DAY ESSENTIAL) TABS Take 1 tablet by mouth daily.       polyethylene glycol (MIRALAX/GLYCOLAX) Packet Take 1 packet by mouth daily       tiZANidine (ZANAFLEX) 4 MG tablet Take 0.5-1 tablets (2-4 mg) by mouth 3 times daily as needed for muscle spasms (Patient not taking: Reported on 6/14/2018) 30 tablet 1     Allergies   Allergen Reactions     Contrast Dye      Pt does not recall which type of contrast     Diphenhydramine      Penicillins Rash     swelling     Labs reviewed in EPIC    Reviewed and updated as needed this visit by clinical staff       Reviewed and updated as needed this visit by Provider         ROS:  Constitutional, HEENT, cardiovascular, pulmonary, gi and gu systems are negative, except as otherwise  "noted.    OBJECTIVE:     /72 (BP Location: Left arm, Patient Position: Sitting, Cuff Size: Adult Regular)  Pulse 124  Temp 98.4  F (36.9  C) (Temporal)  Ht 5' 4.02\" (1.626 m)  Wt 128 lb 12.8 oz (58.4 kg)  LMP 06/01/2018  SpO2 98%  BMI 22.1 kg/m2  Body mass index is 22.1 kg/(m^2).  GENERAL APPEARANCE: alert, active and no distress  RESP: lungs clear to auscultation - no rales, rhonchi or wheezes  CV: regular rates and rhythm and no murmur, click or rub  ABDOMEN: soft, non-tender  MS: extremities normal- no gross deformities noted and peripheral pulses normal  ORTHO: Patient appears to be in mild to moderate pain, antalgic gait noted. Lumbosacral spine area reveals no local tenderness or mass.  Painful and reduced LS ROM noted. Straight leg raise is negative at 90 degrees on bilaterally. DTR's, motor strength and sensation normal.  negative findings: no skin lesions, erythema, or scars,moves about the exam room comfortably    SKIN: no suspicious lesions or rashes  NEURO: Normal strength and tone, mentation intact and speech normal  PSYCH: mentation appears normal and affect normal/bright  Recent Results (from the past 744 hour(s))   XR Lumbar Spine 2/3 Views    Narrative    3 views lumbar spine radiographs 6/14/2018 1:20 PM    History: ; Right-sided low back pain without sciatica, unspecified  chronicity     Comparison: 9/22/2017    Findings:    Standing  AP, lateral including lumbosacral spot film lateral view  views of the lumbar spine were obtained.    5  lumbar type vertebral bodies are assumed for the purpose of this  dictation.    There is no acute osseous abnormality.      There is multilevel degenerative changes of the lumbar spine, most  pronounced at L5-S1 with significant disc space loss, not  substantially changed. There is also lower lumbar predominant facet  arthropathy. Spinous process hypertrophy as can be seen in Baastrup's  disease.    Nonobstructive bowel gas pattern.      Impression    " Impression:  1.  No acute osseous abnormality.  2.  Multilevel degenerative changes most pronounced at L5-S1 with  significant disc space loss, not substantially changed.    MARIOLA MANNING MD (Joe)       Diagnostic Test Results:  Results for orders placed or performed in visit on 05/10/18   CBC with platelets differential   Result Value Ref Range    WBC 5.2 4.0 - 11.0 10e9/L    RBC Count 4.32 3.8 - 5.2 10e12/L    Hemoglobin 13.6 11.7 - 15.7 g/dL    Hematocrit 40.5 35.0 - 47.0 %    MCV 94 78 - 100 fl    MCH 31.5 26.5 - 33.0 pg    MCHC 33.6 31.5 - 36.5 g/dL    RDW 11.5 10.0 - 15.0 %    Platelet Count 389 150 - 450 10e9/L    Diff Method Automated Method     % Neutrophils 62.2 %    % Lymphocytes 22.7 %    % Monocytes 12.0 %    % Eosinophils 2.3 %    % Basophils 0.6 %    % Immature Granulocytes 0.2 %    Absolute Neutrophil 3.2 1.6 - 8.3 10e9/L    Absolute Lymphocytes 1.2 0.8 - 5.3 10e9/L    Absolute Monocytes 0.6 0.0 - 1.3 10e9/L    Absolute Eosinophils 0.1 0.0 - 0.7 10e9/L    Absolute Basophils 0.0 0.0 - 0.2 10e9/L    Abs Immature Granulocytes 0.0 0 - 0.4 10e9/L   Comprehensive metabolic panel   Result Value Ref Range    Sodium 137 133 - 144 mmol/L    Potassium 4.8 3.4 - 5.3 mmol/L    Chloride 101 94 - 109 mmol/L    Carbon Dioxide 28 20 - 32 mmol/L    Anion Gap 8 3 - 14 mmol/L    Glucose 80 70 - 99 mg/dL    Urea Nitrogen 14 7 - 30 mg/dL    Creatinine 0.67 0.52 - 1.04 mg/dL    GFR Estimate >90 >60 mL/min/1.7m2    GFR Estimate If Black >90 >60 mL/min/1.7m2    Calcium 10.1 8.5 - 10.1 mg/dL    Bilirubin Total 0.8 0.2 - 1.3 mg/dL    Albumin 4.3 3.4 - 5.0 g/dL    Protein Total 8.8 6.8 - 8.8 g/dL    Alkaline Phosphatase 64 40 - 150 U/L    ALT 25 0 - 50 U/L    AST 25 0 - 45 U/L   Ca27.29  breast tumor marker   Result Value Ref Range    CA 27-29 21 0 - 39 U/mL       ASSESSMENT/PLAN:       Sheryle was seen today for back pain.    Diagnoses and all orders for this visit:    Right-sided low back pain without sciatica,  unspecified chronicity  -     XR Lumbar Spine 2/3 Views; Future  -     PHYSICAL THERAPY REFERRAL  -     naproxen (NAPROSYN) 500 MG tablet; Take 1 tablet (500 mg) by mouth 2 times daily as needed for moderate pain    PLAN:   1.   Symptomatic therapy suggested:   apply heat to affected area, apply ice to affected area, prescription for NSAID given, referral to Physical Therapy    2.  Orders Placed This Encounter   Medications     naproxen (NAPROSYN) 500 MG tablet     Sig: Take 1 tablet (500 mg) by mouth 2 times daily as needed for moderate pain     Dispense:  30 tablet     Refill:  1     Orders Placed This Encounter   Procedures     XR Lumbar Spine 2/3 Views     PHYSICAL THERAPY REFERRAL       3. Patient needs to follow up in if no improvement,or sooner if worsening of symptoms or other symptoms develop.  Will follow up and/or notify patient of  results via My Chart to determine further need for followup      See Patient Instructions    GILDARDO Michaels CNP  [facility]

## 2018-06-14 NOTE — MR AVS SNAPSHOT
After Visit Summary   6/14/2018    Sheryle Cruse    MRN: [id number]           Patient Information     Date Of Birth          1970        Visit Information        Provider Department      6/14/2018 10:10 AM Katie Billy APRN CNP [facility]        Today's Diagnoses     Right-sided low back pain without sciatica, unspecified chronicity    -  1      Care Instructions    PLAN:   1.   Symptomatic therapy suggested:   apply heat to affected area, apply ice to affected area, prescription for NSAID given, referral to Physical Therapy    2.  Orders Placed This Encounter   Medications     naproxen (NAPROSYN) 500 MG tablet     Sig: Take 1 tablet (500 mg) by mouth 2 times daily as needed for moderate pain     Dispense:  30 tablet     Refill:  1     Orders Placed This Encounter   Procedures     XR Lumbar Spine 2/3 Views     PHYSICAL THERAPY REFERRAL       3. Patient needs to follow up in if no improvement,or sooner if worsening of symptoms or other symptoms develop.  Will follow up and/or notify patient of  results via My Chart to determine further need for followup    It was a pleasure seeing you today at the [facility]. Thank you for allowing us to care for you today. We truly hope we provided you with the excellent service you deserve. Please let us know if there is anything else we can do for you so we can be sure you are leaving completley satisfied with your care experience.       General information about your clinic   Clinic Hours Lab Hours (Appointments are required)   Mon-Thurs: 7:30 AM - 7 PM Mon-Thurs: 7:30 AM - 7 PM   Fri: 7:30 AM - 5 PM Fri: 7:30 AM - 5 PM        After Hours Nurse Advise & Appts:  [facility] Nurse Advisors: [phone]  [facility] On Call: to make appointments anytime: [phone] On Call Physician: call [phone] and answering service will page the on call physician.        For urgent appointments, please call  [phone] and ask for the triage nurse or your care team clinic nurse.  How to contact my care team:  Michael: [email or web address]   Phone: [phone]   Fax: [phone]       [facility]:   Phone: [phone]  Hours: 8:00 AM - 6:00 PM  Medication Refills:  Call your pharmacy and they will forward the refill to us. Please allow 3 business days for your refills to be completed.       Normal or non-critical lab and imaging results will be communicated to you by MyChart, letter or phone within 7 days.  If you do not hear from us within 10 days, please call the clinic. If you have a critical or abnormal lab result, we will notify you by phone as soon as possible.       We now have PWIC (Pediatric Walk in Care)  Monday-Friday from 7:30-4. Simply walk in and be seen for your urgent needs like cough, fever, rash, diarrhea or vomiting, pink eye, UTI. No appointments needed. Ask one of the team for more information      -Your Care Team:    Dr. Stephanie Theodore - Internal Medicine/Pediatrics   Dr. Shirley Wesley - Family Medicine  Dr. Loli Nichols - Pediatrics  Dr. Vale Santillan - Pediatrics  Katie Billy CNP - Family Practice Nurse Practitioner                                 Follow-ups after your visit        Additional Services     PHYSICAL THERAPY REFERRAL       *This therapy referral will be filtered to a centralized scheduling office at [facility] and the patient will receive a call to schedule an appointment at a [place] location most convenient for them. *     [facility] provides Physical Therapy evaluation and treatment and many specialty services across the [place] system.  If requesting a specialty program, please choose from the list below.    If you have not heard from the scheduling office within 2 business days, please call [phone] for all locations, with the exception of [place], please call [phone] and [place], please call  "[phone]  Treatment: Evaluation & Treatment  Special Instructions/Modalities:   Special Programs: None    Please be aware that coverage of these services is subject to the terms and limitations of your health insurance plan.  Call member services at your health plan with any benefit or coverage questions.      **Note to Provider:  If you are referring outside of [place] for the therapy appointment, please list the name of the location in the \"special instructions\" above, print the referral and give to the patient to schedule the appointment.                  Your next 10 appointments already scheduled     Jul 24, 2018  1:00 PM CDT   New Visit with Ibis Galvan MD   [facility])    [id number] [place] [id number]   [phone]            Aug 01, 2018  8:40 AM CDT   New Visit with Micha Argueta OD   [facility])    [address] [place] [address]   [phone]            Aug 14, 2018  8:45 AM CDT   LAB with LAB ONC [facility])    [address] [place] [address]   [phone]           Please do not eat 10-12 hours before your appointment if you are coming in fasting for labs on lipids, cholesterol, or glucose (sugar). This does not apply to pregnant women. Water, hot tea and black coffee (with nothing added) are okay. Do not drink other fluids, diet soda or chew gum.            Aug 14, 2018  9:15 AM CDT   Survivorship Visit with GILDARDO Johnston CNP   [facility])    [address] [place] [address]   [phone]            Nov 08, 2018  8:00 AM CST   Return Visit with Gwendolyn Ackerman MD   [facility])    [address] [place] [address]   [phone]            Nov 14, 2018  7:30 AM CST "   LAB with LAB ONC [facility])    [address] [place] [address] [phone]           Please do not eat 10-12 hours before your appointment if you are coming in fasting for labs on lipids, cholesterol, or glucose (sugar). This does not apply to pregnant women. Water, hot tea and black coffee (with nothing added) are okay. Do not drink other fluids, diet soda or chew gum.              Future tests that were ordered for you today     Open Future Orders        Priority Expected Expires Ordered    XR Lumbar Spine 2/3 Views Routine 6/14/2018 6/14/2019 6/14/2018            Who to contact     If you have questions or need follow up information about today's clinic visit or your schedule please contact [facility] directly at [phone].  Normal or non-critical lab and imaging results will be communicated to you by MyChart, letter or phone within 4 business days after the clinic has received the results. If you do not hear from us within 7 days, please contact the clinic through [employer] or phone. If you have a critical or abnormal lab result, we will notify you by phone as soon as possible.  Submit refill requests through [employer] or call your pharmacy and they will forward the refill request to us. Please allow 3 business days for your refill to be completed.          Additional Information About Your Visit        Likez Information     Likez is an electronic gateway that provides easy, online access to your medical records. With Likez, you can request a clinic appointment, read your test results, renew a prescription or communicate with your care team.     To sign up for Likez visit the website at [email or web address]   You will be asked to enter the access code listed below, as well as some personal information. Please follow the directions to create your username and password.     Your access code is:  [email or web address]  Expires: 2018  8:29 AM     Your access code will  in 90 days. If you need help or a new code, please contact your [facility] or call [phone] for assistance.        Care EveryWhere ID     This is your Care EveryWhere ID. This could be used by other organizations to access your [facility] medical records  [id number]        Your Vitals Were     Pulse Temperature Height Last Period Pulse Oximetry BMI (Body Mass Index)    124 98.4  F (36.9  C) (Temporal) 5' 4.02\" (1.626 m) 2018 98% 22.1 kg/m2       Blood Pressure from Last 3 Encounters:   18 102/72   05/10/18 113/75   18 126/82    Weight from Last 3 Encounters:   18 128 lb 12.8 oz (58.4 kg)   18 129 lb (58.5 kg)   18 130 lb (59 kg)              We Performed the Following     PHYSICAL THERAPY REFERRAL          Today's Medication Changes          These changes are accurate as of 18 10:46 AM.  If you have any questions, ask your nurse or doctor.               Start taking these medicines.        Dose/Directions    naproxen 500 MG tablet   Commonly known as:  NAPROSYN   Used for:  Right-sided low back pain without sciatica, unspecified chronicity   Started by:  Katie Billy APRN CNP        Dose:  500 mg   Take 1 tablet (500 mg) by mouth 2 times daily as needed for moderate pain   Quantity:  30 tablet   Refills:  1            Where to get your medicines      These medications were sent to [facility] - [place], [place] - [address]  [address], [facility] [address]     Phone:  [phone]     naproxen 500 MG tablet                Primary Care Provider Office Phone # Fax #    GILDARDO Michaels [phone]       [address]  [place] [address]        Equal Access to Services     JORGE HERNÁNDEZ AH: Vargas Quan, waaxda luqadaha, malinda katz" kalani mcdermottdeni la'aan ah. So [facility] [phone].    ATENCIÓN: Si jarenla ashu, tiene a spear disposición servicios gratuitos de asistencia lingüística. Nasir al [phone].    We comply with applicable federal civil rights laws and [place] laws. We do not discriminate on the basis of race, color, national origin, age, disability, sex, sexual orientation, or gender identity.            Thank you!     Thank you for choosing [facility]  for your care. Our goal is always to provide you with excellent care. Hearing back from our patients is one way we can continue to improve our services. Please take a few minutes to complete the written survey that you may receive in the mail after your visit with us. Thank you!             Your Updated Medication List - Protect others around you: Learn how to safely use, store and throw away your medicines at www.disposemymeds.org.          This list is accurate as of 6/14/18 10:46 AM.  Always use your most recent med list.                   Brand Name Dispense Instructions for use Diagnosis    CALCIUM MAGNESIUM PO      With zinc    RLQ abdominal pain       naproxen 500 MG tablet    NAPROSYN    30 tablet    Take 1 tablet (500 mg) by mouth 2 times daily as needed for moderate pain    Right-sided low back pain without sciatica, unspecified chronicity       ONE-A-DAY ESSENTIAL Tabs      Take 1 tablet by mouth daily.        polyethylene glycol Packet    MIRALAX/GLYCOLAX     Take 1 packet by mouth daily        tiZANidine 4 MG tablet    ZANAFLEX    30 tablet    Take 0.5-1 tablets (2-4 mg) by mouth 3 times daily as needed for muscle spasms    Bilateral low back pain without sciatica, unspecified chronicity

## 2018-06-14 NOTE — PROGRESS NOTES
[facility] Care  June 14, 2018      Behavioral Health Clinician Progress Note    Patient Name: Sheryle Cruse           Service Type:  Individual      Service Location:   Face to Face in Clinic     Session Start Time:  9: 09 am  Session End Time:  10:00 am      Session Length: 38 - 52      Attendees: Patient    Visit Activities (Refresh list every visit): [facility] Only    Diagnostic Assessment Date: 11/17/17  Treatment Plan Review Date: 11/30/17  See Flowsheets for today's PHQ-9 and MAGGY-7 results  Previous PHQ-9:   PHQ-9 SCORE 9/22/2017 2/1/2018   Total Score 11 6     Previous MAGGY-7:   MAGGY-7 SCORE 9/22/2017 2/1/2018   Total Score 14 14       FRANCISCO LEVEL:  FRANCISCO Score (Last Two) 8/22/2016 9/22/2017   FRANCISCO Raw Score 36 29   Activation Score 47.4 52.9   FRANCISCO Level 2 2       DATA  Extended Session (60+ minutes): No  Interactive Complexity: No  Crisis: No  [facility] Patient: No    Treatment Objective(s) Addressed in This Session:  Target Behavior(s): disease management/lifestyle changes , mental health management    Depressed Mood: Identify negative self-talk and behaviors: challenge core beliefs, myths, and actions    Current Stressors / Issues:  Patient returned for visit after approximately 3 months. Patient discussed stressors that have occurred since previous visit, including the death of her cat. Patient processed her grief and loss secondary to this loss as this cat was present for her during her cancer diagnosis and treatment, and has been a source of comfort and support to her. She discussed the anxiety she felt when she was needing to make a decision to prolong or end the cat's life, the regret she sometimes feels, and how she continues to try to grieve. Patient disclosed how this loss has triggered additional thoughts of grief and loss from previous loses.  Patient reported that writing has been helpful for her to process what has occurred. She stated that she is also trying to exercise and utilize previously  "learned coping skills.  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 Patient reported that she is preparing to \"come out with her cancer\" on social media in anticipation of having success with her current book and potential articles. Patient expressed anticipatory anxiety as she prepares to share with others.     C and patient reviewed CBT techniques to assist with ongoing management of mental health symptoms.     Progress on Treatment Objective(s) / Homework:  Stable - ACTION (Actively working towards change); Intervened by reinforcing change plan / affirming steps taken    Motivational Interviewing    MI Intervention: Expressed Empathy/Understanding, Supported Autonomy, Collaboration, Evocation, Permission to raise concern or advise, Open-ended questions and Reframe     Change Talk Expressed by the Patient: Ability to change Reasons to change Need to change    Provider Response to Change Talk: E - Evoked more info from patient about behavior change, A - Affirmed patient's thoughts, decisions, or attempts at behavior change, R - Reflected patient's change talk and S - Summarized patient's change talk statements    Also provided psychoeducation about behavioral health condition, symptoms, and treatment options    Care Plan review completed: Yes    Medication Review:  No current psychiatric medications prescribed    Medication Compliance:  NA    Changes in Health Issues:   Yes: radiation completed, needing to make decisions on next steps in treatment    Chemical Use Review:   Substance Use: Chemical use reviewed, no active concerns identified      Tobacco Use: No current tobacco use.      Assessment: Current Emotional / Mental Status (status of significant symptoms):  Risk status (Self / Other harm or suicidal ideation)  Patient denies a history of suicidal ideation, suicide attempts, self-injurious " behavior, homicidal ideation, homicidal behavior and and other safety concerns  Patient denies current fears or concerns for personal safety.  Patient denies current or recent suicidal ideation or behaviors.  Patient denies current or recent homicidal ideation or behaviors.  Patient denies current or recent self injurious behavior or ideation.  Patient denies other safety concerns.  A safety and risk management plan has not been developed at this time, however patient was encouraged to call [place] [phone] should there be a change in any of these risk factors.    Appearance:   Appropriate   Eye Contact:   Good   Psychomotor Behavior: Normal   Attitude:   Cooperative   Orientation:   All  Speech   Rate / Production: Normal    Volume:  Normal   Mood:    Normal  Affect:    Appropriate   Thought Content:  Clear   Thought Form:  Coherent  Logical   Insight:    Good     Diagnoses:  1. Generalized anxiety disorder        Collateral Reports Completed:  Not Applicable    Plan: (Homework, other):  Patient was given information about behavioral services and encouraged to schedule a follow up appointment with the clinic [facility] in 2 weeks.  She was also given Cognitive Behavioral Therapy skills to practice when experiencing anxiety and depression.  CD Recommendations: No indications of CD issues.  Blanca Burns, [facility]      ______________________________________________________________________    Integrated Primary Care Behavioral Health Treatment Plan    Patient's Name: Sheryle Cruse  YOB: 1970    Date: 11/30/17    DSM-V Diagnoses: 300.02 (F41.1) Generalized Anxiety Disorder  Psychosocial / Contextual Factors: current cancer treatment  WHODAS: TBD    Referral / Collaboration:  The following referral(s) was/were discussed but client declines follow up at this time. Referral to [facility] therapist for trauma therapy. Patient continues to contemplate readiness for medication for symptom management.     Anticipated number  of session or this episode of care: 8-10      MeasurableTreatment Goal(s) related to diagnosis / functional impairment(s)  Goal 1: Patient will reduce feelings of anxiety as evidenced by lower MAGGY 7.    I will know I've met my goal when I feel that I can cope better with my anxiety.      Objective #A (Patient Action)    Patient will use distraction each time intrusive worry surfaces.  Status: New - Date: 11/30/17     Intervention(s)  [facility] will assign homework to practice distraction techniques until feels automatic.    Objective #B  Patient will use cognitive strategies identified in therapy to challenge anxious thoughts.  Status: New - Date: 11/30/17     Intervention(s)  [facility] will role-play cognitive restructruing.    Objective #C  Patient will practice deep breathing at least 3 a day.  Status: New - Date: 11/30/17     Intervention(s)  [facility] will assign homework to practice skills.      Patient has reviewed and agreed to the above plan.      JANE Anne  November 30, 2017

## 2018-06-15 NOTE — TELEPHONE ENCOUNTER
Attempt #2    Left message on patients home and cell  for patient to return call to clinic.  Clinic number given.    Whitney Wakefield CMA

## 2018-06-16 NOTE — TELEPHONE ENCOUNTER
----- Message from Lazaro Maloney sent at 6/15/2018  7:46 PM CDT -----  Reason for Call:  Other call back    Detailed comments: Patient is returning a phone call about some test results. She states she was on hold for over an hour and didn't want to hold anymore. Please give patient a call back to discuss results at your earliest convenience. Thank you.    Phone Number Patient can be reached at: Home number on file [phone] (home)    Best Time: Asap, now.    Can we leave a detailed message on this number? YES    Call taken on 6/15/2018 at 7:46 PM by Lazaro Maloney

## 2018-06-16 NOTE — TELEPHONE ENCOUNTER
Called patient back and given message from provider:  Notes Recorded by Katie Billy, APRN CNP on 6/14/2018 at 2:45 PM  Please call   ddd lumbar spine but not significantly changed.   Katie Billy, NP, APRN CNP

## 2018-06-29 ENCOUNTER — OFFICE VISIT (OUTPATIENT)
Dept: PSYCHOLOGY | Facility: CLINIC | Age: 48
End: 2018-06-29
Payer: COMMERCIAL

## 2018-06-29 DIAGNOSIS — F41.1 GENERALIZED ANXIETY DISORDER: Primary | ICD-10-CM

## 2018-06-29 PROCEDURE — 90834 PSYTX W PT 45 MINUTES: CPT | Performed by: SOCIAL WORKER

## 2018-06-29 NOTE — PROGRESS NOTES
[facility] Care  June 29, 2018      Behavioral Health Clinician Progress Note    Patient Name: Sheryle Cruse           Service Type:  Individual      Service Location:   Face to Face in Clinic     Session Start Time:  8:38 am  Session End Time: 9: 20 am      Session Length: 38 - 52      Attendees: Patient    Visit Activities (Refresh list every visit): [facility] Only    Diagnostic Assessment Date: 11/17/17  Treatment Plan Review Date: 6/29/18  See Flowsheets for today's PHQ-9 and MAGGY-7 results  Previous PHQ-9:   PHQ-9 SCORE 9/22/2017 2/1/2018   Total Score 11 6     Previous MAGGY-7:   MAGGY-7 SCORE 9/22/2017 2/1/2018   Total Score 14 14       FRANCISCO LEVEL:  FRANCISCO Score (Last Two) 8/22/2016 9/22/2017   FRANCISCO Raw Score 36 29   Activation Score 47.4 52.9   FRANCISCO Level 2 2       DATA  Extended Session (60+ minutes): No  Interactive Complexity: No  Crisis: No  [facility] Patient: No    Treatment Objective(s) Addressed in This Session:  Target Behavior(s): disease management/lifestyle changes , mental health management    Depressed Mood: Identify negative self-talk and behaviors: challenge core beliefs, myths, and actions    Current Stressors / Issues:  Patient requesting feedback on how to approach relationship with her mother. Per patient, she identifies the relationship as enmeshed, and stated that it has been enmeshed since childhood. Patient reported that knows that her mother is not likely to change due to her age and health, and wants to be able to change her approach. Patient stated that she knows logically what she needs to do, but finds it difficult to do so when she becomes emotional. Patient stated that she becomes triggered when her mother makes comment about feeling disappointed. Patient discussed how she feels at those times that she is not doing enough for her mother, even though she knows that she is good daughter because of the frequent calls and involvement in care conferences. She stated that she wants to be  able to remind herself of all that she is doing for her daughter.  [facility] explored the role of anxiety when patient is attempting to navigate her relationship with her mother. She recognized how anxiety makes it difficult to approach the relationship logically. Patient and [facility] began to explore how to establish boundaries with her mother, including ending phone calls or visits when she begins to make comments that are unhealthy and inappropriate.    Progress on Treatment Objective(s) / Homework:  Stable - ACTION (Actively working towards change); Intervened by reinforcing change plan / affirming steps taken    Motivational Interviewing    MI Intervention: Expressed Empathy/Understanding, Supported Autonomy, Collaboration, Evocation, Permission to raise concern or advise, Open-ended questions and Reframe     Change Talk Expressed by the Patient: Ability to change Reasons to change Need to change    Provider Response to Change Talk: E - Evoked more info from patient about behavior change, A - Affirmed patient's thoughts, decisions, or attempts at behavior change, R - Reflected patient's change talk and S - Summarized patient's change talk statements    Also provided psychoeducation about behavioral health condition, symptoms, and treatment options    Care Plan review completed: Yes    Medication Review:  No current psychiatric medications prescribed    Medication Compliance:  NA    Changes in Health Issues:   Yes: radiation completed, needing to make decisions on next steps in treatment    Chemical Use Review:   Substance Use: Chemical use reviewed, no active concerns identified      Tobacco Use: No current tobacco use.      Assessment: Current Emotional / Mental Status (status of significant symptoms):  Risk status (Self / Other harm or suicidal ideation)  Patient denies a history of suicidal ideation, suicide attempts, self-injurious behavior, homicidal ideation, homicidal behavior and and other safety concerns  Patient  denies current fears or concerns for personal safety.  Patient denies current or recent suicidal ideation or behaviors.  Patient denies current or recent homicidal ideation or behaviors.  Patient denies current or recent self injurious behavior or ideation.  Patient denies other safety concerns.  A safety and risk management plan has not been developed at this time, however patient was encouraged to call [place] [phone] should there be a change in any of these risk factors.    Appearance:   Appropriate   Eye Contact:   Good   Psychomotor Behavior: Normal   Attitude:   Cooperative   Orientation:   All  Speech   Rate / Production: Normal    Volume:  Normal   Mood:    Normal  Affect:    Appropriate   Thought Content:  Clear   Thought Form:  Coherent  Logical   Insight:    Good     Diagnoses:  1. Generalized anxiety disorder        Collateral Reports Completed:  Not Applicable    Plan: (Homework, other):  Patient was given information about behavioral services and encouraged to schedule a follow up appointment with the clinic [facility] in 2 weeks.  She was also given Cognitive Behavioral Therapy skills to practice when experiencing anxiety and depression.  CD Recommendations: No indications of CD issues.  Blanca Burns, [facility]      ______________________________________________________________________    Integrated Primary Care Behavioral Health Treatment Plan    Patient's Name: Sheryle Cruse  YOB: 1970    Date: 11/30/17    DSM-V Diagnoses: 300.02 (F41.1) Generalized Anxiety Disorder  Psychosocial / Contextual Factors: current cancer treatment  WHODAS: TBD    Referral / Collaboration:  The following referral(s) was/were discussed but client declines follow up at this time. Referral to [facility] therapist for trauma therapy. Patient continues to contemplate readiness for medication for symptom management.     Anticipated number of session or this episode of care: 8-10      MeasurableTreatment Goal(s) related to  diagnosis / functional impairment(s)  Goal 1: Patient will reduce feelings of anxiety as evidenced by lower MAGGY 7.    I will know I've met my goal when I feel that I can cope better with my anxiety.      Objective #A (Patient Action)    Patient will use distraction each time intrusive worry surfaces.  Status: New - Date: 11/30/17     Intervention(s)  [facility] will assign homework to practice distraction techniques until feels automatic.    Objective #B  Patient will use cognitive strategies identified in therapy to challenge anxious thoughts.  Status: New - Date: 11/30/17     Intervention(s)  [facility] will role-play cognitive restructruing.    Objective #C  Patient will practice deep breathing at least 3 a day.  Status: New - Date: 11/30/17     Intervention(s)  [facility] will assign homework to practice skills.      Patient has reviewed and agreed to the above plan.      JANE Anne  November 30, 2017

## 2018-07-13 ENCOUNTER — TELEPHONE (OUTPATIENT)
Dept: CARE COORDINATION | Facility: CLINIC | Age: 48
End: 2018-07-13

## 2018-07-13 NOTE — TELEPHONE ENCOUNTER
Social Work Note: Telephone Call  [facility]    Data/Intervention:  Patient Name:  Sheryle Cruse  /Age:  1970 (47 year old)    Call From:  Pt to LAZARA Godinez  Reason for Call:  Pt inquiring regarding no cost/low cost moving service that would be able to assist pt with move to new apartment.     Assessment:  Pt/spouse have located a new apartment to move into 2018.  Pt/spouse are not feeling safe in their current apt building and pt does not want to be exposed to smoke smell in her building, hence, their reason fro moving.  SW researched/consulted with other SWer's and was unable to provide a no cost/low cost moving resource to pt.  Pt did request moving assistance from the [employer] in , and at that time was given $500.00 from another fund to assist in moving costs.         Plan:  SW available for future needs of pt.    LAZARA Godinez     Social Work  [facility]  Office:  [phone]  E-Mail:  [email or web address]  2018 2:57 PM

## 2018-07-18 ENCOUNTER — OFFICE VISIT (OUTPATIENT)
Dept: PSYCHOLOGY | Facility: CLINIC | Age: 48
End: 2018-07-18
Payer: COMMERCIAL

## 2018-07-18 DIAGNOSIS — F41.1 GENERALIZED ANXIETY DISORDER: Primary | ICD-10-CM

## 2018-07-18 PROCEDURE — 90832 PSYTX W PT 30 MINUTES: CPT | Performed by: SOCIAL WORKER

## 2018-07-18 NOTE — Clinical Note
Hi!  This is a referral for Sheryle.  She is interested in scheduling with Yoselyn in September (has an upcoming move and will likely be busy until then)!  Thanks, Blanca

## 2018-07-18 NOTE — PROGRESS NOTES
[facility] Care  July 18, 2018      Behavioral Health Clinician Progress Note    Patient Name: Sheryle Cruse           Service Type:  Individual      Service Location:   Face to Face in Clinic     Session Start Time: 10: 23 am  Session End Time: 10: 55 am      Session Length: 16 - 37      Attendees: Patient    Visit Activities (Refresh list every visit): [facility] Only    Diagnostic Assessment Date: 11/17/17  Treatment Plan Review Date: 6/29/18  See Flowsheets for today's PHQ-9 and MAGGY-7 results  Previous PHQ-9:   PHQ-9 SCORE 9/22/2017 2/1/2018   Total Score 11 6     Previous MAGGY-7:   MAGGY-7 SCORE 9/22/2017 2/1/2018   Total Score 14 14       FRANCISCO LEVEL:  FRANCISCO Score (Last Two) 8/22/2016 9/22/2017   FRANCISCO Raw Score 36 29   Activation Score 47.4 52.9   FRANCISCO Level 2 2       DATA  Extended Session (60+ minutes): No  Interactive Complexity: No  Crisis: No  [facility] Patient: No    Treatment Objective(s) Addressed in This Session:  Target Behavior(s): disease management/lifestyle changes , mental health management    Depressed Mood: Identify negative self-talk and behaviors: challenge core beliefs, myths, and actions    Current Stressors / Issues:  Patient reported that anxiety has been improving and stabilizing. She stated that she and her  are busy as they prepare for an upcoming move, but reported that she does not identify the move as stressful due to potential gains that will occur and knowing that the stress will be short term in nature. Patient stated that she her first manuscript for her latest piece of written is finished, and she is pleased with the results. Patient discussed how she hopes that her writing about her cancer diagnosis will be able support others and reduce anxiety. Patient reported that she continues to work on establishing boundaries with her mother, and feels more confident with the steps she is taking with her.  Per patient, she continues to grieve the loss of her cat, but reported that  she is allowing herself to experience the feelings as they arise. Patient denied questions, concerns, or needs at this time.    Progress on Treatment Objective(s) / Homework:  Stable - MAINTENANCE (Working to maintain change, with risk of relapse); Intervened by continuing to positively reinforce healthy behavior choice     Motivational Interviewing    MI Intervention: Expressed Empathy/Understanding, Supported Autonomy, Collaboration, Evocation, Permission to raise concern or advise, Open-ended questions and Reframe     Change Talk Expressed by the Patient: Ability to change Reasons to change Need to change    Provider Response to Change Talk: E - Evoked more info from patient about behavior change, A - Affirmed patient's thoughts, decisions, or attempts at behavior change, R - Reflected patient's change talk and S - Summarized patient's change talk statements    Also provided psychoeducation about behavioral health condition, symptoms, and treatment options    Care Plan review completed: Yes    Medication Review:  No current psychiatric medications prescribed    Medication Compliance:  NA    Changes in Health Issues:   Yes: radiation completed, needing to make decisions on next steps in treatment    Chemical Use Review:   Substance Use: Chemical use reviewed, no active concerns identified      Tobacco Use: No current tobacco use.      Assessment: Current Emotional / Mental Status (status of significant symptoms):  Risk status (Self / Other harm or suicidal ideation)  Patient denies a history of suicidal ideation, suicide attempts, self-injurious behavior, homicidal ideation, homicidal behavior and and other safety concerns  Patient denies current fears or concerns for personal safety.  Patient denies current or recent suicidal ideation or behaviors.  Patient denies current or recent homicidal ideation or behaviors.  Patient denies current or recent self injurious behavior or ideation.  Patient denies other safety  concerns.  A safety and risk management plan has not been developed at this time, however patient was encouraged to call [place] [phone] should there be a change in any of these risk factors.    Appearance:   Appropriate   Eye Contact:   Good   Psychomotor Behavior: Normal   Attitude:   Cooperative   Orientation:   All  Speech   Rate / Production: Normal    Volume:  Normal   Mood:    Normal  Affect:    Appropriate   Thought Content:  Clear   Thought Form:  Coherent  Logical   Insight:    Good     Diagnoses:  1. Generalized anxiety disorder        Collateral Reports Completed:  Not Applicable    Plan: (Homework, other):  Patient was given information about behavioral services and encouraged to schedule a follow up appointment with the clinic [facility] as needed.  [facility] discussed upcoming maternity leave. Patient expressed interest in meeting with a [facility] therapist in September.  [facility] to place referral. She was also given Cognitive Behavioral Therapy skills to practice when experiencing anxiety and depression.  CD Recommendations: No indications of CD issues.  JANE Anne      ______________________________________________________________________    Integrated Primary Care Behavioral Health Treatment Plan    Patient's Name: Sheryle Cruse  YOB: 1970    Date: 11/30/17    DSM-V Diagnoses: 300.02 (F41.1) Generalized Anxiety Disorder  Psychosocial / Contextual Factors: current cancer treatment  WHODAS: TBD    Referral / Collaboration:  The following referral(s) was/were discussed but client declines follow up at this time. Referral to [facility] therapist for trauma therapy. Patient continues to contemplate readiness for medication for symptom management.     Anticipated number of session or this episode of care: 8-10      MeasurableTreatment Goal(s) related to diagnosis / functional impairment(s)  Goal 1: Patient will reduce feelings of anxiety as evidenced by lower MAGGY 7.    I will know I've met my goal when I  feel that I can cope better with my anxiety.      Objective #A (Patient Action)    Patient will use distraction each time intrusive worry surfaces.  Status: New - Date: 11/30/17     Intervention(s)  [facility] will assign homework to practice distraction techniques until feels automatic.    Objective #B  Patient will use cognitive strategies identified in therapy to challenge anxious thoughts.  Status: New - Date: 11/30/17     Intervention(s)  [facility] will role-play cognitive restructruing.    Objective #C  Patient will practice deep breathing at least 3 a day.  Status: New - Date: 11/30/17     Intervention(s)  [facility] will assign homework to practice skills.      Patient has reviewed and agreed to the above plan.      Blanca Burns, JANE  November 30, 2017

## 2018-07-24 ENCOUNTER — OFFICE VISIT (OUTPATIENT)
Dept: DERMATOLOGY | Facility: CLINIC | Age: 48
End: 2018-07-24
Attending: NURSE PRACTITIONER
Payer: COMMERCIAL

## 2018-07-24 DIAGNOSIS — L65.9 ALOPECIA: Primary | ICD-10-CM

## 2018-07-24 PROCEDURE — 99202 OFFICE O/P NEW SF 15 MIN: CPT | Performed by: DERMATOLOGY

## 2018-07-24 RX ORDER — FLUOCINONIDE TOPICAL SOLUTION USP, 0.05% 0.5 MG/ML
SOLUTION TOPICAL
Qty: 20 ML | Refills: 0 | Status: SHIPPED | OUTPATIENT
Start: 2018-07-24 | End: 2018-10-02

## 2018-07-24 ASSESSMENT — PAIN SCALES - GENERAL: PAINLEVEL: NO PAIN (0)

## 2018-07-24 NOTE — LETTER
7/24/2018         RE: Sheryle Cruse  [address]  [place] [address]        Dear Colleague,    Thank you for referring your patient, Sheryle Cruse, to the [facility]. Please see a copy of my visit note below.    [facility] Dermatology Note      Dermatology Problem List:  1. Alopecia, likely androgenetic  - Current tx: minoxidil 5%, Lidex    Encounter Date: Jul 24, 2018    CC:  Chief Complaint   Patient presents with     Hair Loss     started noticing at 5 years ago used rogain for women it did not work. breast cancer diagnosis 1 year ago had bilateral masectmony,         History of Present Illness:  Ms. Sheryle Cruse is a 47 year old female who presents for evaluation of hair loss. The patient reports that she first noticed this hair loss about five years ago, at which time she tried using Rogaine for women, which did not work for her after 4-6 months of use. She has been seen by other primary care physicians for this problem, no of whom offered any definitive diagnoses. Last summer, she was diagnosed with breast cancer and had a bilateral mastectomy. Her PCP provider told her that hair loss could occur after such a procedure. While the pateint noted that the hair loss increased after the procedure, she notes that she had been losing her hair for years prior to this. Both of the patient's parents had thinning hair. In addition to the hair loss along the scalp, she has noticed some loss of hair from her eyebrows. Denies hair loss anywhere else except for an irradiated area from when she had breast cancer. She uses Suave shampoo. Denies taking hair and nail supplements, though she has in the past. The patient is vegan and has been for the last 3 years. She has been a vegetarian since she was 18. For proteins, she avoids soy and primarily eats beans and legumes. Patient admits a history of eating disorder, but she eats 3-4 times throughout the day. She  feels this is in remission.     Past Medical History:   Patient Active Problem List   Diagnosis     Vitamin D deficiency     Vegetarianism     Hyperlipidemia LDL goal <160     Advance care planning     ERRONEOUS ENCOUNTER--DISREGARD     Monoallelic mutation of MUTYH gene     Malignant neoplasm of lower-inner quadrant of left breast in female, estrogen receptor positive (H)     Generalized anxiety disorder     Right-sided low back pain without sciatica     Past Medical History:   Diagnosis Date     Anemia, iron deficiency      Breast cancer (H)      Depression past     Eating disorder age 20    [facility], In recovery for 15 yr     IBS (irritable bowel syndrome)     possible.  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    Family History:  Both patient's parents had thinning hair    Medications:  Current Outpatient Prescriptions   Medication Sig Dispense Refill     Calcium-Magnesium-Vitamin D (CALCIUM MAGNESIUM PO) With zinc       Multiple Vitamin (ONE-A-DAY ESSENTIAL) TABS Take 1 tablet by mouth daily.       naproxen (NAPROSYN) 500 MG tablet Take 1 tablet (500 mg) by mouth 2 times daily as needed for moderate pain 30 tablet 1     polyethylene glycol (MIRALAX/GLYCOLAX) Packet Take 1 packet by mouth daily       tiZANidine (ZANAFLEX) 4 MG tablet Take 0.5-1 tablets (2-4 mg) by mouth 3 times daily as needed for muscle spasms (Patient not taking: Reported on 6/14/2018) 30 tablet 1       Allergies   Allergen Reactions     Contrast Dye      Pt does not recall which type of contrast     Diphenhydramine      Penicillins Rash     swelling       Review of Systems:    -Constitutional: The patient is feeling generally  well.  -Skin: As above in HPI. No additional skin concerns.    Physical exam:  Vitals: There were no vitals taken for this visit.  GEN: This is a well developed, well-nourished female in no acute distress, in a pleasant mood.    SKIN: Sun-exposed skin, which includes the head/face, neck, both forearms, digits, and/or nails was examined.   - Approximately dime sized patchy area of hair loss around the part line of the scalp  - Some frontal and temporal recession noted.   -A part width of 1.4 was present - widens posteriorly There was no erythema and scaling of the scalp noted.  Eyelashes and eyebrows are within normal limits.       - Hair pull test is negative     -No other lesions of concern on areas examined.       Impression/Plan:  1. Alopecia, most likely androgenetic - given the history of Vitamin D deficiency, eating disorder, and family history of thinning hair, there are many potential contributing factors for hair loss. Given one patch of loss, Alopecia areata is also possible. Offered biopsy and she will consider    Given the patch of hair loss noted, patient advised that she could have a skin biopsy to rule out alopecia areata. Patient says that she needs to weigh her options before trying this.     Start minoxidil 5%. Apply twice daily to the affected area on the scalp.    Consider LLLT    For spot on the scalp, start fluocinolone 0.05% solution. Apply to the affected area twice daily for two weeks, then weekends only for two weeks. Repeat cycle as needed.    Photodocumentation obtained at today's visit.     Patient will be scheduled for labs.    Follow-up in 3 months, for biopsies.       Staff Involved:  Scribe/Staff    Scribe Disclosure  I, Tyrell Villarreal, am serving as a scribe to document services personally performed by Dr. Ibis Galvan MD, based on data collection and the provider's statements to me.     Provider Disclosure:   The documentation recorded by the scribe accurately reflects the services I  personally performed and the decisions made by me.    Ibis Galvan MD    Department of Dermatology  [facility]: Phone: [phone], Fax:[phone]  [facility]: Phone: [phone], Fax: [phone]        Again, thank you for allowing me to participate in the care of your patient.        Sincerely,        Ibis Galvan MD

## 2018-07-24 NOTE — NURSING NOTE
Sheryle Cruse's goals for this visit include:   Chief Complaint   Patient presents with     Hair Loss     started noticing at 5 years ago used rogain for women it did not work. breast cancer diagnosis 1 year ago had bilateral masectmony,       She requests these members of her care team be copied on today's visit information: Cecilia Billy    PCP: Katie Billy    Referring Provider:  Katie Billy, APRN CNP  [address]  [place] [address]    There were no vitals taken for this visit.    Do you need any medication refills at today's visit? no

## 2018-07-24 NOTE — MR AVS SNAPSHOT
After Visit Summary   7/24/2018    Sheryle Cruse    MRN: [id number]           Patient Information     Date Of Birth          1970        Visit Information        Provider Department      7/24/2018 1:00 PM Ibis Galvan MD [facility]        Today's Diagnoses     Alopecia    -  1      Care Instructions    Topical Rogaine (Minoxidil) for Pattern Hair Loss      Minoxidil is an FDA approved over the counter topical for the treatment of hair loss and thinning hair in men and women.     Initially a 2% solution was available however this required application twice daily. A 5% solution is also now approved, only requiring application once per day.     Available Products:     Rogaine 5% solution: Packaged for men however can be used by men or women. Use dropper and apply directly to scalp at bedtime. This product can cause an allergy because of presence of propylene glycol. Stop this product if you develop a rash or itching and contact your physician.     Rogaine 5% foam: Packaged for men and women: Apply foam directly to the scalp once daily. This is less greasy compared to the solution. This formula is preferred for those who had a reaction to the solution product. If you develop rash or itching, stop the product and contact your physician.     What if I stop minoxidil topical?     After stopping minoxidil the hair will return to the usual pattern of thinning. Using the product 3-4 times per week is better than not using product at all.       Can I use generic minoxidil?     Yes, look for 5% minoxidil.     What are the side effects?    The most common side effect is rash or itching of the scalp. This can occur if a contact allergy develops with propylene glycol. A small group of patients noticed the appearance of facial hair if the product runs onto the face or with prolonged use.       Last updated: 6/26/2016        Photobiomodulation (Low Level Laser (Light) Therapy)      What is  Photobiomodulation?     Photobiomodulation, also referred to as low level laser therapy,  is an emerging treatment for hair thinning in men and women, also known as pattern hair loss. The devices use light to stimulate the hair follicle.  The exact mechanism is still unknown but there is evidence for improvement with hair growth and density.      What types of devices are available?    Each patient has many different options devices depending on preference for shape, frequency of use and price point.      There is no study comparing these devices. However, most research available is on the Hairmax devices.    Below is a sample of some devices currently available. All are FDA cleared for androgenetic alopecia.    In general, the more laser lights the more expensive the device. However, this does not necessarily mean the device works better.      aScentias Lasercomb and Band   Shape Comb or Band   Price Comb ($199-$399), Band ($499-$799)   Contact Information [email or web address] - [employer] for the rest of us  [phone]  [phone]              LaserITYZ Pro Capillus 82 iGrow Theradome   Shape Hat Baseball Cap Helmet Helmet   Price $3,000 $799 $549 $895   Contact Information [employer]  [phone]  [email or web address]  [phone]  [email or web address]  [phone]  [email or web address]   [phone]         If you plan to buy a hair max device, please consider using the following coupon code as this will give you a discount and also result in a donation from hair [employer] to our medical students.     e      Last update:4/2/2018      For spot on the scalp start fluocinolone solution twice daily for 2 weeks, weekends only for 2 weeks, repeat cycle as needed    Scalp photos today      Follow up in 3 months, consider biopsy- we will schedule you for biopsy but you can change your mind      Hair loss most likely a combination- female pattern hair loss/androgenetic alopecia and low vitamind D(we  Will check  today), diet, recent surgery 10/2017 causing hair shedding and stress    Question if there is possibly alopecia areata in that small bald patch    Consider biopsy          Follow-ups after your visit        Follow-up notes from your care team     Return for go to any [place] lab when ready to draw labs, come back to MD in 3 months.      Your next 10 appointments already scheduled     Aug 01, 2018  7:10 AM CDT   LAB with LAB FIRST FLOOR [facility] ([facility])    [address] [facility] [address] [phone]           Please do not eat 10-12 hours before your appointment if you are coming in fasting for labs on lipids, cholesterol, or glucose (sugar). This does not apply to pregnant women. Water, hot tea and black coffee (with nothing added) are okay. Do not drink other fluids, diet soda or chew gum.            Aug 01, 2018  8:40 AM CDT   New Visit with Micha Argueta OD   [facility]    [address] [facility] [address]   [phone]            Aug 14, 2018  8:45 AM CDT   LAB with LAB ONC [facility] ([facility])    [address] [facility] [address] [phone]           Please do not eat 10-12 hours before your appointment if you are coming in fasting for labs on lipids, cholesterol, or glucose (sugar). This does not apply to pregnant women. Water, hot tea and black coffee (with nothing added) are okay. Do not drink other fluids, diet soda or chew gum.            Aug 14, 2018  9:15 AM CDT   Survivorship Visit with GILDARDO Johnston CNP   [facility] ([facility])    [address] [facility] [address] [phone]            Sep 04, 2018  9:00 AM CDT   New Visit with Yoselyn Mckay, PhD   [facility] ([facility])    [id number]  [facility] [address]   [phone]            Sep 04, 2018 11:00 AM CDT   (Arrive by 10:45 AM)   AMBER Spine with Tresa Gudino, PT   [facility] ([facility]  )    [address] [facility] [address]   [phone]            Oct 18, 2018  8:00 AM CDT   Return Visit with Ibis Galvan MD   [facility] ([facility])    [address] [address]   [phone]            Nov 08, 2018  8:00 AM CST   Return Visit with Gwendolyn Ackerman MD   [facility])    [address] [address] [phone]            Nov 14, 2018  7:30 AM CST   LAB with LAB ONC [facility] ([facility])    [address] [place] [address] [phone]           Please do not eat 10-12 hours before your appointment if you are coming in fasting for labs on lipids, cholesterol, or glucose (sugar). This does not apply to pregnant women. Water, hot tea and black coffee (with nothing added) are okay. Do not drink other fluids, diet soda or chew gum.              Who to contact     If you have questions or need follow up information about today's clinic visit or your schedule please contact [facility] directly at [phone].  Normal or non-critical lab and imaging results will be communicated to you by MyChart, letter or phone within 4 business days after the clinic has received the results. If you do not hear from us within 7 days, please contact the clinic through MyChart or phone. If you have a critical or abnormal lab result, we will notify you by phone as soon as possible.  Submit refill requests through [employer] or call your pharmacy and they will forward the refill request to us. Please allow 3 business days for your refill to be completed.          Additional Information About Your  Visit        [employer] Information     [employer] gives you secure access to your electronic health record. If you see a primary care provider, you can also send messages to your care team and make appointments. If you have questions, please call your primary care clinic.  If you do not have a primary care provider, please call [phone] and they will assist you.      [employer] is an electronic gateway that provides easy, online access to your medical records. With [employer], you can request a clinic appointment, read your test results, renew a prescription or communicate with your care team.     To access your existing account, please contact your [facility] or call [phone] for assistance.        Care EveryWhere ID     This is your Care EveryWhere ID. This could be used by other organizations to access your [place] medical records  [id number]         Blood Pressure from Last 3 Encounters:   06/14/18 102/72   05/10/18 113/75   03/22/18 126/82    Weight from Last 3 Encounters:   06/14/18 58.4 kg (128 lb 12.8 oz)   03/22/18 58.5 kg (129 lb)   02/23/18 59 kg (130 lb)              We Performed the Following     CBC with platelets differential     Ferritin     TSH with free T4 reflex     Vitamin D Deficiency          Today's Medication Changes          These changes are accurate as of 7/24/18  1:41 PM.  If you have any questions, ask your nurse or doctor.               Start taking these medicines.        Dose/Directions    fluocinonide 0.05 % solution   Commonly known as:  LIDEX   Used for:  Alopecia        Apply twice daily for 2 weeks, weekends only for 2 weeks, repeat cycle as needed   Quantity:  20 mL   Refills:  0            Where to get your medicines      These medications were sent to [facility] - [place], [place] - [address]  [address], [facility] [address]     Phone:  [phone]     fluocinonide 0.05 % solution                 Primary Care Provider Office Phone # Fax #    Katie Billy, APRN BRANDEN [phone]       [address]  [place] [address]        Equal Access to Services     JORGE HERNÁNDEZ : Hadii cecy ku hadsherlyno Soomaali, waaxda luqadaha, qaybta kaalmada adeegyada, waxchava deckern lauren wang dinorah murry. So [facility] [phone].    ATENCIÓN: Si habla español, tiene a spear disposición servicios gratuitos de asistencia lingüística. Llame al [phone].    We comply with applicable federal civil rights laws and [place] laws. We do not discriminate on the basis of race, color, national origin, age, disability, sex, sexual orientation, or gender identity.            Thank you!     Thank you for choosing [facility]  for your care. Our goal is always to provide you with excellent care. Hearing back from our patients is one way we can continue to improve our services. Please take a few minutes to complete the written survey that you may receive in the mail after your visit with us. Thank you!             Your Updated Medication List - Protect others around you: Learn how to safely use, store and throw away your medicines at www.disposemymeds.org.          This list is accurate as of 7/24/18  1:41 PM.  Always use your most recent med list.                   Brand Name Dispense Instructions for use Diagnosis    CALCIUM MAGNESIUM PO      With zinc    RLQ abdominal pain       fluocinonide 0.05 % solution    LIDEX    20 mL    Apply twice daily for 2 weeks, weekends only for 2 weeks, repeat cycle as needed    Alopecia       naproxen 500 MG tablet    NAPROSYN    30 tablet    Take 1 tablet (500 mg) by mouth 2 times daily as needed for moderate pain    Right-sided low back pain without sciatica, unspecified chronicity       ONE-A-DAY ESSENTIAL Tabs      Take 1 tablet by mouth daily.        polyethylene glycol Packet    MIRALAX/GLYCOLAX     Take 1 packet by mouth daily        tiZANidine 4  MG tablet    ZANAFLEX    30 tablet    Take 0.5-1 tablets (2-4 mg) by mouth 3 times daily as needed for muscle spasms    Bilateral low back pain without sciatica, unspecified chronicity

## 2018-07-24 NOTE — PATIENT INSTRUCTIONS
Topical Rogaine (Minoxidil) for Pattern Hair Loss      Minoxidil is an FDA approved over the counter topical for the treatment of hair loss and thinning hair in men and women.     Initially a 2% solution was available however this required application twice daily. A 5% solution is also now approved, only requiring application once per day.     Available Products:     Rogaine 5% solution: Packaged for men however can be used by men or women. Use dropper and apply directly to scalp at bedtime. This product can cause an allergy because of presence of propylene glycol. Stop this product if you develop a rash or itching and contact your physician.     Rogaine 5% foam: Packaged for men and women: Apply foam directly to the scalp once daily. This is less greasy compared to the solution. This formula is preferred for those who had a reaction to the solution product. If you develop rash or itching, stop the product and contact your physician.     What if I stop minoxidil topical?     After stopping minoxidil the hair will return to the usual pattern of thinning. Using the product 3-4 times per week is better than not using product at all.       Can I use generic minoxidil?     Yes, look for 5% minoxidil.     What are the side effects?    The most common side effect is rash or itching of the scalp. This can occur if a contact allergy develops with propylene glycol. A small group of patients noticed the appearance of facial hair if the product runs onto the face or with prolonged use.       Last updated: 6/26/2016        Photobiomodulation (Low Level Laser (Light) Therapy)      What is Photobiomodulation?     Photobiomodulation, also referred to as low level laser therapy,  is an emerging treatment for hair thinning in men and women, also known as pattern hair loss. The devices use light to stimulate the hair follicle.  The exact mechanism is still unknown but there is evidence for improvement with hair growth and  density.      What types of devices are available?    Each patient has many different options devices depending on preference for shape, frequency of use and price point.      There is no study comparing these devices. However, most research available is on the Hairmax devices.    Below is a sample of some devices currently available. All are FDA cleared for androgenetic alopecia.    In general, the more laser lights the more expensive the device. However, this does not necessarily mean the device works better.      HairMax Lasercomb and Band   Shape Comb or Band   Price Comb ($199-$399), Band ($499-$799)   Contact Information [email or web address]  [phone]  [phone]              LaserCap Pro Capillus 82 iGrow Theradome   Shape Hat Baseball Cap Helmet Helmet   Price $3,000 $799 $549 $895   Contact Information [employer]  [phone]  [email or web address]  [phone]  [email or web address]  [phone]  [email or web address]   [phone]         If you plan to buy a hair max device, please consider using the following coupon code as this will give you a discount and also result in a donation from hair max to our medical students.     e      Last update:4/2/2018      For spot on the scalp start fluocinolone solution twice daily for 2 weeks, weekends only for 2 weeks, repeat cycle as needed    Scalp photos today      Follow up in 3 months, consider biopsy- we will schedule you for biopsy but you can change your mind      Hair loss most likely a combination- female pattern hair loss/androgenetic alopecia and low vitamind D(we  Will check today), diet, recent surgery 10/2017 causing hair shedding and stress    Question if there is possibly alopecia areata in that small bald patch    Consider biopsy

## 2018-07-24 NOTE — PROGRESS NOTES
[facility] Dermatology Note      Dermatology Problem List:  1. Alopecia, likely androgenetic  - Current tx: minoxidil 5%, Lidex    Encounter Date: Jul 24, 2018    CC:  Chief Complaint   Patient presents with     Hair Loss     started noticing at 5 years ago used rogain for women it did not work. breast cancer diagnosis 1 year ago had bilateral masectmony,         History of Present Illness:  Ms. Sheryle Cruse is a 47 year old female who presents for evaluation of hair loss. The patient reports that she first noticed this hair loss about five years ago, at which time she tried using Rogaine for women, which did not work for her after 4-6 months of use. She has been seen by other primary care physicians for this problem, no of whom offered any definitive diagnoses. Last summer, she was diagnosed with breast cancer and had a bilateral mastectomy. Her PCP provider told her that hair loss could occur after such a procedure. While the pateint noted that the hair loss increased after the procedure, she notes that she had been losing her hair for years prior to this. Both of the patient's parents had thinning hair. In addition to the hair loss along the scalp, she has noticed some loss of hair from her eyebrows. Denies hair loss anywhere else except for an irradiated area from when she had breast cancer. She uses Suave shampoo. Denies taking hair and nail supplements, though she has in the past. The patient is vegan and has been for the last 3 years. She has been a vegetarian since she was 18. For proteins, she avoids soy and primarily eats beans and legumes. Patient admits a history of eating disorder, but she eats 3-4 times throughout the day. She feels this is in remission.     Past Medical History:   Patient Active Problem List   Diagnosis     Vitamin D deficiency     Vegetarianism     Hyperlipidemia LDL goal <160     Advance care planning     ERRONEOUS ENCOUNTER--DISREGARD     Monoallelic mutation  of MUTYH gene     Malignant neoplasm of lower-inner quadrant of left breast in female, estrogen receptor positive (H)     Generalized anxiety disorder     Right-sided low back pain without sciatica     Past Medical History:   Diagnosis Date     Anemia, iron deficiency      Breast cancer (H)      Depression past     Eating disorder age 20    [facility] Program, In recovery for 15 yr     IBS (irritable bowel syndrome)     possible.  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 (c.1187G>A) Prezma 8/10/17     Vitamin D deficiency      Past Surgical History:   Procedure Laterality Date     MASTECTOMY MODIFIED RADICAL BILATERAL         Social History:  The patient is a .    Family History:  Both patient's parents had thinning hair    Medications:  Current Outpatient Prescriptions   Medication Sig Dispense Refill     Calcium-Magnesium-Vitamin D (CALCIUM MAGNESIUM PO) With zinc       Multiple Vitamin (ONE-A-DAY ESSENTIAL) TABS Take 1 tablet by mouth daily.       naproxen (NAPROSYN) 500 MG tablet Take 1 tablet (500 mg) by mouth 2 times daily as needed for moderate pain 30 tablet 1     polyethylene glycol (MIRALAX/GLYCOLAX) Packet Take 1 packet by mouth daily       tiZANidine (ZANAFLEX) 4 MG tablet Take 0.5-1 tablets (2-4 mg) by mouth 3 times daily as needed for muscle spasms (Patient not taking: Reported on 6/14/2018) 30 tablet 1       Allergies   Allergen Reactions     Contrast Dye      Pt does not recall which type of contrast     Diphenhydramine      Penicillins Rash     swelling       Review of Systems:    -Constitutional: The patient is feeling generally well.  -Skin: As above in HPI. No additional skin concerns.    Physical exam:  Vitals: There were no vitals taken for this visit.  GEN: This is a well developed, well-nourished female in no acute distress, in a pleasant mood.    SKIN: Sun-exposed skin, which  includes the head/face, neck, both forearms, digits, and/or nails was examined.   - Approximately dime sized patchy area of hair loss around the part line of the scalp  - Some frontal and temporal recession noted.   -A part width of 1.4 was present - widens posteriorly There was no erythema and scaling of the scalp noted.  Eyelashes and eyebrows are within normal limits.       - Hair pull test is negative     -No other lesions of concern on areas examined.       Impression/Plan:  1. Alopecia, most likely androgenetic - given the history of Vitamin D deficiency, eating disorder, and family history of thinning hair, there are many potential contributing factors for hair loss. Given one patch of loss, Alopecia areata is also possible. Offered biopsy and she will consider    Given the patch of hair loss noted, patient advised that she could have a skin biopsy to rule out alopecia areata. Patient says that she needs to weigh her options before trying this.     Start minoxidil 5%. Apply twice daily to the affected area on the scalp.    Consider LLLT    For spot on the scalp, start fluocinolone 0.05% solution. Apply to the affected area twice daily for two weeks, then weekends only for two weeks. Repeat cycle as needed.    Photodocumentation obtained at today's visit.     Patient will be scheduled for labs.    Follow-up in 3 months, for biopsies.       Staff Involved:  Scribe/Staff    Scribe Disclosure  I, Tyrell Villarreal, am serving as a scribe to document services personally performed by Dr. Ibis Galvan MD, based on data collection and the provider's statements to me.     Provider Disclosure:   The documentation recorded by the scribe accurately reflects the services I personally performed and the decisions made by me.    Ibis Galvan MD    Department of Dermatology  [facility]: Phone: [phone], Fax:[phone]  [facility]  [facility]: Phone: [phone], Fax: [phone]

## 2018-08-01 ENCOUNTER — APPOINTMENT (OUTPATIENT)
Dept: OPTOMETRY | Facility: CLINIC | Age: 48
End: 2018-08-01
Payer: COMMERCIAL

## 2018-08-01 DIAGNOSIS — L65.9 ALOPECIA: ICD-10-CM

## 2018-08-01 LAB
BASOPHILS # BLD AUTO: 0 10E9/L (ref 0–0.2)
BASOPHILS NFR BLD AUTO: 1.1 %
DEPRECATED CALCIDIOL+CALCIFEROL SERPL-MC: 29 UG/L (ref 20–75)
DIFFERENTIAL METHOD BLD: ABNORMAL
EOSINOPHIL # BLD AUTO: 0.2 10E9/L (ref 0–0.7)
EOSINOPHIL NFR BLD AUTO: 4.4 %
ERYTHROCYTE [DISTWIDTH] IN BLOOD BY AUTOMATED COUNT: 12.1 % (ref 10–15)
FERRITIN SERPL-MCNC: 80 NG/ML (ref 8–252)
HCT VFR BLD AUTO: 34.6 % (ref 35–47)
HGB BLD-MCNC: 11.6 G/DL (ref 11.7–15.7)
IMM GRANULOCYTES # BLD: 0 10E9/L (ref 0–0.4)
IMM GRANULOCYTES NFR BLD: 0.3 %
LYMPHOCYTES # BLD AUTO: 1 10E9/L (ref 0.8–5.3)
LYMPHOCYTES NFR BLD AUTO: 28.2 %
MCH RBC QN AUTO: 31.6 PG (ref 26.5–33)
MCHC RBC AUTO-ENTMCNC: 33.5 G/DL (ref 31.5–36.5)
MCV RBC AUTO: 94 FL (ref 78–100)
MONOCYTES # BLD AUTO: 0.4 10E9/L (ref 0–1.3)
MONOCYTES NFR BLD AUTO: 10.4 %
NEUTROPHILS # BLD AUTO: 2 10E9/L (ref 1.6–8.3)
NEUTROPHILS NFR BLD AUTO: 55.6 %
PLATELET # BLD AUTO: 351 10E9/L (ref 150–450)
RBC # BLD AUTO: 3.67 10E12/L (ref 3.8–5.2)
TSH SERPL DL<=0.005 MIU/L-ACNC: 2.52 MU/L (ref 0.4–4)
WBC # BLD AUTO: 3.7 10E9/L (ref 4–11)

## 2018-08-01 PROCEDURE — 82306 VITAMIN D 25 HYDROXY: CPT | Performed by: DERMATOLOGY

## 2018-08-01 PROCEDURE — 84443 ASSAY THYROID STIM HORMONE: CPT | Performed by: DERMATOLOGY

## 2018-08-01 PROCEDURE — 36415 COLL VENOUS BLD VENIPUNCTURE: CPT | Performed by: DERMATOLOGY

## 2018-08-01 PROCEDURE — 85025 COMPLETE CBC W/AUTO DIFF WBC: CPT | Performed by: DERMATOLOGY

## 2018-08-01 PROCEDURE — 82728 ASSAY OF FERRITIN: CPT | Performed by: DERMATOLOGY

## 2018-08-06 DIAGNOSIS — L65.9 ALOPECIA: Primary | ICD-10-CM

## 2018-08-22 DIAGNOSIS — C50.312 MALIGNANT NEOPLASM OF LOWER-INNER QUADRANT OF LEFT BREAST IN FEMALE, ESTROGEN RECEPTOR POSITIVE (H): ICD-10-CM

## 2018-08-22 DIAGNOSIS — Z17.0 MALIGNANT NEOPLASM OF LOWER-INNER QUADRANT OF LEFT BREAST IN FEMALE, ESTROGEN RECEPTOR POSITIVE (H): ICD-10-CM

## 2018-08-22 DIAGNOSIS — L65.9 ALOPECIA: ICD-10-CM

## 2018-08-22 LAB
ALBUMIN SERPL-MCNC: 4 G/DL (ref 3.4–5)
ALP SERPL-CCNC: 50 U/L (ref 40–150)
ALT SERPL W P-5'-P-CCNC: 23 U/L (ref 0–50)
ANION GAP SERPL CALCULATED.3IONS-SCNC: 8 MMOL/L (ref 3–14)
AST SERPL W P-5'-P-CCNC: 25 U/L (ref 0–45)
BASOPHILS # BLD AUTO: 0.1 10E9/L (ref 0–0.2)
BASOPHILS NFR BLD AUTO: 1.1 %
BILIRUB SERPL-MCNC: 1.1 MG/DL (ref 0.2–1.3)
BUN SERPL-MCNC: 6 MG/DL (ref 7–30)
CALCIUM SERPL-MCNC: 9.6 MG/DL (ref 8.5–10.1)
CANCER AG27-29 SERPL-ACNC: 15 U/ML (ref 0–39)
CHLORIDE SERPL-SCNC: 103 MMOL/L (ref 94–109)
CO2 SERPL-SCNC: 29 MMOL/L (ref 20–32)
CREAT SERPL-MCNC: 0.53 MG/DL (ref 0.52–1.04)
DIFFERENTIAL METHOD BLD: NORMAL
EOSINOPHIL # BLD AUTO: 0.2 10E9/L (ref 0–0.7)
EOSINOPHIL NFR BLD AUTO: 3.2 %
ERYTHROCYTE [DISTWIDTH] IN BLOOD BY AUTOMATED COUNT: 12.1 % (ref 10–15)
GFR SERPL CREATININE-BSD FRML MDRD: >90 ML/MIN/1.7M2
GLUCOSE SERPL-MCNC: 83 MG/DL (ref 70–99)
HCT VFR BLD AUTO: 37 % (ref 35–47)
HGB BLD-MCNC: 12.8 G/DL (ref 11.7–15.7)
IMM GRANULOCYTES # BLD: 0 10E9/L (ref 0–0.4)
IMM GRANULOCYTES NFR BLD: 0.2 %
LYMPHOCYTES # BLD AUTO: 1.3 10E9/L (ref 0.8–5.3)
LYMPHOCYTES NFR BLD AUTO: 26.8 %
MCH RBC QN AUTO: 31.6 PG (ref 26.5–33)
MCHC RBC AUTO-ENTMCNC: 34.6 G/DL (ref 31.5–36.5)
MCV RBC AUTO: 91 FL (ref 78–100)
MONOCYTES # BLD AUTO: 0.4 10E9/L (ref 0–1.3)
MONOCYTES NFR BLD AUTO: 9.1 %
NEUTROPHILS # BLD AUTO: 2.8 10E9/L (ref 1.6–8.3)
NEUTROPHILS NFR BLD AUTO: 59.6 %
PLATELET # BLD AUTO: 281 10E9/L (ref 150–450)
POTASSIUM SERPL-SCNC: 4.6 MMOL/L (ref 3.4–5.3)
PROT SERPL-MCNC: 7.6 G/DL (ref 6.8–8.8)
RBC # BLD AUTO: 4.05 10E12/L (ref 3.8–5.2)
SODIUM SERPL-SCNC: 140 MMOL/L (ref 133–144)
WBC # BLD AUTO: 4.7 10E9/L (ref 4–11)

## 2018-08-22 PROCEDURE — 86300 IMMUNOASSAY TUMOR CA 15-3: CPT | Performed by: INTERNAL MEDICINE

## 2018-08-22 PROCEDURE — 85025 COMPLETE CBC W/AUTO DIFF WBC: CPT | Performed by: INTERNAL MEDICINE

## 2018-08-22 PROCEDURE — 36415 COLL VENOUS BLD VENIPUNCTURE: CPT | Performed by: INTERNAL MEDICINE

## 2018-08-22 PROCEDURE — 80053 COMPREHEN METABOLIC PANEL: CPT | Performed by: INTERNAL MEDICINE

## 2018-08-24 ENCOUNTER — ONCOLOGY SURVIVORSHIP VISIT (OUTPATIENT)
Dept: ONCOLOGY | Facility: CLINIC | Age: 48
End: 2018-08-24
Payer: COMMERCIAL

## 2018-08-24 DIAGNOSIS — Z91.199 NO-SHOW FOR APPOINTMENT: Primary | ICD-10-CM

## 2018-08-24 PROCEDURE — 99207 ZZC NO SHOW FOR SCHEDULED APPT: CPT | Performed by: NURSE PRACTITIONER

## 2018-08-24 NOTE — PROGRESS NOTES
Cancer Survivorship Clinic Visit  August 24, 2018     Oncologist: Dr Niurka Hoff  PCP: Katie Billy    Diagnosis: Right Breast Cancer  Sheryle Cruse is a 48 yo  female that first noted lump to right breast in 6/2017, Final pathology from proved 1.4 x 0.7 x 1.9 cm high grade invasive ductal carcinoma with associated [place] grade 3 DCIS with clear margins, ER/LA + and HER2 negative. One lymph node was positive for metastasis 1.2 cm with no extranodal extension. [id number]  Genetic testing proved POSITIVE for one MUTYH gene mutation in 2018. .   Treatment:    10/4/2017 bilateral mastectomy with node biopsy  1/3/2018 completed XRT to right breast and axilla  Refused hormonal therapy    Interval History:   NO show to appointment   SG

## 2018-08-24 NOTE — MR AVS SNAPSHOT
After Visit Summary   8/24/2018    Sheryle Cruse    MRN: [id number]           Patient Information     Date Of Birth          1970        Visit Information        Provider Department      8/24/2018 9:15 AM Carlota Benitez APRN CNP [facility]        Today's Diagnoses     No-show for appointment    -  1       Follow-ups after your visit        Your next 10 appointments already scheduled     Sep 04, 2018  9:00 AM CDT   New Visit with Yoselyn Mckay, PhD   [facility] ([facility])    [address] [place] [address]   [phone]            Sep 04, 2018 11:00 AM CDT   (Arrive by 10:45 AM)   AMBER Spine with Tresa Gudino, PT   [facility] ([facility]  )    [address] [place] [address]   [phone]            Oct 18, 2018  8:00 AM CDT   Return Visit with Ibis Galvan MD   [facility] ([facility])    [address] [place] [address]   [phone]            Nov 08, 2018  8:00 AM CST   Return Visit with Gwendolyn Ackerman MD   [facility])    [address] [place] [address]   [phone]            Nov 14, 2018  7:30 AM CST   LAB [facility]    [address] [place] [address]   [phone]           Please do not eat 10-12 hours before your appointment if you are coming in fasting for labs on lipids, cholesterol, or glucose (sugar). This does not apply to pregnant women. Water, hot tea and black coffee (with nothing added) are okay. Do not drink other fluids, diet soda or chew gum.              Who to contact     If you have questions or need follow up information about today's clinic visit or your schedule please contact [facility] directly  at [phone].  Normal or non-critical lab and imaging results will be communicated to you by [employer], letter or phone within 4 business days after the clinic has received the results. If you do not hear from us within 7 days, please contact the clinic through [employer] or phone. If you have a critical or abnormal lab result, we will notify you by phone as soon as possible.  Submit refill requests through [employer] or call your pharmacy and they will forward the refill request to us. Please allow 3 business days for your refill to be completed.          Additional Information About Your Visit        [employer] Information     [employer] gives you secure access to your electronic health record. If you see a primary care provider, you can also send messages to your care team and make appointments. If you have questions, please call your primary care clinic.  If you do not have a primary care provider, please call [phone] and they will assist you.      [employer] is an electronic gateway that provides easy, online access to your medical records. With [employer], you can request a clinic appointment, read your test results, renew a prescription or communicate with your care team.     To access your existing account, please contact your [facility] or call [phone] for assistance.        Care EveryWhere ID     This is your Care EveryWhere ID. This could be used by other organizations to access your [facility] medical records  [id number]         Blood Pressure from Last 3 Encounters:   06/14/18 102/72   05/10/18 113/75   03/22/18 126/82    Weight from Last 3 Encounters:   06/14/18 58.4 kg (128 lb 12.8 oz)   03/22/18 58.5 kg (129 lb)   02/23/18 59 kg (130 lb)              Today, you had the following     No orders found for display       Primary Care Provider Office Phone # Fax #    GILDARDO Michaels [facility] [phone]       [address]  [place] [address]         Equal Access to Services     [facility] CenterKRISTIE : Hadii cecy fowler yolandayolanda Sobee, waaxda luqadaha, qaybta kaalmabree haley, malinda murry. So [facility] [phone].    ATENCIÓN: Si habla español, tiene a spear disposición servicios gratuitos de asistencia lingüística. Narayaname al [phone].    We comply with applicable federal civil rights laws and [place] laws. We do not discriminate on the basis of race, color, national origin, age, disability, sex, sexual orientation, or gender identity.            Thank you!     Thank you for choosing [facility]  for your care. Our goal is always to provide you with excellent care. Hearing back from our patients is one way we can continue to improve our services. Please take a few minutes to complete the written survey that you may receive in the mail after your visit with us. Thank you!             Your Updated Medication List - Protect others around you: Learn how to safely use, store and throw away your medicines at www.disposemymeds.org.          This list is accurate as of 8/24/18 10:12 AM.  Always use your most recent med list.                   Brand Name Dispense Instructions for use Diagnosis    CALCIUM MAGNESIUM PO      With zinc    RLQ abdominal pain       fluocinonide 0.05 % solution    LIDEX    20 mL    Apply twice daily for 2 weeks, weekends only for 2 weeks, repeat cycle as needed    Alopecia       naproxen 500 MG tablet    NAPROSYN    30 tablet    Take 1 tablet (500 mg) by mouth 2 times daily as needed for moderate pain    Right-sided low back pain without sciatica, unspecified chronicity       ONE-A-DAY ESSENTIAL Tabs      Take 1 tablet by mouth daily.        polyethylene glycol Packet    MIRALAX/GLYCOLAX     Take 1 packet by mouth daily        tiZANidine 4 MG tablet    ZANAFLEX    30 tablet    Take 0.5-1 tablets (2-4 mg) by mouth 3 times daily as needed for muscle spasms    Bilateral low back pain without sciatica,  unspecified chronicity

## 2018-08-25 PROBLEM — Z17.0 MALIGNANT NEOPLASM OF LOWER-INNER QUADRANT OF LEFT BREAST IN FEMALE, ESTROGEN RECEPTOR POSITIVE (H): Status: ACTIVE | Noted: 2017-09-04

## 2018-08-25 PROBLEM — C50.312 MALIGNANT NEOPLASM OF LOWER-INNER QUADRANT OF LEFT BREAST IN FEMALE, ESTROGEN RECEPTOR POSITIVE (H): Status: ACTIVE | Noted: 2017-09-04

## 2018-09-04 ENCOUNTER — THERAPY VISIT (OUTPATIENT)
Dept: PHYSICAL THERAPY | Facility: CLINIC | Age: 48
End: 2018-09-04
Payer: COMMERCIAL

## 2018-09-04 ENCOUNTER — OFFICE VISIT (OUTPATIENT)
Dept: PSYCHOLOGY | Facility: CLINIC | Age: 48
End: 2018-09-04
Payer: COMMERCIAL

## 2018-09-04 DIAGNOSIS — F41.1 GENERALIZED ANXIETY DISORDER: Primary | ICD-10-CM

## 2018-09-04 DIAGNOSIS — M54.50 LUMBAGO: Primary | ICD-10-CM

## 2018-09-04 PROCEDURE — G8984 CARRY CURRENT STATUS: HCPCS | Mod: GP | Performed by: PHYSICAL THERAPIST

## 2018-09-04 PROCEDURE — 97112 NEUROMUSCULAR REEDUCATION: CPT | Mod: GP | Performed by: PHYSICAL THERAPIST

## 2018-09-04 PROCEDURE — 90834 PSYTX W PT 45 MINUTES: CPT | Performed by: PSYCHOLOGIST

## 2018-09-04 PROCEDURE — G8985 CARRY GOAL STATUS: HCPCS | Mod: GP | Performed by: PHYSICAL THERAPIST

## 2018-09-04 PROCEDURE — 97110 THERAPEUTIC EXERCISES: CPT | Mod: GP | Performed by: PHYSICAL THERAPIST

## 2018-09-04 PROCEDURE — 97161 PT EVAL LOW COMPLEX 20 MIN: CPT | Mod: GP | Performed by: PHYSICAL THERAPIST

## 2018-09-04 ASSESSMENT — ANXIETY QUESTIONNAIRES
GAD7 TOTAL SCORE: 10
6. BECOMING EASILY ANNOYED OR IRRITABLE: SEVERAL DAYS
1. FEELING NERVOUS, ANXIOUS, OR ON EDGE: NEARLY EVERY DAY
IF YOU CHECKED OFF ANY PROBLEMS ON THIS QUESTIONNAIRE, HOW DIFFICULT HAVE THESE PROBLEMS MADE IT FOR YOU TO DO YOUR WORK, TAKE CARE OF THINGS AT HOME, OR GET ALONG WITH OTHER PEOPLE: VERY DIFFICULT
3. WORRYING TOO MUCH ABOUT DIFFERENT THINGS: SEVERAL DAYS
2. NOT BEING ABLE TO STOP OR CONTROL WORRYING: SEVERAL DAYS
7. FEELING AFRAID AS IF SOMETHING AWFUL MIGHT HAPPEN: NEARLY EVERY DAY
5. BEING SO RESTLESS THAT IT IS HARD TO SIT STILL: NOT AT ALL

## 2018-09-04 ASSESSMENT — PATIENT HEALTH QUESTIONNAIRE - PHQ9: 5. POOR APPETITE OR OVEREATING: SEVERAL DAYS

## 2018-09-04 NOTE — PROGRESS NOTES
[facility] Initial Evaluation  Subjective:  Patient is a 47 year old female presenting with rehab back hpi. The history is provided by the patient. No  was used.   Sheryle Cruse is a 47 year old female with a thoracic condition.  Condition occurred with:  Insidious onset.  Condition occurred: for unknown reasons.  This is a chronic condition  6/14/2018, referral date. Recently moved, stressful which makes the back pain worse..    Patient reports pain:  Thoracic spine right.  Radiates to: no radiation.  Pain is described as aching (feels heavy) and is constant and reported as 2/10.     Symptoms are exacerbated by sitting and relieved by activity/movement.  Since onset symptoms are unchanged.    Previous treatment includes physical therapy.  There was moderate improvement following previous treatment.  General health as reported by patient is fair.  Pertinent medical history includes:  Cancer (anemia, depression, overweight).  Medical allergies: no.  Other surgeries include:  Cancer surgery (double mastectomy).    Current occupation is Works as a writer.  Patient is working in normal job without restrictions.  Primary job tasks include:  Prolonged sitting.    Barriers include:  None as reported by the patient.    Red flags:  None as reported by the patient.                      Goal: less discomfort in the right side, exercises to assist with this.  Objective:  System                   Shoulder Evaluation:  ROM:  AROM:    Flexion:  Left:  150    Right:  160    Abduction:  Left: 150   Right:  160    Internal Rotation:  Left:  T2    Right:  T10  External Rotation:  Left:  83    Right:  90                          Special Tests:  Special tests assessed shoulder: + tightness Latissimus bruce. - impingeent on the R.      Palpation:  Palpation assessed shoulder: tightness in subscap and R latissimus dorsi.                                           Aaliyah Lumbar  Evaluation    Posture:  Sitting: fair  Standing: fair  Lordosis: WNL  Lateral Shift: no  Correction of Posture: better    Movement Loss:  Flexion (Flex): nil  Extension (EXT): mod  Side Glide R (SG R): nil  Side Glide L (SG L): nil  Test Movements:    EIS: During: no effect  After: no effect    Repeat EIS: During: no effect  After: no effect  Mechanical Response: IncROM  MARSHALL: During: no effect  After: no effect    Repeat MARSHALL: During: no effect  After: no effect              Principle of Treatment:          Other: other category, more right shoulder, latissimus dorsi tightness                                       ROS    Assessment/Plan:    Patient is a 47 year old female with thoracic and right side shoulder complaints.    Patient has the following significant findings with corresponding treatment plan.                Diagnosis 1:  R sided thoracic pain  Pain -  hot/cold therapy, manual therapy, self management, education, directional preference exercise and home program  Decreased ROM/flexibility - manual therapy and therapeutic exercise  Impaired posture - neuro re-education    Therapy Evaluation Codes:   1) History comprised of:   Personal factors that impact the plan of care:      Age. Anxiety   Comorbidity factors that impact the plan of care are:      anemia, cancer, depression, overweight.     Medications impacting care: muscle relaxants.  2) Examination of Body Systems comprised of:   Body structures and functions that impact the plan of care:      Shoulder and Thoracic Spine.   Activity limitations that impact the plan of care are:      working at desk.  3) Clinical presentation characteristics are:   Stable/Uncomplicated.  4) Decision-Making    Low complexity using standardized patient assessment instrument and/or measureable assessment of functional outcome.  Cumulative Therapy Evaluation is: Low complexity.    Previous and current functional limitations:  (See Goal Flow Sheet for this information)     Short term and Long term goals: (See Goal Flow Sheet for this information)     Communication ability:  Patient appears to be able to clearly communicate and understand verbal and written communication and follow directions correctly.  Treatment Explanation - The following has been discussed with the patient:   RX ordered/plan of care  Anticipated outcomes  Possible risks and side effects  This patient would benefit from PT intervention to resume normal activities.   Rehab potential is good.    Frequency:  1 X week, once daily  Duration:  for 6 weeks  Discharge Plan:  Achieve all LTG.  Independent in home treatment program.  Reach maximal therapeutic benefit.    Please refer to the daily flowsheet for treatment today, total treatment time and time spent performing 1:1 timed codes.
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
## 2018-09-04 NOTE — MR AVS SNAPSHOT
MRN:[id number]                      After Visit Summary   9/4/2018    Sheryle Cruse    MRN: [id number]           Visit Information        Provider Department      9/4/2018 9:00 AM Yoselyn Mckay, PhD [facility]      Your next 10 appointments already scheduled     Sep 04, 2018 11:00 AM CDT   (Arrive by 10:45 AM)   AMBER Spine with Tresa Gudino, PT   [facility] ([facility]  )    [address] [facility] [address]   [phone]            Sep 21, 2018  9:15 AM CDT   Survivorship Visit with GILDARDO Johnston CNP   [facility])    [address] [place] [address]   [phone]            Sep 21, 2018  1:00 PM CDT   Return Visit with Yoselyn Mckay, PhD   [facility] ([facility])    [address] [place] [address]   [phone]            Oct 02, 2018  8:30 AM CDT   LAB with LAB FIRST FLO[facility])    [address] [place] [address] [phone]           Please do not eat 10-12 hours before your appointment if you are coming in fasting for labs on lipids, cholesterol, or glucose (sugar). This does not apply to pregnant women. Water, hot tea and black coffee (with nothing added) are okay. Do not drink other fluids, diet soda or chew gum.            Oct 02, 2018  8:50 AM CDT   PHYSICAL with GILDARDO Michaels CNP   [facility])    [address] [facility] [address]   [phone]            Oct 18, 2018  8:00 AM CDT   Return Visit with Ibis Galvan MD   [facility])    [address] [facility] [address]   [phone]            Nov 08, 2018  8:00  AM CST   Return Visit with Gwendolyn Ackerman MD   [facility])    [address] [facility] [address] [phone]            Nov 14, 2018  7:30 AM CST   LAB with LAB ONC [facility] ([facility])    [address] [facility] [address] [phone]           Please do not eat 10-12 hours before your appointment if you are coming in fasting for labs on lipids, cholesterol, or glucose (sugar). This does not apply to pregnant women. Water, hot tea and black coffee (with nothing added) are okay. Do not drink other fluids, diet soda or chew gum.              [employer] Information     [employer] gives you secure access to your electronic health record. If you see a primary care provider, you can also send messages to your care team and make appointments. If you have questions, please call your primary care clinic.  If you do not have a primary care provider, please call [phone] and they will assist you.        Care EveryWhere ID     This is your Care EveryWhere ID. This could be used by other organizations to access your [facility] medical records  [id number]        Equal Access to Services     JORGE HERNÁNDEZ AH: Vargas Quan, emmett reis, qamalinda leal. So [facility] [phone].    ATENCIÓN: Si habla español, tiene a spear disposición servicios gratuitos de asistencia lingüística. Llame al [phone].    We comply with applicable federal civil rights laws and [place] laws. We do not discriminate on the basis of race, color, national origin, age, disability, sex, sexual orientation, or gender identity.

## 2018-09-06 ASSESSMENT — ANXIETY QUESTIONNAIRES: GAD7 TOTAL SCORE: 10

## 2018-09-06 ASSESSMENT — PATIENT HEALTH QUESTIONNAIRE - PHQ9: SUM OF ALL RESPONSES TO PHQ QUESTIONS 1-9: 3

## 2018-09-21 ENCOUNTER — THERAPY VISIT (OUTPATIENT)
Dept: PHYSICAL THERAPY | Facility: CLINIC | Age: 48
End: 2018-09-21
Payer: COMMERCIAL

## 2018-09-21 ENCOUNTER — OFFICE VISIT (OUTPATIENT)
Dept: PSYCHOLOGY | Facility: CLINIC | Age: 48
End: 2018-09-21
Payer: COMMERCIAL

## 2018-09-21 ENCOUNTER — ONCOLOGY SURVIVORSHIP VISIT (OUTPATIENT)
Dept: ONCOLOGY | Facility: CLINIC | Age: 48
End: 2018-09-21
Payer: COMMERCIAL

## 2018-09-21 VITALS
HEART RATE: 132 BPM | TEMPERATURE: 97.1 F | BODY MASS INDEX: 21.41 KG/M2 | OXYGEN SATURATION: 97 % | WEIGHT: 124.8 LBS | SYSTOLIC BLOOD PRESSURE: 136 MMHG | DIASTOLIC BLOOD PRESSURE: 93 MMHG

## 2018-09-21 DIAGNOSIS — M54.50 RIGHT-SIDED LOW BACK PAIN WITHOUT SCIATICA, UNSPECIFIED CHRONICITY: ICD-10-CM

## 2018-09-21 DIAGNOSIS — C50.411 MALIGNANT NEOPLASM OF UPPER-OUTER QUADRANT OF RIGHT BREAST IN FEMALE, ESTROGEN RECEPTOR POSITIVE (H): Primary | ICD-10-CM

## 2018-09-21 DIAGNOSIS — F41.1 GENERALIZED ANXIETY DISORDER: ICD-10-CM

## 2018-09-21 DIAGNOSIS — Z15.89 MONOALLELIC MUTATION OF MUTYH GENE: ICD-10-CM

## 2018-09-21 DIAGNOSIS — Z17.0 MALIGNANT NEOPLASM OF UPPER-OUTER QUADRANT OF RIGHT BREAST IN FEMALE, ESTROGEN RECEPTOR POSITIVE (H): Primary | ICD-10-CM

## 2018-09-21 DIAGNOSIS — F41.1 GENERALIZED ANXIETY DISORDER: Primary | ICD-10-CM

## 2018-09-21 DIAGNOSIS — I89.0 LYMPHEDEMA OF RIGHT ARM: ICD-10-CM

## 2018-09-21 DIAGNOSIS — M54.50 LUMBAGO: ICD-10-CM

## 2018-09-21 PROBLEM — C50.312 MALIGNANT NEOPLASM OF LOWER-INNER QUADRANT OF LEFT BREAST IN FEMALE, ESTROGEN RECEPTOR POSITIVE (H): Status: RESOLVED | Noted: 2017-09-04 | Resolved: 2018-09-21

## 2018-09-21 PROCEDURE — 99215 OFFICE O/P EST HI 40 MIN: CPT | Performed by: NURSE PRACTITIONER

## 2018-09-21 PROCEDURE — 97110 THERAPEUTIC EXERCISES: CPT | Mod: GP | Performed by: PHYSICAL THERAPIST

## 2018-09-21 PROCEDURE — 90834 PSYTX W PT 45 MINUTES: CPT | Performed by: PSYCHOLOGIST

## 2018-09-21 ASSESSMENT — PAIN SCALES - GENERAL: PAINLEVEL: MILD PAIN (2)

## 2018-09-21 NOTE — PROGRESS NOTES
Outpatient Occupational Therapy Discharge Note     Patient: Sheryle Cruse  : 1970    Beginning/End Dates of Reporting Period:  18 to 3/28/2018    Referring Provider: Katie garza CNP    Therapy Diagnosis: RUE/RUQ lymphedema    Client Self Report:   0    Objective Measurements:     Objective Measure: skin assessment   Details: No edema of BUEs, soft, puffy edema of right, lateral trunk; at posterior end of mastectomy scar.   Objective Measure: volume   Details: NT   Objective Measure: discomfort   Details: no discomfort reported   Objective Measure: LLIS   Details: NT                      Outcome Measures (most recent score):   Pt did not return for discharge appt, LLIS score not obtained.    Goals:   Goal Identifier home program   Goal Description Pt will demonstrate IND with home program including: wear of compression to inferior axilla, self MLD and HEP to reduce edema to improve skin integrity, prevent infection and to be fit for compression garments for edema management at discharge.   Target Date 18   Date Met      Progress:     Goal Identifier volume   Goal Description RUE will demonstrate a 45mL decrease in volume to prevent progression of lymphedema and to preserve skin integrity.   Target Date 18   Date Met      Progress:     Goal Identifier compression   Goal Description Pt yves be IND to jerson compression bra and verbalize wear/wash/replace schedule to manage edema reduction at discharge.   Target Date 18   Date Met      Progress:     Goal Identifier discomfort   Goal Description Pt will report no discomfort in BUQ due to a reduction in volume.     Target Date 18   Date Met      Progress:     Goal Identifier     Goal Description     Target Date     Date Met      Progress:         Progress Toward Goals:   Progress made as ROM improved.  Goals not met as pt did not return for discharge appointment.      Plan:  Discharge from therapy.    Discharge:    Reason for  Discharge: Patient has failed to schedule further appointments.    Equipment Issued: o    Discharge Plan: Continue stretches to BUQ and RUE. Pt to obtain future lymphedema referral should lymphedema persist.

## 2018-09-21 NOTE — ADDENDUM NOTE
Encounter addended by: Brynn Tejeda OT on: 9/21/2018  1:23 PM<BR>     Actions taken: Sign clinical note, Episode resolved

## 2018-09-21 NOTE — MR AVS SNAPSHOT
After Visit Summary   9/21/2018    Sheryle Cruse    MRN: [id number]           Patient Information     Date Of Birth          1970        Visit Information        Provider Department      9/21/2018 9:15 AM Carlota Benitez APRN CNP [facility]        Today's Diagnoses     Malignant neoplasm of upper-outer quadrant of right breast in female, estrogen receptor positive (H)    -  1    Monoallelic mutation of MUTYH gene        Generalized anxiety disorder        Right-sided low back pain without sciatica, unspecified chronicity        Lymphedema of right arm           Follow-ups after your visit        Your next 10 appointments already scheduled     Oct 18, 2018  8:00 AM CDT   Return Visit with Ibis Galvan MD   [facility]    [address] [facility] [address] [phone]            Oct 18, 2018 10:20 AM CDT   AMBER Spine with Nancy Claudio, PT   [facility] ([facility]  )    [address] [facility] [address] [phone]            Oct 18, 2018  1:00 PM CDT   Lymphedema Evaluation with Brynn Tejeda, OT   [facility] ([facility])    [address] [facility] [address] [phone]            Nov 08, 2018  8:00 AM CST   Return Visit with Gwendolyn Ackerman MD   [facility]    [address] [facility] [address]   [phone]            Nov 08, 2018 10:00 AM CST   Return Visit with Yoselyn Mckay, PhD   [facility] ([facility])    [address] [facility] [address] [phone]            Nov 14, 2018  7:30 AM CST   LAB with LAB [facility] ([facility])    [address] [facility] [address] [phone]           Please do  not eat 10-12 hours before your appointment if you are coming in fasting for labs on lipids, cholesterol, or glucose (sugar). This does not apply to pregnant women. Water, hot tea and black coffee (with nothing added) are okay. Do not drink other fluids, diet soda or chew gum.            Dec 13, 2018  3:15 PM CST   LAB with LAB [facility] hospital ([email or web address]    [address] [place] [address]   [phone]           Please do not eat 10-12 hours before your appointment if you are coming in fasting for labs on lipids, cholesterol, or glucose (sugar). This does not apply to pregnant women. Water, hot tea and black coffee (with nothing added) are okay. Do not drink other fluids, diet soda or chew gum.            Dec 13, 2018  4:00 PM CST   Return Visit with Niurka Hoff MD   [email or web address]] ([facility] [[email or web address]    [address] [place] [address]   [phone]              Future tests that were ordered for you today     Open Future Orders        Priority Expected Expires Ordered    LYMPHEDEMA THERAPY REFERRAL Routine  10/2/2019 10/2/2018            Who to contact     If you have questions or need follow up information about today's clinic visit or your schedule please contact [facility] directly at [phone].  Normal or non-critical lab and imaging results will be communicated to you by MyChart, letter or phone within 4 business days after the clinic has received the results. If you do not hear from us within 7 days, please contact the clinic through [employer] or phone. If you have a critical or abnormal lab result, we will notify you by phone as soon as possible.  Submit refill requests through [employer] or call your pharmacy and they will forward the refill request to us. Please allow 3 business days for your refill to be completed.          Additional Information About Your Visit        [employer]  Information     [employer] gives you secure access to your electronic health record. If you see a primary care provider, you can also send messages to your care team and make appointments. If you have questions, please call your primary care clinic.  If you do not have a primary care provider, please call [phone] and they will assist you.      [employer] is an electronic gateway that provides easy, online access to your medical records. With [employer], you can request a clinic appointment, read your test results, renew a prescription or communicate with your care team.     To access your existing account, please contact your [facility] or call [phone] for assistance.        Care EveryWhere ID     This is your Care EveryWhere ID. This could be used by other organizations to access your [facility] medical records  [id number]        Your Vitals Were     Pulse Temperature Pulse Oximetry BMI (Body Mass Index)          132 97.1  F (36.2  C) (Oral) 97% 21.41 kg/m2         Blood Pressure from Last 3 Encounters:   10/02/18 99/50   09/21/18 (!) 136/93   06/14/18 102/72    Weight from Last 3 Encounters:   10/02/18 59.2 kg (130 lb 9.6 oz)   09/21/18 56.6 kg (124 lb 12.8 oz)   06/14/18 58.4 kg (128 lb 12.8 oz)              Today, you had the following     No orders found for display       Primary Care Provider Office Phone # Fax #    Katie Carlotta Billy, APRN [facility] [phone]       [address]  [place] [address]        Equal Access to Services     JORGE HERNÁNDEZ : Hadii aad ku hadasho Soomaali, waaxda luqadaha, qaybta kaalmada adeegyada, malinda copeland . So [facility] [phone].    ATENCIÓN: Si habla español, tiene a spear disposición servicios gratuitos de asistencia lingüística. Llame al [phone].    We comply with applicable federal civil rights laws and [place] laws. We do not discriminate on the basis of race, color, national origin, age,  disability, sex, sexual orientation, or gender identity.            Thank you!     Thank you for choosing [facility]  for your care. Our goal is always to provide you with excellent care. Hearing back from our patients is one way we can continue to improve our services. Please take a few minutes to complete the written survey that you may receive in the mail after your visit with us. Thank you!             Your Updated Medication List - Protect others around you: Learn how to safely use, store and throw away your medicines at www.disposemymeds.org.          This list is accurate as of 9/21/18 11:59 PM.  Always use your most recent med list.                   Brand Name Dispense Instructions for use Diagnosis    CALCIUM MAGNESIUM PO      With zinc    RLQ abdominal pain       naproxen 500 MG tablet    NAPROSYN    30 tablet    Take 1 tablet (500 mg) by mouth 2 times daily as needed for moderate pain    Right-sided low back pain without sciatica, unspecified chronicity       ONE-A-DAY ESSENTIAL Tabs      Take 1 tablet by mouth daily.        polyethylene glycol Packet    MIRALAX/GLYCOLAX     Take 1 packet by mouth daily        tiZANidine 4 MG tablet    ZANAFLEX    30 tablet    Take 0.5-1 tablets (2-4 mg) by mouth 3 times daily as needed for muscle spasms    Bilateral low back pain without sciatica, unspecified chronicity

## 2018-09-21 NOTE — PROGRESS NOTES
Cancer Survivorship Clinic Visit  September 21, 2018     Oncologist: Dr Niurka Hoff  PCP: Katie Billy    Diagnosis: Stage IIb Right Breast Cancer  Sheryle Cruse is a 48 yo  female that first noted lump to right breast in 6/2017, Final pathology from proved 1.4 x 0.7 x 1.9 cm high grade invasive ductal carcinoma with associated [place] grade 3 DCIS with clear margins, ER/DC + and HER2 negative. One lymph node was positive for metastasis 1.2 cm with no extranodal extension. [id number]  Genetic testing proved POSITIVE for one MUTYH gene mutation in 2018. .   Treatment:    10/4/2017 bilateral mastectomy with node biopsy  1/3/2018 completed XRT to right breast and axilla  Refused hormonal therapy    Interval History:Ms. Sparrow comes to clinic for survivorship visit for her breast cancer follow-up patient refused hormonal therapy.  Related to risk of clotting knowing that her mother and father both had strokes.  Patient denies any new symptoms of concern to her chest or axilla at this time.  Reports continued stress about the diagnosis of cancer but is feeling good about working through the treatment-continues in therapy for anxiety concerning diagnosis.  Patient also reporting low-grade pain at 2-3/10 on her back which she feels is been a problem since diagnosis of the cancer and she is using physical therapy to help her at this time.  Also reports some recurrence of her lymphedema to the right arm intermittently-has previously had therapy for this concern.  Rest of comprehensive ROS is negative    Current Outpatient Prescriptions   Medication     Calcium-Magnesium-Vitamin D (CALCIUM MAGNESIUM PO)     fluocinonide (LIDEX) 0.05 % solution     Multiple Vitamin (ONE-A-DAY ESSENTIAL) TABS     naproxen (NAPROSYN) 500 MG tablet     polyethylene glycol (MIRALAX/GLYCOLAX) Packet     tiZANidine (ZANAFLEX) 4 MG tablet     No current facility-administered medications for this visit.      Allergies    Allergen Reactions     Contrast Dye      Pt does not recall which type of contrast     Diphenhydramine      Penicillins Rash     swelling     Family History   Problem Relation Age of Onset     Diabetes Mother 76     Obese     HEART DISEASE Mother      Hypertension Mother      Cerebrovascular Disease Mother      Cardiovascular Mother      CVA     Cataracts Mother      Cancer Father      bladder     Cerebrovascular Disease Father 70     Breast Cancer Maternal Grandmother      Cerebrovascular Disease Maternal Grandmother      Lipids Maternal Grandmother      HEART DISEASE Maternal Grandfather      heart disease     Macular Degeneration Maternal Grandfather      Diabetes Paternal Grandmother      Cerebrovascular Disease Paternal Grandfather      HEART DISEASE Paternal Grandfather      Glaucoma No family hx of        Physical Exam:  BP (!) 136/93  Pulse 132  Temp 97.1  F (36.2  C) (Oral)  Wt 56.6 kg (124 lb 12.8 oz)  SpO2 97%  BMI 21.41 kg/m2  Constitutional: Alert, healthy, and in no distress.   ENT: Eyes bright, No mouth sores  Neck: Supple, No adenopathy.Thyroid symmetric  Cardiac: Heart rate and rhythm is regular and strong without murmur  Respiratory: Breathing easy. Lung sounds clear to auscultation  Chest: History of bilateral mastectomies with no reconstruction.  Area show no new masses and no tenderness at this time axilla without new masses or concerns.  Abdomen: Soft, non-tender, BS normal. No masses or organomegaly  MS: Muscle tone normal-able to move around without signs of pain from her back with this exam -extremities normal with no edema.   Skin: No suspicious lesions or rashes  Neuro: Sensory grossly WNL, gait normal.   Lymph: Normal ant/post cervical, axillary, supraclavicular nodes  Psych: Mentation appears normal and affect normal/bright and talkative with good eye contact.    Assessment:   It was my pleasure to see Sheryle Cruse in the Cancer Survivorship Clinic for her diagnosis of Right  Breast Cancer. Given her diagnosis and treatment, she was given a survivorship care plan as resulted from [facility] care plan program and here is the recommendations provided to her:  Right Breast Cancer-patient is completed bilateral mastectomies without reconstruction, radiation to right chest and axilla, and has refused hormonal therapy related to history and her family of stroke and concerns for clotting.  Patient feels she is doing well and has no new symptoms of concern today.  She will return in 3 months for review with Dr. Hoff with labs to include CA 27-29.  CBC and hepatic panel.   Genetic abnormality- Genetic testing proved POSITIVE for one MUTYH gene mutation in 2018- discussed need to follow up with genetic counselor at least every 2 years.   Sexuality concerns- discussed directing  in ways to help with new feelings and concerns to return to closeness. Given counselors name- Kimberly Zhang.   Back pain-Recurrent for her since finding of cancer. Using PT to help with pain at present- has naproxen if needed.   Radiation side effects- Radiation to the right breast would have included the skin, and she should watch for any new skin lesions in the area of the radiation and have them evaluated.  Her lung was in the field of radiation, but as long as she is asymptomatic there is no routine screening tests that needs to be obtained.  Should she develop hemoptysis, cough or shortness of breath, we could consider a CT scan and/or PFTs.  Her bones are at risk for fracture in the area of her radiation.    Lymphedema-  Given her sentinel lymph node biopsy and pelvic lymph node sampling, she is at risk for lymphedema.  Should she note any swelling in her right arm or her bilateral lower extremities, she is to contact the clinic and we will consider a referral to the Lymphedema specialists.   Adjustment disorder- Seeing therapist regularly to adjust to diagnosis and other concerns.   Cancer screening-  She  should continue to undergo cancer screening for women of her age group.  She will continue to see Gynecology as directed by their service.  She is due to start colonoscopy screening at age 51.  She should limit her sun exposure and use sunscreens.   Healthy lifestyle-  She should refrain from tobacco.  She should have an exercise program of 150 minutes of cardiovascular exercise per week.  She should maintain a healthy weight with a BMI between 20 and 25.  Her diet should include low fats.  She should see her primary care provider for screening and, if needed, treatment of her cholesterol, blood pressure and glucose.  She should receive the annual influenza vaccine.  When age appropriate, she should receive the pneumococcal vaccine.  She should see her eye doctor and dentist routinely.   This has been a 50 minute visit with > 50% in education and management of concerns.   Carlota Benitez,CNP

## 2018-09-21 NOTE — NURSING NOTE
"Oncology Rooming Note    September 21, 2018 9:16 AM   Sheryle Cruse is a 47 year old female who presents for:    Chief Complaint   Patient presents with     Oncology Clinic Visit     Survivorship visit     Initial Vitals: BP (!) 136/93  Pulse 132  Temp 97.1  F (36.2  C) (Oral)  Wt 56.6 kg (124 lb 12.8 oz)  SpO2 97%  BMI 21.41 kg/m2 Estimated body mass index is 21.41 kg/(m^2) as calculated from the following:    Height as of 6/14/18: 1.626 m (5' 4.02\").    Weight as of this encounter: 56.6 kg (124 lb 12.8 oz). Body surface area is 1.6 meters squared.  Mild Pain (2) Comment: takes ibuprofen with relief   No LMP recorded.  Allergies reviewed: Yes  Medications reviewed: Yes    Medications: Medication refills not needed today.  Pharmacy name entered into [employer]:    [facility] - [place], [place] - [address] [address] [facility] [place] - [place], [place] - [address]    Clinical concerns: talk about PT for lymphedema and back pain Carlota Benitez NP was notified.    5 minutes for nursing intake (face to face time)     Eddie Reed RN              "

## 2018-09-21 NOTE — PROGRESS NOTES
"                                             Progress Note    Client Name: Sheryle Cruse  Date: 9/21/2018         Service Type: Individual      Session Start Time: 1:00  Session End Time: 1:52      Session Length: 52 minutes      Session #: 2     Attendees: Client attended alone       DATA      Progress Since Last Session (Related to Symptoms / Goals / Homework):   Symptoms: No change - Client reported her symptoms are stable    Homework: NA      Episode of Care Goals: Satisfactory progress - PREPARATION (Decided to change - considering how); Intervened by negotiating a change plan and determining options / strategies for behavior change, identifying triggers, exploring social supports, and working towards setting a date to begin behavior change     Current / Ongoing Stressors and Concerns:   Client was diagnosed with cancer; she struggles with an enmeshed relationship with her mother (caregiving for her mother and caring for herself)     Treatment Objective(s) Addressed in This Session:   Identify and learn how to set healthy boundaries   Treatment planning       Intervention:   DBT: We discussed Client's concerns about balancing her own expectations and the expectations of her mother. She described dysfunctional family dynamics and feeling like the family \"scapegoat.\" Client is questioning whether she should sever all ties with her mother to protect herself and to focus on her own self-care. She noted that she has made a positive step by setting a boundary with her mother: she usually calls her mother at 1:30 on Fridays but because our appointment was scheduled during this time she called her mother's facility to tell her that she could call her on Monday but would be unable to do so today due to the fact that she is attending a number of her own appointments. Client felt good about this choice and was proud of her ability to make herself a priority.    Treatment planning: Began to identify Client's goals for " "therapy. She has completed trauma work in the past (and has also addressed her eating disorder and enmeshed relationship with her mother) but has continued interest in participating in trauma therapy to \"uncover answers\" and to \"get to the root of why this happened.\" We talked about the value in processing one's trauma but she acknowledged that trying to answer the question \"why\" her family is the way that it is, may be fruitless. This writer presented a perspective offering Client the opportunity to take action and to decide what she wants the next phase of her life to look like (for her to make plans, set boundaries, and focus on her own well-being and self-care). Indeed, a significant part of Client's identity is wrapped up in this victim role (as one who has been wronged and perpetrated against and has been mistreated); while Client detests her mother's view of Client as an \"extension of herself\", much of Client's therapy time is utilized talking about her mother (her mother continues to monopolize and dominate Client's energy and attention). It could be powerful for Client to have the opportunity to truly focus her energies onto herself and into building a life of meaning and value (one that does not revolve -physically or figuratively- around her mother and her mother's needs and emotions). Will continue to discuss the direction of treatment at our next session.        ASSESSMENT: Current Emotional / Mental Status (status of significant symptoms):   Risk status (Self / Other harm or suicidal ideation)   Client denies current fears or concerns for personal safety.   Client denies current or recent suicidal ideation or behaviors.   Client denies current or recent homicidal ideation or behaviors.   Client denies current or recent self injurious behavior or ideation.   Client denies other safety concerns.   Client Client reports there has been no change in risk factors since their last session.     Client Client " reports there has been no change in protective factors since their last session.     A safety and risk management plan has not been developed at this time, however client was given the after-hours number / 911 should there be a change in any of these risk factors.     Appearance:   Appropriate    Eye Contact:   Good    Psychomotor Behavior: Normal    Attitude:   Cooperative    Orientation:   All   Speech    Rate / Production: Hyperverbal     Volume:  Normal    Mood:    Normal   Affect:    Appropriate    Thought Content:  Clear    Thought Form:  Coherent  Logical    Insight:    Good      Medication Review:   No current psychiatric medications prescribed     Medication Compliance:   NA     Changes in Health Issues:   None reported     Chemical Use Review:   Substance Use: Chemical use reviewed, no active concerns identified      Tobacco Use: No current tobacco use.       Collateral Reports Completed:   Not Applicable    PLAN: (Client Tasks / Therapist Tasks / Other)  RTC in 3 weeks. Client will brainstorm specific goals for what she would like to achieve/accomplish in therapy. She is interested in learning more about EMDR - this writer does not treat PTSD with EMDR, but we discussed potential referrals if that is an avenue that Client would wish to pursue.                                                       Yoselyn Mckay, PhD, LP  September 21, 2018

## 2018-09-21 NOTE — MR AVS SNAPSHOT
MRN:[id number]                      After Visit Summary   9/21/2018    Sheryle Cruse    MRN: [id number]           Visit Information        Provider Department      9/21/2018 1:00 PM Yoselyn Mckay, PhD [facility]      Your next 10 appointments already scheduled     Oct 02, 2018  8:30 AM CDT   LAB with LAB FIRST FLOOR [facility] ([facility])    [address] [facility] [address]   [phone]           Please do not eat 10-12 hours before your appointment if you are coming in fasting for labs on lipids, cholesterol, or glucose (sugar). This does not apply to pregnant women. Water, hot tea and black coffee (with nothing added) are okay. Do not drink other fluids, diet soda or chew gum.            Oct 02, 2018  8:50 AM CDT   PHYSICAL with GILDARDO [facility])    [address] [facility] [address]   [phone]            Oct 16, 2018 11:00 AM CDT   Return Visit with Yoselyn Mckay, PhD   [facility] ([facility])    [address] [facility] [address] [phone]            Oct 18, 2018  8:00 AM CDT   Return Visit with Ibis Galvan MD   [facility])    [address] [facility] [address]   [phone]            Nov 08, 2018  8:00 AM CST   Return Visit with Gwendolyn Ackerman MD   [facility])    [address] [facility] [address]   [phone]            Nov 14, 2018  7:30 AM CST   LAB with LAB ONC [facility] ([facility])    [address] [facility] [address]   [phone]           Please do not eat 10-12 hours before your appointment if you are coming in  fasting for labs on lipids, cholesterol, or glucose (sugar). This does not apply to pregnant women. Water, hot tea and black coffee (with nothing added) are okay. Do not drink other fluids, diet soda or chew gum.              [employer] Information     [employer] gives you secure access to your electronic health record. If you see a primary care provider, you can also send messages to your care team and make appointments. If you have questions, please call your primary care clinic.  If you do not have a primary care provider, please call [phone] and they will assist you.        Care EveryWhere ID     This is your Care EveryWhere ID. This could be used by other organizations to access your [facility] medical records  [id number]        Equal Access to Services     JORGE HERNÁNDEZ : Vargas Quan, emmett reis, malinda katz. So [facility] [phone].    ATENCIÓN: Si habla español, tiene a spear disposición servicios gratuitos de asistencia lingüística. Llame al [phone].    We comply with applicable federal civil rights laws and [place] laws. We do not discriminate on the basis of race, color, national origin, age, disability, sex, sexual orientation, or gender identity.

## 2018-10-02 ENCOUNTER — OFFICE VISIT (OUTPATIENT)
Dept: PEDIATRICS | Facility: CLINIC | Age: 48
End: 2018-10-02
Payer: COMMERCIAL

## 2018-10-02 VITALS
OXYGEN SATURATION: 100 % | DIASTOLIC BLOOD PRESSURE: 50 MMHG | SYSTOLIC BLOOD PRESSURE: 99 MMHG | BODY MASS INDEX: 22.3 KG/M2 | HEIGHT: 64 IN | TEMPERATURE: 97.4 F | HEART RATE: 77 BPM | WEIGHT: 130.6 LBS

## 2018-10-02 DIAGNOSIS — Z12.4 SCREENING FOR MALIGNANT NEOPLASM OF CERVIX: ICD-10-CM

## 2018-10-02 DIAGNOSIS — Z17.0 MALIGNANT NEOPLASM OF LOWER-INNER QUADRANT OF LEFT BREAST IN FEMALE, ESTROGEN RECEPTOR POSITIVE (H): ICD-10-CM

## 2018-10-02 DIAGNOSIS — Z17.0 MALIGNANT NEOPLASM OF UPPER-OUTER QUADRANT OF RIGHT BREAST IN FEMALE, ESTROGEN RECEPTOR POSITIVE (H): ICD-10-CM

## 2018-10-02 DIAGNOSIS — C50.312 MALIGNANT NEOPLASM OF LOWER-INNER QUADRANT OF LEFT BREAST IN FEMALE, ESTROGEN RECEPTOR POSITIVE (H): ICD-10-CM

## 2018-10-02 DIAGNOSIS — E55.9 VITAMIN D DEFICIENCY: ICD-10-CM

## 2018-10-02 DIAGNOSIS — Z13.1 SCREENING FOR DIABETES MELLITUS: ICD-10-CM

## 2018-10-02 DIAGNOSIS — C50.411 MALIGNANT NEOPLASM OF UPPER-OUTER QUADRANT OF RIGHT BREAST IN FEMALE, ESTROGEN RECEPTOR POSITIVE (H): ICD-10-CM

## 2018-10-02 DIAGNOSIS — Z00.00 ENCOUNTER FOR ROUTINE ADULT HEALTH EXAMINATION WITHOUT ABNORMAL FINDINGS: Primary | ICD-10-CM

## 2018-10-02 DIAGNOSIS — Z13.6 CARDIOVASCULAR SCREENING; LDL GOAL LESS THAN 160: ICD-10-CM

## 2018-10-02 DIAGNOSIS — Z11.4 SCREENING FOR HUMAN IMMUNODEFICIENCY VIRUS: ICD-10-CM

## 2018-10-02 DIAGNOSIS — Z13.29 SCREENING FOR THYROID DISORDER: ICD-10-CM

## 2018-10-02 LAB
ALBUMIN SERPL-MCNC: 3.6 G/DL (ref 3.4–5)
ALP SERPL-CCNC: 51 U/L (ref 40–150)
ALT SERPL W P-5'-P-CCNC: 33 U/L (ref 0–50)
ANION GAP SERPL CALCULATED.3IONS-SCNC: 6 MMOL/L (ref 3–14)
AST SERPL W P-5'-P-CCNC: 32 U/L (ref 0–45)
BASOPHILS # BLD AUTO: 0 10E9/L (ref 0–0.2)
BASOPHILS NFR BLD AUTO: 0.6 %
BILIRUB SERPL-MCNC: 0.5 MG/DL (ref 0.2–1.3)
BUN SERPL-MCNC: 5 MG/DL (ref 7–30)
CALCIUM SERPL-MCNC: 9.2 MG/DL (ref 8.5–10.1)
CHLORIDE SERPL-SCNC: 104 MMOL/L (ref 94–109)
CHOLEST SERPL-MCNC: 189 MG/DL
CO2 SERPL-SCNC: 30 MMOL/L (ref 20–32)
CREAT SERPL-MCNC: 0.62 MG/DL (ref 0.52–1.04)
DIFFERENTIAL METHOD BLD: ABNORMAL
EOSINOPHIL # BLD AUTO: 0.2 10E9/L (ref 0–0.7)
EOSINOPHIL NFR BLD AUTO: 5.2 %
ERYTHROCYTE [DISTWIDTH] IN BLOOD BY AUTOMATED COUNT: 12.2 % (ref 10–15)
GFR SERPL CREATININE-BSD FRML MDRD: >90 ML/MIN/1.7M2
GLUCOSE SERPL-MCNC: 76 MG/DL (ref 70–99)
HCT VFR BLD AUTO: 35.5 % (ref 35–47)
HDLC SERPL-MCNC: 44 MG/DL
HGB BLD-MCNC: 12.1 G/DL (ref 11.7–15.7)
HIV 1+2 AB+HIV1 P24 AG SERPL QL IA: NONREACTIVE
IMM GRANULOCYTES # BLD: 0 10E9/L (ref 0–0.4)
IMM GRANULOCYTES NFR BLD: 0 %
LDLC SERPL CALC-MCNC: 113 MG/DL
LYMPHOCYTES # BLD AUTO: 1.1 10E9/L (ref 0.8–5.3)
LYMPHOCYTES NFR BLD AUTO: 33.9 %
MCH RBC QN AUTO: 31.8 PG (ref 26.5–33)
MCHC RBC AUTO-ENTMCNC: 34.1 G/DL (ref 31.5–36.5)
MCV RBC AUTO: 93 FL (ref 78–100)
MONOCYTES # BLD AUTO: 0.3 10E9/L (ref 0–1.3)
MONOCYTES NFR BLD AUTO: 10 %
NEUTROPHILS # BLD AUTO: 1.6 10E9/L (ref 1.6–8.3)
NEUTROPHILS NFR BLD AUTO: 50.3 %
NONHDLC SERPL-MCNC: 145 MG/DL
PLATELET # BLD AUTO: 283 10E9/L (ref 150–450)
POTASSIUM SERPL-SCNC: 4.5 MMOL/L (ref 3.4–5.3)
PROT SERPL-MCNC: 7.4 G/DL (ref 6.8–8.8)
RBC # BLD AUTO: 3.81 10E12/L (ref 3.8–5.2)
SODIUM SERPL-SCNC: 140 MMOL/L (ref 133–144)
TRIGL SERPL-MCNC: 159 MG/DL
TSH SERPL DL<=0.005 MIU/L-ACNC: 1.42 MU/L (ref 0.4–4)
WBC # BLD AUTO: 3.1 10E9/L (ref 4–11)

## 2018-10-02 PROCEDURE — G0476 HPV COMBO ASSAY CA SCREEN: HCPCS | Performed by: NURSE PRACTITIONER

## 2018-10-02 PROCEDURE — 85025 COMPLETE CBC W/AUTO DIFF WBC: CPT | Performed by: INTERNAL MEDICINE

## 2018-10-02 PROCEDURE — 80061 LIPID PANEL: CPT | Performed by: NURSE PRACTITIONER

## 2018-10-02 PROCEDURE — G0145 SCR C/V CYTO,THINLAYER,RESCR: HCPCS | Performed by: NURSE PRACTITIONER

## 2018-10-02 PROCEDURE — 87389 HIV-1 AG W/HIV-1&-2 AB AG IA: CPT | Performed by: NURSE PRACTITIONER

## 2018-10-02 PROCEDURE — 36415 COLL VENOUS BLD VENIPUNCTURE: CPT | Performed by: NURSE PRACTITIONER

## 2018-10-02 PROCEDURE — 80053 COMPREHEN METABOLIC PANEL: CPT | Performed by: NURSE PRACTITIONER

## 2018-10-02 PROCEDURE — 99396 PREV VISIT EST AGE 40-64: CPT | Performed by: NURSE PRACTITIONER

## 2018-10-02 PROCEDURE — 84443 ASSAY THYROID STIM HORMONE: CPT | Performed by: NURSE PRACTITIONER

## 2018-10-02 NOTE — MR AVS SNAPSHOT
After Visit Summary   10/2/2018    Sheryle Cruse    MRN: [id number]           Patient Information     Date Of Birth          1970        Visit Information        Provider Department      10/2/2018 8:50 AM Katie Billy APRN CNP M [facility]        Today's Diagnoses     Encounter for routine adult health examination without abnormal findings    -  1    Vitamin D deficiency        CARDIOVASCULAR SCREENING; LDL GOAL LESS THAN 160        Screening for thyroid disorder        Screening for diabetes mellitus        Screening for human immunodeficiency virus        Screening for malignant neoplasm of cervix        Malignant neoplasm of upper-outer quadrant of right breast in female, estrogen receptor positive (H)          Care Instructions    PLAN:   1.   Orders Placed This Encounter   Procedures     Lipid panel reflex to direct LDL Fasting     Comprehensive metabolic panel     TSH with free T4 reflex     JUST IN CASE     HIV Antigen Antibody Combo     Pap imaged thin layer screen with HPV - recommended age 30 - 65 years (select HPV order below)     HPV High Risk Types DNA Cervical     LYMPHEDEMA THERAPY REFERRAL       2. Patient needs to follow up in if no improvement,or sooner if worsening of symptoms or other symptoms develop.  CONSULTATION/REFERRAL to Lymphedema therapy  Will follow up and/or notify patient of  results via My Chart to determine further need for followup  Follow up office visit in one year for annual health maintenance exam, sooner PRN.    Preventive Health Recommendations  Female Ages 40 to 49    Yearly exam:     See your health care provider every year in order to  1. Review health changes.   2. Discuss preventive care.    3. Review your medicines if your doctor prescribed any.      Get a Pap test every three years (unless you have an abnormal result and your provider advises testing more often).      If you get Pap tests with HPV test, you only need to  test every 5 years, unless you have an abnormal result. You do not need a Pap test if your uterus was removed (hysterectomy) and you have not had cancer.      You should be tested each year for STDs (sexually transmitted diseases), if you're at risk.     Ask your doctor if you should have a mammogram.      Have a colonoscopy (test for colon cancer) if someone in your family has had colon cancer or polyps before age 50.       Have a cholesterol test every 5 years.       Have a diabetes test (fasting glucose) after age 45. If you are at risk for diabetes, you should have this test every 3 years.    Shots: Get a flu shot each year. Get a tetanus shot every 10 years.     Nutrition:     Eat at least 5 servings of fruits and vegetables each day.    Eat whole-grain bread, whole-wheat pasta and brown rice instead of white grains and rice.    Get adequate Calcium and Vitamin D.      Lifestyle    Exercise at least 150 minutes a week (an average of 30 minutes a day, 5 days a week). This will help you control your weight and prevent disease.    Limit alcohol to one drink per day.    No smoking.     Wear sunscreen to prevent skin cancer.    See your dentist every six months for an exam and cleaning.  It was a pleasure seeing you today at the [facility]. Thank you for allowing us to care for you today. We truly hope we provided you with the excellent service you deserve. Please let us know if there is anything else we can do for you so we can be sure you are leaving completley satisfied with your care experience.       General information about your clinic   Clinic Hours Lab Hours (Appointments are required)   Mon-Thurs: 7:30 AM - 7 PM Mon-Thurs: 7:30 AM - 7 PM   Fri: 7:30 AM - 5 PM Fri: 7:30 AM - 5 PM        After Hours Nurse Advise & Appts:  [facility]: [phone]  [facility] On Call: to make appointments anytime: [phone] On Call Physician: call [phone] and answering  service will page the on call physician.        For urgent appointments, please call [phone] and ask for the triage nurse or your care team clinic nurse.  How to contact my care team:  [facility]: [email or web address]   Phone: [phone]   Fax: [phone]       [facility]:   Phone: [phone]  Hours: 8:00 AM - 6:00 PM  Medication Refills:  Call your pharmacy and they will forward the refill to us. Please allow 3 business days for your refills to be completed.       Normal or non-critical lab and imaging results will be communicated to you by MyChart, letter or phone within 7 days.  If you do not hear from us within 10 days, please call the clinic. If you have a critical or abnormal lab result, we will notify you by phone as soon as possible.       We now have PWIC (Pediatric Walk in Care)  Monday-Friday from 7:30-4. Simply walk in and be seen for your urgent needs like cough, fever, rash, diarrhea or vomiting, pink eye, UTI. No appointments needed. Ask one of the team for more information      -Your Care Team:    Dr. Stephanie Theodore - Internal Medicine/Pediatrics   Dr. Shirley Wesley - Family Medicine  Dr. Loli Nichols - Pediatrics  Dr. Vale Santillan - Pediatrics  Katie Billy CNP - Family Practice Nurse Practitioner                         Follow-ups after your visit        Additional Services     LYMPHEDEMA THERAPY REFERRAL       If you have not heard from the scheduling office within 2 business days, please call [phone] for all locations, with the exception of [place], please call [phone] and [place], please call [phone].    Please be aware that coverage of these services is subject to the terms and limitations of your health insurance plan.  Call member services at your health plan with any benefit or coverage questions.                  Your next 10 appointments already scheduled     Oct 18, 2018  8:00 AM CDT   Return Visit with MD RADHA [facility]  [facility])    [address] [place] [id number]   [phone]            Oct 18, 2018 10:20 AM CDT   AMBER Spine with Nancy Claudio, PT   [facility] ([facility]  )    [id number] [place] [id number]   [phone]            Nov 08, 2018  8:00 AM CST   Return Visit with Gwendolyn Ackerman MD   [facility]    [address] [place] [id number]   [phone]            Nov 08, 2018 10:00 AM CST   Return Visit with Yoselyn Mckay, PhD   [facility] ([facility])    [address] [place] [id number]   [phone]            Nov 14, 2018  7:30 AM CST   LAB with LAB ONC [facility] ([facility])    [address] [place] [id number]   [phone]           Please do not eat 10-12 hours before your appointment if you are coming in fasting for labs on lipids, cholesterol, or glucose (sugar). This does not apply to pregnant women. Water, hot tea and black coffee (with nothing added) are okay. Do not drink other fluids, diet soda or chew gum.            Dec 13, 2018  4:00 PM CST   Return Visit with Niurka Hoff MD   [facility]    [address] [place] [id number]   [phone]              Future tests that were ordered for you today     Open Future Orders        Priority Expected Expires Ordered    LYMPHEDEMA THERAPY REFERRAL Routine  10/2/2019 10/2/2018            Who to contact     If you have questions or need follow up information about today's clinic visit or your schedule please contact [facility] directly at [phone].  Normal or non-critical lab and imaging results will be communicated to you by MyChart, letter or phone within 4 business days after the clinic has  "received the results. If you do not hear from us within 7 days, please contact the clinic through [email or web address] or phone. If you have a critical or abnormal lab result, we will notify you by phone as soon as possible.  Submit refill requests through [email or web address] or call your pharmacy and they will forward the refill request to us. Please allow 3 business days for your refill to be completed.          Additional Information About Your Visit        Digisc[facility] Information     [email or web address] gives you secure access to your electronic health record. If you see a primary care provider, you can also send messages to your care team and make appointments. If you have questions, please call your primary care clinic.  If you do not have a primary care provider, please call [phone] and they will assist you.      [email or web address] is an electronic gateway that provides easy, online access to your medical records. With [email or web address], you can request a clinic appointment, read your test results, renew a prescription or communicate with your care team.     To access your existing account, please contact your [facility] or call [phone] for assistance.        Care EveryWhere ID     This is your Care EveryWhere ID. This could be used by other organizations to access your [facility] medical records  [id number]        Your Vitals Were     Pulse Temperature Height Last Period Pulse Oximetry Breastfeeding?    77 97.4  F (36.3  C) (Temporal) 5' 4\" (1.626 m) 09/22/2018 100% No    BMI (Body Mass Index)                   22.42 kg/m2            Blood Pressure from Last 3 Encounters:   10/02/18 99/50   09/21/18 (!) 136/93   06/14/18 102/72    Weight from Last 3 Encounters:   10/02/18 130 lb 9.6 oz (59.2 kg)   09/21/18 124 lb 12.8 oz (56.6 kg)   06/14/18 128 lb 12.8 oz (58.4 kg)              We Performed the Following     Comprehensive metabolic panel     HIV Antigen Antibody Combo     HPV High Risk Types DNA Cervical     JUST IN CASE     " Lipid panel reflex to direct LDL Fasting     Pap imaged thin layer screen with HPV - recommended age 30 - 65 years (select HPV order below)     TSH with free T4 reflex        Primary Care Provider Office Phone # Fax #    GILDARDO Michaels [phone] [address]  [place] [address]        Equal Access to Services     CAROLYN HERNÁNDEZ : Hadii aad ku hadasho Soomaali, waaxda luqadaha, qaybta kaalmada adeegyada, waxay idiin hayaan adeeg kharash lamir . So [facility] [phone].    ATENCIÓN: Si habla español, tiene a spear disposición servicios gratuitos de asistencia lingüística. Llame al [phone].    We comply with applicable federal civil rights laws and [place] laws. We do not discriminate on the basis of race, color, national origin, age, disability, sex, sexual orientation, or gender identity.            Thank you!     Thank you for choosing [facility]  for your care. Our goal is always to provide you with excellent care. Hearing back from our patients is one way we can continue to improve our services. Please take a few minutes to complete the written survey that you may receive in the mail after your visit with us. Thank you!             Your Updated Medication List - Protect others around you: Learn how to safely use, store and throw away your medicines at www.disposemymeds.org.          This list is accurate as of 10/2/18  9:01 AM.  Always use your most recent med list.                   Brand Name Dispense Instructions for use Diagnosis    CALCIUM MAGNESIUM PO      With zinc    RLQ abdominal pain       naproxen 500 MG tablet    NAPROSYN    30 tablet    Take 1 tablet (500 mg) by mouth 2 times daily as needed for moderate pain    Right-sided low back pain without sciatica, unspecified chronicity       ONE-A-DAY ESSENTIAL Tabs      Take 1 tablet by mouth daily.        polyethylene glycol Packet    MIRALAX/GLYCOLAX     Take 1 packet by mouth daily         tiZANidine 4 MG tablet    ZANAFLEX    30 tablet    Take 0.5-1 tablets (2-4 mg) by mouth 3 times daily as needed for muscle spasms    Bilateral low back pain without sciatica, unspecified chronicity

## 2018-10-02 NOTE — PROGRESS NOTES
Hello Sheryle Cruse,    Attached are your test results.  -Liver and gallbladder tests are normal. (ALT,AST, Alk phos, bilirubin), kidney function is normal (Cr, GFR), Sodium is normal, Potassium is normal, Calcium is normal, Glucose is normal (diabetes screening test).   -LDL(bad) cholesterol level is  Very mildly elevated, HDL(good) cholesterol level is low and your triglycerides are very mildly  elevated which can increase your heart disease risk.  A diet high in fat and simple carbohydrates, genetics and being overweight can contribute to this. ADVISE: Exercise, a low fat, low carbohydrate diet, weight control, and omega-3 fatty acids (fish oil)  daily are helpful to improve this.  Rechecking your fasting cholesterol panel in 12 months is recommended (Lipid w/ LDL reflex, DX: hyperlipidemia)  -TSH (thyroid stimulating hormone) level is normal which indicates normal thyroid function.   Please contact us if you have any questions.    Katie Billy, CNP
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
## 2018-10-02 NOTE — PATIENT INSTRUCTIONS
PLAN:   1.   Orders Placed This Encounter   Procedures     Lipid panel reflex to direct LDL Fasting     Comprehensive metabolic panel     TSH with free T4 reflex     JUST IN CASE     HIV Antigen Antibody Combo     Pap imaged thin layer screen with HPV - recommended age 30 - 65 years (select HPV order below)     HPV High Risk Types DNA Cervical     LYMPHEDEMA THERAPY REFERRAL       2. Patient needs to follow up in if no improvement,or sooner if worsening of symptoms or other symptoms develop.  CONSULTATION/REFERRAL to Lymphedema therapy  Will follow up and/or notify patient of  results via My Chart to determine further need for followup  Follow up office visit in one year for annual health maintenance exam, sooner PRN.    Preventive Health Recommendations  Female Ages 40 to 49    Yearly exam:     See your health care provider every year in order to  1. Review health changes.   2. Discuss preventive care.    3. Review your medicines if your doctor prescribed any.      Get a Pap test every three years (unless you have an abnormal result and your provider advises testing more often).      If you get Pap tests with HPV test, you only need to test every 5 years, unless you have an abnormal result. You do not need a Pap test if your uterus was removed (hysterectomy) and you have not had cancer.      You should be tested each year for STDs (sexually transmitted diseases), if you're at risk.     Ask your doctor if you should have a mammogram.      Have a colonoscopy (test for colon cancer) if someone in your family has had colon cancer or polyps before age 50.       Have a cholesterol test every 5 years.       Have a diabetes test (fasting glucose) after age 45. If you are at risk for diabetes, you should have this test every 3 years.    Shots: Get a flu shot each year. Get a tetanus shot every 10 years.     Nutrition:     Eat at least 5 servings of fruits and vegetables each day.    Eat whole-grain bread, whole-wheat pasta  and brown rice instead of white grains and rice.    Get adequate Calcium and Vitamin D.      Lifestyle    Exercise at least 150 minutes a week (an average of 30 minutes a day, 5 days a week). This will help you control your weight and prevent disease.    Limit alcohol to one drink per day.    No smoking.     Wear sunscreen to prevent skin cancer.    See your dentist every six months for an exam and cleaning.  It was a pleasure seeing you today at the [facility]. Thank you for allowing us to care for you today. We truly hope we provided you with the excellent service you deserve. Please let us know if there is anything else we can do for you so we can be sure you are leaving completley satisfied with your care experience.       General information about your clinic   Clinic Hours Lab Hours (Appointments are required)   Mon-Thurs: 7:30 AM - 7 PM Mon-Thurs: 7:30 AM - 7 PM   Fri: 7:30 AM - 5 PM Fri: 7:30 AM - 5 PM        After Hours Nurse Advise & Appts:  [facility]: [phone]  [facility] On Call: to make appointments anytime: [phone] On Call Physician: call [phone] and answering service will page the on call physician.        For urgent appointments, please call [phone] and ask for the triage nurse or your care team clinic nurse.  How to contact my care team:  MyChart: [email or web address]   Phone: [phone]   Fax: [phone]       [facility]:   Phone: [phone]  Hours: 8:00 AM - 6:00 PM  Medication Refills:  Call your pharmacy and they will forward the refill to us. Please allow 3 business days for your refills to be completed.       Normal or non-critical lab and imaging results will be communicated to you by MyChart, letter or phone within 7 days.  If you do not hear from us within 10 days, please call the clinic. If you have a critical or abnormal lab result, we will notify you by phone as soon as possible.       We now have VENICE  (Pediatric Walk in Care)  Monday-Friday from 7:30-4. Simply walk in and be seen for your urgent needs like cough, fever, rash, diarrhea or vomiting, pink eye, UTI. No appointments needed. Ask one of the team for more information      -Your Care Team:    Dr. Stephanie Theodore - Internal Medicine/Pediatrics   Dr. Shirley Wesley - Family Medicine  Dr. Loli Nichols - Pediatrics  Dr. Vale Santillan - Pediatrics  Katie Billy CNP - Family Practice Nurse Practitioner

## 2018-10-02 NOTE — NURSING NOTE
"Chief Complaint   Patient presents with     Physical     AFE       Initial BP 99/50 (BP Location: Right arm, Patient Position: Sitting, Cuff Size: Adult Regular)  Pulse 77  Temp 97.4  F (36.3  C) (Temporal)  Ht 5' 4\" (1.626 m)  Wt 130 lb 9.6 oz (59.2 kg)  LMP 09/22/2018  SpO2 100%  Breastfeeding? No  BMI 22.42 kg/m2 Estimated body mass index is 22.42 kg/(m^2) as calculated from the following:    Height as of this encounter: 5' 4\" (1.626 m).    Weight as of this encounter: 130 lb 9.6 oz (59.2 kg).  Medication Reconciliation: complete      EDIE Ley      "

## 2018-10-03 NOTE — PROGRESS NOTES
Hello Sheryle Cruse,    Attached are your test results.  HIV is negative    Please contact us if you have any questions.    Katie Billy, CNP

## 2018-10-05 LAB
COPATH REPORT: NORMAL
PAP: NORMAL

## 2018-10-08 LAB
FINAL DIAGNOSIS: NORMAL
HPV HR 12 DNA CVX QL NAA+PROBE: NEGATIVE
HPV16 DNA SPEC QL NAA+PROBE: NEGATIVE
HPV18 DNA SPEC QL NAA+PROBE: NEGATIVE
SPECIMEN DESCRIPTION: NORMAL
SPECIMEN SOURCE CVX/VAG CYTO: NORMAL

## 2018-10-18 ENCOUNTER — OFFICE VISIT (OUTPATIENT)
Dept: DERMATOLOGY | Facility: CLINIC | Age: 48
End: 2018-10-18
Payer: COMMERCIAL

## 2018-10-18 ENCOUNTER — HOSPITAL ENCOUNTER (OUTPATIENT)
Dept: OCCUPATIONAL THERAPY | Facility: CLINIC | Age: 48
Setting detail: THERAPIES SERIES
End: 2018-10-18
Attending: NURSE PRACTITIONER
Payer: COMMERCIAL

## 2018-10-18 ENCOUNTER — THERAPY VISIT (OUTPATIENT)
Dept: PHYSICAL THERAPY | Facility: CLINIC | Age: 48
End: 2018-10-18
Payer: COMMERCIAL

## 2018-10-18 DIAGNOSIS — Z17.0 MALIGNANT NEOPLASM OF UPPER-OUTER QUADRANT OF RIGHT BREAST IN FEMALE, ESTROGEN RECEPTOR POSITIVE (H): ICD-10-CM

## 2018-10-18 DIAGNOSIS — C50.411 MALIGNANT NEOPLASM OF UPPER-OUTER QUADRANT OF RIGHT BREAST IN FEMALE, ESTROGEN RECEPTOR POSITIVE (H): ICD-10-CM

## 2018-10-18 DIAGNOSIS — L65.9 ALOPECIA: Primary | ICD-10-CM

## 2018-10-18 DIAGNOSIS — M54.50 LUMBAGO: ICD-10-CM

## 2018-10-18 PROCEDURE — 99213 OFFICE O/P EST LOW 20 MIN: CPT | Performed by: DERMATOLOGY

## 2018-10-18 PROCEDURE — 97165 OT EVAL LOW COMPLEX 30 MIN: CPT | Mod: GO | Performed by: OCCUPATIONAL THERAPIST

## 2018-10-18 PROCEDURE — 97110 THERAPEUTIC EXERCISES: CPT | Mod: GP | Performed by: PHYSICAL THERAPIST

## 2018-10-18 PROCEDURE — 40000445 ZZHC STATISTIC OT VISIT, LYMPHEDEMA: Performed by: OCCUPATIONAL THERAPIST

## 2018-10-18 PROCEDURE — 97535 SELF CARE MNGMENT TRAINING: CPT | Mod: GO | Performed by: OCCUPATIONAL THERAPIST

## 2018-10-18 PROCEDURE — 97140 MANUAL THERAPY 1/> REGIONS: CPT | Mod: GP | Performed by: PHYSICAL THERAPIST

## 2018-10-18 ASSESSMENT — PAIN SCALES - GENERAL: PAINLEVEL: MILD PAIN (3)

## 2018-10-18 NOTE — PROGRESS NOTES
"[facility] Dermatology Note      Dermatology Problem List:  1. Alopecia, likely androgenetic  - Current tx: minoxidil 5%, Lidex    Encounter Date: Oct 18, 2018    CC:  Chief Complaint   Patient presents with     Hair Loss     Not interested in a biopsy today. Hair loss has stayed the same. Topical cream prescribed previously caused itchy scalp and pt DC'd.         History of Present Illness:  Ms. Sheryle Cruse is a 47 year old female who presents for follow up regarding alopecia. She was last seen in dermatology on 7/24/18 when she started fluocinolone and minoxidil for the scalp. At the last visit, a scalp biopsy was also discussed as a method of elucidating the cause of the patient's hair loss. Today, the patient reports that she is not interested in a biopsy. Her hair loss has not stopped since the last visit. In regards to the medication that she was previously prescribed, the pt reports that the prescribed topicals after about a month caused pruritus on her scalp, and so she decided to discontinue it.     The patient currently is dealing with pain on her right side underneath her rib cage. She reports that she is \"physically uncomfortable today\" on account of this pain. Sitting exacerbates the discomfort. She is following up with her PCP so a to determine the cause of this pain.      No other concerns to address.     Past Medical History:   Patient Active Problem List   Diagnosis     Vitamin D deficiency     Vegetarianism     Hyperlipidemia LDL goal <160     Advance care planning     ERRONEOUS ENCOUNTER--DISREGARD     Monoallelic mutation of MUTYH gene     Generalized anxiety disorder     Right-sided low back pain without sciatica     Lumbago     Malignant neoplasm of upper-outer quadrant of right breast in female, estrogen receptor positive (H)     Past Medical History:   Diagnosis Date     Anemia, iron deficiency      Breast cancer (H)      Depression past     Eating disorder age 20    " [employer] [facility], In recovery for 15 yr     IBS (irritable bowel syndrome)     possible.  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 (c.1187G>A) [employer] 8/10/17     Vitamin D deficiency      Past Surgical History:   Procedure Laterality Date     MASTECTOMY MODIFIED RADICAL BILATERAL         Social History:  The patient is a .  Kept in chart for convenience.       Family History:  Both patient's parents had thinning hair  Kept in chart for convenience.       Medications:  Current Outpatient Prescriptions   Medication Sig Dispense Refill     Calcium-Magnesium-Vitamin D (CALCIUM MAGNESIUM PO) With zinc       Multiple Vitamin (ONE-A-DAY ESSENTIAL) TABS Take 1 tablet by mouth daily.       naproxen (NAPROSYN) 500 MG tablet Take 1 tablet (500 mg) by mouth 2 times daily as needed for moderate pain 30 tablet 1     polyethylene glycol (MIRALAX/GLYCOLAX) Packet Take 1 packet by mouth daily       tiZANidine (ZANAFLEX) 4 MG tablet Take 0.5-1 tablets (2-4 mg) by mouth 3 times daily as needed for muscle spasms 30 tablet 1       Allergies   Allergen Reactions     Contrast Dye      Pt does not recall which type of contrast     Diphenhydramine      Penicillins Rash     swelling       Review of Systems:  -Skin: As above in HPI. No additional skin concerns.    Physical exam:  Vitals: LMP 09/22/2018  GEN: This is a well developed, well-nourished female in no acute distress, in a pleasant mood.    SKIN: Focused exam of the scalp.   - 1 cm patch of hair loss with possible loss of follicular ostia around the part line of the scalp  - No erythema or scale noted on the scalp.  -thinning of the crown   -No other lesions of concern on areas examined.     Component      Latest Ref Rng & Units 8/1/2018 8/22/2018   WBC      4.0 - 11.0 10e9/L  4.7   RBC Count      3.8 - 5.2 10e12/L  4.05   Hemoglobin      11.7 - 15.7 g/dL  12.8    Hematocrit      35.0 - 47.0 %  37.0   MCV      78 - 100 fl  91   MCH      26.5 - 33.0 pg  31.6   MCHC      31.5 - 36.5 g/dL  34.6   RDW      10.0 - 15.0 %  12.1   Platelet Count      150 - 450 10e9/L  281   Diff Method        Automated Method   % Neutrophils      %  59.6   % Lymphocytes      %  26.8   % Monocytes      %  9.1   % Eosinophils      %  3.2   % Basophils      %  1.1   % Immature Granulocytes      %  0.2   Absolute Neutrophil      1.6 - 8.3 10e9/L  2.8   Absolute Lymphocytes      0.8 - 5.3 10e9/L  1.3   Absolute Monocytes      0.0 - 1.3 10e9/L  0.4   Absolute Eosinophils      0.0 - 0.7 10e9/L  0.2   Absolute Basophils      0.0 - 0.2 10e9/L  0.1   Abs Immature Granulocytes      0 - 0.4 10e9/L  0.0   TSH      0.40 - 4.00 mU/L 2.52    Ferritin      8 - 252 ng/mL 80    Vitamin D Deficiency screening      20 - 75 ug/L 29        Impression/Plan:  1. Alopecia, unspecified type - scalp appears to be stable from last visit.No changes. Consider alopecia areata with female pattern hair loss and recent telogen effluvium. May also consider a low grade LPP with one burned out patch on the top of scalp.     Hold minoxidil and fluocinolone on account of these causing scalp pruritus    We discussed biopsy again. Given that the patient is dealing with abdominal pain of an unknown source, we will hold off until this is resolved. Se does not want to proceed.     She would like autoimmune testing without a biposy. I reviewed this could be a positive test in a proportion of people: Labs ordered today: ZHANNA.    2. While the pt's vitamin D was normal, it is on the low side of normal (see above).     We will consider replacement to get closer to 50 at her follow up. She declines today.    Follow-up in 3 months, earlier for new or changing lesions.       Staff Involved:  Scribe/Staff    Scribe Disclosure  I, Tyrell Villarreal, am serving as a scribe to document services personally performed by Dr. Ibis Galvan MD, based on data  collection and the provider's statements to me.     Provider Disclosure:   The documentation recorded by the scribe accurately reflects the services I personally performed and the decisions made by me.    Ibis Galvan MD    Department of Dermatology  [facility]: Phone: [phone], Fax:[phone]  [facility]: Phone: [phone], Fax: [phone]

## 2018-10-18 NOTE — MR AVS SNAPSHOT
After Visit Summary   10/18/2018    Sheryle Cruse    MRN: [id number]           Patient Information     Date Of Birth          1970        Visit Information        Provider Department      10/18/2018 10:20 AM Nancy Claudio, PT [facility]        Today's Diagnoses     Lumbago           Follow-ups after your visit        Your next 10 appointments already scheduled     Oct 18, 2018  1:00 PM CDT   Lymphedema Evaluation with Brynn Tejeda, OT   [facility] ([facility])    [address] [place] [id number]   [phone]            Oct 23, 2018 10:30 AM CDT   Return Visit with GILDARDO Michaels CNP   [facility])    [address] [place] [address]   [phone]            Nov 08, 2018  8:00 AM CST   Return Visit with Gwendolyn Ackerman MD   [facility])    [address] [place] [phone]   [phone]            Nov 08, 2018 10:00 AM CST   Return Visit with Yoselyn Mckay, PhD   [facility] ([facility])    [address] [place] [address]   [phone]            Nov 08, 2018 11:30 AM CST   Return Visit with GILDARDO Michaels CNP   [facility])    [address] [place] [address]   [phone]            Nov 14, 2018  7:30 AM CST   LAB with [facility])    [address] [place] [id number]   [phone]           Please do not eat 10-12 hours before your appointment if you are coming in fasting for labs on lipids, cholesterol, or glucose (sugar). This does not apply to pregnant women. Water, hot tea and black coffee (with nothing added) are okay. Do not drink  other fluids, diet soda or chew gum.            Dec 13, 2018  3:15 PM CST   LAB with [facility] ([facility])    [address] [place] [phone]           Please do not eat 10-12 hours before your appointment if you are coming in fasting for labs on lipids, cholesterol, or glucose (sugar). This does not apply to pregnant women. Water, hot tea and black coffee (with nothing added) are okay. Do not drink other fluids, diet soda or chew gum.            Dec 13, 2018  4:00 PM CST   Return Visit with Niurka Hoff MD   [facility] ([facility])    [address] [place] [id number] [phone]            Jan 25, 2019  8:30 AM CST   Return Visit with Ibis Galvan MD   [facility])    [address] [place] [id number] [phone]              Future tests that were ordered for you today     Open Future Orders        Priority Expected Expires Ordered    Anti Nuclear Maira IgG by IFA with Reflex Routine 10/18/2018 10/18/2019 10/18/2018            Who to contact     If you have questions or need follow up information about today's clinic visit or your schedule please contact [facility] PHYSICAL THERAP[facility] directly at [phone].  Normal or non-critical lab and imaging results will be communicated to you by MyChart, letter or phone within 4 business days after the clinic has received the results. If you do not hear from us within 7 days, please contact the clinic through MyChart or phone. If you have a critical or abnormal lab result, we will notify you by phone as soon as possible.  Submit refill requests through [employer] or call your pharmacy and they will forward the refill request to us. Please allow 3 business days for your refill to be completed.          Additional Information About Your Visit        V[employer]  Information     [email or web address] gives you secure access to your electronic health record. If you see a primary care provider, you can also send messages to your care team and make appointments. If you have questions, please call your primary care clinic.  If you do not have a primary care provider, please call [phone] and they will assist you.        Care EveryWhere ID     This is your Care EveryWhere ID. This could be used by other organizations to access your [place] medical records  [id number]        Your Vitals Were     Last Period                   09/22/2018            Blood Pressure from Last 3 Encounters:   10/02/18 99/50   09/21/18 (!) 136/93   06/14/18 102/72    Weight from Last 3 Encounters:   10/02/18 59.2 kg (130 lb 9.6 oz)   09/21/18 56.6 kg (124 lb 12.8 oz)   06/14/18 58.4 kg (128 lb 12.8 oz)              We Performed the Following     MANUAL THER TECH,1+REGIONS,EA 15 MIN     THERAPEUTIC EXERCISES        Primary Care Provider Office Phone # Fax #    Katie Carlotta Billy, APRN [phone]       [address]  [place] [address]        Equal Access to Services     JORGE HERNÁNDEZ AH: Hadii aad ku hadasho Soomaali, waaxda luqadaha, qaybta kaalmada adeegyada, malinda deckern lauren wang lamir murry. So [facility] [phone].    ATENCIÓN: Si habla español, tiene a spear disposición servicios gratuitos de asistencia lingüística. Llame al [phone].    We comply with applicable federal civil rights laws and [place] laws. We do not discriminate on the basis of race, color, national origin, age, disability, sex, sexual orientation, or gender identity.            Thank you!     Thank you for choosing [facility]ER[facility]  for your care. Our goal is always to provide you with excellent care. Hearing back from our patients is one way we can continue to improve our services. Please take a few minutes to complete the written survey that you may receive in the  mail after your visit with us. Thank you!             Your Updated Medication List - Protect others around you: Learn how to safely use, store and throw away your medicines at www.disposemymeds.org.          This list is accurate as of 10/18/18 10:59 AM.  Always use your most recent med list.                   Brand Name Dispense Instructions for use Diagnosis    CALCIUM MAGNESIUM PO      With zinc    RLQ abdominal pain       naproxen 500 MG tablet    NAPROSYN    30 tablet    Take 1 tablet (500 mg) by mouth 2 times daily as needed for moderate pain    Right-sided low back pain without sciatica, unspecified chronicity       ONE-A-DAY ESSENTIAL Tabs      Take 1 tablet by mouth daily.        polyethylene glycol Packet    MIRALAX/GLYCOLAX     Take 1 packet by mouth daily        tiZANidine 4 MG tablet    ZANAFLEX    30 tablet    Take 0.5-1 tablets (2-4 mg) by mouth 3 times daily as needed for muscle spasms    Bilateral low back pain without sciatica, unspecified chronicity

## 2018-10-18 NOTE — LETTER
"    10/18/2018         RE: Sheryle Cruse  [address]  [place] [address]        Dear Colleague,    Thank you for referring your patient, Sheryle Cruse, to the [facility]. Please see a copy of my visit note below.    [facility] Dermatology Note      Dermatology Problem List:  1. Alopecia, likely androgenetic  - Current tx: minoxidil 5%, Lidex    Encounter Date: Oct 18, 2018    CC:  Chief Complaint   Patient presents with     Hair Loss     Not interested in a biopsy today. Hair loss has stayed the same. Topical cream prescribed previously caused itchy scalp and pt DC'd.         History of Present Illness:  Ms. Sheryle Cruse is a 47 year old female who presents for follow up regarding alopecia. She was last seen in dermatology on 7/24/18 when she started fluocinolone and minoxidil for the scalp. At the last visit, a scalp biopsy was also discussed as a method of elucidating the cause of the patient's hair loss. Today, the patient reports that she is not interested in a biopsy. Her hair loss has not stopped since the last visit. In regards to the medication that she was previously prescribed, the pt reports that the prescribed topicals after about a month caused pruritus on her scalp, and so she decided to discontinue it.     The patient currently is dealing with pain on her right side underneath her rib cage. She reports that she is \"physically uncomfortable today\" on account of this pain. Sitting exacerbates the discomfort. She is following up with her PCP so a to determine the cause of this pain.      No other concerns to address.     Past Medical History:   Patient Active Problem List   Diagnosis     Vitamin D deficiency     Vegetarianism     Hyperlipidemia LDL goal <160     Advance care planning     ERRONEOUS ENCOUNTER--DISREGARD     Monoallelic mutation of MUTYH gene     Generalized anxiety disorder     Right-sided low back pain without sciatica     " Lumbago     Malignant neoplasm of upper-outer quadrant of right breast in female, estrogen receptor positive (H)     Past Medical History:   Diagnosis Date     Anemia, iron deficiency      Breast cancer (H)      Depression past     Eating disorder age 20    [employer] [facility], In recovery for 15 yr     IBS (irritable bowel syndrome)     possible.  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 (c.1187G>A) [employer] 8/10/17     Vitamin D deficiency      Past Surgical History:   Procedure Laterality Date     MASTECTOMY MODIFIED RADICAL BILATERAL         Social History:  The patient is a .  Kept in chart for convenience.       Family History:  Both patient's parents had thinning hair  Kept in chart for convenience.       Medications:  Current Outpatient Prescriptions   Medication Sig Dispense Refill     Calcium-Magnesium-Vitamin D (CALCIUM MAGNESIUM PO) With zinc       Multiple Vitamin (ONE-A-DAY ESSENTIAL) TABS Take 1 tablet by mouth daily.       naproxen (NAPROSYN) 500 MG tablet Take 1 tablet (500 mg) by mouth 2 times daily as needed for moderate pain 30 tablet 1     polyethylene glycol (MIRALAX/GLYCOLAX) Packet Take 1 packet by mouth daily       tiZANidine (ZANAFLEX) 4 MG tablet Take 0.5-1 tablets (2-4 mg) by mouth 3 times daily as needed for muscle spasms 30 tablet 1       Allergies   Allergen Reactions     Contrast Dye      Pt does not recall which type of contrast     Diphenhydramine      Penicillins Rash     swelling       Review of Systems:  -Skin: As above in HPI. No additional skin concerns.    Physical exam:  Vitals: LMP 09/22/2018  GEN: This is a well developed, well-nourished female in no acute distress, in a pleasant mood.    SKIN: Focused exam of the scalp.   - 1 cm patch of hair loss with possible loss of follicular ostia around the part line of the scalp  - No erythema or scale noted on the  scalp.  -thinning of the crown   -No other lesions of concern on areas examined.     Component      Latest Ref Rng & Units 8/1/2018 8/22/2018   WBC      4.0 - 11.0 10e9/L  4.7   RBC Count      3.8 - 5.2 10e12/L  4.05   Hemoglobin      11.7 - 15.7 g/dL  12.8   Hematocrit      35.0 - 47.0 %  37.0   MCV      78 - 100 fl  91   MCH      26.5 - 33.0 pg  31.6   MCHC      31.5 - 36.5 g/dL  34.6   RDW      10.0 - 15.0 %  12.1   Platelet Count      150 - 450 10e9/L  281   Diff Method        Automated Method   % Neutrophils      %  59.6   % Lymphocytes      %  26.8   % Monocytes      %  9.1   % Eosinophils      %  3.2   % Basophils      %  1.1   % Immature Granulocytes      %  0.2   Absolute Neutrophil      1.6 - 8.3 10e9/L  2.8   Absolute Lymphocytes      0.8 - 5.3 10e9/L  1.3   Absolute Monocytes      0.0 - 1.3 10e9/L  0.4   Absolute Eosinophils      0.0 - 0.7 10e9/L  0.2   Absolute Basophils      0.0 - 0.2 10e9/L  0.1   Abs Immature Granulocytes      0 - 0.4 10e9/L  0.0   TSH      0.40 - 4.00 mU/L 2.52    Ferritin      8 - 252 ng/mL 80    Vitamin D Deficiency screening      20 - 75 ug/L 29        Impression/Plan:  1. Alopecia, unspecified type - scalp appears to be stable from last visit.No changes. Consider alopecia areata with female pattern hair loss and recent telogen effluvium. May also consider a low grade LPP with one burned out patch on the top of scalp.     Hold minoxidil and fluocinolone on account of these causing scalp pruritus    We discussed biopsy again. Given that the patient is dealing with abdominal pain of an unknown source, we will hold off until this is resolved. Se does not want to proceed.     She would like autoimmune testing without a biposy. I reviewed this could be a positive test in a proportion of people: Labs ordered today: ZHANNA.    2. While the pt's vitamin D was normal, it is on the low side of normal (see above).     We will consider replacement to get closer to 50 at her follow up. She  declines today.    Follow-up in 3 months, earlier for new or changing lesions.       Staff Involved:  Scribe/Staff    Scribe Disclosure  I, Tyrell Villarreal, am serving as a scribe to document services personally performed by Dr. Ibis Galvan MD, based on data collection and the provider's statements to me.     Provider Disclosure:   The documentation recorded by the scribe accurately reflects the services I personally performed and the decisions made by me.    Ibis Galvan MD    Department of Dermatology  [facility]: Phone: [phone], Fax:[phone]  [facility]: Phone: [phone], Fax: [phone]          Again, thank you for allowing me to participate in the care of your patient.        Sincerely,        Ibis Galvan MD

## 2018-10-18 NOTE — MR AVS SNAPSHOT
After Visit Summary   10/18/2018    Sheryle Cruse    MRN: [id number]           Patient Information     Date Of Birth          1970        Visit Information        Provider Department      10/18/2018 8:00 AM Ibis Galvan MD [facility]        Today's Diagnoses     Alopecia    -  1       Follow-ups after your visit        Follow-up notes from your care team     Return in about 3 months (around 1/18/2019) for alopecia.      Your next 10 appointments already scheduled     Oct 18, 2018 10:20 AM CDT   AMBER Spine with Nancy Claudio, PT   [facility] ([facility]  )    [address] [facility] [address]   [phone]            Oct 18, 2018  1:00 PM CDT   Lymphedema Evaluation with Brynn Tejeda, OT   [facility] ([facility])    [address] [facility] [address]   [phone]            Oct 23, 2018 10:30 AM CDT   Return Visit with GILDARDO Michaels CNP   [facility])    [address] [place] [address]   [phone]            Nov 08, 2018  8:00 AM CST   [facility]    [address] [facility] [address]   [phone]            Nov 08, 2018 10:00 AM CST   Return Visit with Yoselyn Mckay, PhD   [facility] ([facility])    [address] [facility] [address]   [phone]            Nov 08, 2018 11:30 AM CST   Return Visit with GILDARDO Michaels CNP   [facility])    [address] [facility] [address]   [phone]            Nov 14, 2018  7:30 AM CST   LAB with LAB ONC [facility])    [address] [place] [address]  [place] [address]   [phone]           Please do not eat 10-12 hours before your appointment if you are coming in fasting for labs on lipids, cholesterol, or glucose (sugar). This does not apply to pregnant women. Water, hot tea and black coffee (with nothing added) are okay. Do not drink other fluids, diet soda or chew gum.            Dec 13, 2018  3:15 PM CST   LAB with LAB ONC [facility] ([facility])    [address] [place] [address]   [phone]           Please do not eat 10-12 hours before your appointment if you are coming in fasting for labs on lipids, cholesterol, or glucose (sugar). This does not apply to pregnant women. Water, hot tea and black coffee (with nothing added) are okay. Do not drink other fluids, diet soda or chew gum.            Dec 13, 2018  4:00 PM CST   Return Visit with Niurka Hoff MD   [facility])    [address] [place] [address] [phone]            Jan 25, 2019  8:30 AM CST   Return Visit with Ibis Galvan MD   [facility])    [address] [place] [id number] [phone]              Who to contact     If you have questions or need follow up information about today's clinic visit or your schedule please contact [facility] directly at [phone].  Normal or non-critical lab and imaging results will be communicated to you by MyChart, letter or phone within 4 business days after the clinic has received the results. If you do not hear from us within 7 days, please contact the clinic through MyChart or phone. If you have a critical or abnormal lab result, we will notify you by phone as soon as possible.  Submit refill requests through [employer] or call your pharmacy and they will forward the refill request to us. Please allow 3 business days for your refill to be completed.           Additional Information About Your Visit        [email or web address] Information     [employer] gives you secure access to your electronic health record. If you see a primary care provider, you can also send messages to your care team and make appointments. If you have questions, please call your primary care clinic.  If you do not have a primary care provider, please call [phone] and they will assist you.      [employer] is an electronic gateway that provides easy, online access to your medical records. With [employer], you can request a clinic appointment, read your test results, renew a prescription or communicate with your care team.     To access your existing account, please contact your [facility] or call [phone] for assistance.        Care EveryWhere ID     This is your Care EveryWhere ID. This could be used by other organizations to access your [place] medical records  [id number]        Your Vitals Were     Last Period                   09/22/2018            Blood Pressure from Last 3 Encounters:   10/02/18 99/50   09/21/18 (!) 136/93   06/14/18 102/72    Weight from Last 3 Encounters:   10/02/18 59.2 kg (130 lb 9.6 oz)   09/21/18 56.6 kg (124 lb 12.8 oz)   06/14/18 58.4 kg (128 lb 12.8 oz)              We Performed the Following     Anti Nuclear Maira IgG by IFA with Reflex        Primary Care Provider Office Phone # Fax #    Katie Laguerre Wenceslao, APRN [phone]       [address]  [place] [address]        Equal Access to Services     JORGE HERNÁNDEZ : Hadii aad ku hadasho Soomaali, waaxda luqadaha, qaybta kaalmada adeegyada, waxchava idiin haydalen lauren mckinney'elvin . So [facility] [phone].    ATENCIÓN: Si habla español, tiene a spear disposición servicios gratuitos de asistencia lingüística. Llame al [phone].    We comply with applicable federal civil rights laws and [place] laws. We do not discriminate on the basis of race, color, national  origin, age, disability, sex, sexual orientation, or gender identity.            Thank you!     Thank you for choosing [facility]  for your care. Our goal is always to provide you with excellent care. Hearing back from our patients is one way we can continue to improve our services. Please take a few minutes to complete the written survey that you may receive in the mail after your visit with us. Thank you!             Your Updated Medication List - Protect others around you: Learn how to safely use, store and throw away your medicines at www.disposemymeds.org.          This list is accurate as of 10/18/18  8:27 AM.  Always use your most recent med list.                   Brand Name Dispense Instructions for use Diagnosis    CALCIUM MAGNESIUM PO      With zinc    RLQ abdominal pain       naproxen 500 MG tablet    NAPROSYN    30 tablet    Take 1 tablet (500 mg) by mouth 2 times daily as needed for moderate pain    Right-sided low back pain without sciatica, unspecified chronicity       ONE-A-DAY ESSENTIAL Tabs      Take 1 tablet by mouth daily.        polyethylene glycol Packet    MIRALAX/GLYCOLAX     Take 1 packet by mouth daily        tiZANidine 4 MG tablet    ZANAFLEX    30 tablet    Take 0.5-1 tablets (2-4 mg) by mouth 3 times daily as needed for muscle spasms    Bilateral low back pain without sciatica, unspecified chronicity

## 2018-10-18 NOTE — NURSING NOTE
Sheryle Cruse's goals for this visit include:   Chief Complaint   Patient presents with     Hair Loss     Not interested in a biopsy today. Hair loss has stayed the same. Topical cream prescribed previously caused itchy scalp and pt DC'd.       She requests these members of her care team be copied on today's visit information: PCP    PCP: Katie Billy    Referring Provider:  Katie Billy, APRN CNP  [address]  [place] [address]    LMP 09/22/2018    Do you need any medication refills at today's visit? None      Keena Jimenez, [facility]

## 2018-10-19 NOTE — PROGRESS NOTES
"   10/18/18 1311   Rehab Discipline   Discipline OT   Type of Visit   Type of visit Initial Edema Evaluation   General Information   Start of care 10/18/18   Referring physician Katie Billy CNP   Orders Evaluate and treat as indicated   Order date 10/02/18   Medical diagnosis H/o right breast cancer with bilateral mastectomy and SLND of right axilla 10/4/17.  Pt is experiencing discomfort in RUQ and concerned that edema is present in RUE and right lateral trunk.    Onset of illness / date of surgery 10/04/17   Edema onset 02/01/18   Affected body parts (RUQ)   Edema etiology Cancer with lymph node dissection;Radiation;Surgery   Location - Cancer with lymph node dissection right axilla   Location - Radiation RUQ, right axilla   Radiation comments completed   Pertinent history of current problem (PT: include personal factors and/or comorbidities that impact the POC; OT: include additional occupational profile info) Pt wants to make sure RUE volume has not increasd and edema is not present.  Pt is experiencing  RUQ discomfort.  Pt is seeing PT for back pain.   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)   Living environment Apartment / condo   Living environment comments no concerns   Subjective Report   Patient report of symptoms discomfort of RUQ, over scar line, swelling of right, lateral trunk   Patient / Family Goals   Patient / family goals statement to see if edema has returned and if \"this body is the 'new' normal\"   Pain   Pain comments discomfort in RUQ on anterior region, pain at inferior border of ribcage on right   Cognitive Status   Orientation Orientation to person, place and time   Level of consciousness Alert   Follows commands and answers questions 100% of the time "   Edema Exam / Assessment   Skin condition comments Skin of RUQ and RUE intact.  Soft, mild edema at RUQ above mastectomy scar, at inferior axilla and of upper arm.     Scar Yes   Location bilateral mastectomy scars   Mobility flat   Scar comments healed, light pink in color   Girth Measurements   Girth Measurements Refer to separate girth measurement flowsheet  (-130mL reduction in RUE & -285mL reduction in LUE f/ 3/7/18)   Range of Motion   ROM No deficits were identified   Posture   Posture Normal   Activities of Daily Living   Activities of Daily Living IND   Bed Mobility   Bed mobility IND   Transfers   Transfers IND   Gait / Locomotion   Gait / Locomotion IND   Sensory   Sensory perception comments erogenous area of mastectomy scars   Planned Edema Interventions   Planned edema interventions Fit for compression garment;Exercises;Precautions to prevent infection / exacerbation;Education;Skin care / precautions;Home management program development   Clinical Impression   Criteria for skilled therapeutic intervention met Yes   Therapy diagnosis RUQ discomfort after ALND   Assessment of Occupational Performance 1-3 Performance Deficits   Identified Performance Deficits discomfort in RUQ   Clinical Decision Making (Complexity) Low complexity   Treatment frequency 1 time / week   Treatment duration within 90 days   Patient / family and/or staff in agreement with plan of care Yes   Risks and benefits of therapy have been explained Yes   Clinical impression comments Pt presents with concerns about RUQ discomfort after ALND and wants to make sure it is not the presentation of lymphedema.  Pt will benefit from continued skilled OT intervention to provide education on lymphedema, precautions and development of a home program.    Goal 1   Goal identifier 1   Goal description Pt will demonstrate IND iwth home program including wear of swell spot to right, lateral trunk 4x/wk during day hours, daily PT stretches and skin  traction to right trunk to reduce feelings of discomfort and to prevent the on-set of lymphedema.   Target date 01/16/19   Goal 2   Goal identifier 2   Goal description Pt will demonstrate no change in RUE and chest circumference measurements in 90 days.   Target date 01/16/19   Total Evaluation Time   Total evaluation time 20

## 2018-10-23 ENCOUNTER — OFFICE VISIT (OUTPATIENT)
Dept: PEDIATRICS | Facility: CLINIC | Age: 48
End: 2018-10-23
Payer: COMMERCIAL

## 2018-10-23 VITALS
WEIGHT: 126.3 LBS | DIASTOLIC BLOOD PRESSURE: 60 MMHG | SYSTOLIC BLOOD PRESSURE: 92 MMHG | BODY MASS INDEX: 21.68 KG/M2 | TEMPERATURE: 97.6 F | HEART RATE: 104 BPM | OXYGEN SATURATION: 100 %

## 2018-10-23 DIAGNOSIS — N30.00 ACUTE CYSTITIS WITHOUT HEMATURIA: ICD-10-CM

## 2018-10-23 DIAGNOSIS — R82.90 NONSPECIFIC FINDING ON EXAMINATION OF URINE: ICD-10-CM

## 2018-10-23 DIAGNOSIS — R10.84 ABDOMINAL PAIN, GENERALIZED: Primary | ICD-10-CM

## 2018-10-23 DIAGNOSIS — L65.9 ALOPECIA: ICD-10-CM

## 2018-10-23 LAB
ALBUMIN SERPL-MCNC: 4.2 G/DL (ref 3.4–5)
ALBUMIN UR-MCNC: 10 MG/DL
ALP SERPL-CCNC: 58 U/L (ref 40–150)
ALT SERPL W P-5'-P-CCNC: 26 U/L (ref 0–50)
AMORPH CRY #/AREA URNS HPF: ABNORMAL /HPF
ANION GAP SERPL CALCULATED.3IONS-SCNC: 13 MMOL/L (ref 3–14)
APPEARANCE UR: ABNORMAL
AST SERPL W P-5'-P-CCNC: 24 U/L (ref 0–45)
BACTERIA #/AREA URNS HPF: ABNORMAL /HPF
BILIRUB SERPL-MCNC: 1.1 MG/DL (ref 0.2–1.3)
BILIRUB UR QL STRIP: NEGATIVE
BUN SERPL-MCNC: 10 MG/DL (ref 7–30)
CALCIUM SERPL-MCNC: 9.5 MG/DL (ref 8.5–10.1)
CHLORIDE SERPL-SCNC: 99 MMOL/L (ref 94–109)
CO2 SERPL-SCNC: 24 MMOL/L (ref 20–32)
COLOR UR AUTO: YELLOW
CREAT SERPL-MCNC: 0.54 MG/DL (ref 0.52–1.04)
ERYTHROCYTE [DISTWIDTH] IN BLOOD BY AUTOMATED COUNT: 12 % (ref 10–15)
GFR SERPL CREATININE-BSD FRML MDRD: >90 ML/MIN/1.7M2
GLUCOSE SERPL-MCNC: 80 MG/DL (ref 70–99)
GLUCOSE UR STRIP-MCNC: NEGATIVE MG/DL
HCT VFR BLD AUTO: 38.4 % (ref 35–47)
HGB BLD-MCNC: 12.8 G/DL (ref 11.7–15.7)
HGB UR QL STRIP: NEGATIVE
KETONES UR STRIP-MCNC: 80 MG/DL
LEUKOCYTE ESTERASE UR QL STRIP: ABNORMAL
MCH RBC QN AUTO: 31.4 PG (ref 26.5–33)
MCHC RBC AUTO-ENTMCNC: 33.3 G/DL (ref 31.5–36.5)
MCV RBC AUTO: 94 FL (ref 78–100)
NITRATE UR QL: NEGATIVE
NON-SQ EPI CELLS #/AREA URNS LPF: ABNORMAL /LPF
PH UR STRIP: 7.5 PH (ref 5–7)
PLATELET # BLD AUTO: 413 10E9/L (ref 150–450)
POTASSIUM SERPL-SCNC: 4.7 MMOL/L (ref 3.4–5.3)
PROT SERPL-MCNC: 8.2 G/DL (ref 6.8–8.8)
RBC # BLD AUTO: 4.08 10E12/L (ref 3.8–5.2)
RBC #/AREA URNS AUTO: ABNORMAL /HPF
SODIUM SERPL-SCNC: 136 MMOL/L (ref 133–144)
SOURCE: ABNORMAL
SP GR UR STRIP: 1.01 (ref 1–1.03)
UROBILINOGEN UR STRIP-MCNC: NORMAL MG/DL (ref 0–2)
WBC # BLD AUTO: 6.4 10E9/L (ref 4–11)
WBC #/AREA URNS AUTO: ABNORMAL /HPF

## 2018-10-23 PROCEDURE — 87086 URINE CULTURE/COLONY COUNT: CPT | Performed by: NURSE PRACTITIONER

## 2018-10-23 PROCEDURE — 86039 ANTINUCLEAR ANTIBODIES (ANA): CPT | Performed by: DERMATOLOGY

## 2018-10-23 PROCEDURE — 99214 OFFICE O/P EST MOD 30 MIN: CPT | Performed by: NURSE PRACTITIONER

## 2018-10-23 PROCEDURE — 80053 COMPREHEN METABOLIC PANEL: CPT | Performed by: NURSE PRACTITIONER

## 2018-10-23 PROCEDURE — 81001 URINALYSIS AUTO W/SCOPE: CPT | Performed by: NURSE PRACTITIONER

## 2018-10-23 PROCEDURE — 36415 COLL VENOUS BLD VENIPUNCTURE: CPT | Performed by: NURSE PRACTITIONER

## 2018-10-23 PROCEDURE — 86038 ANTINUCLEAR ANTIBODIES: CPT | Performed by: DERMATOLOGY

## 2018-10-23 PROCEDURE — 85027 COMPLETE CBC AUTOMATED: CPT | Performed by: NURSE PRACTITIONER

## 2018-10-23 RX ORDER — SULFAMETHOXAZOLE/TRIMETHOPRIM 800-160 MG
1 TABLET ORAL 2 TIMES DAILY
Qty: 14 TABLET | Refills: 0 | Status: SHIPPED | OUTPATIENT
Start: 2018-10-23 | End: 2019-01-08

## 2018-10-23 NOTE — PROGRESS NOTES
Hello Sheryle Cruse,    Attached are your test results.  -Liver and gallbladder tests are normal. (ALT,AST, Alk phos, bilirubin), kidney function is normal (Cr, GFR), Sodium is normal, Potassium is normal, Calcium is normal, Glucose is normal (diabetes screening test).    Please contact us if you have any questions.    Katie Billy, CNP

## 2018-10-23 NOTE — MR AVS SNAPSHOT
After Visit Summary   10/23/2018    Sheryle Cruse    MRN: [id number]           Patient Information     Date Of Birth          1970        Visit Information        Provider Department      10/23/2018 10:30 AM Katie Billy APRN CNP [facility]        Today's Diagnoses     Abdominal pain, generalized    -  1      Care Instructions    PLAN:   1.   Orders Placed This Encounter   Procedures     CT Abdomen Pelvis w/o Contrast     UA with Microscopic reflex to Culture ([place]; [facility])     Comprehensive metabolic panel     CBC with platelets     2. Patient needs to follow up in if no improvement,or sooner if worsening of symptoms or other symptoms develop.  FURTHER TESTING:       - CT abdomen and pelvis   Will follow up and/or notify patient of  results via My Chart to determine further need for followup      It was a pleasure seeing you today at the [facility]. Thank you for allowing us to care for you today. We truly hope we provided you with the excellent service you deserve. Please let us know if there is anything else we can do for you so we can be sure you are leaving completley satisfied with your care experience.       General information about your clinic   Clinic Hours Lab Hours (Appointments are required)   Mon-Thurs: 7:30 AM - 7 PM Mon-Thurs: 7:30 AM - 7 PM   Fri: 7:30 AM - 5 PM Fri: 7:30 AM - 5 PM        After Hours Nurse Advise & Appts:  [facility]: [phone]  [facility] On Call: to make appointments anytime: [phone] On Call Physician: call [phone] and answering service will page the on call physician.        For urgent appointments, please call [phone] and ask for the triage nurse or your care team clinic nurse.  How to contact my care team:  MyChart: [email or web address]   Phone: [phone]   Fax: [phone]       [facility]:   Phone: [phone]  Hours: 8:00 AM - 6:00 PM   Medication Refills:  Call your pharmacy and they will forward the refill to us. Please allow 3 business days for your refills to be completed.       Normal or non-critical lab and imaging results will be communicated to you by MyChart, letter or phone within 7 days.  If you do not hear from us within 10 days, please call the clinic. If you have a critical or abnormal lab result, we will notify you by phone as soon as possible.       We now have PWIC (Pediatric Walk in Care)  Monday-Friday from 7:30-4. Simply walk in and be seen for your urgent needs like cough, fever, rash, diarrhea or vomiting, pink eye, UTI. No appointments needed. Ask one of the team for more information      -Your Care Team:    Dr. Stephanie Theodore - Internal Medicine/Pediatrics   Dr. Shirley Wesley - Family Medicine  Dr. Loli Nichols - Pediatrics  Dr. Vale Santillan - Pediatrics  Katie Billy CNP - Family Practice Nurse Practitioner                         Follow-ups after your visit        Your next 10 appointments already scheduled     Nov 08, 2018  8:00 AM CST   Return Visit with Gwendolyn Ackerman MD   [facility])    [address] [place] [address] [phone]            Nov 08, 2018 10:00 AM CST   Return Visit with Yoselyn Mckay, PhD   [facility] ([facility])    [address] [facility] [address]   [phone]            Nov 08, 2018 11:30 AM CST   Return Visit with GILDARDO Michaels CNP   [facility] ([facility])    [address] [place] [id number] [phone]            Nov 14, 2018  7:30 AM CST   LAB with LAB ONC [facility] ([facility])    [address] [place] [phone] [phone]           Please do not eat 10-12 hours before your appointment if you are coming in fasting for labs on  lipids, cholesterol, or glucose (sugar). This does not apply to pregnant women. Water, hot tea and black coffee (with nothing added) are okay. Do not drink other fluids, diet soda or chew gum.            Dec 13, 2018  3:15 PM CST   LAB with LAB ONC [facility] ([facility])    [address] [place] [address] [phone]           Please do not eat 10-12 hours before your appointment if you are coming in fasting for labs on lipids, cholesterol, or glucose (sugar). This does not apply to pregnant women. Water, hot tea and black coffee (with nothing added) are okay. Do not drink other fluids, diet soda or chew gum.            Dec 13, 2018  4:00 PM CST   Return Visit with Niurka Hoff MD   [facility])    [address] [place] [phone] [phone]            Jan 25, 2019  8:30 AM CST   Return Visit with Ibis Galvan MD   [facility])    [address] [place] [phone] [phone]              Future tests that were ordered for you today     Open Future Orders        Priority Expected Expires Ordered    CT Abdomen Pelvis w/o Contrast Routine 10/23/2018 12/7/2018 10/23/2018            Who to contact     If you have questions or need follow up information about today's clinic visit or your schedule please contact [facility] directly at [phone].  Normal or non-critical lab and imaging results will be communicated to you by MyChart, letter or phone within 4 business days after the clinic has received the results. If you do not hear from us within 7 days, please contact the clinic through MyChart or phone. If you have a critical or abnormal lab result, we will notify you by phone as soon as possible.  Submit refill requests through [employer] or call your pharmacy and they will forward the refill request to us.  Please allow 3 business days for your refill to be completed.          Additional Information About Your Visit        [employer] Information     [employer] gives you secure access to your electronic health record. If you see a primary care provider, you can also send messages to your care team and make appointments. If you have questions, please call your primary care clinic.  If you do not have a primary care provider, please call [phone] and they will assist you.      [employer] is an electronic gateway that provides easy, online access to your medical records. With [employer], you can request a clinic appointment, read your test results, renew a prescription or communicate with your care team.     To access your existing account, please contact your [facility] or call [phone] for assistance.        Care EveryWhere ID     This is your Care EveryWhere ID. This could be used by other organizations to access your [place] medical records  [id number]        Your Vitals Were     Pulse Temperature Last Period Pulse Oximetry Breastfeeding? BMI (Body Mass Index)    104 97.6  F (36.4  C) (Temporal) 09/22/2018 100% No 21.68 kg/m2       Blood Pressure from Last 3 Encounters:   10/23/18 92/60   10/02/18 99/50   09/21/18 (!) 136/93    Weight from Last 3 Encounters:   10/23/18 126 lb 4.8 oz (57.3 kg)   10/02/18 130 lb 9.6 oz (59.2 kg)   09/21/18 124 lb 12.8 oz (56.6 kg)              We Performed the Following     CBC with platelets     Comprehensive metabolic panel     UA with Microscopic reflex to Culture ([facility]; [facility])        Primary Care Provider Office Phone # Fax #    GILDARDO Michaels [phone]       [address]  [place] [address]        Equal Access to Services     JORGE HERNÁNDEZ : Vargas Quan, emmett reis, edy haley, malinda murry. So [facility] [phone].    ATENCIÓN:  Si habla ashu, tiene a spear disposición servicios gratuitos de asistencia lingüística. Nasir hagan [phone].    We comply with applicable federal civil rights laws and [place] laws. We do not discriminate on the basis of race, color, national origin, age, disability, sex, sexual orientation, or gender identity.            Thank you!     Thank you for choosing [facility]  for your care. Our goal is always to provide you with excellent care. Hearing back from our patients is one way we can continue to improve our services. Please take a few minutes to complete the written survey that you may receive in the mail after your visit with us. Thank you!             Your Updated Medication List - Protect others around you: Learn how to safely use, store and throw away your medicines at www.disposemymeds.org.          This list is accurate as of 10/23/18 11:18 AM.  Always use your most recent med list.                   Brand Name Dispense Instructions for use Diagnosis    CALCIUM MAGNESIUM PO      With zinc    RLQ abdominal pain       naproxen 500 MG tablet    NAPROSYN    30 tablet    Take 1 tablet (500 mg) by mouth 2 times daily as needed for moderate pain    Right-sided low back pain without sciatica, unspecified chronicity       ONE-A-DAY ESSENTIAL Tabs      Take 1 tablet by mouth daily.        polyethylene glycol Packet    MIRALAX/GLYCOLAX     Take 1 packet by mouth daily        tiZANidine 4 MG tablet    ZANAFLEX    30 tablet    Take 0.5-1 tablets (2-4 mg) by mouth 3 times daily as needed for muscle spasms    Bilateral low back pain without sciatica, unspecified chronicity

## 2018-10-23 NOTE — PROGRESS NOTES
Hello Sheryle Cruse,    Attached are your test results.  Your urine looks like a urinary tract infection   We are going to send in a an antibiotic for you   -Normal red blood cell (hgb) levels, normal white blood cell count and normal platelet levels.   Please contact us if you have any questions.    Katie Billy, CNP

## 2018-10-23 NOTE — PROGRESS NOTES
SUBJECTIVE:   Sheryle Cruse is a 47 year old female who presents to clinic today for the following health issues:    ABDOMINAL and FLANK PAIN     Onset: 2-3 weeks    Description:   Character: Dull ache and Burning  Location: right upper quadrant  Radiation: None     Intensity: moderate    Progression of Symptoms:  same    Accompanying Signs & Symptoms:  Fever/Chills?: no   Gas/Bloating: no   Nausea: no   Vomitting: no   Diarrhea?: no   Constipation:no   Dysuria or Hematuria: no    History:   Trauma: no   Previous similar pain: YES   Previous tests done: none    Precipitating factors:   Does the pain change with:     Food: YES-noticed the pain after eating    BM: no     Urination: no     Alleviating factors:  none    Therapies Tried and outcome: none    LMP:  9/22/18     Lower back and right side are constantly inflamed, Dull ache to burning sensation, constant, nagging, worse at night.  This pain began 2-3 weeks ago  Right side under ribs, wraps around to back, feels crowded, like something is enlarged and pushing from the inside outside.   Feels worse after eating.   NO pain with urination or bowel movements, has 1-2 every day.   No other s/s.     Problem list and histories reviewed & adjusted, as indicated.  Additional history: as documented    Patient Active Problem List   Diagnosis     Vitamin D deficiency     Vegetarianism     Hyperlipidemia LDL goal <160     Advance care planning     ERRONEOUS ENCOUNTER--DISREGARD     Monoallelic mutation of MUTYH gene     Generalized anxiety disorder     Right-sided low back pain without sciatica     Lumbago     Malignant neoplasm of upper-outer quadrant of right breast in female, estrogen receptor positive (H)     Past Surgical History:   Procedure Laterality Date     MASTECTOMY MODIFIED RADICAL BILATERAL         Social History   Substance Use Topics     Smoking status: Never Smoker     Smokeless tobacco: Never Used     Alcohol use No     Family History   Problem Relation  Age of Onset     Diabetes Mother 76     Obese     HEART DISEASE Mother      Hypertension Mother      Cerebrovascular Disease Mother      Cardiovascular Mother      CVA     Cataracts Mother      Cancer Father      bladder     Cerebrovascular Disease Father 70     Breast Cancer Maternal Grandmother      Cerebrovascular Disease Maternal Grandmother      Lipids Maternal Grandmother      HEART DISEASE Maternal Grandfather      heart disease     Macular Degeneration Maternal Grandfather      Diabetes Paternal Grandmother      Cerebrovascular Disease Paternal Grandfather      HEART DISEASE Paternal Grandfather      Glaucoma No family hx of          Current Outpatient Prescriptions   Medication Sig Dispense Refill     Calcium-Magnesium-Vitamin D (CALCIUM MAGNESIUM PO) With zinc       Multiple Vitamin (ONE-A-DAY ESSENTIAL) TABS Take 1 tablet by mouth daily.       naproxen (NAPROSYN) 500 MG tablet Take 1 tablet (500 mg) by mouth 2 times daily as needed for moderate pain 30 tablet 1     polyethylene glycol (MIRALAX/GLYCOLAX) Packet Take 1 packet by mouth daily       sulfamethoxazole-trimethoprim (BACTRIM DS/SEPTRA DS) 800-160 MG per tablet Take 1 tablet by mouth 2 times daily 14 tablet 0     tiZANidine (ZANAFLEX) 4 MG tablet Take 0.5-1 tablets (2-4 mg) by mouth 3 times daily as needed for muscle spasms 30 tablet 1     Allergies   Allergen Reactions     Contrast Dye      Pt does not recall which type of contrast     Diphenhydramine      Penicillins Rash     swelling     BP Readings from Last 3 Encounters:   10/23/18 92/60   10/02/18 99/50   09/21/18 (!) 136/93    Wt Readings from Last 3 Encounters:   10/23/18 126 lb 4.8 oz (57.3 kg)   10/02/18 130 lb 9.6 oz (59.2 kg)   09/21/18 124 lb 12.8 oz (56.6 kg)           Labs reviewed in EPIC    Reviewed and updated as needed this visit by clinical staff  Tobacco  Allergies  Meds  Med Hx  Surg Hx  Fam Hx  Soc Hx      Reviewed and updated as needed this visit by Provider  Med Hx        ROS:  INTEGUMENTARY/SKIN: NEGATIVE for worrisome rashes, moles or lesions  ENT/MOUTH: NEGATIVE for ear, mouth and throat problems  RESP: NEGATIVE for significant cough or SOB  CV: NEGATIVE for chest pain, palpitations or peripheral edema  GI: POSITIVE for RUQ/Rib pain. See above. NEGATIVE for nausea, heartburn, or change in bowel habits  : normal menstrual cycles  MUSCULOSKELETAL: NEGATIVE for significant arthralgias or myalgia  PSYCHIATRIC: NEGATIVE for changes in mood or affect    OBJECTIVE:     BP 92/60 (BP Location: Right arm, Patient Position: Sitting, Cuff Size: Adult Regular)  Pulse 104  Temp 97.6  F (36.4  C) (Temporal)  Wt 126 lb 4.8 oz (57.3 kg)  LMP 09/22/2018  SpO2 100%  Breastfeeding? No  BMI 21.68 kg/m2  Body mass index is 21.68 kg/(m^2).    GENERAL: healthy, no distress and anxious regarding situation  EYES: Eyes grossly normal to inspection, PERRL and conjunctivae and sclerae normal  NECK: no adenopathy, no asymmetry, masses, or scars and thyroid normal to palpation  RESP: lungs clear to auscultation - no rales, rhonchi or wheezes  CV: regular rate and rhythm, normal S1 S2, no S3 or S4, no murmur, click or rub, no peripheral edema and peripheral pulses strong  ABDOMEN: Soft, slight tenderness to RUQ, no organomegaly or masses, bowel sounds normal and no palpable or pulsatile masses  MS: no gross musculoskeletal defects noted, no edema  BACK: no CVA tenderness    Diagnostic Test Results:  Results for orders placed or performed in visit on 10/23/18   UA with Microscopic reflex to Culture ([place]; [facility])   Result Value Ref Range    Color Urine Yellow     Appearance Urine Cloudy     Glucose Urine Negative NEG^Negative mg/dL    Bilirubin Urine Negative NEG^Negative    Ketones Urine 80 (A) NEG^Negative mg/dL    Specific Gravity Urine 1.013 1.003 - 1.035    Blood Urine Negative NEG^Negative    pH Urine 7.5 (H) 5.0 - 7.0 pH    Protein Albumin Urine 10 (A) NEG^Negative mg/dL     Urobilinogen mg/dL Normal 0.0 - 2.0 mg/dL    Nitrite Urine Negative NEG^Negative    Leukocyte Esterase Urine Large (A) NEG^Negative    Source Midstream Urine     WBC Urine 10-25 (A) OTO5^0 - 5 /HPF    RBC Urine O - 2 OTO2^O - 2 /HPF    Bacteria Urine Few (A) NEG^Negative /HPF    Squamous Epithelial /LPF Urine Few FEW^Few /LPF    Amorphous Crystals Many (A) NEG^Negative /HPF   Comprehensive metabolic panel   Result Value Ref Range    Sodium 136 133 - 144 mmol/L    Potassium 4.7 3.4 - 5.3 mmol/L    Chloride 99 94 - 109 mmol/L    Carbon Dioxide 24 20 - 32 mmol/L    Anion Gap 13 3 - 14 mmol/L    Glucose 80 70 - 99 mg/dL    Urea Nitrogen 10 7 - 30 mg/dL    Creatinine 0.54 0.52 - 1.04 mg/dL    GFR Estimate >90 >60 mL/min/1.7m2    GFR Estimate If Black >90 >60 mL/min/1.7m2    Calcium 9.5 8.5 - 10.1 mg/dL    Bilirubin Total 1.1 0.2 - 1.3 mg/dL    Albumin 4.2 3.4 - 5.0 g/dL    Protein Total 8.2 6.8 - 8.8 g/dL    Alkaline Phosphatase 58 40 - 150 U/L    ALT 26 0 - 50 U/L    AST 24 0 - 45 U/L   CBC with platelets   Result Value Ref Range    WBC 6.4 4.0 - 11.0 10e9/L    RBC Count 4.08 3.8 - 5.2 10e12/L    Hemoglobin 12.8 11.7 - 15.7 g/dL    Hematocrit 38.4 35.0 - 47.0 %    MCV 94 78 - 100 fl    MCH 31.4 26.5 - 33.0 pg    MCHC 33.3 31.5 - 36.5 g/dL    RDW 12.0 10.0 - 15.0 %    Platelet Count 413 150 - 450 10e9/L       ASSESSMENT/PLAN:       Sheryle was seen today for abdominal pain.    Diagnoses and all orders for this visit:    Abdominal pain, generalized  -     UA with Microscopic reflex to Culture ([facility]; [facility])  -     Comprehensive metabolic panel  -     CBC with platelets  -     CT Abdomen Pelvis w/o Contrast; Future    Nonspecific finding on examination of urine  -     Urine Culture Aerobic Bacterial    Acute cystitis without hematuria  -     sulfamethoxazole-trimethoprim (BACTRIM DS/SEPTRA DS) 800-160 MG per tablet; Take 1 tablet by mouth 2 times daily    PLAN:   1.   Orders Placed This Encounter    Procedures     CT Abdomen Pelvis w/o Contrast     UA with Microscopic reflex to Culture ([place]; [facility])     Comprehensive metabolic panel     CBC with platelets     2. Patient needs to follow up in if no improvement,or sooner if worsening of symptoms or other symptoms develop.  FURTHER TESTING:       - CT abdomen and pelvis   Will follow up and/or notify patient of  results via My Chart to determine further need for followup    See Patient Instructions    Dianna Hannah APRN Student  I have examined the patient and agree with written evaluation. Assessment and plan presented by this provider.   Katie Billy, BRANDEN Billy, APRN CNP  [facility]

## 2018-10-23 NOTE — NURSING NOTE
"Chief Complaint   Patient presents with     Abdominal Pain     RUQ abdominal pain x 2-3 weeks       Initial BP 92/60 (BP Location: Right arm, Patient Position: Sitting, Cuff Size: Adult Regular)  Pulse 104  Temp 97.6  F (36.4  C) (Temporal)  Wt 126 lb 4.8 oz (57.3 kg)  LMP 09/22/2018  SpO2 100%  Breastfeeding? No  BMI 21.68 kg/m2 Estimated body mass index is 21.68 kg/(m^2) as calculated from the following:    Height as of 10/2/18: 5' 4\" (1.626 m).    Weight as of this encounter: 126 lb 4.8 oz (57.3 kg).  Medication Reconciliation: complete      EDIE Ley      "

## 2018-10-23 NOTE — PATIENT INSTRUCTIONS
PLAN:   1.   Orders Placed This Encounter   Procedures     CT Abdomen Pelvis w/o Contrast     UA with Microscopic reflex to Culture ([place]; [facility])     Comprehensive metabolic panel     CBC with platelets     2. Patient needs to follow up in if no improvement,or sooner if worsening of symptoms or other symptoms develop.  FURTHER TESTING:       - CT abdomen and pelvis   Will follow up and/or notify patient of  results via My Chart to determine further need for followup      It was a pleasure seeing you today at the [facility]. Thank you for allowing us to care for you today. We truly hope we provided you with the excellent service you deserve. Please let us know if there is anything else we can do for you so we can be sure you are leaving completley satisfied with your care experience.       General information about your clinic   Clinic Hours Lab Hours (Appointments are required)   Mon-Thurs: 7:30 AM - 7 PM Mon-Thurs: 7:30 AM - 7 PM   Fri: 7:30 AM - 5 PM Fri: 7:30 AM - 5 PM        After Hours Nurse Advise & Appts:  [facility] Nurse Advisors: [phone]  [facility] On Call: to make appointments anytime: [phone] On Call Physician: call [phone] and answering service will page the on call physician.        For urgent appointments, please call [phone] and ask for the triage nurse or your care team clinic nurse.  How to contact my care team:  MyChart: [email or web address]   Phone: [phone]   Fax: [phone]       [facility]:   Phone: [phone]  Hours: 8:00 AM - 6:00 PM  Medication Refills:  Call your pharmacy and they will forward the refill to us. Please allow 3 business days for your refills to be completed.       Normal or non-critical lab and imaging results will be communicated to you by MyChart, letter or phone within 7 days.  If you do not hear from us within 10 days, please call the clinic. If you have a critical or abnormal lab  result, we will notify you by phone as soon as possible.       We now have PWIC (Pediatric Walk in Care)  Monday-Friday from 7:30-4. Simply walk in and be seen for your urgent needs like cough, fever, rash, diarrhea or vomiting, pink eye, UTI. No appointments needed. Ask one of the team for more information      -Your Care Team:    Dr. Stephanie Theodore - Internal Medicine/Pediatrics   Dr. Shirley Wesley - Family Medicine  Dr. Loli Nichols - Pediatrics  Dr. Vale Santillan - Pediatrics  Katie Billy CNP - Family Practice Nurse Practitioner

## 2018-10-24 LAB
BACTERIA SPEC CULT: NORMAL
SPECIMEN SOURCE: NORMAL

## 2018-10-24 NOTE — PROGRESS NOTES
Hello Sheryle Cruse,    Attached are your test results.  -Urine culture is abnormal but it looks like a contaminated, non clean-catch sample.  There is no need for antibiotics at this point.  If new, worsening, or persistent symptoms occur then you should call or return for a recheck.     Please contact us if you have any questions.    Katie Billy, CNP

## 2018-10-25 LAB
ANA PAT SER IF-IMP: ABNORMAL
ANA SER QL IF: POSITIVE
ANA TITR SER IF: ABNORMAL {TITER}

## 2018-10-31 DIAGNOSIS — R76.8 POSITIVE ANA (ANTINUCLEAR ANTIBODY): Primary | ICD-10-CM

## 2018-10-31 DIAGNOSIS — L65.9 ALOPECIA: ICD-10-CM

## 2018-11-01 ENCOUNTER — RADIANT APPOINTMENT (OUTPATIENT)
Dept: CT IMAGING | Facility: CLINIC | Age: 48
End: 2018-11-01
Attending: NURSE PRACTITIONER
Payer: COMMERCIAL

## 2018-11-01 DIAGNOSIS — R10.84 ABDOMINAL PAIN, GENERALIZED: ICD-10-CM

## 2018-11-01 PROCEDURE — 74176 CT ABD & PELVIS W/O CONTRAST: CPT | Performed by: RADIOLOGY

## 2018-11-01 NOTE — PROGRESS NOTES
Hello Sheryle Cruse,    Attached are your test results.  Your CT scan is negative except for a moderate amount of stool which is consistent with some constipation  A high fiber diet with plenty of fluids (up to 8 glasses of water daily) is suggested to relieve these symptoms.    Would do the Miralax daily and see if helps with the symptoms      Please contact us if you have any questions.    Katie Billy, CNP

## 2018-11-05 DIAGNOSIS — Z17.0 MALIGNANT NEOPLASM OF UPPER-OUTER QUADRANT OF RIGHT BREAST IN FEMALE, ESTROGEN RECEPTOR POSITIVE (H): Primary | ICD-10-CM

## 2018-11-05 DIAGNOSIS — C50.411 MALIGNANT NEOPLASM OF UPPER-OUTER QUADRANT OF RIGHT BREAST IN FEMALE, ESTROGEN RECEPTOR POSITIVE (H): Primary | ICD-10-CM

## 2018-11-06 ENCOUNTER — TELEPHONE (OUTPATIENT)
Dept: SPIRITUAL SERVICES | Facility: CLINIC | Age: 48
End: 2018-11-06
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
## 2018-11-08 ENCOUNTER — TELEPHONE (OUTPATIENT)
Dept: DERMATOLOGY | Facility: CLINIC | Age: 48
End: 2018-11-08

## 2018-11-08 ENCOUNTER — OFFICE VISIT (OUTPATIENT)
Dept: PSYCHOLOGY | Facility: CLINIC | Age: 48
End: 2018-11-08
Payer: COMMERCIAL

## 2018-11-08 ENCOUNTER — OFFICE VISIT (OUTPATIENT)
Dept: SURGERY | Facility: CLINIC | Age: 48
End: 2018-11-08
Payer: COMMERCIAL

## 2018-11-08 DIAGNOSIS — Z85.3 PERSONAL HISTORY OF MALIGNANT NEOPLASM OF BREAST: Primary | ICD-10-CM

## 2018-11-08 DIAGNOSIS — F41.1 GENERALIZED ANXIETY DISORDER: Primary | ICD-10-CM

## 2018-11-08 DIAGNOSIS — Z85.3 HISTORY OF BREAST CANCER: Primary | ICD-10-CM

## 2018-11-08 PROCEDURE — 99213 OFFICE O/P EST LOW 20 MIN: CPT | Performed by: SURGERY

## 2018-11-08 PROCEDURE — 90834 PSYTX W PT 45 MINUTES: CPT | Performed by: PSYCHOLOGIST
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
## 2018-11-08 NOTE — LETTER
11/8/2018         RE: Sheryle Cruse  [address]  [facility] [address]        Dear Colleague,    Thank you for referring your patient, Sheryle Cruse, to the [facility]. Please see a copy of my visit note below.    [facility] Follow Up Note    CHIEF COMPLAINT:      HISTORY OF PRESENT ILLNESS:  Sheryle Cruse is a 47 year old female who is seen in follow up due to history of right breast cancer. 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 Her surgery was on 10/4/2017 with Dr. Gonzalez. She did not have reconstruction. She underwent adjuvant radiation therapy. She declined hormonal therapy and chemotherapy.     I saw Esther in May 2018 for follow up and she was very concerned about lymphedema at that time and felt tight on the right. She had seen physical therapy and reports her therapist felt she was doing well. No evidence of lymphedema. Sheryle reports she still feels tight across her right chest following radiation. She is otherwise doing pretty well. She was recently treated for a UTI. She reports issues on her entire right side which she attributes to her mastectomy including back pain; though she had this prior to her breast cancer diagnosis. She is exercising usually 5 times per week. She is vegetarian and eats a low fat diet.     REVIEW OF SYSTEMS:  Constitutional:  Negative for chills, fatigue, fever and weight change.  Eyes:  Negative for blurred vision, eye pain and photophobia.  ENT:  Negative for hearing problems, ENT pain, congestion, rhinorrhea, epistaxis, hoarseness and dental problems.  Cardiovascular:  Negative for chest pain, palpitations, tachycardia, orthopnea and edema.  Respiratory:  Negative for cough, dyspnea and hemoptysis.  Gastrointestinal:  Negative for abdominal pain, heartburn, constipation, diarrhea and stool changes.  Musculoskeletal:  Negative for arthralgias, back pain  and myalgias.  Integumentary/Breast:  See HPI.    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     Vitamin D deficiency        Past Surgical History:   Procedure Laterality Date     MASTECTOMY MODIFIED RADICAL BILATERAL         Family History   Problem Relation Age of Onset     Diabetes Mother 76     Obese     HEART DISEASE Mother      Hypertension Mother      Cerebrovascular Disease Mother      Cardiovascular Mother      CVA     Cataracts Mother      Cancer Father      bladder     Cerebrovascular Disease Father 70     Breast Cancer Maternal Grandmother      Cerebrovascular Disease Maternal Grandmother      Lipids Maternal Grandmother      HEART DISEASE Maternal Grandfather      heart disease     Macular Degeneration Maternal Grandfather      Diabetes Paternal Grandmother      Cerebrovascular Disease Paternal Grandfather      HEART DISEASE Paternal Grandfather      Glaucoma No family hx of        Social History   Substance Use Topics     Smoking status: Never Smoker     Smokeless tobacco: Never Used     Alcohol use No       Patient Active Problem List   Diagnosis     Vitamin D deficiency     Vegetarianism     Hyperlipidemia LDL goal <160     Advance care planning     ERRONEOUS ENCOUNTER--DISREGARD     Monoallelic mutation of MUTYH gene     Generalized anxiety disorder     Right-sided low back pain without sciatica     Lumbago     Malignant neoplasm of upper-outer quadrant of right breast in female, estrogen receptor positive (H)     Allergies   Allergen Reactions     Contrast Dye      Pt does not recall which type of contrast     Diphenhydramine      Penicillins Rash      swelling     Current Outpatient Prescriptions   Medication Sig Dispense Refill     Calcium-Magnesium-Vitamin D (CALCIUM MAGNESIUM PO) With zinc       Multiple Vitamin (ONE-A-DAY ESSENTIAL) TABS Take 1 tablet by mouth daily.       naproxen (NAPROSYN) 500 MG tablet Take 1 tablet (500 mg) by mouth 2 times daily as needed for moderate pain 30 tablet 1     polyethylene glycol (MIRALAX/GLYCOLAX) Packet Take 1 packet by mouth daily       sulfamethoxazole-trimethoprim (BACTRIM DS/SEPTRA DS) 800-160 MG per tablet Take 1 tablet by mouth 2 times daily 14 tablet 0     tiZANidine (ZANAFLEX) 4 MG tablet Take 0.5-1 tablets (2-4 mg) by mouth 3 times daily as needed for muscle spasms 30 tablet 1       EXAM:  GENERAL: healthy, alert and no distress   BREAST:  Breasts are surgically absent. Well healed mastectomy scars. Excess axillary tissue on right. No lymphedema. No masses.   There is no axillary or supraclavicular lymphadenopathy.  CARDIOVASCULAR:  RRR  RESPIRATORY: nonlabored breathing  NECK: Neck supple. No adenopathy. Thyroid symmetric, normal size,, Carotids without bruits.  SKIN: No suspicious lesions or rashes  LYMPH: Normal cervical lymph nodes      ASSESSMENT:  Sheryle Cruse is a 47yof w h/o locally advanced right breast cancer s/p bilateral mastectomies, right SLNB and radiation. No evidence of recurrence on exam. She should have annual chest exams. We discussed the importance of exercise and low fat diet to decrease risk of recurrence. We also discussed that she could consider seeing Hedy Luther who is a physical therapist who sees many breast cancer patients.     PLAN:  Referral to Hedy Luther, PT  Follow up with Dr. Hoff going forward  Follow up with me prn.     Gwendolyn Ackerman MD  [facility]  [phone]        Please route or send letter to:  Primary Care Provider (PCP) and Referring Provider        Again, thank you for allowing me to participate in the care of your patient.         Sincerely,        Gwendolyn Ackerman MD

## 2018-11-08 NOTE — NURSING NOTE
Sheryle Cruse's goals for this visit include: 6 month follow up breast cancer  She requests these members of her care team be copied on today's visit information: no    PCP: Katie Billy    Referring Provider:  No referring provider defined for this encounter.    There were no vitals taken for this visit.    Do you need any medication refills at today's visit? No    Esther Valerio LPN

## 2018-11-08 NOTE — TELEPHONE ENCOUNTER
Patient was in clinic to see Dr. Ackerman today. She stated that Dr. Galvan put in a referral for Rheumatology. Forwarding to  to call patient to schedule. She tested positive for ZHANNA.    Patient has labs scheduled to be done on 11/14/18 and was wondering if Rheumatology could add any extra labs that should be ordered so she can have all of them drawn at once. Writer spoke with Dr. Regalado's CMA and she stated that patient would need to been seen in clinic by Rheum before it was determined which labs they would need to order.    Ema - can you please relay this message to patient when calling to schedule? Please let me know if you need any additional information.    Thank you!    Esther Valerio LPN

## 2018-11-08 NOTE — MR AVS SNAPSHOT
After Visit Summary   11/8/2018    Sheryle Cruse    MRN: [id number]           Patient Information     Date Of Birth          1970        Visit Information        Provider Department      11/8/2018 8:00 AM Gwendolyn Ackerman MD [facility]        Today's Diagnoses     History of breast cancer    -  1       Follow-ups after your visit        Your next 10 appointments already scheduled     Nov 08, 2018 10:00 AM CST   Return Visit with Yoselyn Mckay, PhD   [facility] ([facility])    [address] [place] [address] [phone]            Nov 14, 2018  7:30 AM CST   LAB with LAB ONC [facility] ([facility])    [address] [place] [address] [phone]           Please do not eat 10-12 hours before your appointment if you are coming in fasting for labs on lipids, cholesterol, or glucose (sugar). This does not apply to pregnant women. Water, hot tea and black coffee (with nothing added) are okay. Do not drink other fluids, diet soda or chew gum.            Dec 13, 2018  4:00 PM CST   Return Visit with Niurka Hoff MD   [facility] ([facility]    [address] [place] [address] [phone]            Jan 25, 2019  8:30 AM CST   Return Visit with Ibis Galvan MD   [facility] ([facility])    [address] [place] [address] [phone]              Who to contact     If you have questions or need follow up information about today's clinic visit or your schedule please contact [facility] directly at [phone].  Normal or non-critical lab and imaging results will be communicated to you by MyChart, letter or phone within 4 business days after the clinic has received the results. If you do not hear from us within 7 days, please  contact the clinic through [employer] or phone. If you have a critical or abnormal lab result, we will notify you by phone as soon as possible.  Submit refill requests through [employer] or call your pharmacy and they will forward the refill request to us. Please allow 3 business days for your refill to be completed.          Additional Information About Your Visit        [employer] Information     [employer] gives you secure access to your electronic health record. If you see a primary care provider, you can also send messages to your care team and make appointments. If you have questions, please call your primary care clinic.  If you do not have a primary care provider, please call [phone] and they will assist you.      [employer] is an electronic gateway that provides easy, online access to your medical records. With [employer], you can request a clinic appointment, read your test results, renew a prescription or communicate with your care team.     To access your existing account, please contact your [facility] or call [phone] for assistance.        Care EveryWhere ID     This is your Care EveryWhere ID. This could be used by other organizations to access your [facility] medical records  [id number]         Blood Pressure from Last 3 Encounters:   10/23/18 92/60   10/02/18 99/50   09/21/18 (!) 136/93    Weight from Last 3 Encounters:   10/23/18 126 lb 4.8 oz (57.3 kg)   10/02/18 130 lb 9.6 oz (59.2 kg)   09/21/18 124 lb 12.8 oz (56.6 kg)              Today, you had the following     No orders found for display       Primary Care Provider Office Phone # Fax #    KatieGILDARDO Davis [facility] [phone]       [address]  [place] [address]        Equal Access to Services     JORGE HERNÁNDEZ : Vargas Quan, walacyda chato, qaybta kaalpenny haley, malinda murry. So [facility] [phone].    ATENCIÓN: Si cici [place],  tiene a spear disposición servicios gratuitos de asistencia lingüística. Nasir hagan [phone].    We comply with applicable federal civil rights laws and [place] laws. We do not discriminate on the basis of race, color, national origin, age, disability, sex, sexual orientation, or gender identity.            Thank you!     Thank you for choosing [facility]  for your care. Our goal is always to provide you with excellent care. Hearing back from our patients is one way we can continue to improve our services. Please take a few minutes to complete the written survey that you may receive in the mail after your visit with us. Thank you!             Your Updated Medication List - Protect others around you: Learn how to safely use, store and throw away your medicines at www.disposemymeds.org.          This list is accurate as of 11/8/18  8:25 AM.  Always use your most recent med list.                   Brand Name Dispense Instructions for use Diagnosis    CALCIUM MAGNESIUM PO      With zinc    RLQ abdominal pain       naproxen 500 MG tablet    NAPROSYN    30 tablet    Take 1 tablet (500 mg) by mouth 2 times daily as needed for moderate pain    Right-sided low back pain without sciatica, unspecified chronicity       ONE-A-DAY ESSENTIAL Tabs      Take 1 tablet by mouth daily.        polyethylene glycol Packet    MIRALAX/GLYCOLAX     Take 1 packet by mouth daily        sulfamethoxazole-trimethoprim 800-160 MG per tablet    BACTRIM DS/SEPTRA DS    14 tablet    Take 1 tablet by mouth 2 times daily    Acute cystitis without hematuria       tiZANidine 4 MG tablet    ZANAFLEX    30 tablet    Take 0.5-1 tablets (2-4 mg) by mouth 3 times daily as needed for muscle spasms    Bilateral low back pain without sciatica, unspecified chronicity

## 2018-11-08 NOTE — LETTER
2018    Re: Sheryle Cruse - 1970    HISTORY OF PRESENT ILLNESS:  Sheryle Cruse is a 47 year old female who is seen in follow up due to history of right breast cancer. 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 Her surgery was on 10/4/2017 with Dr. Gonzalez. She did not have reconstruction. She underwent adjuvant radiation therapy. She declined hormonal therapy and chemotherapy.      I saw Esther in May 2018 for follow up and she was very concerned about lymphedema at that time and felt tight on the right. She had seen physical therapy and reports her therapist felt she was doing well. No evidence of lymphedema. Sheryle reports she still feels tight across her right chest following radiation. She is otherwise doing pretty well. She was recently treated for a UTI. She reports issues on her entire right side which she attributes to her mastectomy including back pain; though she had this prior to her breast cancer diagnosis. She is exercising usually 5 times per week. She is vegetarian and eats a low fat diet.      REVIEW OF SYSTEMS:  Constitutional:  Negative for chills, fatigue, fever and weight change.  Eyes:  Negative for blurred vision, eye pain and photophobia.  ENT:  Negative for hearing problems, ENT pain, congestion, rhinorrhea, epistaxis, hoarseness and dental problems.  Cardiovascular:  Negative for chest pain, palpitations, tachycardia, orthopnea and edema.  Respiratory:  Negative for cough, dyspnea and hemoptysis.  Gastrointestinal:  Negative for abdominal pain, heartburn, constipation, diarrhea and stool changes.  Musculoskeletal:  Negative for arthralgias, back pain and myalgias.  Integumentary/Breast:  See HPI.      EXAM:  GENERAL: healthy, alert and no distress   BREAST:  Breasts are surgically absent. Well healed mastectomy scars. Excess axillary tissue on right. No lymphedema. No masses.   There is no axillary or  supraclavicular lymphadenopathy.  CARDIOVASCULAR:  RRR  RESPIRATORY: nonlabored breathing  NECK: Neck supple. No adenopathy. Thyroid symmetric, normal size,, Carotids without bruits.  SKIN: No suspicious lesions or rashes  LYMPH: Normal cervical lymph nodes        ASSESSMENT:  Sheryle Cruse is a 47yof w h/o locally advanced right breast cancer s/p bilateral mastectomies, right SLNB and radiation. No evidence of recurrence on exam. She should have annual chest exams. We discussed the importance of exercise and low fat diet to decrease risk of recurrence. We also discussed that she could consider seeing Hedy Luther who is a physical therapist who sees many breast cancer patients.      PLAN:  Referral to Hedy Luther, PT  Follow up with Dr. Hoff going forward  Follow up with me prn.      Gwendolyn Ackerman MD  [facility]  [phone]

## 2018-11-08 NOTE — PROGRESS NOTES
[facility] Follow Up Note    CHIEF COMPLAINT:      HISTORY OF PRESENT ILLNESS:  Sheryle Cruse is a 47 year old female who is seen in follow up due to history of right breast cancer. 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 Her surgery was on 10/4/2017 with Dr. Gonzalez. She did not have reconstruction. She underwent adjuvant radiation therapy. She declined hormonal therapy and chemotherapy.     I saw Esther in May 2018 for follow up and she was very concerned about lymphedema at that time and felt tight on the right. She had seen physical therapy and reports her therapist felt she was doing well. No evidence of lymphedema. Sheryle reports she still feels tight across her right chest following radiation. She is otherwise doing pretty well. She was recently treated for a UTI. She reports issues on her entire right side which she attributes to her mastectomy including back pain; though she had this prior to her breast cancer diagnosis. She is exercising usually 5 times per week. She is vegetarian and eats a low fat diet.     REVIEW OF SYSTEMS:  Constitutional:  Negative for chills, fatigue, fever and weight change.  Eyes:  Negative for blurred vision, eye pain and photophobia.  ENT:  Negative for hearing problems, ENT pain, congestion, rhinorrhea, epistaxis, hoarseness and dental problems.  Cardiovascular:  Negative for chest pain, palpitations, tachycardia, orthopnea and edema.  Respiratory:  Negative for cough, dyspnea and hemoptysis.  Gastrointestinal:  Negative for abdominal pain, heartburn, constipation, diarrhea and stool changes.  Musculoskeletal:  Negative for arthralgias, back pain and myalgias.  Integumentary/Breast:  See HPI.    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 (c.1187G>A) Digital [employer] 8/10/17     Vitamin D deficiency        Past Surgical History:   Procedure Laterality Date     MASTECTOMY MODIFIED RADICAL BILATERAL         Family History   Problem Relation Age of Onset     Diabetes Mother 76     Obese     HEART DISEASE Mother      Hypertension Mother      Cerebrovascular Disease Mother      Cardiovascular Mother      CVA     Cataracts Mother      Cancer Father      bladder     Cerebrovascular Disease Father 70     Breast Cancer Maternal Grandmother      Cerebrovascular Disease Maternal Grandmother      Lipids Maternal Grandmother      HEART DISEASE Maternal Grandfather      heart disease     Macular Degeneration Maternal Grandfather      Diabetes Paternal Grandmother      Cerebrovascular Disease Paternal Grandfather      HEART DISEASE Paternal Grandfather      Glaucoma No family hx of        Social History   Substance Use Topics     Smoking status: Never Smoker     Smokeless tobacco: Never Used     Alcohol use No       Patient Active Problem List   Diagnosis     Vitamin D deficiency     Vegetarianism     Hyperlipidemia LDL goal <160     Advance care planning     ERRONEOUS ENCOUNTER--DISREGARD     Monoallelic mutation of MUTYH gene     Generalized anxiety disorder     Right-sided low back pain without sciatica     Lumbago     Malignant neoplasm of upper-outer quadrant of right breast in female, estrogen receptor positive (H)     Allergies   Allergen Reactions     Contrast Dye      Pt does not recall which type of contrast     Diphenhydramine      Penicillins Rash     swelling     Current Outpatient Prescriptions   Medication Sig Dispense Refill     Calcium-Magnesium-Vitamin D (CALCIUM MAGNESIUM PO) With zinc       Multiple Vitamin (ONE-A-DAY ESSENTIAL) TABS Take 1 tablet by mouth daily.       naproxen (NAPROSYN)  500 MG tablet Take 1 tablet (500 mg) by mouth 2 times daily as needed for moderate pain 30 tablet 1     polyethylene glycol (MIRALAX/GLYCOLAX) Packet Take 1 packet by mouth daily       sulfamethoxazole-trimethoprim (BACTRIM DS/SEPTRA DS) 800-160 MG per tablet Take 1 tablet by mouth 2 times daily 14 tablet 0     tiZANidine (ZANAFLEX) 4 MG tablet Take 0.5-1 tablets (2-4 mg) by mouth 3 times daily as needed for muscle spasms 30 tablet 1       EXAM:  GENERAL: healthy, alert and no distress   BREAST:  Breasts are surgically absent. Well healed mastectomy scars. Excess axillary tissue on right. No lymphedema. No masses.   There is no axillary or supraclavicular lymphadenopathy.  CARDIOVASCULAR:  RRR  RESPIRATORY: nonlabored breathing  NECK: Neck supple. No adenopathy. Thyroid symmetric, normal size,, Carotids without bruits.  SKIN: No suspicious lesions or rashes  LYMPH: Normal cervical lymph nodes      ASSESSMENT:  Sheryle Cruse is a 47yof w h/o locally advanced right breast cancer s/p bilateral mastectomies, right SLNB and radiation. No evidence of recurrence on exam. She should have annual chest exams. We discussed the importance of exercise and low fat diet to decrease risk of recurrence. We also discussed that she could consider seeing Hedy Luther who is a physical therapist who sees many breast cancer patients.     PLAN:  Referral to Hedy Luther, PT  Follow up with Dr. Hoff going forward  Follow up with me prn.     Gwendolyn Ackerman MD  [facility]  [phone]        Please route or send letter to:  Primary Care Provider (PCP) and Referring Provider

## 2018-11-08 NOTE — MR AVS SNAPSHOT
MRN:[id number]                      After Visit Summary   11/8/2018    Sheryle Cruse    MRN: [id number]           Visit Information        Provider Department      11/8/2018 10:00 AM Yoselyn Mckay, PhD [facility] DISCHARGE      Your next 10 appointments already scheduled     Nov 14, 2018  7:30 AM CST   LAB with LAB ONC [facility] ([facility])    [address] [place] [phone]   [phone]           Please do not eat 10-12 hours before your appointment if you are coming in fasting for labs on lipids, cholesterol, or glucose (sugar). This does not apply to pregnant women. Water, hot tea and black coffee (with nothing added) are okay. Do not drink other fluids, diet soda or chew gum.            Dec 13, 2018  4:00 PM CST   Return Visit with Niurka Hoff MD   [facility])    [address] [place] [id number] [phone]            Jan 25, 2019  8:30 AM CST   Return Visit with Ibis Galvan MD   [facility])    [address] [place] [phone]              MyChart Information     [employer] gives you secure access to your electronic health record. If you see a primary care provider, you can also send messages to your care team and make appointments. If you have questions, please call your primary care clinic.  If you do not have a primary care provider, please call [phone] and they will assist you.        Care EveryWhere ID     This is your Care EveryWhere ID. This could be used by other organizations to access your [place] medical records  [id number]        Equal Access to Services     JORGE HERNÁNDEZ : Vargas Quan, emmett reis, malinda katz. So [facility]  [phone].    ATENCIÓN: Si habla español, tiene a spear disposición servicios gratuitos de asistencia lingüística. Llame al [phone].    We comply with applicable federal civil rights laws and [place] laws. We do not discriminate on the basis of race, color, national origin, age, disability, sex, sexual orientation, or gender identity.

## 2018-11-09 NOTE — TELEPHONE ENCOUNTER
Patient has been scheduled with Dr. Regalado on 1/4/19, she requested an appointment in January due to all her appointments in December. She declined to schedule at the [facility], only want [place].    Ema GARCIA [facility]~Specialty/Med Surg   [phone]

## 2018-11-14 DIAGNOSIS — C50.411 MALIGNANT NEOPLASM OF UPPER-OUTER QUADRANT OF RIGHT BREAST IN FEMALE, ESTROGEN RECEPTOR POSITIVE (H): ICD-10-CM

## 2018-11-14 DIAGNOSIS — Z17.0 MALIGNANT NEOPLASM OF UPPER-OUTER QUADRANT OF RIGHT BREAST IN FEMALE, ESTROGEN RECEPTOR POSITIVE (H): ICD-10-CM

## 2018-11-14 LAB — CANCER AG27-29 SERPL-ACNC: 12 U/ML (ref 0–39)

## 2018-11-14 PROCEDURE — 36415 COLL VENOUS BLD VENIPUNCTURE: CPT | Performed by: NURSE PRACTITIONER

## 2018-11-14 PROCEDURE — 86300 IMMUNOASSAY TUMOR CA 15-3: CPT | Performed by: NURSE PRACTITIONER

## 2018-12-13 ENCOUNTER — ONCOLOGY VISIT (OUTPATIENT)
Dept: ONCOLOGY | Facility: CLINIC | Age: 48
End: 2018-12-13
Payer: COMMERCIAL

## 2018-12-13 ENCOUNTER — DOCUMENTATION ONLY (OUTPATIENT)
Dept: SPIRITUAL SERVICES | Facility: CLINIC | Age: 48
End: 2018-12-13

## 2018-12-13 ENCOUNTER — OFFICE VISIT (OUTPATIENT)
Dept: PSYCHOLOGY | Facility: CLINIC | Age: 48
End: 2018-12-13
Payer: COMMERCIAL

## 2018-12-13 VITALS
TEMPERATURE: 98.4 F | RESPIRATION RATE: 20 BRPM | OXYGEN SATURATION: 96 % | HEIGHT: 64 IN | DIASTOLIC BLOOD PRESSURE: 83 MMHG | WEIGHT: 127 LBS | HEART RATE: 114 BPM | BODY MASS INDEX: 21.68 KG/M2 | SYSTOLIC BLOOD PRESSURE: 116 MMHG

## 2018-12-13 DIAGNOSIS — Z17.0 MALIGNANT NEOPLASM OF UPPER-OUTER QUADRANT OF RIGHT BREAST IN FEMALE, ESTROGEN RECEPTOR POSITIVE (H): Primary | ICD-10-CM

## 2018-12-13 DIAGNOSIS — C50.411 MALIGNANT NEOPLASM OF UPPER-OUTER QUADRANT OF RIGHT BREAST IN FEMALE, ESTROGEN RECEPTOR POSITIVE (H): Primary | ICD-10-CM

## 2018-12-13 DIAGNOSIS — F41.1 GENERALIZED ANXIETY DISORDER: Primary | ICD-10-CM

## 2018-12-13 PROCEDURE — 99214 OFFICE O/P EST MOD 30 MIN: CPT | Performed by: INTERNAL MEDICINE

## 2018-12-13 PROCEDURE — 90832 PSYTX W PT 30 MINUTES: CPT | Performed by: SOCIAL WORKER

## 2018-12-13 ASSESSMENT — PAIN SCALES - GENERAL: PAINLEVEL: NO PAIN (0)

## 2018-12-13 ASSESSMENT — MIFFLIN-ST. JEOR: SCORE: 1196.07

## 2018-12-13 NOTE — PROGRESS NOTES
[facility]  December 13, 2018      Behavioral Health Clinician Progress Note    Patient Name: Sheryle Cruse           Service Type:  Individual      Service Location:   Face to Face in Clinic     Session Start Time: 2: 34 pm  Session End Time: 3: 07 pm      Session Length: 16 - 37      Attendees: Patient    Visit Activities (Refresh list every visit): [facility] Only    Diagnostic Assessment Date: 11/17/17  Treatment Plan Review Date: 6/29/18  See Flowsheets for today's PHQ-9 and MAGGY-7 results  Previous PHQ-9:   PHQ-9 SCORE 9/22/2017 2/1/2018 9/4/2018   PHQ-9 Total Score 11 6 3     Previous MAGGY-7:   MAGGY-7 SCORE 9/22/2017 2/1/2018 9/4/2018   Total Score 14 14 10       FRANCISCO LEVEL:  FRANCISCO Score (Last Two) 8/22/2016 9/22/2017   FRANCISCO Raw Score 36 29   Activation Score 47.4 52.9   FRANCISCO Level 2 2       DATA  Extended Session (60+ minutes): No  Interactive Complexity: No  Crisis: No  [facility] Patient: No    Treatment Objective(s) Addressed in This Session:  Target Behavior(s): disease management/lifestyle changes , mental health management    Depressed Mood: Identify negative self-talk and behaviors: challenge core beliefs, myths, and actions    Current Stressors / Issues:  Patient stated that she wanted to provide the [facility] an update since the last office visit. She reported that she met with Dr. Yoselyn Mckay during [facility] maternity leave, and has decided to pursue EMDR.  Patient has an appointment with Jenniffer Tran in early February. Patient discussed goals for treatment, and was receptive to exploring and attempting to operationalize progress to know what she is working towards in treatment.  Patient discussed belief that her anxiety has stabilized despite moving, her 's potential health concerns, and the stress of ongoing navigation within her relationship with her mother. Patient shared that she has become more confident in her ability to establish boundaries with her mother, and feels better knowing that  she is able to follow through with her boundaries.     Progress on Treatment Objective(s) / Homework:  Stable - MAINTENANCE (Working to maintain change, with risk of relapse); Intervened by continuing to positively reinforce healthy behavior choice     Motivational Interviewing    MI Intervention: Expressed Empathy/Understanding, Supported Autonomy, Collaboration, Evocation, Permission to raise concern or advise, Open-ended questions and Reframe     Change Talk Expressed by the Patient: Ability to change Reasons to change Need to change    Provider Response to Change Talk: E - Evoked more info from patient about behavior change, A - Affirmed patient's thoughts, decisions, or attempts at behavior change, R - Reflected patient's change talk and S - Summarized patient's change talk statements    Also provided psychoeducation about behavioral health condition, symptoms, and treatment options    Care Plan review completed: Yes    Medication Review:  No current psychiatric medications prescribed    Medication Compliance:  NA    Changes in Health Issues:   Yes: radiation completed, needing to make decisions on next steps in treatment    Chemical Use Review:   Substance Use: Chemical use reviewed, no active concerns identified      Tobacco Use: No current tobacco use.      Assessment: Current Emotional / Mental Status (status of significant symptoms):  Risk status (Self / Other harm or suicidal ideation)  Patient denies a history of suicidal ideation, suicide attempts, self-injurious behavior, homicidal ideation, homicidal behavior and and other safety concerns  Patient denies current fears or concerns for personal safety.  Patient denies current or recent suicidal ideation or behaviors.  Patient denies current or recent homicidal ideation or behaviors.  Patient denies current or recent self injurious behavior or ideation.  Patient denies other safety concerns.  A safety and risk management plan has not been developed at  this time, however patient was encouraged to call [place] [phone] should there be a change in any of these risk factors.    Appearance:   Appropriate   Eye Contact:   Good   Psychomotor Behavior: Normal   Attitude:   Cooperative   Orientation:   All  Speech   Rate / Production: Normal    Volume:  Normal   Mood:    Normal  Affect:    Appropriate   Thought Content:  Clear   Thought Form:  Coherent  Logical   Insight:    Good     Diagnoses:  1. Generalized anxiety disorder        Collateral Reports Completed:  Not Applicable    Plan: (Homework, other):  Patient was given information about behavioral services and encouraged to schedule a follow up appointment with the clinic [facility] as needed. Patient is scheduled to transfer to an EMDR therapist in early February. Patient to return to [facility] after completion of EMDR or if not content with provider. [facility] would complete re-assessment and help determine appropriate level of care. Patient is aware of ability to meet with [facility] until she is able to transfer care to EMDR provider. She was also given Cognitive Behavioral Therapy skills to practice when experiencing anxiety and depression.  CD Recommendations: No indications of CD issues.  Blanca Burns, [facility]      ______________________________________________________________________    Integrated Primary Care Behavioral Health Treatment Plan    Patient's Name: Sheryle Cruse  YOB: 1970    Date: 11/30/17    DSM-V Diagnoses: 300.02 (F41.1) Generalized Anxiety Disorder  Psychosocial / Contextual Factors: current cancer treatment  WHODAS: TBD    Referral / Collaboration:  The following referral(s) was/were discussed but client declines follow up at this time. Referral to [facility] therapist for trauma therapy. Patient continues to contemplate readiness for medication for symptom management.     Anticipated number of session or this episode of care: 8-10      MeasurableTreatment Goal(s) related to diagnosis / functional  impairment(s)  Goal 1: Patient will reduce feelings of anxiety as evidenced by lower MAGGY 7.    I will know I've met my goal when I feel that I can cope better with my anxiety.      Objective #A (Patient Action)    Patient will use distraction each time intrusive worry surfaces.  Status: New - Date: 11/30/17     Intervention(s)  [facility] will assign homework to practice distraction techniques until feels automatic.    Objective #B  Patient will use cognitive strategies identified in therapy to challenge anxious thoughts.  Status: New - Date: 11/30/17     Intervention(s)  [facility] will role-play cognitive restructruing.    Objective #C  Patient will practice deep breathing at least 3 a day.  Status: New - Date: 11/30/17     Intervention(s)  [facility] will assign homework to practice skills.      Patient has reviewed and agreed to the above plan.      Blanca Burns, JANE  November 30, 2017

## 2018-12-13 NOTE — LETTER
12/13/2018         RE: Sheryle Cruse  [address]  [place] [address]        Dear Colleague,    Thank you for referring your patient, Sheryle Cruse, to the [facility]. Please see a copy of my visit note below.    Oncology Follow-up visit:  Date on this visit: Dec 13, 2018      PCP: Katie Billy  Surgeon: Dr. Audrey Gonzalez    Diagnosis:    Locally advanced breast cancer      Oncologic History:  1. Locally advanced right breast cancer-    Esther first noted a lump in her right breast on 6/15/17.  This led to a diagnostic b/l mammogram and R breast US on 6/30/2018 which showed that in the right breast at 3:00 position, anterior depth there was a focal asymmetry. R breast US confirmed a solid mass at 3:00 position 4 cm from the nipple that measured 1.4 x 0.7 x 1.9 cm and correlated with mammography and palpable lump. Right axillary survey by US  demonstrated multiple benign-appearing lymph nodes. Her mammogram prior to that was in Aug 2013.  An US guided core needle biopsy on 7/7/2017 revealed the mass to be high grade invasive ductal carcinoma that is strongly ER+ (>97%)/PA positive(>90%), Her 2 non amplified (HER2 Adeel/CHELSEA 17 ratio of 1.1).  She underwent bilateral simple mastectomies and right axillary sentinel node biopsy on 10/4/2017 by Dr. Gonzalez. She was not interested in breast reconstruction. Pathology from surgery demonstrated two foci of IDC, with largest foci of a 3 cm (measured microscopically) IDC, Mesa grade 3 in the right breast, with associated DCIS, negative margins of resection for both DCIS and invasive carcinoma. One/one nonsentinel lymph node was positive for metastasis, 1.2 cm, with no extranodal extension. SLN was not identified in the specimen. LVI was present. She was staged at pT2(m) N1. No malignancy was found in the left breast.   She declined adjuvant systemic chemotherapy and thus OncotypeDx was not ordered.   She completed radiation to  right chest wall, R SCV LNs  and axilla on 1/3/2018.     2. MUTYH mutation heterozygote-  FHx is significant for her maternal GM  diagnosed with breast cancer in her 60's and a paternal aunt being diagnosed in her 50's.  There is no FH for ovarian, colon, uterine, prostate or pancreatic CA. She underwent genetic testing and was found to be  POSITIVE for one MUTYH gene mutation. 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 Of note, Sheryle tested negative for mutations in the following genes by sequencing and deletion/duplication analysis: OLLIE, BARD1, BRCA1, BRCA2, BRIP1, CDH1, CHEK2, DICER1, EPCAM (deletions/duplications only), MLH1, MRE11A, MSH2, MSH6, MUTYH, NBN, NF1, PALB2, PMS2, PTEN, RAD50, RAD51C, RAD51D, SMARCA4, STK11, and TP53.        History Of Present Illness:  Ms. Sparrow is a 48 year old premenopausal  female who presents for f/up of opinion of locally advanced ER+ Her 2 Adeel negative right sided breast cancer. She completed radiation to right chest wall, R SCV LNs  and axilla on 1/3/2018.  She has declined chemotherapy and  systemic hormonal therapy. She saw Dr. Meneses in Feb 2018 for consultation of prophylactic BSO but decided not to proceed. She was noted to have a positive ZHANNA and was referred to rheumatology. On 11/1/2018 she had a noncontrast CT abdomen and pelvis for evaluation of abdominal pain. It showed no suspicious findings as below:  1. Negative noncontrast CT without acute findings in the abdomen or  pelvis to explain this patient's right upper quadrant pain.  2. Moderate colonic stool burden.  She has been in good health.   She is feeling well otherwise.  In addition, a complete 12 point  review of systems is negative.         Past Medical/Surgical History:  Past Medical History:   Diagnosis Date      "Anemia, iron deficiency      Breast cancer (H)      Depression past     Eating disorder age 20    [employer] [facility], In recovery for 15 yr     IBS (irritable bowel syndrome)     possible.  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 (c.1187G>A) [employer] 8/10/17     Vitamin D deficiency      Past Surgical History:   Procedure Laterality Date     MASTECTOMY MODIFIED RADICAL BILATERAL       Family and SocHx reviewed:  SOCIAL HISTORY:  .    Denies current tobacco use.  Is a writer for food, weight and body.    Allergies:  Allergies as of 12/13/2018 - Reviewed 12/13/2018   Allergen Reaction Noted     Contrast dye  06/30/2017     Diphenhydramine  08/31/2017     Penicillins Rash 01/07/2011     Current Medications:  Current Outpatient Medications   Medication Sig Dispense Refill     Calcium-Magnesium-Vitamin D (CALCIUM MAGNESIUM PO) With zinc       cholecalciferol (VITAMIN D3) 5000 units (125 mcg) capsule Take 5,000 Units by mouth daily       Multiple Vitamin (ONE-A-DAY ESSENTIAL) TABS Take 1 tablet by mouth daily.       naproxen (NAPROSYN) 500 MG tablet Take 1 tablet (500 mg) by mouth 2 times daily as needed for moderate pain 30 tablet 1     polyethylene glycol (MIRALAX/GLYCOLAX) Packet Take 1 packet by mouth daily       tiZANidine (ZANAFLEX) 4 MG tablet Take 0.5-1 tablets (2-4 mg) by mouth 3 times daily as needed for muscle spasms 30 tablet 1     sulfamethoxazole-trimethoprim (BACTRIM DS/SEPTRA DS) 800-160 MG per tablet Take 1 tablet by mouth 2 times daily (Patient not taking: Reported on 12/13/2018) 14 tablet 0             Physical Exam:  /83   Pulse 114   Temp 98.4  F (36.9  C)   Resp 20   Ht 1.626 m (5' 4\")   Wt 57.6 kg (127 lb)   LMP 11/26/2018   SpO2 96%   BMI 21.80 kg/m           GENERAL APPEARANCE: healthy, alert and no distress     HENT: Mouth without ulcers or lesions     NECK: no adenopathy, no " asymmetry or masses     LYMPHATICS: No cervical, supraclavicular, axillary or inguinal lymphadenopathy     RESP: lungs clear to auscultation - no rales, rhonchi or wheezes     CARDIOVASCULAR: regular rates and rhythm, normal S1 S2, no S3 or S4 and no murmur.     ABDOMEN:  soft, nontender, no HSM or masses and bowel sounds normal     MUSCULOSKELETAL: extremities normal- no gross deformities noted, no evidence of inflammation in joints, FROM in all extremities. No edema b/l LE. + RUE lymphedema, RUE circumference in the arm slightly more than LUE.     SKIN: no suspicious lesions or rashes     PSYCHIATRIC: mentation appears normal and affect normal  CHest wall: Skin changes c/w XRT Right chest wall.  No chest wall masses b/l. No axillary lymphadenopathy b/l.    Laboratory/Imaging Studies  Lab Results   Component Value Date    WBC 6.4 10/23/2018     Lab Results   Component Value Date    RBC 4.08 10/23/2018     Lab Results   Component Value Date    HGB 12.8 10/23/2018     Lab Results   Component Value Date    HCT 38.4 10/23/2018     No components found for: MCT  Lab Results   Component Value Date    MCV 94 10/23/2018     Lab Results   Component Value Date    MCH 31.4 10/23/2018     Lab Results   Component Value Date    MCHC 33.3 10/23/2018     Lab Results   Component Value Date    RDW 12.0 10/23/2018     Lab Results   Component Value Date     10/23/2018     Labs reviewed and documented in the EMR.      Results for orders placed or performed in visit on 11/14/18   Ca27.29  breast tumor marker   Result Value Ref Range    CA 27-29 12 0 - 39 U/mL     ASSESSMENT/PLAN:  Sheryle is a 47 year old premenopausal woman with  stage IIB, T2N1 multifocalhigh grade, strongly ER and FL positive, Her2 Adeel negative by FISH invasive ductal carcinoma of the right breast.  Final pathology showed 2 foci at 3 cm as well as separate 0.7 cm invasive ductal carcinoma, Iman grade III, poorly differentiated. She is s/p Bilateral simple  mastectomies and right axillary sentinel node biopsy on 10/4/2017 by Dr. Gonzalez. One lymph node removed, having a 1.2 cm metastatic deposit.  The patient declined further axillary node dissection. She has declined adjuvant chemotherapy. She completed radiation to right chest wall, R SCV LNs  and axilla on 1/3/2018. She has declined a staging PET/CT scan (even without contrast). She met with Dr. Meneses and has not decided in favor of prophylactic BSO. She has declined and continues to decline adjuvant hormonal therapy.      1.  Stage IIB, T2N1 multifocal invasive ductal carcinoma of the right breast. She prefers to be observed closely. Since she is at high risk for disease recurrence because she declined ALND dissection, adjuvant chemotherapy and adjuvant hormonal therapy, we'll include ca27-29 in her f/up labs. I will plan to see her back in 3 months, with labs- cbcd, cmp, ca27-29.    2.  MUTYH gene mutation carrier -  Carriers of MAP have a very slightly increased risk of colon cancer, which could be as high  (~10-12%) as twice the population risk of 5-6%. It is currently uncertain if a specialized screening plan is necessary for MAP mutation carriers who have do not have a family history of colon cancer.   As Sheryle does not have a first degree relative with colon cancer, she could consider beginning colonoscopies at age 51 and repeated every 10 years, or per colonoscopy findings.    3. S/p b/l mastectomy- she is not interested in breast reconstruction surgery.    4. Anxiety/depression associated with diagnosis of breast cancer- she has been seeing a counselor.    5. Pt requests all future imaging to be noncontrast (n/a today)- She declines any imaging with contrast since she has a contrast allergy to unknown type of contrast but when she was given steroids as premedication for her MRI with contrast, she had blurry vision.     6. Positive ZHANNA- seeing Dr. Regalado from rheumatology in Jan 2019  At the end of our  visit patient and  verbalized understanding and concurred with the plan.        Again, thank you for allowing me to participate in the care of your patient.        Sincerely,        Niurka Hoff MD, MD

## 2018-12-13 NOTE — PROGRESS NOTES
Oncology Follow-up visit:  Date on this visit: Dec 13, 2018      PCP: Katie Billy  Surgeon: Dr. Audrey Gonzalez    Diagnosis:    Locally advanced breast cancer      Oncologic History:  1. Locally advanced right breast cancer-    Esther first noted a lump in her right breast on 6/15/17.  This led to a diagnostic b/l mammogram and R breast US on 6/30/2018 which showed that in the right breast at 3:00 position, anterior depth there was a focal asymmetry. R breast US confirmed a solid mass at 3:00 position 4 cm from the nipple that measured 1.4 x 0.7 x 1.9 cm and correlated with mammography and palpable lump. Right axillary survey by US  demonstrated multiple benign-appearing lymph nodes. Her mammogram prior to that was in Aug 2013.  An US guided core needle biopsy on 7/7/2017 revealed the mass to be high grade invasive ductal carcinoma that is strongly ER+ (>97%)/OR positive(>90%), Her 2 non amplified (HER2 Adeel/CHELSEA 17 ratio of 1.1).  She underwent bilateral simple mastectomies and right axillary sentinel node biopsy on 10/4/2017 by Dr. Gonzalez. She was not interested in breast reconstruction. Pathology from surgery demonstrated two foci of IDC, with largest foci of a 3 cm (measured microscopically) IDC, Saxe grade 3 in the right breast, with associated DCIS, negative margins of resection for both DCIS and invasive carcinoma. One/one nonsentinel lymph node was positive for metastasis, 1.2 cm, with no extranodal extension. SLN was not identified in the specimen. LVI was present. She was staged at pT2(m) N1. No malignancy was found in the left breast.   She declined adjuvant systemic chemotherapy and thus OncotypeDx was not ordered.   She completed radiation to right chest wall, R SCV LNs  and axilla on 1/3/2018.     2. MUTYH mutation heterozygote-  FHx is significant for her maternal GM  diagnosed with breast cancer in her 60's and a paternal aunt being diagnosed in her 50's.  There is no FH for ovarian, colon,  uterine, prostate or pancreatic CA. She underwent genetic testing and was found to be  POSITIVE for one MUTYH gene mutation. 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 Of note, Sheryle tested negative for mutations in the following genes by sequencing and deletion/duplication analysis: OLLIE, BARD1, BRCA1, BRCA2, BRIP1, CDH1, CHEK2, DICER1, EPCAM (deletions/duplications only), MLH1, MRE11A, MSH2, MSH6, MUTYH, NBN, NF1, PALB2, PMS2, PTEN, RAD50, RAD51C, RAD51D, SMARCA4, STK11, and TP53.        History Of Present Illness:  Ms. Sparrow is a 48 year old premenopausal  female who presents for f/up of opinion of locally advanced ER+ Her 2 Adeel negative right sided breast cancer. She completed radiation to right chest wall, R SCV LNs  and axilla on 1/3/2018.  She has declined chemotherapy and  systemic hormonal therapy. She saw Dr. Meneses in Feb 2018 for consultation of prophylactic BSO but decided not to proceed. She was noted to have a positive ZHANNA and was referred to rheumatology. On 11/1/2018 she had a noncontrast CT abdomen and pelvis for evaluation of abdominal pain. It showed no suspicious findings as below:  1. Negative noncontrast CT without acute findings in the abdomen or  pelvis to explain this patient's right upper quadrant pain.  2. Moderate colonic stool burden.  She has been in good health.   She is feeling well otherwise.  In addition, a complete 12 point  review of systems is negative.         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  U/s abd/pelvist     Monoallelic mutation of MUTYH gene 8/30/2017    Carrier  "of MUTYH-Associated Polyposis (MAP) MUTYH mutation p.G396D (c.1187G>A) [employer] 8/10/17     Vitamin D deficiency      Past Surgical History:   Procedure Laterality Date     MASTECTOMY MODIFIED RADICAL BILATERAL       Family and SocHx reviewed:  SOCIAL HISTORY:  .    Denies current tobacco use.  Is a writer for food, weight and body.    Allergies:  Allergies as of 12/13/2018 - Reviewed 12/13/2018   Allergen Reaction Noted     Contrast dye  06/30/2017     Diphenhydramine  08/31/2017     Penicillins Rash 01/07/2011     Current Medications:  Current Outpatient Medications   Medication Sig Dispense Refill     Calcium-Magnesium-Vitamin D (CALCIUM MAGNESIUM PO) With zinc       cholecalciferol (VITAMIN D3) 5000 units (125 mcg) capsule Take 5,000 Units by mouth daily       Multiple Vitamin (ONE-A-DAY ESSENTIAL) TABS Take 1 tablet by mouth daily.       naproxen (NAPROSYN) 500 MG tablet Take 1 tablet (500 mg) by mouth 2 times daily as needed for moderate pain 30 tablet 1     polyethylene glycol (MIRALAX/GLYCOLAX) Packet Take 1 packet by mouth daily       tiZANidine (ZANAFLEX) 4 MG tablet Take 0.5-1 tablets (2-4 mg) by mouth 3 times daily as needed for muscle spasms 30 tablet 1     sulfamethoxazole-trimethoprim (BACTRIM DS/SEPTRA DS) 800-160 MG per tablet Take 1 tablet by mouth 2 times daily (Patient not taking: Reported on 12/13/2018) 14 tablet 0             Physical Exam:  /83   Pulse 114   Temp 98.4  F (36.9  C)   Resp 20   Ht 1.626 m (5' 4\")   Wt 57.6 kg (127 lb)   LMP 11/26/2018   SpO2 96%   BMI 21.80 kg/m          GENERAL APPEARANCE: healthy, alert and no distress     HENT: Mouth without ulcers or lesions     NECK: no adenopathy, no asymmetry or masses     LYMPHATICS: No cervical, supraclavicular, axillary or inguinal lymphadenopathy     RESP: lungs clear to auscultation - no rales, rhonchi or wheezes     CARDIOVASCULAR: regular rates and rhythm, normal S1 S2, no S3 or S4 and no murmur.     " ABDOMEN:  soft, nontender, no HSM or masses and bowel sounds normal     MUSCULOSKELETAL: extremities normal- no gross deformities noted, no evidence of inflammation in joints, FROM in all extremities. No edema b/l LE. + RUE lymphedema, RUE circumference in the arm slightly more than LUE.     SKIN: no suspicious lesions or rashes     PSYCHIATRIC: mentation appears normal and affect normal  CHest wall: Skin changes c/w XRT Right chest wall.  No chest wall masses b/l. No axillary lymphadenopathy b/l.    Laboratory/Imaging Studies  Lab Results   Component Value Date    WBC 6.4 10/23/2018     Lab Results   Component Value Date    RBC 4.08 10/23/2018     Lab Results   Component Value Date    HGB 12.8 10/23/2018     Lab Results   Component Value Date    HCT 38.4 10/23/2018     No components found for: MCT  Lab Results   Component Value Date    MCV 94 10/23/2018     Lab Results   Component Value Date    MCH 31.4 10/23/2018     Lab Results   Component Value Date    MCHC 33.3 10/23/2018     Lab Results   Component Value Date    RDW 12.0 10/23/2018     Lab Results   Component Value Date     10/23/2018     Labs reviewed and documented in the EMR.      Results for orders placed or performed in visit on 11/14/18   Ca27.29  breast tumor marker   Result Value Ref Range    CA 27-29 12 0 - 39 U/mL     ASSESSMENT/PLAN:  Sheryle is a 47 year old premenopausal woman with  stage IIB, T2N1 multifocalhigh grade, strongly ER and AR positive, Her2 Adeel negative by FISH invasive ductal carcinoma of the right breast.  Final pathology showed 2 foci at 3 cm as well as separate 0.7 cm invasive ductal carcinoma, Norborne grade III, poorly differentiated. She is s/p Bilateral simple mastectomies and right axillary sentinel node biopsy on 10/4/2017 by Dr. Gonzalez. One lymph node removed, having a 1.2 cm metastatic deposit.  The patient declined further axillary node dissection. She has declined adjuvant chemotherapy. She completed radiation  to right chest wall, R SCV LNs  and axilla on 1/3/2018. She has declined a staging PET/CT scan (even without contrast). She met with Dr. Meneses and has not decided in favor of prophylactic BSO. She has declined and continues to decline adjuvant hormonal therapy.      1.  Stage IIB, T2N1 multifocal invasive ductal carcinoma of the right breast. She prefers to be observed closely. Since she is at high risk for disease recurrence because she declined ALND dissection, adjuvant chemotherapy and adjuvant hormonal therapy, we'll include ca27-29 in her f/up labs. I will plan to see her back in 3 months, with labs- cbcd, cmp, ca27-29.    2.  MUTYH gene mutation carrier -  Carriers of MAP have a very slightly increased risk of colon cancer, which could be as high  (~10-12%) as twice the population risk of 5-6%. It is currently uncertain if a specialized screening plan is necessary for MAP mutation carriers who have do not have a family history of colon cancer.   As Sheryle does not have a first degree relative with colon cancer, she could consider beginning colonoscopies at age 51 and repeated every 10 years, or per colonoscopy findings.    3. S/p b/l mastectomy- she is not interested in breast reconstruction surgery.    4. Anxiety/depression associated with diagnosis of breast cancer- she has been seeing a counselor.    5. Pt requests all future imaging to be noncontrast (n/a today)- She declines any imaging with contrast since she has a contrast allergy to unknown type of contrast but when she was given steroids as premedication for her MRI with contrast, she had blurry vision.     6. Positive ZHANNA- seeing Dr. Regalado from rheumatology in Jan 2019  At the end of our visit patient and  verbalized understanding and concurred with the plan.    Addendum:  Pt prefers not to have her CA27-29 level tested going forward due to anxiety and cost.

## 2018-12-13 NOTE — TELEPHONE ENCOUNTER
"SPIRITUAL HEALTH SERVICES  SPIRITUAL ASSESSMENT Progress Note   [facility] Oncology  Care      PRIMARY FOCUS:     Support for coping    REFERRAL SOURCE: Patient had called and asked for a meeting with .  I met with her in the outpatient clinic today at [place].    ILLNESS CIRCUMSTANCES:   Reviewed documentation. Reflective conversation shared with patient which integrated elements of illness and family narratives. Patient also had given me her book to read about her journey with eating disorder and the lastest chapter of her newest book she is just beginning to write about her journey with breast cancer, the lost of her father and her relationship with her mother and other family members..     Context of Serious Illness/Symptom(s) - Patient has finished treatment for breast cancer, mastectomy and radiation, but is worried about reoccurrence.     Resources for Support - Her  of many years.  She also is followed by psychology and social work.    DISTRESS:     Emotional/Spiritual/Existential Distress - Patient voices a lot of distress  surrounding her family relationships.    Denominational Distress - Is the sole agent for her mother and is wondering about what she will do when the time comes for a /memorial service. She voices that she may want a very private service and is wondering if I could help her with this when the time comes.   Her image of God is changing as she works through her own experiences and how this fits in her with her anjelica.     Social/Cultural/Economic Distress - Patient has never felt accepted by her family.  She is trying to discern what God is calling her to do in this life.     SPIRITUAL/Confucianist COPING:     Yazidism/Anjelica - Patient is Mandaen by tradition.    Spiritual Practice(s) - Patient is not currently a member of any Confucianism  here in [place].  She has several friends that are Mandaen that she trusts. She calls the Divine \"Abba\". Reads scripture " for support and strength and insights.     Emotional/Relational/Existential Connections - Patient is learning  to set up clearer boundaries.  She is wanting to establish healthy relationships and a supportive health care team that will be in her corner when needs arise.  Her  has been supportive.  She feels that her writing helps her work through on going stressors as well.     GOALS OF CARE:    Goals of Care - Patient is wanting to move forward and grow and learn from her experiences     Meaning/Sense-Making - Not discussed today.     PLAN: I gave the patient my contact information and asked that she call when needs arise that I could help her with, to leave a message on my voice mail and I will get back with her.     April Kelly  Staff   Pager [phone]

## 2018-12-13 NOTE — NURSING NOTE
"Oncology Rooming Note    December 13, 2018 3:59 PM   Sheryle Cruse is a 48 year old female who presents for:    Chief Complaint   Patient presents with     Oncology Clinic Visit     3 month follow up     Initial Vitals: /83   Pulse 114   Temp 98.4  F (36.9  C)   Resp 20   Ht 1.626 m (5' 4\")   Wt 57.6 kg (127 lb)   LMP 11/26/2018   SpO2 96%   BMI 21.80 kg/m   Estimated body mass index is 21.8 kg/m  as calculated from the following:    Height as of this encounter: 1.626 m (5' 4\").    Weight as of this encounter: 57.6 kg (127 lb). Body surface area is 1.61 meters squared.  No Pain (0) Comment: Data Unavailable   Patient's last menstrual period was 11/26/2018.  Allergies reviewed: Yes  Medications reviewed: Yes    Medications: Medication refills not needed today.  Pharmacy name entered into [facility]:    [id number] - [place], [place] - [address] AT [facility] [address] [facility] - [place], [place] - [address], [address]        5 minutes for nursing intake (face to face time)     Gwendolyn Saha LPN            "

## 2019-01-04 ENCOUNTER — OFFICE VISIT (OUTPATIENT)
Dept: RHEUMATOLOGY | Facility: CLINIC | Age: 49
End: 2019-01-04
Attending: DERMATOLOGY
Payer: COMMERCIAL

## 2019-01-04 VITALS
DIASTOLIC BLOOD PRESSURE: 78 MMHG | SYSTOLIC BLOOD PRESSURE: 114 MMHG | BODY MASS INDEX: 22.98 KG/M2 | HEIGHT: 64 IN | HEART RATE: 77 BPM | OXYGEN SATURATION: 100 % | WEIGHT: 134.6 LBS

## 2019-01-04 DIAGNOSIS — R76.8 POSITIVE ANA (ANTINUCLEAR ANTIBODY): Primary | ICD-10-CM

## 2019-01-04 PROCEDURE — 36415 COLL VENOUS BLD VENIPUNCTURE: CPT | Performed by: STUDENT IN AN ORGANIZED HEALTH CARE EDUCATION/TRAINING PROGRAM

## 2019-01-04 PROCEDURE — 86235 NUCLEAR ANTIGEN ANTIBODY: CPT | Performed by: STUDENT IN AN ORGANIZED HEALTH CARE EDUCATION/TRAINING PROGRAM

## 2019-01-04 PROCEDURE — 86160 COMPLEMENT ANTIGEN: CPT | Performed by: STUDENT IN AN ORGANIZED HEALTH CARE EDUCATION/TRAINING PROGRAM

## 2019-01-04 PROCEDURE — 99204 OFFICE O/P NEW MOD 45 MIN: CPT | Performed by: STUDENT IN AN ORGANIZED HEALTH CARE EDUCATION/TRAINING PROGRAM

## 2019-01-04 PROCEDURE — 86225 DNA ANTIBODY NATIVE: CPT | Performed by: STUDENT IN AN ORGANIZED HEALTH CARE EDUCATION/TRAINING PROGRAM

## 2019-01-04 ASSESSMENT — MIFFLIN-ST. JEOR: SCORE: 1230.54

## 2019-01-04 ASSESSMENT — PAIN SCALES - GENERAL: PAINLEVEL: MODERATE PAIN (4)
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
## 2019-01-04 NOTE — PATIENT INSTRUCTIONS
-- My suspicion for Autoimmune connective tissue disease is low. I will get blood tests today     -- Left leg pain talk to your PCP. If persists then U/S of left leg can be considered to rule out clot and Xray of back to rule out tumor spread.     -- RTC on as needed basis

## 2019-01-04 NOTE — NURSING NOTE
"Sheryle Cruse's goals for this visit include:   She requests these members of her care team be copied on today's visit information:    PCP: Katie Billy    Referring Provider:  Ibis Galvan MD  [address] [place] [address] [address]  [place] [address]    /78   Pulse 77   Ht 1.626 m (5' 4\")   Wt 61.1 kg (134 lb 9.6 oz)   SpO2 100%   BMI 23.10 kg/m     "

## 2019-01-05 LAB
CENTROMERE IGG SER-ACNC: <0.2 AI (ref 0–0.9)
DSDNA AB SER-ACNC: 1 IU/ML
ENA RNP IGG SER IA-ACNC: <0.2 AI (ref 0–0.9)
ENA SCL70 IGG SER IA-ACNC: <0.2 AI (ref 0–0.9)
ENA SM IGG SER-ACNC: <0.2 AI (ref 0–0.9)
ENA SS-A IGG SER IA-ACNC: <0.2 AI (ref 0–0.9)
ENA SS-B IGG SER IA-ACNC: <0.2 AI (ref 0–0.9)

## 2019-01-07 LAB
C3 SERPL-MCNC: 110 MG/DL (ref 76–169)
C4 SERPL-MCNC: 20 MG/DL (ref 15–50)

## 2019-01-08 ENCOUNTER — ANCILLARY PROCEDURE (OUTPATIENT)
Dept: ULTRASOUND IMAGING | Facility: CLINIC | Age: 49
End: 2019-01-08
Payer: COMMERCIAL

## 2019-01-08 ENCOUNTER — OFFICE VISIT (OUTPATIENT)
Dept: PEDIATRICS | Facility: CLINIC | Age: 49
End: 2019-01-08
Payer: COMMERCIAL

## 2019-01-08 ENCOUNTER — OFFICE VISIT (OUTPATIENT)
Dept: PSYCHOLOGY | Facility: CLINIC | Age: 49
End: 2019-01-08
Payer: COMMERCIAL

## 2019-01-08 ENCOUNTER — ANCILLARY PROCEDURE (OUTPATIENT)
Dept: GENERAL RADIOLOGY | Facility: CLINIC | Age: 49
End: 2019-01-08
Payer: COMMERCIAL

## 2019-01-08 ENCOUNTER — DOCUMENTATION ONLY (OUTPATIENT)
Dept: SPIRITUAL SERVICES | Facility: CLINIC | Age: 49
End: 2019-01-08

## 2019-01-08 VITALS
SYSTOLIC BLOOD PRESSURE: 100 MMHG | HEART RATE: 123 BPM | BODY MASS INDEX: 22.43 KG/M2 | DIASTOLIC BLOOD PRESSURE: 70 MMHG | TEMPERATURE: 98.8 F | OXYGEN SATURATION: 99 % | WEIGHT: 130.7 LBS

## 2019-01-08 DIAGNOSIS — M54.10 RADICULAR PAIN OF LEFT LOWER EXTREMITY: ICD-10-CM

## 2019-01-08 DIAGNOSIS — F41.1 GENERALIZED ANXIETY DISORDER: Primary | ICD-10-CM

## 2019-01-08 DIAGNOSIS — M79.662 PAIN OF LEFT LOWER LEG: Primary | ICD-10-CM

## 2019-01-08 DIAGNOSIS — M79.662 PAIN OF LEFT LOWER LEG: ICD-10-CM

## 2019-01-08 PROCEDURE — 99214 OFFICE O/P EST MOD 30 MIN: CPT | Performed by: NURSE PRACTITIONER

## 2019-01-08 PROCEDURE — 72100 X-RAY EXAM L-S SPINE 2/3 VWS: CPT | Performed by: RADIOLOGY

## 2019-01-08 PROCEDURE — 93971 EXTREMITY STUDY: CPT | Mod: LT | Performed by: RADIOLOGY

## 2019-01-08 PROCEDURE — 90832 PSYTX W PT 30 MINUTES: CPT | Performed by: SOCIAL WORKER

## 2019-01-08 RX ORDER — METHYLPREDNISOLONE 4 MG
TABLET, DOSE PACK ORAL
Qty: 21 TABLET | Refills: 0 | Status: SHIPPED | OUTPATIENT
Start: 2019-01-08 | End: 2019-07-15

## 2019-01-08 NOTE — PROGRESS NOTES
[facility]  January 8, 2019      Behavioral Health Clinician Progress Note    Patient Name: Sheryle Cruse           Service Type:  Individual      Service Location:   Face to Face in Clinic     Session Start Time: 12: 54 pm  Session End Time: 1: 30 pm      Session Length: 16 - 37      Attendees: Patient    Visit Activities (Refresh list every visit): [facility] Only    Diagnostic Assessment Date: 11/17/17  Treatment Plan Review Date: 6/29/18  See Flowsheets for today's PHQ-9 and MAGGY-7 results  Previous PHQ-9:   PHQ-9 SCORE 9/22/2017 2/1/2018 9/4/2018   PHQ-9 Total Score 11 6 3     Previous MAGGY-7:   MAGGY-7 SCORE 9/22/2017 2/1/2018 9/4/2018   Total Score 14 14 10       FRANCISCO LEVEL:  FRANCISCO Score (Last Two) 8/22/2016 9/22/2017   FRANCISCO Raw Score 36 29   Activation Score 47.4 52.9   FRANCISCO Level 2 2       DATA  Extended Session (60+ minutes): No  Interactive Complexity: No  Crisis: No  [facility] Patient: No    Treatment Objective(s) Addressed in This Session:  Target Behavior(s): disease management/lifestyle changes , mental health management    Depressed Mood: Identify negative self-talk and behaviors: challenge core beliefs, myths, and actions    Current Stressors / Issues:  Patient reported heightened anxiety secondary to her health since 12/26. She stated that she has been experiencing calf pain, and had an office visit with PCP earlier today.  Patient stated that she has received reassuring news, as she has learned that it is due to issues related to her back. Patient stated that prior to the diagnosis, she was worried about all the potential scenarios, with focusing on the worst case scenarios. Patient reported that she was trying to not focus on them, but stated that her anxiety made it difficult to do so. Patient and [facility] reviewed CBT techniques to assist her to disengage from her unhealthy ruminations.  Patient continues to exhibit interest and motivation for transfer to EMDR as she hopes that it will help to  de-sensitize her to triggers from her past.     Progress on Treatment Objective(s) / Homework:  Stable - MAINTENANCE (Working to maintain change, with risk of relapse); Intervened by continuing to positively reinforce healthy behavior choice     Motivational Interviewing    MI Intervention: Expressed Empathy/Understanding, Supported Autonomy, Collaboration, Evocation, Permission to raise concern or advise, Open-ended questions and Reframe     Change Talk Expressed by the Patient: Ability to change Reasons to change Need to change    Provider Response to Change Talk: E - Evoked more info from patient about behavior change, A - Affirmed patient's thoughts, decisions, or attempts at behavior change, R - Reflected patient's change talk and S - Summarized patient's change talk statements    Also provided psychoeducation about behavioral health condition, symptoms, and treatment options    Care Plan review completed: Yes    Medication Review:  No current psychiatric medications prescribed    Medication Compliance:  NA    Changes in Health Issues:   Yes: radiation completed, needing to make decisions on next steps in treatment    Chemical Use Review:   Substance Use: Chemical use reviewed, no active concerns identified      Tobacco Use: No current tobacco use.      Assessment: Current Emotional / Mental Status (status of significant symptoms):  Risk status (Self / Other harm or suicidal ideation)  Patient denies a history of suicidal ideation, suicide attempts, self-injurious behavior, homicidal ideation, homicidal behavior and and other safety concerns  Patient denies current fears or concerns for personal safety.  Patient denies current or recent suicidal ideation or behaviors.  Patient denies current or recent homicidal ideation or behaviors.  Patient denies current or recent self injurious behavior or ideation.  Patient denies other safety concerns.  A safety and risk management plan has not been developed at this  time, however patient was encouraged to call [place] [phone] should there be a change in any of these risk factors.    Appearance:   Appropriate   Eye Contact:   Good   Psychomotor Behavior: Normal   Attitude:   Cooperative   Orientation:   All  Speech   Rate / Production: Normal    Volume:  Normal   Mood:    Normal  Affect:    Appropriate   Thought Content:  Clear   Thought Form:  Coherent  Logical   Insight:    Good     Diagnoses:  1. Generalized anxiety disorder        Collateral Reports Completed:  Not Applicable    Plan: (Homework, other):  Patient was given information about behavioral services and encouraged to schedule a follow up appointment with the clinic [facility] as needed. Patient is scheduled to transfer to an EMDR therapist in early February. Patient to return to [facility] after completion of EMDR or if not content with provider. [facility] would complete re-assessment and help determine appropriate level of care. Patient is aware of ability to meet with [facility] until she is able to transfer care to EMDR provider. She was also given Cognitive Behavioral Therapy skills to practice when experiencing anxiety and depression.  CD Recommendations: No indications of CD issues.  Blanca Burns, [facility]      ______________________________________________________________________    Integrated Primary Care Behavioral Health Treatment Plan    Patient's Name: Sheryle Cruse  YOB: 1970    Date: 11/30/17    DSM-V Diagnoses: 300.02 (F41.1) Generalized Anxiety Disorder  Psychosocial / Contextual Factors: current cancer treatment  WHODAS: TBD    Referral / Collaboration:  Patient has been referred by [facility] provider to EMDR therapist. Appointment scheduled for early February. Patient continues to contemplate readiness for medication for symptom management.     Anticipated number of session or this episode of care: 8-10      MeasurableTreatment Goal(s) related to diagnosis / functional impairment(s)  Goal 1: Patient will reduce  feelings of anxiety as evidenced by lower MAGGY 7.    I will know I've met my goal when I feel that I can cope better with my anxiety.      Objective #A (Patient Action)    Patient will use distraction each time intrusive worry surfaces.  Status: New - Date: 11/30/17     Intervention(s)  [facility] will assign homework to practice distraction techniques until feels automatic.    Objective #B  Patient will use cognitive strategies identified in therapy to challenge anxious thoughts.  Status: New - Date: 11/30/17     Intervention(s)  [facility] will role-play cognitive restructruing.    Objective #C  Patient will practice deep breathing at least 3 a day.  Status: New - Date: 11/30/17     Intervention(s)  [facility] will assign homework to practice skills.      Patient has reviewed and agreed to the above plan.      JANE Anne  November 30, 2017

## 2019-01-08 NOTE — TELEPHONE ENCOUNTER
"SPIRITUAL HEALTH SERVICES Progress Note  [facility]    Visited Sheryle Cruse for ongoing emotional/spiritual support at her request.       Illness Narrative - Patient currently has some pain in her left foot that she is concerned about and is seeking medical treatment for today in clinic.      Distress - Patient reports that she grew up in a household that was always fearful of what was coming next.        Coping - She has been reading and meditating on the meaning of Psalm 23 and what she can learn when she is moving through the valley.  She has held onto the idea of identifying her anchors when she feels the need for someway to varghese her fear.   We talked about a breathing meditation of \"Be Still and Know that I am God\" that may be helpful.  We also reflected on how to use her gift of \"willfulness\" with God's guidance.      Meaning-Making - Patient continues to take a deep look at her  past and how it has shaped her present. Her migue is very important to her as is her writing.      Plan - I let her know of my availably for SH support and she knows that she can request a visit as needed.      April Kelly  Associate   Pager     "
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
## 2019-01-08 NOTE — RESULT ENCOUNTER NOTE
Hello Sheryle Cruse,    Attached are your test results we discussed at your appointment    Please contact us if you have any questions.    Katie Billy, CNP

## 2019-01-08 NOTE — RESULT ENCOUNTER NOTE
Hello Sheryle Cruse,    Attached are your test results.  No blood clot    Please contact us if you have any questions.    Katie Billy, CNP

## 2019-01-08 NOTE — PATIENT INSTRUCTIONS
"PLAN:   1.   Symptomatic therapy suggested:   prescription for NSAID given, referral to Physical Therapy    2.  Orders Placed This Encounter   Medications     methylPREDNISolone (MEDROL DOSEPAK) 4 MG tablet therapy pack     Sig: Follow package instructions     Dispense:  21 tablet     Refill:  0     Orders Placed This Encounter   Procedures     US Lower Extremity Venous Duplex Left     XR Lumbar Spine 2/3 Views       3. Patient needs to follow up in if no improvement,or sooner if worsening of symptoms or other symptoms develop.  Consider MRI of lumbar spine if symptoms do not improve with physical therapy and trial of steroids           Patient Education       * Sciatica    Sciatica (\"Lumbar Radiculopathy\") causes a pain that spreads from the lower back down into the buttock, hip and leg. Sometimes leg pain can occur without any back pain. Sciatica is due to irritation or pressure on a spinal nerve as it comes out of the spinal canal. This is most often due to pressure on the nerve from a nearby spinal disk (the cartilage cushion between each spinal bone). Other causes include spinal stenosis (narrowing of the spinal canal) and spasm of the piriformis muscle (a muscle in the buttocks that the sciatic nerve passes through).  Sciatica may begin after a sudden twisting/bending force (such as in a car accident), or sometimes after a simple awkward movement. In either case, muscle spasm is commonly present and can add to the pain.  The diagnosis of sciatica is made from the symptoms and physical exam. Unless you had a physical injury (such as a car accident or fall), X-rays are usually not ordered for the initial evaluation of sciatica because the nerves and disks cannot be seen on an X-ray. Most sciatica (80-90%) gets better with time.  What can I do about my low back pain?  There are three main things you can do to ease low back pain and help it go away.    Stay active! Use positions, movements and exercises. Too much " rest can make your symptoms worse.    Use heat or cold packs.    Take medicine as directed.  Using positions, movements and exercises  Research tells us that moving your joints and muscles can help you recover from back pain. Such activity should be simple and gentle.  Use walking to help relieve your discomfort. Try taking a short walk every 3 to 4 hours during the day. Walk for a few minutes inside your home or take longer walks outside, on a treadmill or at a mall. Slowly increase the amount of time you walk. Expect discomfort when you begin, but it should lessen as your back starts to recover.  Finding a position that is comfortable  When your back pain is new, you may find that certain positions will ease your pain. Gently try each of the following positions until you find one that eases your pain. Once you find a position of comfort, use it as often as you like while you recover. Return to your daily routine as soon as possible.     Lie on your back with your legs bent. You can do this by placing a pillow under your knees or lie on the floor and rest your lower legs on the seat of a chair.    Lie on your side with your knees bent and place a pillow between your knees.    Lie on your stomach over pillows.  Using heat or cold packs  Try cold packs or gentle heat to ease your pain. Use whichever gives you the most relief. Apply the cold pack or heat for 15 minutes at a time, as often as needed.  Taking medicine  If taking over-the-counter medicine:    Take ibuprofen (Advil, Motrin) 600 mg. three times a day as needed for pain.  OR    Take Aleve (naproxen sodium) 220 to 440 mg. two times a day as needed for pain.  If your doctor prescribed medicine, follow the instructions. Stop taking the medicine as soon as you can.  When should I call my doctor?  Your back pain should improve over the first couple of weeks. As it improves, you should be able to return to your normal activities. But call your doctor if:    You  have a sudden change in your ability to control? your bladder or bowels.    You begin to feel tingling in your groin or legs.    The pain spreads down your leg and into your foot.    Your toes, feet or leg muscles begin to feel weak.    You feel generally unwell or sick.    Your pain gets worse.    9122-6311 [employer]. [address] [place] [address], [place] [address]. All rights reserved. This information is not intended as a substitute for professional medical care. Always follow your healthcare professional's instructions.  This information has been modified by your health care provider with permission from the publisher.

## 2019-01-08 NOTE — NURSING NOTE
"Chief Complaint   Patient presents with     Musculoskeletal Problem     left leg pain on and off since september but worst in the 2.5 weeks       Initial /70 (BP Location: Right arm, Patient Position: Sitting, Cuff Size: Adult Regular)   Pulse 123   Temp 98.8  F (37.1  C) (Temporal)   Wt 59.3 kg (130 lb 11.2 oz)   LMP 12/26/2018   SpO2 99%   Breastfeeding? No   BMI 22.43 kg/m   Estimated body mass index is 22.43 kg/m  as calculated from the following:    Height as of 1/4/19: 1.626 m (5' 4\").    Weight as of this encounter: 59.3 kg (130 lb 11.2 oz).  Medication Reconciliation: complete      EDIE Ley      "

## 2019-01-10 DIAGNOSIS — M54.10 RADICULAR PAIN OF LEFT LOWER EXTREMITY: Primary | ICD-10-CM

## 2019-01-11 NOTE — PROGRESS NOTES
Physical Therapy referral faxed to [facility] at fax #: [phone]. Received confirmation from [employer] that fax was sent successfully.  Yamilet Laboy CMA

## 2019-01-21 ENCOUNTER — TELEPHONE (OUTPATIENT)
Dept: PSYCHOLOGY | Facility: CLINIC | Age: 49
End: 2019-01-21

## 2019-01-21 NOTE — TELEPHONE ENCOUNTER
[facility]    Phone Message    May a detailed message be left on voicemail: yes    Reason for Call: Patient would like a call back to discuss some development regarding her situation. Patient would like to ask if you would be willing to read a book that the patient has been reading which corresponds with the issues she is dealing with right now prior to her next follow up appt. She would like a call today if possible to discuss. The read is about 70 pages long. Please advise.     Action Taken: Message routed to:  Primary Care p [phone]

## 2019-01-22 NOTE — TELEPHONE ENCOUNTER
Pt is calling checking the status of her message to Blanca Burns. Please give a call back to discuss

## 2019-01-22 NOTE — TELEPHONE ENCOUNTER
[facility] returned patient phone call.  Patient discussed recent writing, and requesting clinical feedback on the content that she has written.  Patient expressed additional anxiety secondary to potential change in insurance and abilty to continue with current care teams. Patient denied need for additional help and support secondary to this stress.  She expressed concern that she may not be able to receive EMDR because of the changes in insurance. Patient has return visit scheduled for 2/8 with [facility].    Blanca Burns, [facility]  Behavioral Health Clinician

## 2019-02-05 ENCOUNTER — TELEPHONE (OUTPATIENT)
Dept: PSYCHOLOGY | Facility: CLINIC | Age: 49
End: 2019-02-05

## 2019-02-05 NOTE — TELEPHONE ENCOUNTER
[facility] received phone call from patient who requested return phone call to discuss concerns.     [facility] spoke with patient. Patient stated that she attended EMDR appointment, but is unable to continue to participate due to cost. She stated that she is needing to pay out of pocket due to change in insurance plans. Patient requesting information on how to access trauma therapy.     Patient also expressed concern about inability to afford medical care, specifically blood work to monitor if cancer has returned. She stated that she has been attempting to receive a quote for blood work to know if she can afford it.  Patient expressed fear that she will have to go without blood work and monitoring. Patient has not yet spoken with  Financial Counselor. Contact information given for [facility] [place].     Patient reported that her anxiety has recently been heightened and all efforts to distract herself have been ineffective.  She cancelled upcoming [facility] office visit due to feeling run down.     [facility] to follow up with patient with information and resources based on her requests.     Blanca Burns, [facility]  Behavioral Health Clinician

## 2019-02-06 PROBLEM — M54.50 RIGHT-SIDED LOW BACK PAIN WITHOUT SCIATICA: Status: RESOLVED | Noted: 2018-02-07 | Resolved: 2019-02-06
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
## 2019-02-07 ENCOUNTER — MYC MEDICAL ADVICE (OUTPATIENT)
Dept: PSYCHOLOGY | Facility: CLINIC | Age: 49
End: 2019-02-07

## 2019-02-08 NOTE — TELEPHONE ENCOUNTER
[facility] sent My Chart message to follow up regarding patient's request on 2/5.    Blanca Burns [facility]  Behavioral Health Clinician

## 2019-02-11 ENCOUNTER — TELEPHONE (OUTPATIENT)
Dept: ONCOLOGY | Facility: CLINIC | Age: 49
End: 2019-02-11

## 2019-02-11 NOTE — TELEPHONE ENCOUNTER
----- Message from Ema Winston sent at 2/7/2019 12:56 PM CST -----  Please call this patient as soon as you can at [phone]. It is Concerning Survivorship monitoring with the bloodwork.     Thanks    Ema MULLINS

## 2019-02-11 NOTE — TELEPHONE ENCOUNTER
TC back to pt. Pt reporting high deductible insurance and questioning cost of treatment and if necessary to come to 3 month follow up. Reviewed with pt that we recommend follow up every 3 months with labs but she will need to make the decision to meet deductible or stretch visits. Did share she would be billed a [id number] visit and needs CBC, CMP and CA 27-29 with visit. SGermscheid, CNP

## 2019-02-12 ENCOUNTER — DOCUMENTATION ONLY (OUTPATIENT)
Dept: SPIRITUAL SERVICES | Facility: CLINIC | Age: 49
End: 2019-02-12

## 2019-02-12 PROBLEM — M54.50 LUMBAGO: Status: RESOLVED | Noted: 2018-09-04 | Resolved: 2019-02-12

## 2019-02-12 NOTE — PROGRESS NOTES
Sheryle Cruse was seen 3 times for evaluation and treatment.  Patient did not return for further treatment and current status is unknown.  Due to short treatment duration, no objective or functional changes were made.  Please see goal flow sheet from episode noted date below and initial evaluation for further information.  Linked Episodes   Type: Episode: Status: Noted: Resolved: Last update: Updated by:   PHYSICAL THERAPY LBP 9/4/2018 Active 9/4/2018  10/18/2018 10:08 AM Tresa Gudino, PT      Comments:

## 2019-02-12 NOTE — TELEPHONE ENCOUNTER
SPIRITUAL HEALTH SERVICES Left Message Note  [facility]  Care     Reason for Contact: Sheryle Cruse requested a telephone SH encounter for spiritual/emotional support.     Visited with patient and she shared some stressors that she has been experiencing recently that make her feel an added degree of suffering.  She wanted to discuss her concerns that perhaps she had done something to deserve this, that this was coming from God.  She states that this thought comes up every now and than for her and makes her more uncertain about relationships and whether she is doing enough.  Reflected with her about her past upbringing and theology and where this may be rooted for her. Offered support and affirmation.    PLAN: Patient knows that she can request a SH visit if it would be helpful.     April Kelly  Staff   Pager [phone]

## 2019-02-18 ENCOUNTER — TELEPHONE (OUTPATIENT)
Dept: PSYCHOLOGY | Facility: CLINIC | Age: 49
End: 2019-02-18

## 2019-02-18 NOTE — TELEPHONE ENCOUNTER
[facility] received phone call from patient on 2/15. [facility] out of office and was unable to return call.    [facility] returned call upon return to clinic on 2/18. Patient requesting to schedule appointment with [facility] in March. Appointment scheduled. Patient provided update about difficulties locating EMDR and/or trauma therapist that is free or on a sliding scale. [facility] informed patient that all known resources have been provided to her. Patient verbalized understanding.    Blanca Burns, [facility]  Behavioral Health Clinician

## 2019-03-01 ENCOUNTER — TELEPHONE (OUTPATIENT)
Dept: PSYCHOLOGY | Facility: CLINIC | Age: 49
End: 2019-03-01

## 2019-03-01 NOTE — TELEPHONE ENCOUNTER
[facility] received voicemail from patient who requested a return phone call.     Patient stated that she continues to feel need for trauma therapy, but is unsure how to proceed due to concerns about cost.  [facility] reviewed free therapy options.  Patient inquired about [facility] ability to provide trauma therapy. [facility] stated that this is outside of [facility] scope of practice.  Patient verbalized understanding.  Due to patient's concerns about insurance, [facility] reminded patient that [facility] is a billable service. Patient expressed concern about cost, and was referred to financial counselor to discuss potential cost of [facility] service since insurance does not provide coverage.     Blanca Burns, [facility]  Behavioral Health Clinician

## 2019-03-06 ENCOUNTER — TELEPHONE (OUTPATIENT)
Dept: PSYCHOLOGY | Facility: CLINIC | Age: 49
End: 2019-03-06

## 2019-03-06 NOTE — TELEPHONE ENCOUNTER
C received message from patient requesting return phone call.     Patient stated that she will need to cancel upcoming appointment with [facility].   Patient reported that there are additional topics that she needs to discuss with [facility].    Blanca Burns, [facility]  Behavioral Health Clinician

## 2019-03-08 NOTE — TELEPHONE ENCOUNTER
[facility] spoke with patient. Patient stated that she will be unable to return to the clinic due to insurance, and has plans to pursue free counseling through the Walk-In center. Patient denied additional questions, concerns, or needs at this time .Patient aware of ongoing [facility] availability if needs arise in the future.    Blanca Burns, [facility]  Behavioral Health Clinician

## 2019-03-13 ENCOUNTER — MYC MEDICAL ADVICE (OUTPATIENT)
Dept: PSYCHOLOGY | Facility: CLINIC | Age: 49
End: 2019-03-13

## 2019-03-19 ENCOUNTER — DOCUMENTATION ONLY (OUTPATIENT)
Dept: SPIRITUAL SERVICES | Facility: CLINIC | Age: 49
End: 2019-03-19

## 2019-03-19 NOTE — TELEPHONE ENCOUNTER
SPIRITUAL HEALTH SERVICES Progress Note  [facility]    Telephone SH encounter with  Sheryle Cruse at her request today.       Illness Narrative - Patient has had treatment for breast cancer and now is in the monitoring stage for any reoccurence.       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       Coping - Patient has a strong Zoroastrianism migue and today she was seeking information about different career paths in which she may be able to use her spirituality for the world's benefit. 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       Plan - The patient knows that she can call to request a SH encounter as needed.     April Kelly  Associate   Pager

## 2019-04-04 ENCOUNTER — DOCUMENTATION ONLY (OUTPATIENT)
Dept: SPIRITUAL SERVICES | Facility: CLINIC | Age: 49
End: 2019-04-04

## 2019-04-04 NOTE — TELEPHONE ENCOUNTER
SPIRITUAL HEALTH SERVICES Progress Note  [facility]    Visited  With Sheryle Cruse via telephone for ongoing emotional/spiritual support at her request. Offered support and affirmation.      Illness Narrative - Patient voiced that she is trying to discern God's plan for her.        Distress - She is trying not to be stressed about being in this time of career uncertainty.       Coping - Patient continues to blog and journal and write about her experiences. She continues to use some of the tools that she has learned in counseling sessions.       Meaning-Making - Not discussed.       Plan - Patient knows that she can contact me as needed for spiritual support.    April Kelly  Associate   Pager

## 2019-04-21 NOTE — PROGRESS NOTES
"CLINICAL NUTRITION SERVICES - ASSESSMENT NOTE    REASON FOR ASSESSMENT  Sheryle Cruse is a 46 year old female seen by the dietitian for Medical Nutrition Therapy related to her  cancer referred by Dr. Santillan.   Pt accompanied by her , Jeffrey.     NUTRITION HISTORY  Factors affecting nutrition intake include:   None at this time     Sheryle requests to speak with dietitian today regarding optimal nutrition post surgery and with chemotherapy.    She reports that she follows a vegan diet.  She wants to ensure she is consuming adequate protein. She has been taking Vegan protein supplements/powders.    She typically has 3 meals/day and snacks as needed.   She eats a lot of beans with grains to make a complete protein.      ANTHROPOMETRICS  Height: 64\"  Weight: 132 lbs/60kg  BMI: 22  Weight Status:  Normal BMI  IBW: 120 lbs  % IBW: 110%  Weight History: stable   Wt Readings from Last 5 Encounters:   10/17/17 (P) 59.9 kg (132 lb)   09/22/17 56.2 kg (124 lb)   09/07/17 56.2 kg (124 lb)   09/01/17 59.3 kg (130 lb 12.8 oz)   08/31/17 58.1 kg (128 lb)       LABS  Labs reviewed    MEDICATIONS/VITAMINS/MINERALS  Medications reviewed    PROCEDURES WITH NUTRITIONAL IMPLICATIONS  Chemotherapy - Impending    ASSESSED NUTRITION NEEDS:  Estimated Energy Needs: 1800 kcals (30 Kcal/Kg)  Justification: maintenance  Estimated Protein Needs: 75-90 grams protein (1.2-1.5 g pro/Kg)  Justification: post-op  Estimated Fluid Needs: >2000  mL (1 mL/Kcal)  Justification: maintenance    NUTRITION DIAGNOSIS:  Food and nutrition-related knowledge deficit related to nutrition needs post op, impending chemotherapy as evidenced by pt unable to verbalize understanding of protein needs.     INTERVENTIONS  Recommendations / Nutrition Prescription  1. 75-90g protein/day  Nutrition Education    Provided written & verbal education on:   - Ways to optimize kcal and protein intake. Discussed calorie and protein needs for maintenance of weight and " [facility]  Consult Note - Hospitalist Service     Date of Admission:  4/21/2019  Consult Requested by: CECILIA Malik  Reason for Consult: Co-medical mangement    Assessment & Plan   Shalonda Howard is a 48 year old female admitted on 4/21/2019.      Past medical history significant for lupus, PCOS, bilateral chronic lower extremity edema, GERD, dysthymia, generalized anxiety disorder, obesity, allergic rhinitis, myalgias with myositis, opioid dependence, ventral hernia without incarceration, postoperative nausea and vomiting, peripheral neuropathy, hyperlipidemia, history of kidney stones and continued nonhealing left surgical wound with multiple infections who is being evaluated for CO medical management of chronic medical conditions following direct admission by orthopedic service suspected left septic ankle.       Suspected septic ankle:    Orthopedic service is managing at this point. Patient indicated that she had noticed a swollen left ankle for a few weeks.  She had an MRI performed on 4/12 which revealed fluid.  This was tapped in clinic and per patient report was growing something that led to her being admitted.    --Defer analgesic management to orthopedic service.    --Defer any imaging study to their service.    --Defer Antibiotics and ID consult to Ortho.      Right lower extremity cellulitis:  Currently on PO antibiotics as an outpatient.    --Hold oral antibiotics and continue with IV antibiotics per Ortho.      Bilateral chronic lower extremity edema:    Patient is compliant with Lasix 160 mg daily and 80 mEq of potassium daily.    --Hold both medications at this point as patient is receiving IV fluids.  --Will monitor potassium level and initiate replacement protocol as needed.      GERD:    --The patient's prior to admit omeprazole 40 mg 2 times daily will be continued.      Dysthymia with anxiety:    --Resume prior to admit Wellbutrin- mg daily, BuSpar 50 mg 2 times  daily, sertraline 100 mg daily, and Topamax 100 mg every evening at bedtime.      Peripheral neuropathy:    --Prior to admit gabapentin 800 mg 3 times daily will be continued.      Opioid dependence:    --Will defer analgesic management to orthopedic service.      Ventral hernia:    Noted history of 5 ventral hernia repairs and has recurrence of a ventral hernia.  Would recommend follow up with primary care provider and further surgical intervention when able.      Hyperlipidemia:    --Resume PTA pravastatin.       Obesity:    Patient's BMI is calculated at 33.28.    --Will defer ongoing management to primary care provider.   --Hold PTA phentermine.       Systemic lupus erythematosus:    Patient indicates that one rheumatologist stated she has lupus and another denied this.  She is not currently on any medications.  No interventions appear necessary.      PCOS:    This appears to be stable.  The patient indicates she is not on any medications.  No interventions appear necessary.      DVT prophylaxis:    --Per orthopedic service.    The patient's care was discussed with the Patient.    The patient has been discussed with Dr. Mccarty, who agrees with the assessment and plan at this time. Dr. Mccarty will evaluate the patient independently.       Robbin Crowe PA-C  [facility]    Patient not seen. Information reviewed.   Agree with impression and plan.     Armando Mccarty  Pager: [phone]  Cell Phone:  [phone]      ______________________________________________________________________    Chief Complaint   Direct admit due to persistent post-operative infection of the left lower extremity and right lower extremity cellulitis.      History is obtained from the patient and EMR.    History of Present Illness   Shalonda Howard is a 48 year old female with a past medical history significant for lupus, PCOS, bilateral chronic lower extremity edema, GERD, dysthymia, generalized anxiety  nutrition status.  Advised pt to aim for 1800kcal and 75-90g protein via 5-6 small frequent meals.   - Reviewed Vegan protein sources. Encouraged pt to always combine a grain with beans to make a complete protein.    - Coping with barriers - as chemotherapy progresses, eating may become more difficult and discussed ways to cope with this.    Provided pt with corresponding education materials on:  Protein Sources.        Pt verbalize understanding of materials provided during consult.   Patient Understanding: Excellent  Expected Compliance: Excellent     Goals  1.  Aim for 5-6 small frequent meals  2.  Aim for 1800kcal and 75-90g protein/day  3. Weight maintenance through chemo treatment      Follow-Up Plans: Pt has RD contact information for questions.      Time spent with patient:30 minutes.  Brandie Wen RD, LD       disorder, obesity, allergic rhinitis, myalgias with myositis, opioid dependence, ventral hernia without incarceration, postoperative nausea and vomiting.  Peripheral neuropathy, hyperlipidemia, history of kidney stones and continued nonhealing left surgical wound with multiple infections who is being evaluated for CO medical management of chronic medical conditions following direct admission by orthopedic service due to left foot nonhealing surgical wound with infection.      Patient has had a persistent nonhealing surgical wound with infection of the left foot since 8/2018.  The patient has required multiple hospitalizations since initial procedure performed in 8/2018 with a bilateral bunionectomy performed by Dr. Harman.  The patient was subsequently admitted in September due to wound dehiscence and on 9/26 underwent incision and drainage as well as hardware removal.  She had been discharged following that hospitalization with antibiotics, however, this did not improve and patient was subsequently admitted on 10/2018 with concerns for worsening infection.  An MRI was performed at that time which did not reveal or appear to have any concerning signs for osteomyelitis.  She was treated with IV Ancef and followed by infectious disease with a 6 week course of IV Ancef through a PICC line.  The patient was then admitted in November with continued nonhealing surgical wound to the left foot with concerning infection for which she underwent incision and drainage as well as debridement.      Patient was admitted for non-healing surgical wound on 1/22/2019.  She was discharged on 1/28/2019 after a left hallux wound irrigation and debridement with bone biopsy and wound vac placement.      Patient returned with non-healing post-op wound on 3/19/2019 and discharged 3/22/2019.  Prior to that admission patient had completed IV antibiotics (3/11/2019) with PICC removed the following day. She had a wound vac in place until  3/18/2019.  She was diagnosed with osteomyelitis of the 1st left metatarsal.  Patient underwent first ray amputation of the left foot with removal of plates and screws from left talonavicular joint and debridement of large 4 x 4 ulcer (plantar) of the left foot.  Tissue cultures grew out Strep and Staph Aures as well as Enterobacter.  She was discharged on Keflex and Levaquin for 1 week course.      Patient was seen in her hospital room.  She stated that she has had swelling and pain in her left ankle.  This led to an MRI of the ankle (performed 4/12 at [facility]) which noted fluid presence.  Patient then returned to clinic and had a joint aspiration performed (4/18/2019) which was found to be quite bloody and this fluid was sent off to lab.  It came back positive (unclear what was growing) and patient was directly admitted.      Patient noted that she has had low grade fever and frequent sweating spells.  She has had increased fatigue and weakness.  She also noted right leg infection with increased pain and swelling.  She has had palpitation.  She has intermittent nausea and her bowel movements are not regular.  She has chronic neuropathy of the feet.        Review of Systems   The 10 point Review of Systems is negative other than noted in the HPI.    Past Medical History    I have reviewed this patient's medical history and updated it with pertinent information if needed.   Past Medical History:   Diagnosis Date     Allergic rhinitis, cause unspecified      Anxiety state, unspecified      Cellulitis and abscess of leg, except foot      Closed fracture of unspecified part of tibia      Disuse osteoporosis      Dysthymic disorder      Edema      Encounter for long-term (current) use of other medications      Esophageal reflux      Kidney stones      Myalgia and myositis, unspecified      Obesity, unspecified      Open wound of foot except toe(s) alone, complicated      Opioid type dependence, unspecified      Other acne       Other congenital valgus deformity of feet      Other ventral hernia without mention of obstruction or gangrene      Polycystic ovaries      PONV (postoperative nausea and vomiting)      Systemic lupus erythematosus (H)      Tibialis tendinitis      Unspecified hereditary and idiopathic peripheral neuropathy    1.  Systemic lupus erythematosus.   2.  PCOS.   3.  Bilateral chronic lower extremity edema.   4.  GERD.   5.  Dysthymia.   6.  Generalized anxiety disorder.   7.  Obesity.   8.  Allergic rhinitis.   9.  History of myalgias with myositis.   10.  Opioid dependence.   11.  Ventral hernia without incarcerated hernia.   12.  Postoperative nausea and vomiting.   13.  Peripheral neuropathy.   14.  Hyperlipidemia.   15.  History of kidney stones.   16.  Continued left foot postoperative infection and nonhealing surgical site.     Past Surgical History   I have reviewed this patient's surgical history and updated it with pertinent information if needed.  Past Surgical History:   Procedure Laterality Date     APPENDECTOMY       APPLY WOUND VAC Left 1/24/2019    Procedure: WOUND VAC APPLICATION;  Surgeon: Miguel Mosher MD;  Location:  OR     ARTHRODESIS FOOT Left 2/4/2015    Procedure: ARTHRODESIS FOOT;  Surgeon: Andre Harman MD;  Location: [facility]     BUNIONECTOMY RT/LT  08/29/2018     DILATION AND CURETTAGE       EXCISE TOENAIL(S) Left 2/4/2015    Procedure: EXCISE TOENAIL(S);  Surgeon: Andre Harman MD;  Location: [facility]     HERNIA REPAIR      umbilical     HERNIA REPAIR      ventral open x 2     IRRIGATION AND DEBRIDEMENT FOOT, COMBINED Left 9/26/2018    Procedure: COMBINED IRRIGATION AND DEBRIDEMENT FOOT;  IRRIGATION AND DEBRIDEMENT LEFT FOOT;  Surgeon: Andre Harman MD;  Location: [facility]     IRRIGATION AND DEBRIDEMENT FOOT, COMBINED Left 11/26/2018    Procedure: COMBINED IRRIGATION AND DEBRIDEMENT LEFT FOOT;  Surgeon: Andre Harman MD;  Location: [facility]     IRRIGATION AND  DEBRIDEMENT FOOT, COMBINED Left 2019    Procedure: LEFT HALLUX WOUND IRRIGATION AND DEBRIDEMENT WITH BONE BIOPSY;  Surgeon: Miguel Mosher MD;  Location:  OR     IRRIGATION AND DEBRIDEMENT FOOT, COMBINED Left 3/20/2019    Procedure: LEFT FOOT IRRIGATION AND  DEBRIDEMENT, FIRST RAY RESECTION AND HARDWARE REMOVAL FROM LEFT FOOT;  Surgeon: Andre Harman MD;  Location:  OR     REMOVE HARDWARE LOWER EXTREMITY Left 3/20/2019    Procedure: REMOVE HARDWARE LOWER EXTREMITY;  Surgeon: Andre Harman MD;  Location:  OR     REPAIR HAMMER TOE Left 2018    Procedure: REPAIR HAMMER TOE;  Surgeon: Andre Harman MD;  Location:  [facility]     TONSILLECTOMY     1.  Tonsillectomy.   2.  Irrigation and debridement of the left foot performed in 2018 and repeated in 2018, 2019.    3.  Bilateral bunionectomy performed in 2018.   4.  Umbilical hernia repair.   5.  Ventral hernia repair x5.   6.  D and C.   7.  Bilateral foot arthrodesis.   8.  Appendectomy.     Social History   I have reviewed this patient's social history and updated it with pertinent information if needed.  Social History     Tobacco Use     Smoking status: Former Smoker     Packs/day: 0.50     Years: 12.00     Pack years: 6.00     Types: Cigarettes     Last attempt to quit: 10/1/2002     Years since quittin.5     Smokeless tobacco: Never Used   Substance Use Topics     Alcohol use: Yes     Comment: wine with dinner occas     Drug use: No   The patient currently resides in a house in [place], [place] with her  and daughter.  She is a former smoker and quit in  with a 6-pack-year history.  She indicates she occasionally consumes wine with dinner.  She denies street or illicit drug use.  She does not currently utilize a cane or walker but has on occasion utilized a scooter to support her left foot when it is painful.     Family History   I have reviewed this patient's family history and updated it with  pertinent information if needed.   No family history on file.   1.  Father passed away at the age of 52 due to esophageal cancer.   2.  Mother has pulmonary hypertension.   3.  Paternal grandfather had esophageal cancer.   4.  An uncle (patient's father's half-brother) also had esophageal cancer.     Medications   Medications Prior to Admission   Medication Sig Dispense Refill Last Dose     ammonium lactate (AMLACTIN) 12 % cream Apply topically daily as needed (to heels)    Past Week at PRN     aspirin (ASA) 325 MG EC tablet Take 1 tablet (325 mg) by mouth daily 30 tablet 1 4/21/2019 at Unknown time     BuPROPion HCl (WELLBUTRIN XL PO) Take 300 mg by mouth daily   4/21/2019 at Unknown time     BusPIRone HCl (BUSPAR PO) Take 15 mg by mouth 2 times daily   4/21/2019 at Unknown time     cephALEXin (KEFLEX) 500 MG capsule Take 1 capsule (500 mg) by mouth 3 times daily 30 capsule 0 4/21/2019 at Unknown time     cetirizine (ZYRTEC) 10 MG tablet Take 10 mg by mouth daily   4/21/2019 at Unknown time     cholecalciferol (VITAMIN D3) 5000 units TABS tablet Take 5,000 Units by mouth daily   4/21/2019 at Unknown time     clindamycin (CLEOCIN T) 1 % solution Apply topically nightly as needed (Acne outbreak)     at PRN     Furosemide (LASIX PO) Take 160 mg by mouth daily    4/21/2019 at Unknown time     gabapentin (NEURONTIN) 800 MG tablet Take 800 mg by mouth 3 times daily Am, supper, hs   4/21/2019 at Unknown time     hydrOXYzine (VISTARIL) 25 MG capsule Take 25 mg by mouth At Bedtime   4/20/2019 at Unknown time     Omeprazole (PRILOSEC PO) Take 40 mg by mouth 2 times daily (before meals) 2 CAPSULES IN A.M.    4/21/2019 at Unknown time     Ondansetron HCl (ZOFRAN PO) Take 8 mg by mouth as needed for nausea or vomiting    at PRN     oxyCODONE (ROXICODONE) 5 MG tablet Take 1-2 tablets (5-10 mg) by mouth every 3 hours as needed for breakthrough pain 50 tablet 0      phentermine 30 MG capsule Take 30 mg by mouth every morning    4/21/2019 at Unknown time     polyethylene glycol (MIRALAX/GLYCOLAX) packet Take 17 g by mouth as needed for constipation    at PRN     Potassium Chloride Shameka CR (K-DUR PO) Take 160 mEq by mouth daily    4/21/2019 at Unknown time     pravastatin (PRAVACHOL) 20 MG tablet Take 20 mg by mouth every evening   4/20/2019 at Unknown time     senna-docusate (SENOKOT-S/PERICOLACE) 8.6-50 MG tablet Take 1 tablet by mouth 2 times daily as needed for constipation        sertraline (ZOLOFT) 100 MG tablet Take 100 mg by mouth daily   4/21/2019 at Unknown time     Topiramate (TOPAMAX PO) Take 100 mg by mouth At Bedtime    4/20/2019 at Unknown time       Allergies   Allergies   Allergen Reactions     Sulfa Drugs Shortness Of Breath and Rash     Demerol [Meperidine] Nausea and Vomiting     Erythromycin Nausea and Vomiting     Metformin Nausea and Vomiting     Codeine Rash     Penicillins Rash       Physical Exam   Vital Signs: Temp: 99.6  F (37.6  C) Temp src: Oral BP: 140/89 Pulse: 110   Resp: 17 SpO2: 97 % O2 Device: None (Room air)    Weight: 0 lbs 0 oz      Constitutional: Awake, alert, cooperative, no apparent distress.    ENT: Normocephalic, without obvious abnormality, atraumatic, oral pharynx with moist mucus membranes, tonsils without erythema or exudates.  Eyes pupils are equal, round and reactive to light; extra occular movements intact.  Normal sclera.    Neck: Supple, symmetrical, trachea midline, no adenopathy.  Pulmonary: No increased work of breathing, good air exchange, clear to auscultation bilaterally, no crackles or wheezing.  Cardiovascular: Regular rate and rhythm, normal S1 and S2, no S3 or S4, and no murmur noted.  GI: Normal bowel sounds, soft, non-distended, non-tender.  Obese.  Ventral hernia noted.    Skin/Integumen: Clear, except right lower extremity erythema.    Neuro: CN II-XII grossly intact.  Psych:  Alert and oriented x 3. Normal affect.  Extremities: Trace lower extremity edema noted, and non-TTP  bilaterally.  Post-operative changes of the left foot and left ankle TTP and mildly edematous.      Data   No results found for this or any previous visit (from the past 24 hour(s)).

## 2019-04-30 NOTE — PATIENT INSTRUCTIONS
Please contact [facility] RN with questions or concerns following today's appointment: [phone].    Thank you!     No

## 2019-06-06 ENCOUNTER — DOCUMENTATION ONLY (OUTPATIENT)
Dept: SPIRITUAL SERVICES | Facility: CLINIC | Age: 49
End: 2019-06-06

## 2019-06-06 NOTE — TELEPHONE ENCOUNTER
SPIRITUAL HEALTH SERVICES  [facility]     Reason for Contact: Sheryle Cruse was contacted by phone at her request    Telephone conversation shared with patient. 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 She shared some concerns that she has with some specific bible verses and how this played out in her day-to-day world. She voiced  some of her frustrations with the institutional Orthod[employer] and not finding a fit at present. We reflected on her own health and how these things have impacted it.  Offered affirmation and support.  Patient asked for prayers.     PLAN: Patient knows that she can call and request a SH phone visit as needed.     April Crawford  Staff   Pager [phone]

## 2019-07-08 ENCOUNTER — OFFICE VISIT (OUTPATIENT)
Dept: FAMILY MEDICINE | Facility: CLINIC | Age: 49
End: 2019-07-08
Payer: COMMERCIAL

## 2019-07-08 VITALS
HEART RATE: 110 BPM | DIASTOLIC BLOOD PRESSURE: 74 MMHG | WEIGHT: 128 LBS | BODY MASS INDEX: 21.97 KG/M2 | SYSTOLIC BLOOD PRESSURE: 104 MMHG | TEMPERATURE: 98 F

## 2019-07-08 DIAGNOSIS — K59.00 CONSTIPATION, UNSPECIFIED CONSTIPATION TYPE: Primary | ICD-10-CM

## 2019-07-08 PROBLEM — Z53.9 ERRONEOUS ENCOUNTER--DISREGARD: Status: RESOLVED | Noted: 2017-07-17 | Resolved: 2019-07-08

## 2019-07-08 PROCEDURE — 99213 OFFICE O/P EST LOW 20 MIN: CPT | Performed by: PHYSICIAN ASSISTANT

## 2019-07-08 NOTE — PATIENT INSTRUCTIONS
Go back to miralax and senna daily after finishing the magnesium citrate until having daily normal bm  If not better in 48 hours can try an enema and then call if still not having a normal BM

## 2019-07-08 NOTE — PROGRESS NOTES
Subjective     Sheryle Cruse is a 48 year old female who presents to clinic today for the following health issues:    HPI   Constipation     Onset: 1 week     Description:   Frequency of bowel movements: 4-5 days last bowel movement .  Stool consistency: soft    Progression of Symptoms:  same    Accompanying Signs & Symptoms:  Abdominal pain (cramping?): YES- R upper side abdominal   Blood in stool: YES  Rectal pain: no   Nausea/vomiting: YES- nausea   Weight loss or gain: no    History:   History of abdominal surgery: no     Precipitating factors:   Recent use of narcotics, anticholinergics, calcium channel blockers, antacids, or iron supplements: no   Chronic Laxative Use: no          Therapies Tried and outcome: laxatives, molasses and prune juice    Got worse last week.     Did have trouble in 2017 as well when she was dx with breast cancer.      Is vegan.  Seems beans make things worse.      doesn't feels she goes all the way    Crocker oil helped but only diarrhea.      Doesn't use miralax daily, only as needed    Rectum is feeling tight             Patient Active Problem List   Diagnosis     Vitamin D deficiency     Vegetarianism     Hyperlipidemia LDL goal <160     Advance care planning     Monoallelic mutation of MUTYH gene     Generalized anxiety disorder     Malignant neoplasm of upper-outer quadrant of right breast in female, estrogen receptor positive (H)     Positive ZHANNA (antinuclear antibody)     Past Surgical History:   Procedure Laterality Date     MASTECTOMY MODIFIED RADICAL BILATERAL         Social History     Tobacco Use     Smoking status: Never Smoker     Smokeless tobacco: Never Used   Substance Use Topics     Alcohol use: No     Family History   Problem Relation Age of Onset     Diabetes Mother 76        Obese     Heart Disease Mother      Hypertension Mother      Cerebrovascular Disease Mother      Cardiovascular Mother         CVA     Cataracts Mother      Cancer Father         bladder      Cerebrovascular Disease Father 70     Breast Cancer Maternal Grandmother      Cerebrovascular Disease Maternal Grandmother      Lipids Maternal Grandmother      Heart Disease Maternal Grandfather         heart disease     Macular Degeneration Maternal Grandfather      Diabetes Paternal Grandmother      Cerebrovascular Disease Paternal Grandfather      Heart Disease Paternal Grandfather      Glaucoma No family hx of              Reviewed and updated as needed this visit by Provider  Allergies  Meds  Problems         Review of Systems   As above      Objective    /74   Pulse 110   Temp 98  F (36.7  C) (Oral)   Wt 58.1 kg (128 lb)   LMP 07/07/2019 (Exact Date)   BMI 21.97 kg/m    Body mass index is 21.97 kg/m .  Physical Exam   Constitutional: She appears well-developed and well-nourished. No distress.   Cardiovascular: Normal rate, regular rhythm and normal heart sounds.   Pulmonary/Chest: Effort normal and breath sounds normal.   Abdominal: Soft. Bowel sounds are normal. There is tenderness (ruq).          Diagnostic Test Results:  none         Assessment & Plan     1. Constipation, unspecified constipation type  Try clean out with magnesium citrate.  Follow up in 48 hours if no BM.    - magnesium citrate solution; Take 296 mLs by mouth once for 1 dose  Dispense: 296 mL; Refill: 0       Patient Instructions   Go back to miralax and senna daily after finishing the magnesium citrate until having daily normal bm  If not better in 48 hours can try an enema and then call if still not having a normal BM      No follow-ups on file.    Dali Yi PA-C  [facility]

## 2019-07-10 ENCOUNTER — PATIENT OUTREACH (OUTPATIENT)
Dept: CARE COORDINATION | Facility: CLINIC | Age: 49
End: 2019-07-10

## 2019-07-10 NOTE — PROGRESS NOTES
Social Work Intervention  [facility]    Data/Intervention: 7/10/19    Patient Name:  Sheryle Cruse  /Age:  1970 (48 year old)    Visit Type: telephone  Referral Source: self    Collaborated With:    -patient      Patient Concerns/Issues:   Sw connected with patient following several phone messages left left while writer was out-of-office expressing financial concerns regarding up coming tests.    Patient shared that she was diagnosed with breast cancer two yrs ago and at that time had received financial support via grants/aides to assist with medical expenses. Sheryle shared that that medical insurance has changed since that time and they now have a $5K or $6K deductible.  She stated that her and her  wouldn't be able to afford.    Patient shared that she is having additional testing done this coming Monday, 7/15 to find out if she has reoccurring cancer.  Sw informed Sheryle that we would work to support her once he have more information. Talked with patient about options and grants that we could apply for.     Encouraged Sheryle to take it one day at a time. To writers understanding, it is unclear at this time if her cancer has reoccurred or not. Encouraged Sheryle to connect next week when test results are back.    Confirmed patient has writer contact information. Sw will continue to follow and assist as needed.         NICK Robledo, [facility]    [facility]  [phone]  [email or web address]

## 2019-07-15 ENCOUNTER — ANCILLARY PROCEDURE (OUTPATIENT)
Dept: ULTRASOUND IMAGING | Facility: CLINIC | Age: 49
End: 2019-07-15
Attending: NURSE PRACTITIONER
Payer: COMMERCIAL

## 2019-07-15 ENCOUNTER — OFFICE VISIT (OUTPATIENT)
Dept: PEDIATRICS | Facility: CLINIC | Age: 49
End: 2019-07-15
Payer: COMMERCIAL

## 2019-07-15 VITALS
BODY MASS INDEX: 22.9 KG/M2 | OXYGEN SATURATION: 100 % | WEIGHT: 133.4 LBS | TEMPERATURE: 97.8 F | DIASTOLIC BLOOD PRESSURE: 81 MMHG | SYSTOLIC BLOOD PRESSURE: 116 MMHG | HEART RATE: 81 BPM

## 2019-07-15 DIAGNOSIS — R10.11 ABDOMINAL PAIN, RIGHT UPPER QUADRANT: ICD-10-CM

## 2019-07-15 DIAGNOSIS — C50.411 MALIGNANT NEOPLASM OF UPPER-OUTER QUADRANT OF RIGHT BREAST IN FEMALE, ESTROGEN RECEPTOR POSITIVE (H): Primary | ICD-10-CM

## 2019-07-15 DIAGNOSIS — Z17.0 MALIGNANT NEOPLASM OF UPPER-OUTER QUADRANT OF RIGHT BREAST IN FEMALE, ESTROGEN RECEPTOR POSITIVE (H): Primary | ICD-10-CM

## 2019-07-15 DIAGNOSIS — C50.411 MALIGNANT NEOPLASM OF UPPER-OUTER QUADRANT OF RIGHT BREAST IN FEMALE, ESTROGEN RECEPTOR POSITIVE (H): ICD-10-CM

## 2019-07-15 DIAGNOSIS — K59.00 CONSTIPATION, UNSPECIFIED CONSTIPATION TYPE: Primary | ICD-10-CM

## 2019-07-15 DIAGNOSIS — Z17.0 MALIGNANT NEOPLASM OF UPPER-OUTER QUADRANT OF RIGHT BREAST IN FEMALE, ESTROGEN RECEPTOR POSITIVE (H): ICD-10-CM

## 2019-07-15 LAB
ALBUMIN SERPL-MCNC: 3.9 G/DL (ref 3.4–5)
ALP SERPL-CCNC: 49 U/L (ref 40–150)
ALT SERPL W P-5'-P-CCNC: 24 U/L (ref 0–50)
ANION GAP SERPL CALCULATED.3IONS-SCNC: 6 MMOL/L (ref 3–14)
AST SERPL W P-5'-P-CCNC: 26 U/L (ref 0–45)
BASOPHILS # BLD AUTO: 0 10E9/L (ref 0–0.2)
BASOPHILS NFR BLD AUTO: 0.9 %
BILIRUB SERPL-MCNC: 0.4 MG/DL (ref 0.2–1.3)
BUN SERPL-MCNC: 3 MG/DL (ref 7–30)
CALCIUM SERPL-MCNC: 9.2 MG/DL (ref 8.5–10.1)
CHLORIDE SERPL-SCNC: 109 MMOL/L (ref 94–109)
CO2 SERPL-SCNC: 26 MMOL/L (ref 20–32)
CREAT SERPL-MCNC: 0.56 MG/DL (ref 0.52–1.04)
DIFFERENTIAL METHOD BLD: NORMAL
EOSINOPHIL # BLD AUTO: 0.1 10E9/L (ref 0–0.7)
EOSINOPHIL NFR BLD AUTO: 1.5 %
ERYTHROCYTE [DISTWIDTH] IN BLOOD BY AUTOMATED COUNT: 12.8 % (ref 10–15)
GFR SERPL CREATININE-BSD FRML MDRD: >90 ML/MIN/{1.73_M2}
GLUCOSE SERPL-MCNC: 93 MG/DL (ref 70–99)
HCT VFR BLD AUTO: 35 % (ref 35–47)
HGB BLD-MCNC: 12.3 G/DL (ref 11.7–15.7)
IMM GRANULOCYTES # BLD: 0 10E9/L (ref 0–0.4)
IMM GRANULOCYTES NFR BLD: 0.2 %
LYMPHOCYTES # BLD AUTO: 1.1 10E9/L (ref 0.8–5.3)
LYMPHOCYTES NFR BLD AUTO: 23.3 %
MCH RBC QN AUTO: 30.9 PG (ref 26.5–33)
MCHC RBC AUTO-ENTMCNC: 35.1 G/DL (ref 31.5–36.5)
MCV RBC AUTO: 88 FL (ref 78–100)
MONOCYTES # BLD AUTO: 0.4 10E9/L (ref 0–1.3)
MONOCYTES NFR BLD AUTO: 7.7 %
NEUTROPHILS # BLD AUTO: 3.1 10E9/L (ref 1.6–8.3)
NEUTROPHILS NFR BLD AUTO: 66.4 %
PLATELET # BLD AUTO: 289 10E9/L (ref 150–450)
POTASSIUM SERPL-SCNC: 4.5 MMOL/L (ref 3.4–5.3)
PROT SERPL-MCNC: 7.2 G/DL (ref 6.8–8.8)
RBC # BLD AUTO: 3.98 10E12/L (ref 3.8–5.2)
SODIUM SERPL-SCNC: 141 MMOL/L (ref 133–144)
WBC # BLD AUTO: 4.7 10E9/L (ref 4–11)

## 2019-07-15 PROCEDURE — 85025 COMPLETE CBC W/AUTO DIFF WBC: CPT | Performed by: INTERNAL MEDICINE

## 2019-07-15 PROCEDURE — 99214 OFFICE O/P EST MOD 30 MIN: CPT | Performed by: NURSE PRACTITIONER

## 2019-07-15 PROCEDURE — 76700 US EXAM ABDOM COMPLETE: CPT | Performed by: RADIOLOGY

## 2019-07-15 PROCEDURE — 80053 COMPREHEN METABOLIC PANEL: CPT | Performed by: INTERNAL MEDICINE

## 2019-07-15 PROCEDURE — 36415 COLL VENOUS BLD VENIPUNCTURE: CPT | Performed by: INTERNAL MEDICINE

## 2019-07-15 RX ORDER — NICOTINE POLACRILEX 4 MG/1
20 GUM, CHEWING ORAL DAILY
Qty: 30 TABLET | Refills: 1 | Status: SHIPPED | OUTPATIENT
Start: 2019-07-15

## 2019-07-15 ASSESSMENT — ANXIETY QUESTIONNAIRES
1. FEELING NERVOUS, ANXIOUS, OR ON EDGE: MORE THAN HALF THE DAYS
5. BEING SO RESTLESS THAT IT IS HARD TO SIT STILL: NOT AT ALL
7. FEELING AFRAID AS IF SOMETHING AWFUL MIGHT HAPPEN: SEVERAL DAYS
6. BECOMING EASILY ANNOYED OR IRRITABLE: SEVERAL DAYS
GAD7 TOTAL SCORE: 7
3. WORRYING TOO MUCH ABOUT DIFFERENT THINGS: SEVERAL DAYS
2. NOT BEING ABLE TO STOP OR CONTROL WORRYING: SEVERAL DAYS

## 2019-07-15 ASSESSMENT — PATIENT HEALTH QUESTIONNAIRE - PHQ9
SUM OF ALL RESPONSES TO PHQ QUESTIONS 1-9: 4
5. POOR APPETITE OR OVEREATING: SEVERAL DAYS

## 2019-07-15 NOTE — PATIENT INSTRUCTIONS
PLAN:   1.   Symptomatic therapy suggested: will try prilosec once a day.  2.  Orders Placed This Encounter   Medications     omeprazole 20 MG tablet     Sig: Take 1 tablet (20 mg) by mouth daily Take 30-60 minutes before a meal.     Dispense:  30 tablet     Refill:  1     Orders Placed This Encounter   Procedures     US Abdomen Complete       3. Patient needs to follow up in if no improvement,or sooner if worsening of symptoms or other symptoms develop.  FURTHER TESTING:       - abdomen ultrasound  Will follow up and/or notify patient of  results via My Chart to determine further need for followup

## 2019-07-15 NOTE — RESULT ENCOUNTER NOTE
Hello Sheryle Cruse,    Attached are your test results.  Abdomen ultrasound is normal   Lets try the Prilosec and see if it helps    Please contact us if you have any questions.    Katie Billy, CNP

## 2019-07-15 NOTE — PROGRESS NOTES
Subjective     Sheryle Cruse is a 48 year old female who presents to clinic today for the following health issues:    HPI   Constipation      Duration: 2 weeks    Description:       Frequency of bowel movements: 2-3 days usually, had one yesterday and patient feels symptoms are improving        Consistency of stool: soft    Intensity:  mild    Accompanying signs and symptoms: Yes-feeling of always being full       Abdominal pain: YES- right side        Rectal pain: no        Blood in stool: YES- about 2 weeks ago, bright red       Nausea/vomitting: YES- nausea    History:        Similar problems in past: YES- 2017    Precipitating or alleviating factors:        Medications worsening symptoms: no     Therapies tried and outcome: Magnesium citrate, vegetable broth, warm water with lemon, limiting mejia beans, increasing fiber, Miralax, Milk of Magnesia        Chronic laxative use: no   States is some better now  Also having pain in the right upper quadrant which is where it is localized     Patient has had an insurance change and now has a high deductible.     About 2 months ago, patient had right arm tightness that felt like she had a lump around the elbow area. When patient lifted her arm above her head, she would have an indent in her arm in this area. Patient is concerned as she had breast cancer with the tumor on the right side. These symptoms lasted about one month and have since resolved.  It is gone now but for a few weeks was some lumpiness and then traveled down her arm   Has a spot on her right breast has been same since radiation but more swollen than the left   Knows she is supposed to have repeat labs visits   Also went to [facility] to have her arm checked it out     States is also grappling with anxiety and depression as  and are financially struggling some as well which is a stressor       Patient Active Problem List   Diagnosis     Vitamin D deficiency     Vegetarianism      Hyperlipidemia LDL goal <160     Advance care planning     Monoallelic mutation of MUTYH gene     Generalized anxiety disorder     Malignant neoplasm of upper-outer quadrant of right breast in female, estrogen receptor positive (H)     Positive ZHANNA (antinuclear antibody)     Past Surgical History:   Procedure Laterality Date     MASTECTOMY MODIFIED RADICAL BILATERAL         Social History     Tobacco Use     Smoking status: Never Smoker     Smokeless tobacco: Never Used   Substance Use Topics     Alcohol use: No     Family History   Problem Relation Age of Onset     Diabetes Mother 76        Obese     Heart Disease Mother      Hypertension Mother      Cerebrovascular Disease Mother      Cardiovascular Mother         CVA     Cataracts Mother      Cancer Father         bladder     Cerebrovascular Disease Father 70     Breast Cancer Maternal Grandmother      Cerebrovascular Disease Maternal Grandmother      Lipids Maternal Grandmother      Heart Disease Maternal Grandfather         heart disease     Macular Degeneration Maternal Grandfather      Diabetes Paternal Grandmother      Cerebrovascular Disease Paternal Grandfather      Heart Disease Paternal Grandfather      Glaucoma No family hx of          Current Outpatient Medications   Medication Sig Dispense Refill     cholecalciferol (VITAMIN D3) 5000 units (125 mcg) capsule Take 5,000 Units by mouth daily       Multiple Vitamin (ONE-A-DAY ESSENTIAL) TABS Take 1 tablet by mouth daily.       polyethylene glycol (MIRALAX/GLYCOLAX) Packet Take 1 packet by mouth daily       Allergies   Allergen Reactions     Contrast Dye      Pt does not recall which type of contrast     Diphenhydramine      Penicillins Rash     swelling     BP Readings from Last 3 Encounters:   07/15/19 116/81   07/08/19 104/74   01/08/19 100/70    Wt Readings from Last 3 Encounters:   07/15/19 60.5 kg (133 lb 6.4 oz)   07/08/19 58.1 kg (128 lb)   01/08/19 59.3 kg (130 lb 11.2 oz)           Reviewed and  updated as needed this visit by Provider         Review of Systems   ROS COMP: CONSTITUTIONAL:NEGATIVE for fever, chills, change in weight  INTEGUMENTARY/SKIN: NEGATIVE for changing lesion  and rash   ENT/MOUTH: NEGATIVE for ear, mouth and throat problems  RESP:NEGATIVE for significant cough or SOB  CV: NEGATIVE for chest pain/chest pressure, dyspnea on exertion, irregular heart beat and lower extremity edema  GI: POSITIVE for abdominal pain epigastric and RUQ, constipation, gas or bloating and Hx GERD and NEGATIVE for jaundice, melena and vomiting  MUSCULOSKELETAL: NEGATIVE for significant arthralgias or myalgia  NEURO: NEGATIVE for weakness, dizziness or paresthesias  ENDOCRINE: NEGATIVE for temperature intolerance, skin/hair changes  PSYCHIATRIC: POSITIVE foranxiety and NEGATIVE forthoughts of hurting someone else and thoughts of self harm      Objective    /81 (BP Location: Left arm, Patient Position: Sitting, Cuff Size: Adult Regular)   Pulse 81   Temp 97.8  F (36.6  C) (Temporal)   Wt 60.5 kg (133 lb 6.4 oz)   LMP 07/07/2019   SpO2 100%   BMI 22.90 kg/m    Body mass index is 22.9 kg/m .   Wt Readings from Last 4 Encounters:   07/15/19 60.5 kg (133 lb 6.4 oz)   07/08/19 58.1 kg (128 lb)   01/08/19 59.3 kg (130 lb 11.2 oz)   01/04/19 61.1 kg (134 lb 9.6 oz)       Physical Exam   GENERAL APPEARANCE: alert, active and no distress  NECK: normal and supple  RESP: lungs clear to auscultation - no rales, rhonchi or wheezes  BREAST: normal without masses, tenderness or nipple discharge and no palpable axillary masses or adenopathy  CV: regular rates and rhythm and no murmur, click or rub  ABDOMEN: mild tenderness in the RUQ area, no rebound tenderness, no guarding or rigidity, no CVA tenderness, no herniae noted, no masses noted  MS: extremities normal- no gross deformities noted  SKIN: Skin color, texture, turgor normal.     NEURO: Normal strength and tone, mentation intact and speech normal  PSYCH:  mentation appears normal and affect normal/bright  MENTAL STATUS EXAM:  Appearance/Behavior: No apparent distress, Casually groomed and Dressed appropriately for weather  Speech: Normal  Mood/Affect: normal affect  Insight: Adequate    Diagnostic Test Results:  Recent Results (from the past 744 hour(s))   US Abdomen Complete    Narrative    EXAMINATION: US ABDOMEN COMPLETE, 7/15/2019 11:22 AM     COMPARISON: CT abdomen and pelvis 11/1/2018    HISTORY: Right upper quadrant abdominal pain.    TECHNIQUE: The abdomen was scanned in standard fashion with  specialized ultrasound transducer(s) using both grey scale and limited  color Doppler techniques.    Findings:  Pancreas: Visualized portions of the head and body of the pancreas are  within normal limits.   Aorta and IVC:  The visualized portions are not dilated.   Liver: The liver demonstrates normal homogeneous echotexture.  The  main portal vein is patent with antegrade flow, measuring 0.9 cm.  Gallbladder: There is no wall thickening, pericholecystic fluid, nor  sonographic Linares's sign. No cholelithiasis.  Bile Ducts: There is no intrahepatic bile duct dilation.  The common  bile duct measures 2 mm in diameter.  Kidneys: No hydronephrosis.  The craniocaudal dimensions are: right-  10.2 cm, left- 10.8 cm. The inferior pole of the right kidney was  obscured by bowel gas. The superior and inferior poles of the left  kidney were obscured.  Spleen: The spleen is normal in size,  measuring 8 cm in sagittal  dimension.  Fluid: No evidence of ascites or pleural effusions.      Impression    Impression:   1.  Normal abdominal ultrasound.    AN MD AADM     Results for orders placed or performed in visit on 07/15/19   Comprehensive metabolic panel   Result Value Ref Range    Sodium 141 133 - 144 mmol/L    Potassium 4.5 3.4 - 5.3 mmol/L    Chloride 109 94 - 109 mmol/L    Carbon Dioxide 26 20 - 32 mmol/L    Anion Gap 6 3 - 14 mmol/L    Glucose 93 70 - 99 mg/dL    Urea  Nitrogen 3 (L) 7 - 30 mg/dL    Creatinine 0.56 0.52 - 1.04 mg/dL    GFR Estimate >90 >60 mL/min/[1.73_m2]    GFR Estimate If Black >90 >60 mL/min/[1.73_m2]    Calcium 9.2 8.5 - 10.1 mg/dL    Bilirubin Total 0.4 0.2 - 1.3 mg/dL    Albumin 3.9 3.4 - 5.0 g/dL    Protein Total 7.2 6.8 - 8.8 g/dL    Alkaline Phosphatase 49 40 - 150 U/L    ALT 24 0 - 50 U/L    AST 26 0 - 45 U/L   CBC with platelets differential   Result Value Ref Range    WBC 4.7 4.0 - 11.0 10e9/L    RBC Count 3.98 3.8 - 5.2 10e12/L    Hemoglobin 12.3 11.7 - 15.7 g/dL    Hematocrit 35.0 35.0 - 47.0 %    MCV 88 78 - 100 fl    MCH 30.9 26.5 - 33.0 pg    MCHC 35.1 31.5 - 36.5 g/dL    RDW 12.8 10.0 - 15.0 %    Platelet Count 289 150 - 450 10e9/L    Diff Method Automated Method     % Neutrophils 66.4 %    % Lymphocytes 23.3 %    % Monocytes 7.7 %    % Eosinophils 1.5 %    % Basophils 0.9 %    % Immature Granulocytes 0.2 %    Absolute Neutrophil 3.1 1.6 - 8.3 10e9/L    Absolute Lymphocytes 1.1 0.8 - 5.3 10e9/L    Absolute Monocytes 0.4 0.0 - 1.3 10e9/L    Absolute Eosinophils 0.1 0.0 - 0.7 10e9/L    Absolute Basophils 0.0 0.0 - 0.2 10e9/L    Abs Immature Granulocytes 0.0 0 - 0.4 10e9/L             Assessment & Plan     Sheryle was seen today for constipation.    Diagnoses and all orders for this visit:    Constipation, unspecified constipation type  I discussed the etiologies of constipation as well as factors that will influence this including diet, activity, fluid intake and medications. Reviewed benefits of fiber and specific directives given.    Abdominal pain, right upper quadrant  -     US Abdomen Complete; Future  -     omeprazole 20 MG tablet; Take 1 tablet (20 mg) by mouth daily Take 30-60 minutes before a meal.  Will follow up and/or notify patient of  results via My Chart to determine further need for followup      Malignant neoplasm of upper-outer quadrant of right breast in female, estrogen receptor positive (H)  -      Ca27.29  breast tumor  marker  -     Comprehensive metabolic panel  -     CBC with platelets differential    PLAN:   1.   Symptomatic therapy suggested: will try prilosec once a day.  2.  Orders Placed This Encounter   Medications     omeprazole 20 MG tablet     Sig: Take 1 tablet (20 mg) by mouth daily Take 30-60 minutes before a meal.     Dispense:  30 tablet     Refill:  1     Orders Placed This Encounter   Procedures     US Abdomen Complete       3. Patient needs to follow up in if no improvement,or sooner if worsening of symptoms or other symptoms develop.  FURTHER TESTING:       - abdomen ultrasound  Will follow up and/or notify patient of  results via My Chart to determine further need for followup       See Patient Instructions      No follow-ups on file.    GILDARDO Michaels CNP  [facility]

## 2019-07-16 ASSESSMENT — ANXIETY QUESTIONNAIRES: GAD7 TOTAL SCORE: 7

## 2019-08-12 ENCOUNTER — PATIENT OUTREACH (OUTPATIENT)
Dept: CARE COORDINATION | Facility: CLINIC | Age: 49
End: 2019-08-12

## 2019-08-12 NOTE — PROGRESS NOTES
"Social Work Note: Telephone Call  Oncology Clinic    Data/Intervention:  Patient Name:  Sheryle Cruse  /Age:  1970 (48 year old)    Call From: MEG  Reason for Call:  Return pt VM    Assessment:  SW had voicemail from Sheryle left over the weekend, explaining she received MEG's information from Alea Petty and was looking for support from an OSW in [place].  MEG called and spoke to Sheryle.  Sheryle thanked meg for calling back and started off by stating that she didn't need anything specific at this time, but that she felt she needed someone that could serve as a point of contact for her.  She expressed her frustrations that she has called sw's in the past and doesn't feel satisfied with the length of time it takes to get a call back. Sheryle has been in contact with patient relations, who directed her to Alea Petty and then me.  Sheryle states she needs someone that she can connect with for support and they \"will get back to me in a timely fashion.\"  MEG validated Sheryle's feelings and assured her she would do her best to get back to her when she calls as quickly as she can, while also naming some limits to this and clarifying reasonable expectations for timely returns to phone calls.     Sheryle has had significant coverage changes since her time of diagnosis.  She was on a [employer] plan at that time and had very little costs connected to her care, but now has a deductible that is $3978-3195.  She notes that she and her  just can't afford that significant of a cost.  She has decided not to pursue some of the tumor marker blood work that is being recommended by her providers due to their expense.  She states that she understands the risks that come along with that decision, \"I get a clear dose of my mortality every day.\"  She reports that she sees her PCP regularly and when she notices any symptom that could be concerning.  Discussed financial support opportunities, though unfortunately, they " "primarily available for patients in active treatment.  Sheryle states she understands this and isn't looking for the help right now, but is glad to know sw would be available if she ended up back in that position.      Sheryle states that she and her  are hoping to move to [place] September 2020.  She is hoping that they may be able to find some physical or financial support to assist with that move.  MEG agreed to pass along that information if she learns of resources that become available.      Discussion wrapped up with no specific plans for follow up.  Sheryle states, \"I hope that things work out that I will never need to use your services\" but she was glad to have the contact in the event that became needed.  MEG agreed she would wish for the same positive outcome for Sheryle, but would keep her on meg's radar in the event things changed.      Plan:  Sheryle will contact MEG with further psychosocial support needs or resource questions  SW will remain available as needed and appropriate.    NICK Sawyer, [facility]  Clinical , Adult Oncology  Pager: [phone]  Phone: [phone]        "

## 2019-08-22 ENCOUNTER — DOCUMENTATION ONLY (OUTPATIENT)
Dept: SPIRITUAL SERVICES | Facility: CLINIC | Age: 49
End: 2019-08-22

## 2019-08-22 NOTE — TELEPHONE ENCOUNTER
SPIRITUAL HEALTH SERVICES  SPIRITUAL ASSESSMENT Progress Note  [facility] Oncology Care       REFERRAL SOURCE: Patient requested a telephone SH visit today.     Illness Narrative -  Patient did not mention her cancer in our call today.  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         Distress - She continues to deal with  relationship issues and finds some biblical scripture makes this more difficult. The idea that we are to make sacrifices for others in this life is one of them.       Coping - Patient reports that she is trying to pray and discern and be open to  her next steps.     Intervention: Reflected with her about were she feels stuck at times with scripture and how past narrative may be adding into this reading of scripture.      Plan - The patient has my contact information and knows that she can request a  visit as needed.     April Kelly  Staff

## 2019-11-14 ENCOUNTER — OFFICE VISIT (OUTPATIENT)
Dept: PEDIATRICS | Facility: CLINIC | Age: 49
End: 2019-11-14
Payer: COMMERCIAL

## 2019-11-14 VITALS
OXYGEN SATURATION: 100 % | DIASTOLIC BLOOD PRESSURE: 70 MMHG | SYSTOLIC BLOOD PRESSURE: 120 MMHG | HEART RATE: 111 BPM | BODY MASS INDEX: 22.47 KG/M2 | TEMPERATURE: 97.8 F | WEIGHT: 130.9 LBS

## 2019-11-14 DIAGNOSIS — R10.9 RIGHT FLANK PAIN: Primary | ICD-10-CM

## 2019-11-14 DIAGNOSIS — R82.90 NONSPECIFIC FINDING ON EXAMINATION OF URINE: ICD-10-CM

## 2019-11-14 DIAGNOSIS — N39.0 URINARY TRACT INFECTION WITHOUT HEMATURIA, SITE UNSPECIFIED: ICD-10-CM

## 2019-11-14 LAB
ALBUMIN UR-MCNC: 30 MG/DL
APPEARANCE UR: CLEAR
BACTERIA #/AREA URNS HPF: ABNORMAL /HPF
BILIRUB UR QL STRIP: NEGATIVE
COLOR UR AUTO: YELLOW
GLUCOSE UR STRIP-MCNC: NEGATIVE MG/DL
HGB UR QL STRIP: NEGATIVE
KETONES UR STRIP-MCNC: >150 MG/DL
LEUKOCYTE ESTERASE UR QL STRIP: ABNORMAL
NITRATE UR QL: NEGATIVE
NON-SQ EPI CELLS #/AREA URNS LPF: ABNORMAL /LPF
PH UR STRIP: 5.5 PH (ref 5–7)
RBC #/AREA URNS AUTO: ABNORMAL /HPF
SOURCE: ABNORMAL
SP GR UR STRIP: 1.02 (ref 1–1.03)
UROBILINOGEN UR STRIP-MCNC: NORMAL MG/DL (ref 0–2)
WBC #/AREA URNS AUTO: ABNORMAL /HPF

## 2019-11-14 PROCEDURE — 81001 URINALYSIS AUTO W/SCOPE: CPT | Performed by: NURSE PRACTITIONER

## 2019-11-14 PROCEDURE — 87086 URINE CULTURE/COLONY COUNT: CPT | Performed by: NURSE PRACTITIONER

## 2019-11-14 PROCEDURE — 99214 OFFICE O/P EST MOD 30 MIN: CPT | Performed by: NURSE PRACTITIONER

## 2019-11-14 RX ORDER — SULFAMETHOXAZOLE/TRIMETHOPRIM 800-160 MG
1 TABLET ORAL 2 TIMES DAILY
Qty: 14 TABLET | Refills: 0 | Status: SHIPPED | OUTPATIENT
Start: 2019-11-14 | End: 2020-02-06

## 2019-11-14 NOTE — NURSING NOTE
"Chief Complaint   Patient presents with     Back Pain     right sided back pain on going pain, this episode started in september       Initial /70 (BP Location: Left arm, Patient Position: Sitting, Cuff Size: Adult Regular)   Pulse 111   Temp 97.8  F (36.6  C) (Temporal)   Wt 59.4 kg (130 lb 14.4 oz)   LMP 10/25/2019   SpO2 100%   Breastfeeding No   BMI 22.47 kg/m   Estimated body mass index is 22.47 kg/m  as calculated from the following:    Height as of 1/4/19: 1.626 m (5' 4\").    Weight as of this encounter: 59.4 kg (130 lb 14.4 oz).  Medication Reconciliation: complete      EDIE Ley      "

## 2019-11-14 NOTE — PROGRESS NOTES
Subjective     Sheryle Cruse is a 48 year old female who presents to clinic today for the following health issues:    HPI   Chronic/Recurring Back Pain Follow Up      Where is your back pain located? (Select all that apply) low back right, middle of back right and hip right    How would you describe your back pain?  dull ache and stinging feeling, swollen pressurized feeling    Where does your back pain spread? Nowhere, sometimes around the rib cage    Since your last clinic visit for back pain, how has your pain changed? unchanged    Does your back pain interfere with your job? YES    Since your last visit, have you tried any new treatment? Yes -  lumbar pillow, increased water with lemon, apsom salt baths, heat and Physical Therapy    Having persistent low back pain. Noticed started in September and is trying to white knuckle this because of insurance issues  Was worried about internal organs. Not having abdominal pain   Feels like in her right flank area   Not worsening but just persistent  Drinking water    States right side of torso has always been uncomfortable since having her breast surgery on her breast   New insurance starts in January and that will   Has a high deductible plan now and has not met the deductible yet   Also has regular menses cycles the pain the pain in the flank goes away       How many servings of fruits and vegetables do you eat daily?  4 or more    On average, how many sweetened beverages do you drink each day (soda, juice, sweet tea, etc)?   2-3    How many days per week do you miss taking your medication? 0      Patient Active Problem List   Diagnosis     Vitamin D deficiency     Vegetarianism     Hyperlipidemia LDL goal <160     Advance care planning     Monoallelic mutation of MUTYH gene     Generalized anxiety disorder     Malignant neoplasm of upper-outer quadrant of right breast in female, estrogen receptor positive (H)     Positive ZHANNA (antinuclear antibody)     Past Surgical  History:   Procedure Laterality Date     MASTECTOMY MODIFIED RADICAL BILATERAL         Social History     Tobacco Use     Smoking status: Never Smoker     Smokeless tobacco: Never Used   Substance Use Topics     Alcohol use: No     Family History   Problem Relation Age of Onset     Diabetes Mother 76        Obese     Heart Disease Mother      Hypertension Mother      Cerebrovascular Disease Mother      Cardiovascular Mother         CVA     Cataracts Mother      Cancer Father         bladder     Cerebrovascular Disease Father 70     Breast Cancer Maternal Grandmother      Cerebrovascular Disease Maternal Grandmother      Lipids Maternal Grandmother      Heart Disease Maternal Grandfather         heart disease     Macular Degeneration Maternal Grandfather      Diabetes Paternal Grandmother      Cerebrovascular Disease Paternal Grandfather      Heart Disease Paternal Grandfather      Glaucoma No family hx of          Current Outpatient Medications   Medication Sig Dispense Refill     cholecalciferol (VITAMIN D3) 5000 units (125 mcg) capsule Take 5,000 Units by mouth daily       Multiple Vitamin (ONE-A-DAY ESSENTIAL) TABS Take 1 tablet by mouth daily.       omeprazole 20 MG tablet Take 1 tablet (20 mg) by mouth daily Take 30-60 minutes before a meal. 30 tablet 1     polyethylene glycol (MIRALAX/GLYCOLAX) Packet Take 1 packet by mouth daily       Allergies   Allergen Reactions     Contrast Dye      Pt does not recall which type of contrast     Diphenhydramine      Penicillins Rash     swelling     BP Readings from Last 3 Encounters:   11/14/19 120/70   07/15/19 116/81   07/08/19 104/74    Wt Readings from Last 3 Encounters:   11/14/19 59.4 kg (130 lb 14.4 oz)   07/15/19 60.5 kg (133 lb 6.4 oz)   07/08/19 58.1 kg (128 lb)              Reviewed and updated as needed this visit by Provider         Review of Systems   ROS COMP: Constitutional, HEENT, cardiovascular, pulmonary, gi and gu systems are negative, except as  otherwise noted.      Objective    /70 (BP Location: Left arm, Patient Position: Sitting, Cuff Size: Adult Regular)   Pulse 111   Temp 97.8  F (36.6  C) (Temporal)   Wt 59.4 kg (130 lb 14.4 oz)   LMP 10/25/2019   SpO2 100%   Breastfeeding No   BMI 22.47 kg/m    Body mass index is 22.47 kg/m .  Physical Exam   GENERAL APPEARANCE: alert, active and no distress  NECK: no adenopathy, no asymmetry, masses, or scars and thyroid normal to palpation  RESP: lungs clear to auscultation - no rales, rhonchi or wheezes  CV: regular rates and rhythm and no murmur, click or rub  ABDOMEN: mild and moderate tenderness in the in the back area of right flank , no rebound tenderness, no guarding or rigidity, no herniae noted, no masses noted  MS: extremities normal- no gross deformities noted  SKIN: Skin color, texture, turgor normal.   NEURO: Normal strength and tone, mentation intact and speech normal  PSYCH: mentation appears normal, patient appearance--, anxious and worried    Diagnostic Test Results:  Results for orders placed or performed in visit on 11/14/19   UA with Microscopic reflex to Culture     Status: Abnormal   Result Value Ref Range    Color Urine Yellow     Appearance Urine Clear     Glucose Urine Negative NEG^Negative mg/dL    Bilirubin Urine Negative NEG^Negative    Ketones Urine >150 (A) NEG^Negative mg/dL    Specific Gravity Urine 1.020 1.003 - 1.035    Blood Urine Negative NEG^Negative    pH Urine 5.5 5.0 - 7.0 pH    Protein Albumin Urine 30 (A) NEG^Negative mg/dL    Urobilinogen mg/dL Normal 0.0 - 2.0 mg/dL    Nitrite Urine Negative NEG^Negative    Leukocyte Esterase Urine Small (A) NEG^Negative    Source Midstream Urine     WBC Urine 5-10 (A) OTO5^0 - 5 /HPF    RBC Urine O - 2 OTO2^O - 2 /HPF    Bacteria Urine Moderate (A) NEG^Negative /HPF    Squamous Epithelial /LPF Urine Few FEW^Few /LPF   Urine Culture Aerobic Bacterial     Status: None   Result Value Ref Range    Specimen Description Midstream  Urine     Culture Micro No growth            Assessment & Plan     Sheryle was seen today for back pain.    Diagnoses and all orders for this visit:    Right flank pain  -     UA with Microscopic reflex to Culture  -     CT Abdomen Pelvis w/o Contrast; Future  Concerning for potential kidney stone   Symptomatic therapy suggested: increase fluids, OTC acetaminophen and call prn if symptoms persist or worsen.    Nonspecific finding on examination of urine  -     Urine Culture Aerobic Bacterial  Will follow up and/or notify patient of  results via My Chart to determine further need for followup      Urinary tract infection without hematuria, site unspecified  -     sulfamethoxazole-trimethoprim (BACTRIM DS/SEPTRA DS) 800-160 MG tablet; Take 1 tablet by mouth 2 times daily for 7 days  Covered for UTI until culture is back   Patient was instructed to drink plenty of fluids, urinate frequently and to contact the office promptly should she notice fever greater than 102, increase in discomfort, skin rash, lack of improvement after 2 days of treatment, or the appearance of new symptoms.       See Patient Instructions  Patient Instructions     PLAN:   1.   Symptomatic therapy suggested: increase fluids and call prn if symptoms persist or worsen.  2.  Orders Placed This Encounter   Procedures     CT Abdomen Pelvis w/o Contrast     UA with Microscopic reflex to Culture     3. Patient needs to follow up in if no improvement,or sooner if worsening of symptoms or other symptoms develop.  FURTHER TESTING:       - CT abdomen and pelvis  Will follow up and/or notify patient of  results via My Chart to determine further need for followup      No follow-ups on file.    GILDARDO Michaels CNP  [facility]

## 2019-11-14 NOTE — RESULT ENCOUNTER NOTE
Hello Sheryle Cruse,    Attached are your test results.  Your urine looks like possible urinary tract infection   Will send antibiotics while we are waiting for culture results    Please contact us if you have any questions.    Katie Billy, CNP

## 2019-11-14 NOTE — PATIENT INSTRUCTIONS
PLAN:   1.   Symptomatic therapy suggested: increase fluids and call prn if symptoms persist or worsen.  2.  Orders Placed This Encounter   Procedures     CT Abdomen Pelvis w/o Contrast     UA with Microscopic reflex to Culture     3. Patient needs to follow up in if no improvement,or sooner if worsening of symptoms or other symptoms develop.  FURTHER TESTING:       - CT abdomen and pelvis  Will follow up and/or notify patient of  results via My Chart to determine further need for followup

## 2019-11-15 LAB
BACTERIA SPEC CULT: NO GROWTH
SPECIMEN SOURCE: NORMAL

## 2019-11-15 NOTE — RESULT ENCOUNTER NOTE
Hello Sheryle Cruse,    Attached are your test results.  -Urine culture is normal.  There is no need for antibiotics at this point.  Would go ahead and get the CT scan done as planned  Please contact us if you have any questions.    Katie Billy, CNP

## 2019-11-21 ENCOUNTER — TELEPHONE (OUTPATIENT)
Dept: SPIRITUAL SERVICES | Facility: CLINIC | Age: 49
End: 2019-11-21

## 2019-11-25 ENCOUNTER — ANCILLARY PROCEDURE (OUTPATIENT)
Dept: CT IMAGING | Facility: CLINIC | Age: 49
End: 2019-11-25
Attending: NURSE PRACTITIONER
Payer: COMMERCIAL

## 2019-11-25 DIAGNOSIS — R10.9 RIGHT FLANK PAIN: ICD-10-CM

## 2019-11-25 PROCEDURE — 74176 CT ABD & PELVIS W/O CONTRAST: CPT | Performed by: RADIOLOGY

## 2019-11-25 NOTE — RESULT ENCOUNTER NOTE
Hello Sheryle Cruse,    Attached are your test results.  Your CT scan is essentially normal   Have a little sludge in your gallbladder but no stones  If you were having persistent symptoms would consider an ultrasound of your gallbladder to take a closer look    Please contact us if you have any questions.    Katie Billy, CNP

## 2019-11-26 ENCOUNTER — TELEPHONE (OUTPATIENT)
Dept: PEDIATRICS | Facility: CLINIC | Age: 49
End: 2019-11-26

## 2019-11-26 DIAGNOSIS — R10.9 RIGHT FLANK PAIN: Primary | ICD-10-CM

## 2019-11-26 NOTE — TELEPHONE ENCOUNTER
"Called patient.  She had questions on what the definition of sludge is.  What is the material that is in the sludge in her gallbladder? Is this normal?    Her main concern is ruling out any reoccurrence of cancer.  She always has pain on her right side of her body, she has cramping from her menses.    She states it's like \"whack-o-mole\" trying to find out the sources of her symptoms.  Her symptoms are a little better    If she was advised to do anything further it would be after the January 1, 2020    Please advise patient over MyChart when able.    Tresa Bailey RN, [facility]    "

## 2019-11-27 NOTE — TELEPHONE ENCOUNTER
No signs of cancer on the CT   Sludge can sometimes mean a gallbladder not working well   If she has discomfort after eating could consider a HIDA scan which assesses how the gallbladder functions

## 2019-11-27 NOTE — TELEPHONE ENCOUNTER
Called patient, left message to call back.    Norma Jeronimo RN   [facility], [facility]

## 2019-11-29 NOTE — TELEPHONE ENCOUNTER
Called patient and discussed.   Gave her message per Cecilia Billy NP.   She states she have strange sensations on her right side and does want to rule things out.    She does not want any procedures for the rest of the year.    She wants Cecilia to know that she has had reactions to contrast and concerned about reactions with HIDA scan.      She is ok with waiting until Monday when Cecilia returns to clinic.    Tresa Bailey RN, [facility]

## 2019-11-29 NOTE — TELEPHONE ENCOUNTER
Called patient, gave part of the message to the patient, she interrupted this caller and stated she will not be doing anything until she needs to, or until symptoms are worse.    Tried to give the rest of the message but she states she will not do anything that would be a charge.  She states she cannot afford anything at this time. She was not interested in discussing further.    Will route as a FYI to BARB Sosa RN, [facility]

## 2019-11-29 NOTE — TELEPHONE ENCOUNTER
This test can wait until January   I can place the order for the HIDA and we can see what radiology thinks based on your previous issues

## 2019-12-12 ENCOUNTER — TELEPHONE (OUTPATIENT)
Dept: SPIRITUAL SERVICES | Facility: CLINIC | Age: 49
End: 2019-12-12

## 2019-12-12 NOTE — TELEPHONE ENCOUNTER
SPIRITUAL HEALTH SERVICES  SPIRITUAL ASSESSMENT Progress Note  [facility]       REFERRAL SOURCE: Patient requested a Spiritual Health telephone encounter.     Patient shared her struggle with family dynamics and some spiritual distress that is at times heightened when she reflects on scripture readings.  Offered support and guidance about her concerns about the possibility of a punishing divinity.  Patient feels a lack of spiritual support in her community.    PLAN: Patient knows that she can reach out for Spiritual Health support as needed.     April Kelly  Staff

## 2020-01-21 ENCOUNTER — DOCUMENTATION ONLY (OUTPATIENT)
Dept: SPIRITUAL SERVICES | Facility: CLINIC | Age: 50
End: 2020-01-21

## 2020-01-21 NOTE — TELEPHONE ENCOUNTER
SPIRITUAL HEALTH SERVICES Telephone Note  [facility]  [facility]     Reason for Contact: Sheryle Cruse was contacted by phone per request.    Visited by telephone for ongoing spiritual support.  Patient continues to voice some concerns about her family relationships in light of what she was taught from her migue tradition and what scriptures site as correct behavior toward one's parents.  Reflective conversation shared with discussion of internal family structure and her previous migue traditions  and her reasons for uncertainty moving forward.  Patient is concerned about the reoccurrence of her breast cancer if she stays stuck in unhealthy family dynamics.  Encouraged healthy boundaries and prayer in her discernment of her need to alter unhealthy relationships and to move forward. We also discussed her image of God and tried to re-imagine a more merciful divinity.    PLAN: Patient knows that she can request a spiritual health visit as needed.     April Kelly  Staff

## 2020-01-27 ENCOUNTER — PATIENT OUTREACH (OUTPATIENT)
Dept: CARE COORDINATION | Facility: CLINIC | Age: 50
End: 2020-01-27

## 2020-01-27 NOTE — PROGRESS NOTES
"Social Work Note: Telephone Call  Oncology Clinic    Data/Intervention:  Patient Name:  Sheryle Cruse  /Age:  1970 (49 year old)    Call From:  MEG  Reason for Call:  Return voicemail    Assessment:  MEG received voicemail from Sheryle requesting call back regarding grants and support as she thinks she may have recurrence.    MEG called and spoke to Sheryle this afternoon. Sheryle explained that she hasn't seen her providers yet, but is scheduled to see her PCP next week and Dr. Hoff at the end of February. She has concerns over how expensive these visits and potential tests and possible treatments may be and states she needs \"all the help I can get.\" MEG validated Sheryle's concerns and offered support. While MEG agreed to help Sheryle in any way that she can, she also notes that options for grants for cancer patients aren't going to be accessible to her until she finds out if in fact this is a recurrence. Sheryle spoke quite a bit about her plans to move to [place] with her  and how hard they are working to save their money so they can do that. The move is being planned for August. As discussed in previous calls, Sheryle reports a traumatic history with her family and a dire need to get away from them. She states this is vital as she feels if she were to stay here near them the stress and difficulty would be a source of cancer recurrence and it would kill her. Sheryle also reports her  just had a colonoscopy and she is worried he could have cancer. She asked if SW would follow him as well and help with resources given this status. MEG explained that if he is diagnosed with cancer and gets connected with providers here in this clinic, meg would follow him and would be happy to provide support.     MEG emphasized the importance of trying to take things a day at a time. While meg wants to provide that support, she also encouraged Sheryle to try and focus on the present noting she can expend a " lot of energy with this anxiety and it won't help her pursue her other goals while waiting for information. Sheryle got quite upset with SW at this stating she has often had the worst case scenario happen to her and that is why she approaches things in this way. She states sw is minimizing and doesn't understand Sheryle's perspective. SW apologized for any miscommunication, noting it certainly wasn't her intention to minimize Sheryle's feelings. Sheryle states as meg has not had cancer or had her life, she can't understand. She continued to emphasize this point, sw acknowledged Sheryle's feelings and noted that she wasn't disagreeing with her, but did want to be supportive in any way that she can be.     SW brought discussion back to primary focus and assured Sheryle she would be mindful of any grants that Sheryle may be eligible for and prepared to assist her with those if she does come back positive for recurrence. 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 She is not sure of her 's income. SW encouraged her to have some of that information ready if the time comes to start applying for support. Sheryle agreed.        Sheryle asked when to get back in touch with MEG. SW advised Sheryle could call whenever she felt she needed some additional support, but advised that likely options for grants won't change until after a change in diagnosis. MEG did agree to keep Sheryle in mind and would let her know if she becomes aware of further resources.     Plan:  SW will remain available as needed and appropriate.     NICK Sawyer, [facility]  Clinical , Adult Oncology  Pager: [phone]  Phone: [phone]

## 2020-01-29 ENCOUNTER — TELEPHONE (OUTPATIENT)
Dept: ONCOLOGY | Facility: CLINIC | Age: 50
End: 2020-01-29

## 2020-01-29 NOTE — TELEPHONE ENCOUNTER
"Per Dr NASSAR's request, she wanted Sheryle to get in sooner than Feb 24th to see her or Carltoa. I called and asked her if she could come into see Dr NASSAR or Carlota prior to the scheduled appointment. Sheryle stated, \"I am seeing Katie Billy next Thursday. I would prefer to leave my appointment where it is. My arm is improving but still has achiness. I think Cecilia will rule something out.\"    Karen   "

## 2020-02-06 ENCOUNTER — OFFICE VISIT (OUTPATIENT)
Dept: PEDIATRICS | Facility: CLINIC | Age: 50
End: 2020-02-06
Payer: COMMERCIAL

## 2020-02-06 ENCOUNTER — TELEPHONE (OUTPATIENT)
Dept: SPIRITUAL SERVICES | Facility: CLINIC | Age: 50
End: 2020-02-06

## 2020-02-06 VITALS
HEIGHT: 64 IN | TEMPERATURE: 97.5 F | OXYGEN SATURATION: 99 % | WEIGHT: 130.1 LBS | BODY MASS INDEX: 22.21 KG/M2 | DIASTOLIC BLOOD PRESSURE: 70 MMHG | HEART RATE: 121 BPM | SYSTOLIC BLOOD PRESSURE: 100 MMHG

## 2020-02-06 DIAGNOSIS — R20.2 PARESTHESIA OF RIGHT UPPER EXTREMITY: ICD-10-CM

## 2020-02-06 DIAGNOSIS — C50.411 MALIGNANT NEOPLASM OF UPPER-OUTER QUADRANT OF RIGHT BREAST IN FEMALE, ESTROGEN RECEPTOR POSITIVE (H): ICD-10-CM

## 2020-02-06 DIAGNOSIS — M79.89 SWELLING OF RIGHT UPPER EXTREMITY: Primary | ICD-10-CM

## 2020-02-06 DIAGNOSIS — R23.4 BREAST SKIN CHANGES: ICD-10-CM

## 2020-02-06 DIAGNOSIS — Z17.0 MALIGNANT NEOPLASM OF UPPER-OUTER QUADRANT OF RIGHT BREAST IN FEMALE, ESTROGEN RECEPTOR POSITIVE (H): ICD-10-CM

## 2020-02-06 PROCEDURE — 99214 OFFICE O/P EST MOD 30 MIN: CPT | Performed by: NURSE PRACTITIONER

## 2020-02-06 ASSESSMENT — MIFFLIN-ST. JEOR: SCORE: 1205.13

## 2020-02-06 NOTE — PROGRESS NOTES
Subjective     Sheryle Cruse is a 49 year old female who presents to clinic today for the following health issues:    HPI   Joint Pain    Onset: 1 month    Description:   Location: right forearm  Character: Dull ache    Intensity: mild    Progression of Symptoms: better    Accompanying Signs & Symptoms:  Other symptoms: radiation of pain to elbow, swelling,2 small lumps and tightness. Lumps sometimes will be a whitish color Swelling in the hand.Thinks it my lymphedema     History:   Previous similar pain: YES      Precipitating factors:   Trauma or overuse: YES-did have one lymph node removed on in the right arm 10/2017 and had radiation     Alleviating factors:  Improved by: ice, Ibuprofen and Epsom salt baths    Therapies Tried and outcome: ibuprofen, Epsom salt bath, ice, exercises     HPI:   Patient presents with complaints of achiness, numbness/tingling, swelling and lumps in right upper extremity. This has been going on for about 1 month. Symptoms were not precipitated by an acute injury. Patient had similar symptoms last year in June that lasted for 3 weeks and then resolved. Symptoms are wide-ranging. She reports an achiness that travels and can run down the arm, in the forearm and into the palm of the hand. She occasionally will have tingling or a warm feeling in the arm. She also states that she will occasionally notice 2 raised nodules that are white/flesh colored about a quarter in size present in the forearm but they come and go and are not present today. She states that the swelling has never been more than just a tightness and is not very obvious in looking at the arm. Today she rates her achiness at 2 out of 10 and states her symptoms are improving.    She also reports feeling changes in the tissue consistency at her surgical incision on her right breast. She states it was last examined one year ago in November.        Patient Active Problem List   Diagnosis     Vitamin D deficiency     Vegetarianism      Hyperlipidemia LDL goal <160     Advance care planning     Monoallelic mutation of MUTYH gene     Generalized anxiety disorder     Malignant neoplasm of upper-outer quadrant of right breast in female, estrogen receptor positive (H)     Positive ZHANNA (antinuclear antibody)     Past Surgical History:   Procedure Laterality Date     MASTECTOMY MODIFIED RADICAL BILATERAL         Social History     Tobacco Use     Smoking status: Never Smoker     Smokeless tobacco: Never Used   Substance Use Topics     Alcohol use: No     Family History   Problem Relation Age of Onset     Diabetes Mother 76        Obese     Heart Disease Mother      Hypertension Mother      Cerebrovascular Disease Mother      Cardiovascular Mother         CVA     Cataracts Mother      Cancer Father         bladder     Cerebrovascular Disease Father 70     Breast Cancer Maternal Grandmother      Cerebrovascular Disease Maternal Grandmother      Lipids Maternal Grandmother      Heart Disease Maternal Grandfather         heart disease     Macular Degeneration Maternal Grandfather      Diabetes Paternal Grandmother      Cerebrovascular Disease Paternal Grandfather      Heart Disease Paternal Grandfather      Glaucoma No family hx of          Current Outpatient Medications   Medication Sig Dispense Refill     cholecalciferol (VITAMIN D3) 5000 units (125 mcg) capsule Take 5,000 Units by mouth daily       Multiple Vitamin (ONE-A-DAY ESSENTIAL) TABS Take 1 tablet by mouth daily.       omeprazole 20 MG tablet Take 1 tablet (20 mg) by mouth daily Take 30-60 minutes before a meal. 30 tablet 1     polyethylene glycol (MIRALAX/GLYCOLAX) Packet Take 1 packet by mouth daily       Allergies   Allergen Reactions     Contrast Dye      Pt does not recall which type of contrast     Diphenhydramine      Penicillins Rash     swelling     BP Readings from Last 3 Encounters:   02/06/20 100/70   11/14/19 120/70   07/15/19 116/81    Wt Readings from Last 3 Encounters:  "  02/06/20 59 kg (130 lb 1.6 oz)   11/14/19 59.4 kg (130 lb 14.4 oz)   07/15/19 60.5 kg (133 lb 6.4 oz)            Reviewed and updated as needed this visit by Provider         Review of Systems   ROS COMP: CONSTITUTIONAL:NEGATIVE for fever, chills, change in weight  INTEGUMENTARY/SKIN: NEGATIVE for worrisome rashes, moles or lesions  RESP:NEGATIVE for significant cough or SOB  BREAST: NEGATIVE for tenderness or discharge; POSITIVE for had previous right mastectomy, reports feeling a more prominent firm quality to surgical site on on right breast  CV: NEGATIVE for chest pain/pressure and palpitations  MUSCULOSKELETAL: POSITIVE  for paresthesias of right upper extremity and NEGATIVE for arthralgias, joint pain and muscle weakness  NEURO: POSITIVE for numbness or tingling in right upper extremity intermittently and NEGATIVE   HEME/ALLERGY/IMMUNE: NEGATIVE for night sweats and swollen nodes  PSYCHIATRIC: POSITIVE for stress due to family dynamics with her mother; pt has been seeing a therapist and  for her trauma symptoms; looking into PTSD therapy with EMDR therapy; she reports feeling physically safe at home  NEGATIVE for thoughts of self harm or suicidal thinking      Objective    /70 (BP Location: Left arm, Patient Position: Sitting, Cuff Size: Adult Regular)   Pulse 121   Temp 97.5  F (36.4  C) (Temporal)   Ht 1.626 m (5' 4\")   Wt 59 kg (130 lb 1.6 oz)   LMP 01/17/2020   SpO2 99%   Breastfeeding No   BMI 22.33 kg/m    There is no height or weight on file to calculate BMI.  Physical Exam   GENERAL: healthy, alert and no distress  HENT: normal cephalic/atraumatic  NECK: no adenopathy, no asymmetry, masses, or scars and thyroid normal to palpation  RESP: lungs clear to auscultation - no rales, rhonchi or wheezes  BREAST:  s/p mastectomy right; surgical scar is unremarkable; surrounding skin purple; area surrounding surgical incision is firm and dense to palpation; no palpable axillary masses or " adenopathy  CV: regular rates and rhythm, normal S1 S2, no S3 or S4, no murmur, click or rub, peripheral pulses strong and no peripheral edema  MS: peripheral pulses normal, RUE exam shows normal strength and muscle mass, no deformities and no erythema, induration, edema or nodules, full ROM in shoulder and wrist,  strength is 5/5,  and LUE exam shows normal strength and muscle mass, no deformities and no erythema, induration, edema or nodules, full ROM in shoulder and wrist,  strength is 5/5  SKIN: no suspicious lesions or rashes  NEURO: Normal strength and tone and mentation intact, Phalen's test negative  PSYCH: affect normal/bright, anxious, judgement and insight intact and appearance well groomed  LYMPH: no cervical, axillary, or epitrochlear adenopathy    Diagnostic Test Results:  Labs reviewed in Epic  Pending orders and results           Assessment & Plan     Sheryle was seen today for musculoskeletal problem.    Diagnoses and all orders for this visit:    Swelling of right upper extremity  -     LYMPHEDEMA THERAPY REFERRAL; Future  -     US Breast Right Complete 4 Quadrants; Future    Breast skin changes  -     US Breast Right Complete 4 Quadrants; Future    Malignant neoplasm of upper-outer quadrant of right breast in female, estrogen receptor positive (H)  -     US Breast Right Complete 4 Quadrants; Future    Paresthesia of right upper extremity  -     EMG; Future           Patient Instructions     PLAN:   1.   Symptomatic therapy suggested: Continue current medication regimen unchanged.  2.  Orders Placed This Encounter   Procedures     US Breast Right Complete 4 Quadrants     LYMPHEDEMA THERAPY REFERRAL     EMG       3. Patient needs to follow up in if no improvement,or sooner if worsening of symptoms or other symptoms develop.  FURTHER TESTING:       - breast  Ultrasound  Nerve conduction study   Refer lymphedema therapy         No follow-ups on file.    GILDARDO Michaels [facility]  [facility]    Amber Scheierl, RN-BSN  Doctor of Nursing Practice student  Family nurse practitioner specialty  I have examined the patient and agree with written evaluation. Assessment and plan presented by this provider.   Katie Billy, CNP

## 2020-02-06 NOTE — PATIENT INSTRUCTIONS
PLAN:   1.   Symptomatic therapy suggested: Continue current medication regimen unchanged.  2.  Orders Placed This Encounter   Procedures     US Breast Right Complete 4 Quadrants     LYMPHEDEMA THERAPY REFERRAL     EMG       3. Patient needs to follow up in if no improvement,or sooner if worsening of symptoms or other symptoms develop.  FURTHER TESTING:       - breast  Ultrasound  Nerve conduction study   Refer lymphedema therapy

## 2020-02-06 NOTE — TELEPHONE ENCOUNTER
[facility] HEALTH SERVICES Telephone Note  [facility] Oncology  Care       Reason for Contact: Patient called requesting information. She is concerned that she is going through a rough patch and that her cancer may return due to the stress level.    Intervention: Patient wanted to have some information about breast cancer support groups. I gave her the information she requested about the [facility]  ongoing group in [place] that she had heard about.     Plan: Patient knows that she can request a  encounter as needed.     April Kelly  Staff   Phone

## 2020-02-06 NOTE — NURSING NOTE
"Chief Complaint   Patient presents with     Musculoskeletal Problem     right arm ongoing x 1 month       Initial /70 (BP Location: Left arm, Patient Position: Sitting, Cuff Size: Adult Regular)   Pulse 121   Temp 97.5  F (36.4  C) (Temporal)   Ht 1.626 m (5' 4\")   Wt 59 kg (130 lb 1.6 oz)   LMP 01/17/2020   SpO2 99%   Breastfeeding No   BMI 22.33 kg/m   Estimated body mass index is 22.33 kg/m  as calculated from the following:    Height as of this encounter: 1.626 m (5' 4\").    Weight as of this encounter: 59 kg (130 lb 1.6 oz).  Medication Reconciliation: complete      EDIE Ley      "

## 2020-02-11 ENCOUNTER — ANCILLARY PROCEDURE (OUTPATIENT)
Dept: ULTRASOUND IMAGING | Facility: CLINIC | Age: 50
End: 2020-02-11
Attending: NURSE PRACTITIONER
Payer: COMMERCIAL

## 2020-02-11 DIAGNOSIS — R23.4 BREAST SKIN CHANGES: ICD-10-CM

## 2020-02-11 DIAGNOSIS — Z17.0 MALIGNANT NEOPLASM OF UPPER-OUTER QUADRANT OF RIGHT BREAST IN FEMALE, ESTROGEN RECEPTOR POSITIVE (H): ICD-10-CM

## 2020-02-11 DIAGNOSIS — M79.89 SWELLING OF RIGHT UPPER EXTREMITY: ICD-10-CM

## 2020-02-11 DIAGNOSIS — C50.411 MALIGNANT NEOPLASM OF UPPER-OUTER QUADRANT OF RIGHT BREAST IN FEMALE, ESTROGEN RECEPTOR POSITIVE (H): ICD-10-CM

## 2020-02-11 PROCEDURE — 76642 ULTRASOUND BREAST LIMITED: CPT | Mod: 50 | Performed by: RADIOLOGY

## 2020-02-17 ENCOUNTER — PATIENT OUTREACH (OUTPATIENT)
Dept: CARE COORDINATION | Facility: CLINIC | Age: 50
End: 2020-02-17

## 2020-02-17 NOTE — PROGRESS NOTES
Social Work Note: Telephone Call  Oncology Clinic    Data/Intervention:  Patient Name:  Sheryle Cruse  /Age:  1970 (49 year old)    Call From:  MONA  Reason for Call:  Voicemail return/billing questions    Assessment:  MONA received message from Sheryle over the weekend stating she was trying to get an itemized bill for a procedure in 2019. She states she has called the billing department, but they sent her itemized bill for the wrong time period. Sheryle requested assistance from MONA in getting the right information.     MONA called and left message for Sheryle this morning. MONA explained briefly that she is not connected to billing, but could try calling if Sheryle continues to have trouble. Requested call back to discuss further.     Plan:  MONA will await Sheryle's call back.     NICK Sawyer, [facility]  Clinical , Adult Oncology  Pager: [phone]  Phone: [phone]

## 2020-02-21 ENCOUNTER — DOCUMENTATION ONLY (OUTPATIENT)
Dept: LAB | Facility: CLINIC | Age: 50
End: 2020-02-21

## 2020-02-21 DIAGNOSIS — C50.411 MALIGNANT NEOPLASM OF UPPER-OUTER QUADRANT OF RIGHT BREAST IN FEMALE, ESTROGEN RECEPTOR POSITIVE (H): Primary | ICD-10-CM

## 2020-02-21 DIAGNOSIS — Z17.0 MALIGNANT NEOPLASM OF UPPER-OUTER QUADRANT OF RIGHT BREAST IN FEMALE, ESTROGEN RECEPTOR POSITIVE (H): Primary | ICD-10-CM

## 2020-02-22 NOTE — PROGRESS NOTES
Oncology Follow-up visit:  Date on this visit: Feb 25, 2020      PCP: Katie Billy  Surgeon: Dr. Audrey Gonzalez    Diagnosis:    Locally advanced breast cancer      Oncologic History:  1. Locally advanced right breast cancer-    Esther first noted a lump in her right breast on 6/15/17.  This led to a diagnostic b/l mammogram and R breast US on 6/30/2018 which showed that in the right breast at 3:00 position, anterior depth there was a focal asymmetry. R breast US confirmed a solid mass at 3:00 position 4 cm from the nipple that measured 1.4 x 0.7 x 1.9 cm and correlated with mammography and palpable lump. Right axillary survey by US  demonstrated multiple benign-appearing lymph nodes. Her mammogram prior to that was in Aug 2013.  An US guided core needle biopsy on 7/7/2017 revealed the mass to be high grade invasive ductal carcinoma that is strongly ER+ (>97%)/NV positive(>90%), Her 2 non amplified (HER2 Adeel/CHELSEA 17 ratio of 1.1).  She underwent bilateral simple mastectomies and right axillary sentinel node biopsy on 10/4/2017 by Dr. Gonzalez. She was not interested in breast reconstruction. Pathology from surgery demonstrated two foci of IDC, with largest foci of a 3 cm (measured microscopically) IDC, [place] grade 3 in the right breast, with associated DCIS, negative margins of resection for both DCIS and invasive carcinoma. One/one nonsentinel lymph node was positive for metastasis, 1.2 cm, with no extranodal extension. SLN was not identified in the specimen. LVI was present. She was staged at pT2(m) N1. No malignancy was found in the left breast.   She declined adjuvant systemic chemotherapy and thus OncotypeDx was not ordered.   She completed radiation to right chest wall, R SCV LNs  and axilla on 1/3/2018.     2. MUTYH mutation heterozygote-  FHx is significant for her maternal GM  diagnosed with breast cancer in her 60's and a paternal aunt being diagnosed in her 50's.  There is no FH for ovarian, colon,  uterine, prostate or pancreatic CA. She underwent genetic testing and was found to be  POSITIVE for one MUTYH gene mutation. 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 Of note, Sheryle tested negative for mutations in the following genes by sequencing and deletion/duplication analysis: OLLIE, BARD1, BRCA1, BRCA2, BRIP1, CDH1, CHEK2, DICER1, EPCAM (deletions/duplications only), MLH1, MRE11A, MSH2, MSH6, MUTYH, NBN, NF1, PALB2, PMS2, PTEN, RAD50, RAD51C, RAD51D, SMARCA4, STK11, and TP53.        History Of Present Illness:  Ms. Sparrow is a 49 year old premenopausal  female who presents for f/up of opinion of locally advanced ER+ Her 2 Adeel negative right sided breast cancer. She completed radiation to right chest wall, R SCV LNs  and axilla on 1/3/2018.  She has declined chemotherapy and  systemic hormonal therapy. She saw Dr. Meneses in Feb 2018 for consultation of prophylactic BSO but decided not to proceed. She was noted to have a positive ZHANNA and was referred to rheumatology and she was seen by Dr. Regalado but felt to have low likelihood of autoimmune connective tissue disease. More recently, she has had Right arm and forearm tingling, pain and occasional lumpiness on the ventral forearm which has resolved. These symptoms have been present since early January. She was seen by her PCP Cecilia Billy for evaluation. She has also had some swelling in the right chest wall and axilla and this was evaluated with an US of right axilla which demonstrated a small amount of accessory breast tissue. Ultrasound of the left side performed for comparison  also showed a small amount of accessory breast tissue. Nerve conduction study is scheduled as well. She has previously seen lymphedema therapist but has not been wearing a compression sleeve  lately. They are planning to relocate to another state.  She has been in good health.   She is feeling well otherwise.  In addition, a complete 12 point  review of systems is negative.         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 (c.1187G>A) [email or web address] 8/10/17     Positive ZHANNA (antinuclear antibody)      Vitamin D deficiency      Past Surgical History:   Procedure Laterality Date     MASTECTOMY MODIFIED RADICAL BILATERAL       Family and SocHx reviewed:  SOCIAL HISTORY:  .    Denies current tobacco use.  Is a writer for food, weight and body.    Allergies:  Allergies as of 02/25/2020 - Reviewed 02/06/2020   Allergen Reaction Noted     Contrast dye  06/30/2017     Diphenhydramine  08/31/2017     Penicillins Rash 01/07/2011     Current Medications:  Current Outpatient Medications   Medication Sig Dispense Refill     cholecalciferol (VITAMIN D3) 5000 units (125 mcg) capsule Take 5,000 Units by mouth daily       Multiple Vitamin (ONE-A-DAY ESSENTIAL) TABS Take 1 tablet by mouth daily.       omeprazole 20 MG tablet Take 1 tablet (20 mg) by mouth daily Take 30-60 minutes before a meal. 30 tablet 1     polyethylene glycol (MIRALAX/GLYCOLAX) Packet Take 1 packet by mouth daily               Physical Exam:    /84 (BP Location: Left arm, Patient Position: Sitting, Cuff Size: Adult Regular)   Pulse 120   Temp 97.8  F (36.6  C) (Oral)   Resp 16   Wt 58.1 kg (128 lb 1.6 oz)   SpO2 99%   BMI 21.99 kg/m        GENERAL APPEARANCE:  alert and no distress     NECK: no adenopathy, no asymmetry or masses     LYMPHATICS: No cervical, supraclavicular, axillary  lymphadenopathy     RESP: lungs clear to auscultation - no rales, rhonchi or wheezes     CARDIOVASCULAR: regular rates and rhythm, normal S1 S2, no S3 or S4 and no murmur.     ABDOMEN:  soft, nontender, no HSM or masses and bowel sounds normal     MUSCULOSKELETAL: extremities normal- no gross deformities noted, no evidence of inflammation in joints, FROM in all extremities. No edema b/l LE. + slight RUE lymphedema, RUE circumference in the arm slightly more than LUE, unchanged compared to previous. No right chest wall or axillary edema.     SKIN: no suspicious lesions or rashes     PSYCHIATRIC: mentation appears normal and affect normal  CHest wall: Skin changes c/w XRT Right chest wall.  No chest wall masses b/l. No axillary lymphadenopathy b/l.    Laboratory/Imaging Studies    Component      Latest Ref Rng & Units 2/25/2020   RBC Count      3.8 - 5.2 10e12/L 4.51   Hemoglobin      11.7 - 15.7 g/dL 14.1   Hematocrit      35.0 - 47.0 % 40.2   MCV      78 - 100 fl 89   MCH      26.5 - 33.0 pg 31.3   MCHC      31.5 - 36.5 g/dL 35.1   RDW      10.0 - 15.0 % 11.9   Platelet Count      150 - 450 10e9/L 397   % Neutrophils      % 61.5   % Lymphocytes      % 27.1   % Monocytes      % 9.6   % Eosinophils      % 1.0   % Basophils      % 0.7   % Immature Granulocytes      % 0.1   Absolute Neutrophil      1.6 - 8.3 10e9/L 4.3   Absolute Lymphocytes      0.8 - 5.3 10e9/L 1.9   Absolute Monocytes      0.0 - 1.3 10e9/L 0.7   Absolute Eosinophils      0.0 - 0.7 10e9/L 0.1   Absolute Basophils      0.0 - 0.2 10e9/L 0.1   Abs Immature Granulocytes      0 - 0.4 10e9/L 0.0   Diff Method       Automated Method   Sodium      133 - 144 mmol/L 138   Potassium      3.4 - 5.3 mmol/L 4.4   Chloride      94 - 109 mmol/L 103   Carbon Dioxide      20 - 32 mmol/L 26   Anion Gap      3 - 14 mmol/L 9   Glucose      70 - 99 mg/dL 94   Urea Nitrogen      7 - 30 mg/dL 14   Creatinine      0.52 - 1.04 mg/dL 0.59   GFR Estimate      >60 mL/min/1.73:m2  >90   GFR Estimate If Black      >60 mL/min/1.73:m2 >90   Calcium      8.5 - 10.1 mg/dL 10.0   Bilirubin Total      0.2 - 1.3 mg/dL 0.7   Albumin      3.4 - 5.0 g/dL 4.3   Protein Total      6.8 - 8.8 g/dL 8.6   Alkaline Phosphatase      40 - 150 U/L 54   ALT      0 - 50 U/L 21   AST      0 - 45 U/L 19     CA-27-29 level pending.      ASSESSMENT/PLAN:  Sheryle is a 49 year old premenopausal woman with  stage IIB, T2N1 multifocalhigh grade, strongly ER and NV positive, Her2 Adeel negative by FISH invasive ductal carcinoma of the right breast.  Final pathology showed 2 foci at 3 cm as well as separate 0.7 cm invasive ductal carcinoma, [place] grade III, poorly differentiated. She is s/p Bilateral simple mastectomies and right axillary sentinel node biopsy on 10/4/2017 by Dr. Gonzalez. One lymph node removed, having a 1.2 cm metastatic deposit.  The patient declined further axillary node dissection. She has declined adjuvant chemotherapy. She completed radiation to right chest wall, R SCV LNs  and axilla on 1/3/2018. She has declined a staging PET/CT scan (even without contrast). She met with Dr. Meneses and has not decided in favor of prophylactic BSO. She has declined and continues to decline adjuvant hormonal therapy.      1.  Stage IIB, T2N1 multifocal invasive ductal carcinoma of the right breast. She prefers to be observed closely. Since she is at high risk for disease recurrence because she declined ALND dissection, adjuvant chemotherapy and adjuvant hormonal therapy, I would recommend that she be followed by medical oncology at the minimum q 6 months until she is at least 5 years out from radiation therapy completion. She would like to leave her f/up open as they may be moving to another state and she will be establishing care with another medical oncologist there.    2. RUE pain and tingling, minimal edema c/w lymphedema- proceed with RUE US to r/o DVT since symptoms are new. Compression sleeve for RUE  lymphedema- Rx given. She is declining revisit with lymphedema therapist.   Nerve conduction studies planned for next week.   We'll inform the patient of the results and recommendations and  f/up plan based on the results.       3. S/p b/l mastectomy- she is not interested in breast reconstruction surgery.    4.   MUTYH gene mutation carrier (n/a today) -  Carriers of MAP have a very slightly increased risk of colon cancer, which could be as high  (~10-12%) as twice the population risk of 5-6%. It is currently uncertain if a specialized screening plan is necessary for MAP mutation carriers who have do not have a family history of colon cancer.   As Sheryle does not have a first degree relative with colon cancer, she could consider beginning colonoscopies at age 51 and repeated every 10 years, or per colonoscopy findings.    5. Pt requests all future imaging to be noncontrast (n/a today)- She declines any imaging with contrast since she has a contrast allergy to unknown type of contrast but when she was given steroids as premedication for her MRI with contrast, she had blurry vision.     At the end of our visit patient and  verbalized understanding and concurred with the plan.    Addendum: [id number] level within normal limits

## 2020-02-25 ENCOUNTER — ONCOLOGY VISIT (OUTPATIENT)
Dept: ONCOLOGY | Facility: CLINIC | Age: 50
End: 2020-02-25
Payer: COMMERCIAL

## 2020-02-25 VITALS
DIASTOLIC BLOOD PRESSURE: 84 MMHG | RESPIRATION RATE: 16 BRPM | SYSTOLIC BLOOD PRESSURE: 114 MMHG | OXYGEN SATURATION: 99 % | HEART RATE: 120 BPM | BODY MASS INDEX: 21.99 KG/M2 | WEIGHT: 128.1 LBS | TEMPERATURE: 97.8 F

## 2020-02-25 DIAGNOSIS — C50.411 MALIGNANT NEOPLASM OF UPPER-OUTER QUADRANT OF RIGHT BREAST IN FEMALE, ESTROGEN RECEPTOR POSITIVE (H): ICD-10-CM

## 2020-02-25 DIAGNOSIS — Z17.0 MALIGNANT NEOPLASM OF UPPER-OUTER QUADRANT OF RIGHT BREAST IN FEMALE, ESTROGEN RECEPTOR POSITIVE (H): ICD-10-CM

## 2020-02-25 DIAGNOSIS — M79.621 PAIN OF RIGHT UPPER ARM: Primary | ICD-10-CM

## 2020-02-25 DIAGNOSIS — I89.0 LYMPHEDEMA OF RIGHT ARM: ICD-10-CM

## 2020-02-25 LAB
ALBUMIN SERPL-MCNC: 4.3 G/DL (ref 3.4–5)
ALP SERPL-CCNC: 54 U/L (ref 40–150)
ALT SERPL W P-5'-P-CCNC: 21 U/L (ref 0–50)
ANION GAP SERPL CALCULATED.3IONS-SCNC: 9 MMOL/L (ref 3–14)
AST SERPL W P-5'-P-CCNC: 19 U/L (ref 0–45)
BASOPHILS # BLD AUTO: 0.1 10E9/L (ref 0–0.2)
BASOPHILS NFR BLD AUTO: 0.7 %
BILIRUB SERPL-MCNC: 0.7 MG/DL (ref 0.2–1.3)
BUN SERPL-MCNC: 14 MG/DL (ref 7–30)
CALCIUM SERPL-MCNC: 10 MG/DL (ref 8.5–10.1)
CANCER AG27-29 SERPL-ACNC: 14 U/ML (ref 0–39)
CHLORIDE SERPL-SCNC: 103 MMOL/L (ref 94–109)
CO2 SERPL-SCNC: 26 MMOL/L (ref 20–32)
CREAT SERPL-MCNC: 0.59 MG/DL (ref 0.52–1.04)
DIFFERENTIAL METHOD BLD: NORMAL
EOSINOPHIL # BLD AUTO: 0.1 10E9/L (ref 0–0.7)
EOSINOPHIL NFR BLD AUTO: 1 %
ERYTHROCYTE [DISTWIDTH] IN BLOOD BY AUTOMATED COUNT: 11.9 % (ref 10–15)
GFR SERPL CREATININE-BSD FRML MDRD: >90 ML/MIN/{1.73_M2}
GLUCOSE SERPL-MCNC: 94 MG/DL (ref 70–99)
HCT VFR BLD AUTO: 40.2 % (ref 35–47)
HGB BLD-MCNC: 14.1 G/DL (ref 11.7–15.7)
IMM GRANULOCYTES # BLD: 0 10E9/L (ref 0–0.4)
IMM GRANULOCYTES NFR BLD: 0.1 %
LYMPHOCYTES # BLD AUTO: 1.9 10E9/L (ref 0.8–5.3)
LYMPHOCYTES NFR BLD AUTO: 27.1 %
MCH RBC QN AUTO: 31.3 PG (ref 26.5–33)
MCHC RBC AUTO-ENTMCNC: 35.1 G/DL (ref 31.5–36.5)
MCV RBC AUTO: 89 FL (ref 78–100)
MONOCYTES # BLD AUTO: 0.7 10E9/L (ref 0–1.3)
MONOCYTES NFR BLD AUTO: 9.6 %
NEUTROPHILS # BLD AUTO: 4.3 10E9/L (ref 1.6–8.3)
NEUTROPHILS NFR BLD AUTO: 61.5 %
PLATELET # BLD AUTO: 397 10E9/L (ref 150–450)
POTASSIUM SERPL-SCNC: 4.4 MMOL/L (ref 3.4–5.3)
PROT SERPL-MCNC: 8.6 G/DL (ref 6.8–8.8)
RBC # BLD AUTO: 4.51 10E12/L (ref 3.8–5.2)
SODIUM SERPL-SCNC: 138 MMOL/L (ref 133–144)
WBC # BLD AUTO: 7 10E9/L (ref 4–11)

## 2020-02-25 PROCEDURE — 86300 IMMUNOASSAY TUMOR CA 15-3: CPT | Performed by: INTERNAL MEDICINE

## 2020-02-25 PROCEDURE — 36415 COLL VENOUS BLD VENIPUNCTURE: CPT | Performed by: INTERNAL MEDICINE

## 2020-02-25 PROCEDURE — 80053 COMPREHEN METABOLIC PANEL: CPT | Performed by: INTERNAL MEDICINE

## 2020-02-25 PROCEDURE — 99214 OFFICE O/P EST MOD 30 MIN: CPT | Performed by: INTERNAL MEDICINE

## 2020-02-25 PROCEDURE — 85025 COMPLETE CBC W/AUTO DIFF WBC: CPT | Performed by: INTERNAL MEDICINE

## 2020-02-25 ASSESSMENT — PAIN SCALES - GENERAL: PAINLEVEL: NO PAIN (0)

## 2020-02-25 NOTE — NURSING NOTE
"Oncology Rooming Note    February 25, 2020 1:01 PM   Sheryle Cruse is a 49 year old female who presents for:    Chief Complaint   Patient presents with     Oncology Clinic Visit     Initial Vitals: /84 (BP Location: Left arm, Patient Position: Sitting, Cuff Size: Adult Regular)   Pulse 120   Temp 97.8  F (36.6  C) (Oral)   Resp 16   Wt 58.1 kg (128 lb 1.6 oz)   SpO2 99%   BMI 21.99 kg/m   Estimated body mass index is 21.99 kg/m  as calculated from the following:    Height as of 2/6/20: 1.626 m (5' 4\").    Weight as of this encounter: 58.1 kg (128 lb 1.6 oz). Body surface area is 1.62 meters squared.  No Pain (0) Comment: Data Unavailable   No LMP recorded.  Allergies reviewed: Yes  Medications reviewed: Yes    Medications: Medication refills not needed today.  Pharmacy name entered into Properati:    [id number] - [place], [place] - [address] [facility] - [place], [place] - [id number]        Cora Sanchez RN              "

## 2020-02-25 NOTE — LETTER
2/25/2020         RE: Sheryle Cruse  [address]  [place] [address]        Dear Colleague,    Thank you for referring your patient, Sheryle Cruse, to the [facility]. Please see a copy of my visit note below.    Oncology Follow-up visit:  Date on this visit: Feb 25, 2020      PCP: Katie Billy  Surgeon: Dr. Audrey Gonzalez    Diagnosis:    Locally advanced breast cancer      Oncologic History:  1. Locally advanced right breast cancer-    Esther first noted a lump in her right breast on 6/15/17.  This led to a diagnostic b/l mammogram and R breast US on 6/30/2018 which showed that in the right breast at 3:00 position, anterior depth there was a focal asymmetry. R breast US confirmed a solid mass at 3:00 position 4 cm from the nipple that measured 1.4 x 0.7 x 1.9 cm and correlated with mammography and palpable lump. Right axillary survey by US  demonstrated multiple benign-appearing lymph nodes. Her mammogram prior to that was in Aug 2013.  An US guided core needle biopsy on 7/7/2017 revealed the mass to be high grade invasive ductal carcinoma that is strongly ER+ (>97%)/CT positive(>90%), Her 2 non amplified (HER2 Adeel/CHELSEA 17 ratio of 1.1).  She underwent bilateral simple mastectomies and right axillary sentinel node biopsy on 10/4/2017 by Dr. Gonzalez. She was not interested in breast reconstruction. Pathology from surgery demonstrated two foci of IDC, with largest foci of a 3 cm (measured microscopically) IDC, Iman grade 3 in the right breast, with associated DCIS, negative margins of resection for both DCIS and invasive carcinoma. One/one nonsentinel lymph node was positive for metastasis, 1.2 cm, with no extranodal extension. SLN was not identified in the specimen. LVI was present. She was staged at pT2(m) N1. No malignancy was found in the left breast.   She declined adjuvant systemic chemotherapy and thus OncotypeDx was not ordered.   She completed radiation to  right chest wall, R SCV LNs  and axilla on 1/3/2018.     2. MUTYH mutation heterozygote-  FHx is significant for her maternal GM  diagnosed with breast cancer in her 60's and a paternal aunt being diagnosed in her 50's.  There is no FH for ovarian, colon, uterine, prostate or pancreatic CA. She underwent genetic testing and was found to be  POSITIVE for one MUTYH gene mutation. 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 Of note, Sheryle tested negative for mutations in the following genes by sequencing and deletion/duplication analysis: OLLIE, BARD1, BRCA1, BRCA2, BRIP1, CDH1, CHEK2, DICER1, EPCAM (deletions/duplications only), MLH1, MRE11A, MSH2, MSH6, MUTYH, NBN, NF1, PALB2, PMS2, PTEN, RAD50, RAD51C, RAD51D, SMARCA4, STK11, and TP53.        History Of Present Illness:  Ms. Sparrow is a 49 year old premenopausal  female who presents for f/up of opinion of locally advanced ER+ Her 2 Adeel negative right sided breast cancer. She completed radiation to right chest wall, R SCV LNs  and axilla on 1/3/2018.  She has declined chemotherapy and  systemic hormonal therapy. She saw Dr. Meneses in Feb 2018 for consultation of prophylactic BSO but decided not to proceed. She was noted to have a positive ZHANNA and was referred to rheumatology and she was seen by Dr. Regalado but felt to have low likelihood of autoimmune connective tissue disease. More recently, she has had Right arm and forearm tingling, pain and occasional lumpiness on the ventral forearm which has resolved. These symptoms have been present since early January. She was seen by her PCP Cecilia Billy for evaluation. She has also had some swelling in the right chest wall and axilla and this was evaluated with an US of right axilla which demonstrated a small amount of accessory breast  tissue. Ultrasound of the left side performed for comparison  also showed a small amount of accessory breast tissue. Nerve conduction study is scheduled as well. She has previously seen lymphedema therapist but has not been wearing a compression sleeve lately. They are planning to relocate to another state.  She has been in good health.   She is feeling well otherwise.  In addition, a complete 12 point  review of systems is negative.         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 (c.1187G>A) [employer] 8/10/17     Positive ZHANNA (antinuclear antibody)      Vitamin D deficiency      Past Surgical History:   Procedure Laterality Date     MASTECTOMY MODIFIED RADICAL BILATERAL       Family and SocHx reviewed:  SOCIAL HISTORY:  .    Denies current tobacco use.  Is a writer for food, weight and body.    Allergies:  Allergies as of 02/25/2020 - Reviewed 02/06/2020   Allergen Reaction Noted     Contrast dye  06/30/2017     Diphenhydramine  08/31/2017     Penicillins Rash 01/07/2011     Current Medications:  Current Outpatient Medications   Medication Sig Dispense Refill     cholecalciferol (VITAMIN D3) 5000 units (125 mcg) capsule Take 5,000 Units by mouth daily       Multiple Vitamin (ONE-A-DAY ESSENTIAL) TABS Take 1 tablet by mouth daily.       omeprazole 20 MG tablet Take 1 tablet (20 mg) by mouth daily Take 30-60 minutes before a meal. 30 tablet 1     polyethylene glycol (MIRALAX/GLYCOLAX) Packet Take 1 packet by mouth daily               Physical Exam:    /84 (BP Location: Left arm, Patient Position: Sitting, Cuff Size: Adult Regular)   Pulse 120   Temp  97.8  F (36.6  C) (Oral)   Resp 16   Wt 58.1 kg (128 lb 1.6 oz)   SpO2 99%   BMI 21.99 kg/m         GENERAL APPEARANCE:  alert and no distress     NECK: no adenopathy, no asymmetry or masses     LYMPHATICS: No cervical, supraclavicular, axillary lymphadenopathy     RESP: lungs clear to auscultation - no rales, rhonchi or wheezes     CARDIOVASCULAR: regular rates and rhythm, normal S1 S2, no S3 or S4 and no murmur.     ABDOMEN:  soft, nontender, no HSM or masses and bowel sounds normal     MUSCULOSKELETAL: extremities normal- no gross deformities noted, no evidence of inflammation in joints, FROM in all extremities. No edema b/l LE. + slight RUE lymphedema, RUE circumference in the arm slightly more than LUE, unchanged compared to previous. No right chest wall or axillary edema.     SKIN: no suspicious lesions or rashes     PSYCHIATRIC: mentation appears normal and affect normal  CHest wall: Skin changes c/w XRT Right chest wall.  No chest wall masses b/l. No axillary lymphadenopathy b/l.    Laboratory/Imaging Studies    Component      Latest Ref Rng & Units 2/25/2020   RBC Count      3.8 - 5.2 10e12/L 4.51   Hemoglobin      11.7 - 15.7 g/dL 14.1   Hematocrit      35.0 - 47.0 % 40.2   MCV      78 - 100 fl 89   MCH      26.5 - 33.0 pg 31.3   MCHC      31.5 - 36.5 g/dL 35.1   RDW      10.0 - 15.0 % 11.9   Platelet Count      150 - 450 10e9/L 397   % Neutrophils      % 61.5   % Lymphocytes      % 27.1   % Monocytes      % 9.6   % Eosinophils      % 1.0   % Basophils      % 0.7   % Immature Granulocytes      % 0.1   Absolute Neutrophil      1.6 - 8.3 10e9/L 4.3   Absolute Lymphocytes      0.8 - 5.3 10e9/L 1.9   Absolute Monocytes      0.0 - 1.3 10e9/L 0.7   Absolute Eosinophils      0.0 - 0.7 10e9/L 0.1   Absolute Basophils      0.0 - 0.2 10e9/L 0.1   Abs Immature Granulocytes      0 - 0.4 10e9/L 0.0   Diff Method       Automated Method   Sodium      133 - 144 mmol/L 138   Potassium      3.4 - 5.3 mmol/L 4.4    Chloride      94 - 109 mmol/L 103   Carbon Dioxide      20 - 32 mmol/L 26   Anion Gap      3 - 14 mmol/L 9   Glucose      70 - 99 mg/dL 94   Urea Nitrogen      7 - 30 mg/dL 14   Creatinine      0.52 - 1.04 mg/dL 0.59   GFR Estimate      >60 mL/min/1.73:m2 >90   GFR Estimate If Black      >60 mL/min/1.73:m2 >90   Calcium      8.5 - 10.1 mg/dL 10.0   Bilirubin Total      0.2 - 1.3 mg/dL 0.7   Albumin      3.4 - 5.0 g/dL 4.3   Protein Total      6.8 - 8.8 g/dL 8.6   Alkaline Phosphatase      40 - 150 U/L 54   ALT      0 - 50 U/L 21   AST      0 - 45 U/L 19     CA-27-29 level pending.      ASSESSMENT/PLAN:  Sheryle is a 49 year old premenopausal woman with  stage IIB, T2N1 multifocalhigh grade, strongly ER and UT positive, Her2 Adeel negative by FISH invasive ductal carcinoma of the right breast.  Final pathology showed 2 foci at 3 cm as well as separate 0.7 cm invasive ductal carcinoma, [place] grade III, poorly differentiated. She is s/p Bilateral simple mastectomies and right axillary sentinel node biopsy on 10/4/2017 by Dr. Gonzalez. One lymph node removed, having a 1.2 cm metastatic deposit.  The patient declined further axillary node dissection. She has declined adjuvant chemotherapy. She completed radiation to right chest wall, R SCV LNs  and axilla on 1/3/2018. She has declined a staging PET/CT scan (even without contrast). She met with Dr. Meneses and has not decided in favor of prophylactic BSO. She has declined and continues to decline adjuvant hormonal therapy.      1.  Stage IIB, T2N1 multifocal invasive ductal carcinoma of the right breast. She prefers to be observed closely. Since she is at high risk for disease recurrence because she declined ALND dissection, adjuvant chemotherapy and adjuvant hormonal therapy, I would recommend that she be followed by medical oncology at the minimum q 6 months until she is at least 5 years out from radiation therapy completion. She would like to leave her f/up open as  they may be moving to another state and she will be establishing care with another medical oncologist there.    2. RUE pain and tingling, minimal edema c/w lymphedema- proceed with RUE US to r/o DVT since symptoms are new. Compression sleeve for RUE lymphedema- Rx given. She is declining revisit with lymphedema therapist.   Nerve conduction studies planned for next week.   We'll inform the patient of the results and recommendations and  f/up plan based on the results.       3. S/p b/l mastectomy- she is not interested in breast reconstruction surgery.    4.   MUTYH gene mutation carrier (n/a today) -  Carriers of MAP have a very slightly increased risk of colon cancer, which could be as high  (~10-12%) as twice the population risk of 5-6%. It is currently uncertain if a specialized screening plan is necessary for MAP mutation carriers who have do not have a family history of colon cancer.   As Sheryle does not have a first degree relative with colon cancer, she could consider beginning colonoscopies at age 50 and repeated every 10 years, or per colonoscopy findings.    5. Pt requests all future imaging to be noncontrast (n/a today)- She declines any imaging with contrast since she has a contrast allergy to unknown type of contrast but when she was given steroids as premedication for her MRI with contrast, she had blurry vision.     At the end of our visit patient and  verbalized understanding and concurred with the plan.    Addendum: [id number] level within normal limits      Again, thank you for allowing me to participate in the care of your patient.        Sincerely,        Niurka Hoff MD, MD

## 2020-02-28 ENCOUNTER — MEDICAL CORRESPONDENCE (OUTPATIENT)
Dept: HEALTH INFORMATION MANAGEMENT | Facility: CLINIC | Age: 50
End: 2020-02-28

## 2020-02-28 PROBLEM — I89.0 LYMPHEDEMA: Status: ACTIVE | Noted: 2020-02-28

## 2020-03-01 ENCOUNTER — HEALTH MAINTENANCE LETTER (OUTPATIENT)
Age: 50
End: 2020-03-01

## 2020-03-03 ENCOUNTER — TELEPHONE (OUTPATIENT)
Dept: ONCOLOGY | Facility: CLINIC | Age: 50
End: 2020-03-03

## 2020-03-03 NOTE — TELEPHONE ENCOUNTER
Orders for lymphedema right arm sleeve (20-30mmHg) qty 4/yr signed by Dr. Hoff and faxed to [phone].     Monik Plascencia LPN on 3/3/2020 at 10:38 AM

## 2020-03-05 ENCOUNTER — TELEPHONE (OUTPATIENT)
Dept: SPIRITUAL SERVICES | Facility: CLINIC | Age: 50
End: 2020-03-05

## 2020-03-05 NOTE — TELEPHONE ENCOUNTER
SPIRITUAL HEALTH SERVICES Telephone Note  [facility]  [facility]       Reason for Contact: Sheryle Cruse was contacted by phone per her request for spiritual support.     PRIMARY FOCUS:     Emotional/spiritual/Gnosticist distress    Support for coping      ILLNESS CIRCUMSTANCES:   Reviewed documentation. Reflective conversation shared with patient  by phone which integrated elements of illness and family narratives. Offered reflection about her thoughts and concerns and God's love and mercy in our daily lives.     Context of Serious Illness/Symptom(s) - Patient is very concerned about her cancer reoccurring.     Resources for Support - , Jeffrey.     DISTRESS:     Emotional/Spiritual/Existential Distress - Significant family dynamic issues that continues to cause distress.     Sabianist Distress - Patient continues to weigh her obligation to her family based on scripture and is having a hard time being at peace with her decision to move away.     Social/Cultural/Economic Distress - Patient shared that she may have to move away soon to be free of this situation.     SPIRITUAL/Judaism COPING:     Rastafarian/Anjelica - Adventist     Spiritual Practice(s) - Patient asked for prayers for guidance, and protection.     Emotional/Relational/Existential Connections - Due to financial difficulties the patient is unwilling to reach out at my suggestion of counseling at this time.  We reflected together about how she can find a balance and include self care in her action plan. Patient has been to [facility] in the past and did not find that especially helpful.     GOALS OF CARE:    Goals of Care - Spiritual  support and prayer    Meaning/Sense-Making - Not discussed     PLAN: Patient knows that she can call to request a  visit as needed for spiritual support.     April Kelly  Staff

## 2020-03-31 ENCOUNTER — TELEPHONE (OUTPATIENT)
Dept: SPIRITUAL SERVICES | Facility: CLINIC | Age: 50
End: 2020-03-31

## 2020-03-31 NOTE — TELEPHONE ENCOUNTER
SPIRITUAL HEALTH SERVICES Telephone Note  [facility]  Care       Reason for Contact: Sheryle Cruse was contacted by phone per her request for  support.     Visited Sheryle Cruse by telephone for ongoing emotional/spiritual support.  Offered reflective conversation, support, and affirmation.      Illness Narrative - Tomasz shared that she will be moving in [place] out east.      Distress - Job loss for her  during this corona virus pandemic.  Significant change in their lives.  Endings and beginnings.       Coping - Sheryle continues to lean on her migue for support.  Zoya  is supporting her at this time.  She continues to journal and write and feels she is making the right decisions in this move.       Meaning-Making - Her migue guides her decisions.       Plan - Patient  knows that she can request a Spiritual Health encounter as needed.     April Kelly  Staff

## 2020-04-23 ENCOUNTER — TELEPHONE (OUTPATIENT)
Dept: SPIRITUAL SERVICES | Facility: CLINIC | Age: 50
End: 2020-04-23
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
## 2020-05-05 ENCOUNTER — TELEPHONE (OUTPATIENT)
Dept: SPIRITUAL SERVICES | Facility: CLINIC | Age: 50
End: 2020-05-05

## 2020-05-05 NOTE — TELEPHONE ENCOUNTER
SPIRITUAL HEALTH SERVICES  SPIRITUAL ASSESSMENT Telephone  Note  [facility]       REFERRAL SOURCE: Patient called requesting a SH encounter and prayer.     Visited with Sheryle Cruse by telephone  for ongoing emotional/spiritual support.  She shared her  concerns about Covid 19 in her mother's care center.  Offered reflective conversation and support.     PLAN: Patient knows that she can request a Spiritual Health encounter as needed.     April Kelly  Staff

## 2020-05-13 NOTE — PROGRESS NOTES
Outpatient Occupational Therapy Discharge Note     Patient: Sheryle Cruse  : 1970    Beginning/End Dates of Reporting Period:  10/18/2018: eval and tx only, pt did not return    Referring Provider: Katie Billy CNP    Therapy Diagnosis: edema of RUE and RUQ    Client Self Report:   0    Objective Measurements:                          Goals:   Goal Identifier 1   Goal Description Pt will demonstrate [facility] home program including wear of swell spot to right, lateral trunk 4x/wk during day hours, daily PT stretches and skin traction to right trunk to reduce feelings of discomfort and to prevent the on-set of lymphedema.   Target Date 19   Date Met      Progress:     Goal Identifier 2   Goal Description Pt will demonstrate no change in RUE and chest circumference measurements in 90 days.   Target Date 19   Date Met      Progress:     Goal Identifier     Goal Description     Target Date     Date Met      Progress:     Goal Identifier     Goal Description     Target Date     Date Met      Progress:     Goal Identifier     Goal Description     Target Date     Date Met      Progress:       Progress Toward Goals:   Pt only seen for initial evaluation, did not return for treatment.       Plan:  Discharge from therapy.    Discharge:    Reason for Discharge: Patient has failed to schedule further appointments.    Equipment Issued: 0    Discharge Plan: Pt to obtain future lymphedema referral should lymphedema present.

## 2020-05-13 NOTE — ADDENDUM NOTE
Encounter addended by: Brynn Tejeda OT on: 5/13/2020 10:54 AM   Actions taken: Clinical Note Signed, Episode resolved

## 2020-12-14 ENCOUNTER — HEALTH MAINTENANCE LETTER (OUTPATIENT)
Age: 50
End: 2020-12-14

## 2021-04-17 ENCOUNTER — HEALTH MAINTENANCE LETTER (OUTPATIENT)
Age: 51
End: 2021-04-17

## 2021-10-02 ENCOUNTER — HEALTH MAINTENANCE LETTER (OUTPATIENT)
Age: 51
End: 2021-10-02

## 2022-05-14 ENCOUNTER — HEALTH MAINTENANCE LETTER (OUTPATIENT)
Age: 52
End: 2022-05-14

## 2022-06-23 ENCOUNTER — TELEPHONE (OUTPATIENT)
Dept: SPIRITUAL SERVICES | Facility: CLINIC | Age: 52
End: 2022-06-23

## 2022-06-23 NOTE — TELEPHONE ENCOUNTER
SPIRITUAL HEALTH SERVICES Phone Encounter Note  [facility] [place]    Reason for Contact: Pt Sheryle Cruse had a diagnosis of breast cancer and is no longer followed at [facility] in [place].  This author was referred to contact Sheryle by a voice mail she left on the  phone line at [place].    Intervention: Reviewed documentation.  Sheryle shared a personal narrative incorporating feelings, health history and complex family dynamics. She shared that she had spoken with carlene Chen a few years ago and was reaching out about possibly working with her again. Sheryle also spoke of having moved with her  to the [facility], some new health concerns, and negotiating personal boundaries with her mother. She said she hadn't been able to connect with a  there but did have a therapist.    Plan: I explained that April is no longer working with [facility] and it would be more appropriate for Sheryle to find someone locally. I offered reflective listening and emotional support, affirmed experiences, suggested ways to research and contact spiritual and therapeutic counselors in her area, and said a blessing.     remains available.    Rev Mayuri Lopez  Associate   [facility] Phone Line [phone]  [place] (Tuesdays & Thursdays) [phone]

## 2023-01-14 ENCOUNTER — HEALTH MAINTENANCE LETTER (OUTPATIENT)
Age: 53
End: 2023-01-14

## 2023-06-02 ENCOUNTER — HEALTH MAINTENANCE LETTER (OUTPATIENT)
Age: 53
End: 2023-06-02